# Patient Record
Sex: MALE | Race: WHITE | NOT HISPANIC OR LATINO | Employment: OTHER | URBAN - METROPOLITAN AREA
[De-identification: names, ages, dates, MRNs, and addresses within clinical notes are randomized per-mention and may not be internally consistent; named-entity substitution may affect disease eponyms.]

---

## 2019-12-02 ENCOUNTER — NURSE TRIAGE (OUTPATIENT)
Dept: ADMINISTRATIVE | Facility: CLINIC | Age: 44
End: 2019-12-02

## 2019-12-02 NOTE — TELEPHONE ENCOUNTER
"Pt calls with reports of tingling in hands and feet, vision changes, vertigo, mild confusion and "mental fogginess," and headache. Per triage protocol, pt advised to go to ED within the hour. Pt verbalized understanding.    Reason for Disposition   Headache (with neurologic deficit)    Additional Information   Negative: Difficult to awaken or acting confused (e.g., disoriented, slurred speech)   Negative: New neurologic deficit that is present NOW, sudden onset of ANY of the following: * Weakness of the face, arm, or leg on one side of the body * Numbness of the face, arm, or leg on one side of the body * Loss of speech or garbled speech   Negative: Sounds like a life-threatening emergency to the triager   Negative: Confusion, disorientation, or hallucinations is the main symptom   Negative: Dizziness is the main symptom   Negative: Followed a head injury within last 3 days    Protocols used: NEUROLOGIC DEFICIT-A-OH      "

## 2019-12-03 ENCOUNTER — LAB VISIT (OUTPATIENT)
Dept: LAB | Facility: OTHER | Age: 44
End: 2019-12-03
Attending: INTERNAL MEDICINE
Payer: COMMERCIAL

## 2019-12-03 ENCOUNTER — OFFICE VISIT (OUTPATIENT)
Dept: INTERNAL MEDICINE | Facility: CLINIC | Age: 44
End: 2019-12-03
Attending: INTERNAL MEDICINE
Payer: COMMERCIAL

## 2019-12-03 VITALS
SYSTOLIC BLOOD PRESSURE: 118 MMHG | BODY MASS INDEX: 27.36 KG/M2 | HEART RATE: 74 BPM | HEIGHT: 64 IN | DIASTOLIC BLOOD PRESSURE: 68 MMHG | WEIGHT: 160.25 LBS | OXYGEN SATURATION: 97 %

## 2019-12-03 DIAGNOSIS — R42 DIZZINESS: ICD-10-CM

## 2019-12-03 DIAGNOSIS — R41.3 MEMORY LOSS: ICD-10-CM

## 2019-12-03 DIAGNOSIS — H53.2 DIPLOPIA: ICD-10-CM

## 2019-12-03 DIAGNOSIS — Z00.00 ANNUAL PHYSICAL EXAM: Primary | ICD-10-CM

## 2019-12-03 DIAGNOSIS — G62.9 NEUROPATHY: ICD-10-CM

## 2019-12-03 DIAGNOSIS — Z00.00 ANNUAL PHYSICAL EXAM: ICD-10-CM

## 2019-12-03 DIAGNOSIS — R45.89 ANXIETY ABOUT HEALTH: ICD-10-CM

## 2019-12-03 LAB
ALBUMIN SERPL BCP-MCNC: 4.4 G/DL (ref 3.5–5.2)
ALP SERPL-CCNC: 67 U/L (ref 55–135)
ALT SERPL W/O P-5'-P-CCNC: 56 U/L (ref 10–44)
ANION GAP SERPL CALC-SCNC: 12 MMOL/L (ref 8–16)
AST SERPL-CCNC: 29 U/L (ref 10–40)
BASOPHILS # BLD AUTO: 0.04 K/UL (ref 0–0.2)
BASOPHILS NFR BLD: 0.6 % (ref 0–1.9)
BILIRUB SERPL-MCNC: 0.7 MG/DL (ref 0.1–1)
BUN SERPL-MCNC: 12 MG/DL (ref 6–20)
CALCIUM SERPL-MCNC: 9.6 MG/DL (ref 8.7–10.5)
CHLORIDE SERPL-SCNC: 105 MMOL/L (ref 95–110)
CHOLEST SERPL-MCNC: 194 MG/DL (ref 120–199)
CHOLEST/HDLC SERPL: 5.1 {RATIO} (ref 2–5)
CO2 SERPL-SCNC: 25 MMOL/L (ref 23–29)
CREAT SERPL-MCNC: 1 MG/DL (ref 0.5–1.4)
DIFFERENTIAL METHOD: NORMAL
EOSINOPHIL # BLD AUTO: 0.1 K/UL (ref 0–0.5)
EOSINOPHIL NFR BLD: 1.4 % (ref 0–8)
ERYTHROCYTE [DISTWIDTH] IN BLOOD BY AUTOMATED COUNT: 12.6 % (ref 11.5–14.5)
EST. GFR  (AFRICAN AMERICAN): >60 ML/MIN/1.73 M^2
EST. GFR  (NON AFRICAN AMERICAN): >60 ML/MIN/1.73 M^2
ESTIMATED AVG GLUCOSE: 114 MG/DL (ref 68–131)
GLUCOSE SERPL-MCNC: 83 MG/DL (ref 70–110)
HBA1C MFR BLD HPLC: 5.6 % (ref 4–5.6)
HCT VFR BLD AUTO: 47.6 % (ref 40–54)
HDLC SERPL-MCNC: 38 MG/DL (ref 40–75)
HDLC SERPL: 19.6 % (ref 20–50)
HGB BLD-MCNC: 15.5 G/DL (ref 14–18)
IMM GRANULOCYTES # BLD AUTO: 0.01 K/UL (ref 0–0.04)
IMM GRANULOCYTES NFR BLD AUTO: 0.2 % (ref 0–0.5)
LDLC SERPL CALC-MCNC: 122.6 MG/DL (ref 63–159)
LYMPHOCYTES # BLD AUTO: 1.5 K/UL (ref 1–4.8)
LYMPHOCYTES NFR BLD: 23 % (ref 18–48)
MCH RBC QN AUTO: 28.4 PG (ref 27–31)
MCHC RBC AUTO-ENTMCNC: 32.6 G/DL (ref 32–36)
MCV RBC AUTO: 87 FL (ref 82–98)
MONOCYTES # BLD AUTO: 0.7 K/UL (ref 0.3–1)
MONOCYTES NFR BLD: 10.5 % (ref 4–15)
NEUTROPHILS # BLD AUTO: 4.1 K/UL (ref 1.8–7.7)
NEUTROPHILS NFR BLD: 64.3 % (ref 38–73)
NONHDLC SERPL-MCNC: 156 MG/DL
NRBC BLD-RTO: 0 /100 WBC
PLATELET # BLD AUTO: 259 K/UL (ref 150–350)
PMV BLD AUTO: 9.8 FL (ref 9.2–12.9)
POTASSIUM SERPL-SCNC: 4 MMOL/L (ref 3.5–5.1)
PROT SERPL-MCNC: 8 G/DL (ref 6–8.4)
RBC # BLD AUTO: 5.46 M/UL (ref 4.6–6.2)
SODIUM SERPL-SCNC: 142 MMOL/L (ref 136–145)
TRIGL SERPL-MCNC: 167 MG/DL (ref 30–150)
TSH SERPL DL<=0.005 MIU/L-ACNC: 0.42 UIU/ML (ref 0.4–4)
VIT B12 SERPL-MCNC: 308 PG/ML (ref 210–950)
WBC # BLD AUTO: 6.3 K/UL (ref 3.9–12.7)

## 2019-12-03 PROCEDURE — 80061 LIPID PANEL: CPT

## 2019-12-03 PROCEDURE — 84443 ASSAY THYROID STIM HORMONE: CPT

## 2019-12-03 PROCEDURE — 36415 COLL VENOUS BLD VENIPUNCTURE: CPT

## 2019-12-03 PROCEDURE — 86592 SYPHILIS TEST NON-TREP QUAL: CPT

## 2019-12-03 PROCEDURE — 99999 PR PBB SHADOW E&M-EST. PATIENT-LVL IV: ICD-10-PCS | Mod: PBBFAC,,, | Performed by: INTERNAL MEDICINE

## 2019-12-03 PROCEDURE — 84425 ASSAY OF VITAMIN B-1: CPT

## 2019-12-03 PROCEDURE — 85025 COMPLETE CBC W/AUTO DIFF WBC: CPT

## 2019-12-03 PROCEDURE — 82607 VITAMIN B-12: CPT

## 2019-12-03 PROCEDURE — 84207 ASSAY OF VITAMIN B-6: CPT

## 2019-12-03 PROCEDURE — 99386 PREV VISIT NEW AGE 40-64: CPT | Mod: S$GLB,,, | Performed by: INTERNAL MEDICINE

## 2019-12-03 PROCEDURE — 80053 COMPREHEN METABOLIC PANEL: CPT

## 2019-12-03 PROCEDURE — 99386 PR PREVENTIVE VISIT,NEW,40-64: ICD-10-PCS | Mod: S$GLB,,, | Performed by: INTERNAL MEDICINE

## 2019-12-03 PROCEDURE — 99999 PR PBB SHADOW E&M-EST. PATIENT-LVL IV: CPT | Mod: PBBFAC,,, | Performed by: INTERNAL MEDICINE

## 2019-12-03 PROCEDURE — 83036 HEMOGLOBIN GLYCOSYLATED A1C: CPT

## 2019-12-03 NOTE — PROGRESS NOTES
"Subjective:       Patient ID: Rhett Quarles is a 44 y.o. male.    Chief Complaint: Establish Care; Numbness; and Blurred Vision    Here for annual exam    Since oct 30th he developed, "trouble with my brain." Trouble seeing.  A lot of dizziness and vertigo. Serious brain fog. 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 Stopped driving 2 months prior due to dizziness sensation. Bilateral hand numbness that often occurs at night and numbness of feet that occurs while seated. Dizziness triggered by change in vision. 5-6 times in past 10 years. One eye closed helps symptoms, frequent headaches, bright flashes in far periphery of symptoms. He notes his peripheral vision is very sensitive and he has been hyper-aware of items in his periphery. HA are frontal, associated photophobia where he has trouble keeping eyes open.    Cough for the past 2 weeks.     Allergic rhinitis        Review of Systems   Constitutional: Positive for fatigue. Negative for appetite change, chills, fever and unexpected weight change.   HENT: Negative for hearing loss, sore throat and trouble swallowing.    Eyes: Positive for photophobia and visual disturbance.   Respiratory: Negative for cough, chest tightness and shortness of breath.    Cardiovascular: Negative for chest pain and leg swelling.   Gastrointestinal: Negative for abdominal pain, blood in stool, constipation, diarrhea, nausea and vomiting.   Endocrine: Negative for polydipsia and polyuria.   Genitourinary: Negative for decreased urine volume, difficulty urinating, dysuria, frequency and urgency.   Musculoskeletal: Positive for gait problem and neck stiffness. Negative for arthralgias, back pain, myalgias and neck pain.   Skin: Negative for rash.   Neurological: Positive for dizziness, light-headedness, numbness and headaches.   Psychiatric/Behavioral: The patient is nervous/anxious.        History reviewed. No pertinent past medical history.  History reviewed. No " "pertinent surgical history.  History reviewed. No pertinent family history.  Social History     Socioeconomic History    Marital status:      Spouse name: Not on file    Number of children: Not on file    Years of education: Not on file    Highest education level: Not on file   Occupational History    Not on file   Social Needs    Financial resource strain: Not on file    Food insecurity:     Worry: Not on file     Inability: Not on file    Transportation needs:     Medical: Not on file     Non-medical: Not on file   Tobacco Use    Smoking status: Never Smoker    Smokeless tobacco: Never Used   Substance and Sexual Activity    Alcohol use: Yes    Drug use: Never    Sexual activity: Yes     Partners: Male   Lifestyle    Physical activity:     Days per week: Not on file     Minutes per session: Not on file    Stress: Not on file   Relationships    Social connections:     Talks on phone: Not on file     Gets together: Not on file     Attends Mor[employer] service: Not on file     Active member of club or organization: Not on file     Attends meetings of clubs or organizations: Not on file     Relationship status: Not on file   Other Topics Concern    Not on file   Social History Narrative    Not on file         Objective:      Vitals:    12/03/19 1550   BP: 118/68   Pulse: 74   SpO2: 97%   Weight: 72.7 kg (160 lb 4.4 oz)   Height: 5' 4" (1.626 m)      Physical Exam   Constitutional: He is oriented to person, place, and time. He appears well-developed and well-nourished. No distress.   HENT:   Head: Normocephalic and atraumatic.   Mouth/Throat: Oropharynx is clear and moist. No oropharyngeal exudate.   Eyes: Pupils are equal, round, and reactive to light. Conjunctivae and EOM are normal. No scleral icterus.   Neck: No thyromegaly present.   Cardiovascular: Normal rate, regular rhythm and normal heart sounds.   No murmur heard.  Pulmonary/Chest: Effort normal and breath sounds normal. He has no " wheezes. He has no rales.   Abdominal: Soft. He exhibits no distension. There is no tenderness.   Musculoskeletal: He exhibits no edema or tenderness.   Lymphadenopathy:     He has no cervical adenopathy.   Neurological: He is alert and oriented to person, place, and time. He has normal strength. He displays no tremor. No cranial nerve deficit or sensory deficit. He exhibits normal muscle tone. He displays a negative Romberg sign. He displays no seizure activity. Coordination normal.   Reflex Scores:       Brachioradialis reflexes are 2+ on the right side and 2+ on the left side.       Patellar reflexes are 2+ on the right side and 2+ on the left side.  No pronator drift; FTN intact   Skin: Skin is warm and dry.   Psychiatric: He has a normal mood and affect. His behavior is normal.       Assessment:       1. Annual physical exam    2. Diplopia    3. Memory loss    4. Neuropathy    5. Anxiety about health    6. Dizziness        Plan:       Rhett was seen today for establish care, numbness and blurred vision.    Diagnoses and all orders for this visit:    Annual physical exam  -     Comprehensive metabolic panel; Future  -     Lipid panel; Future  -     TSH; Future  -     CBC auto differential; Future  -     Hemoglobin A1c; Future    Diplopia   Need to r/o intrinsic eye pathology. CT to r/o mass affect. Will defer to neuro the extent of imaging moving forward for all neuro symptoms (MRI brain vs MS protocol?)  -     CT Head Without Contrast; Future  -     Ambulatory Referral to Neurology  -     Ambulatory Referral to Optometry    Memory loss  -     Vitamin B12; Future  -     Vitamin B1; Future    Neuropathy  -     TSH; Future  -     Hemoglobin A1c; Future  -     Vitamin B12; Future  -     Vitamin B1; Future  -     VITAMIN B6; Future  -     RPR; Future    Anxiety about health    Dizziness       RTC in 6 months or sooner prn       Side effects of medication(s) were discussed in detail and patient voiced understanding.   Patient will call back for any issues or complications.

## 2019-12-04 ENCOUNTER — PATIENT MESSAGE (OUTPATIENT)
Dept: INTERNAL MEDICINE | Facility: CLINIC | Age: 44
End: 2019-12-04

## 2019-12-04 ENCOUNTER — HOSPITAL ENCOUNTER (OUTPATIENT)
Dept: RADIOLOGY | Facility: OTHER | Age: 44
Discharge: HOME | End: 2019-12-04
Attending: INTERNAL MEDICINE
Payer: COMMERCIAL

## 2019-12-04 DIAGNOSIS — H53.2 DIPLOPIA: ICD-10-CM

## 2019-12-04 PROCEDURE — 70450 CT HEAD/BRAIN W/O DYE: CPT | Mod: 26,,, | Performed by: RADIOLOGY

## 2019-12-04 PROCEDURE — 70450 CT HEAD WITHOUT CONTRAST: ICD-10-PCS | Mod: 26,,, | Performed by: RADIOLOGY

## 2019-12-04 PROCEDURE — 70450 CT HEAD/BRAIN W/O DYE: CPT | Mod: TC

## 2019-12-05 LAB — RPR SER QL: NORMAL

## 2019-12-09 LAB
PYRIDOXAL SERPL-MCNC: 10 UG/L (ref 5–50)
VIT B1 BLD-MCNC: 62 UG/L (ref 38–122)

## 2019-12-11 ENCOUNTER — OFFICE VISIT (OUTPATIENT)
Dept: OPTOMETRY | Facility: CLINIC | Age: 44
End: 2019-12-11
Payer: COMMERCIAL

## 2019-12-11 DIAGNOSIS — H53.2 DIPLOPIA: Primary | ICD-10-CM

## 2019-12-11 DIAGNOSIS — H53.50 COLOR DEFICIENCY: ICD-10-CM

## 2019-12-11 PROCEDURE — 92004 PR EYE EXAM, NEW PATIENT,COMPREHESV: ICD-10-PCS | Mod: S$GLB,,, | Performed by: OPTOMETRIST

## 2019-12-11 PROCEDURE — 99999 PR PBB SHADOW E&M-EST. PATIENT-LVL II: ICD-10-PCS | Mod: PBBFAC,,, | Performed by: OPTOMETRIST

## 2019-12-11 PROCEDURE — 99999 PR PBB SHADOW E&M-EST. PATIENT-LVL II: CPT | Mod: PBBFAC,,, | Performed by: OPTOMETRIST

## 2019-12-11 PROCEDURE — 92004 COMPRE OPH EXAM NEW PT 1/>: CPT | Mod: S$GLB,,, | Performed by: OPTOMETRIST

## 2019-12-11 NOTE — LETTER
December 12, 2019      Bryce Robert MD  [address]  [address]  [facility] [address]           [facility]  [address]  [facility] [address]  Phone: [phone]          Patient: Rhett Quarles   MR Number: [id number]   YOB: 1975   Date of Visit: 12/11/2019       Dear Dr. Bryce Robert:    Thank you for referring Rhett Quarles to me for evaluation. Attached you will find relevant portions of my assessment and plan of care.    If you have questions, please do not hesitate to call me. I look forward to following Rhett Quarles along with you.    Sincerely,    Mabel Sharma, OD    Enclosure  CC:  No Recipients    If you would like to receive this communication electronically, please contact [email or web address] or [phone] to request more information on [employer] Link access.    For providers and/or their staff who would like to refer a patient to [facility], please contact us through our one-stop-shop provider referral line, [facility], at [phone].    If you feel you have received this communication in error or would no longer like to receive these types of communications, please e-mail [email or web address]

## 2019-12-12 ENCOUNTER — CLINICAL SUPPORT (OUTPATIENT)
Dept: OPHTHALMOLOGY | Facility: CLINIC | Age: 44
End: 2019-12-12
Attending: OPTOMETRIST
Payer: COMMERCIAL

## 2019-12-12 DIAGNOSIS — H53.2 DIPLOPIA: ICD-10-CM

## 2019-12-12 PROCEDURE — 92133 CPTRZD OPH DX IMG PST SGM ON: CPT | Mod: S$GLB,,, | Performed by: OPTOMETRIST

## 2019-12-12 PROCEDURE — 92133 OCT, OPTIC NERVE - OU - BOTH EYES: ICD-10-PCS | Mod: S$GLB,,, | Performed by: OPTOMETRIST

## 2019-12-12 NOTE — PROGRESS NOTES
"HPI     Ocular health exam per PCP   KEN - over 15 yrs ago     Having dizziness and vertigo consistent x1 mo feels like eyes are shifting   around and not working together. "Eyes feel like they wont stop moving"    had to stop driving while trying to figure out what's going on.  Hx of glasses at near. Wearing w/ prism, 16 yrs old wears for reading   vision only.   Sharp Stabbing as well as pressure pain in OS started around the same time   as the vertigo x1 mo and is very random a few times a day.   Has double va when looking at near only while relaxed but can self correct   it (side to side). Occurring for many years. Hurts to look at near when   not wearing glasses.   Has neuro appt next week.    Double va has been since last 16 year old exam and occasional vertigo   since but nothing consistent since 1 mo ago.   (+)Flashes OS in am x 1 mo (+)Floaters  (-)Itch, (-)tear, (-)burn, (-)Dryness.  (-) OTC Drops-  (+)Photophobia no more than normal   (-)Glare      No eye disease and no famohx     No past eye sx     -DM -HTN     No medication have been started in regards to vertigo. CT WNL. Seeing   neuro next week.   Sensitive to things in peripheral vision.   Feels like things are constantly moving in vision.     Last edited by Mabel Sharma, OD on 12/12/2019  4:09 PM. (History)            Assessment /Plan     For exam results, see Encounter Report.    Diplopia  -     OCT, Optic Nerve - OU - Both Eyes; Future    Color deficiency      H/o of diplopia at near for ~15 years. Uses +1.00 reading glasses with BI prism, which eliminates diplopia at near. Near diplopia has not worsened since vertigo episode. (-)diplopia in the distance.   Recent onset of vertigo and dizziness with the sensation of "swimming". Very sensitive to objects or movement in peripheral vision. No h/o concussion.   (-)pain on eye movements. (-)new diplopia. (-)optic nerve edema or pallor. 20/20 OD, OS, OU. J1 at near with and without SRx. RNFL OCT " performed, WNL OU.    H/o color deficiency since childhood. Pt reports he has done poorly on the color vision test in the past. OD = OS.       Continue care/management/treatment with neuro in regards to new onset vertigo. No ocular pathology to explain symptoms. Diplopia at near longstanding and corrected with prim due to binocular misalignment.     RTC for glasses check if so desired or yearly for DFE or sooner if needed.

## 2019-12-18 ENCOUNTER — LAB VISIT (OUTPATIENT)
Dept: LAB | Facility: HOSPITAL | Age: 44
End: 2019-12-18
Attending: NEUROMUSCULOSKELETAL MEDICINE & OMM
Payer: COMMERCIAL

## 2019-12-18 ENCOUNTER — OFFICE VISIT (OUTPATIENT)
Dept: NEUROLOGY | Facility: CLINIC | Age: 44
End: 2019-12-18
Payer: COMMERCIAL

## 2019-12-18 VITALS
BODY MASS INDEX: 27.21 KG/M2 | WEIGHT: 159.38 LBS | SYSTOLIC BLOOD PRESSURE: 136 MMHG | HEART RATE: 60 BPM | HEIGHT: 64 IN | DIASTOLIC BLOOD PRESSURE: 86 MMHG

## 2019-12-18 DIAGNOSIS — R53.83 OTHER FATIGUE: ICD-10-CM

## 2019-12-18 DIAGNOSIS — H53.2 DIPLOPIA: ICD-10-CM

## 2019-12-18 DIAGNOSIS — H53.2 DIPLOPIA: Primary | ICD-10-CM

## 2019-12-18 LAB
25(OH)D3+25(OH)D2 SERPL-MCNC: 28 NG/ML (ref 30–96)
ERYTHROCYTE [SEDIMENTATION RATE] IN BLOOD BY WESTERGREN METHOD: 5 MM/HR (ref 0–23)
FOLATE SERPL-MCNC: 8 NG/ML (ref 4–24)

## 2019-12-18 PROCEDURE — 83519 RIA NONANTIBODY: CPT

## 2019-12-18 PROCEDURE — 3008F BODY MASS INDEX DOCD: CPT | Mod: CPTII,S$GLB,, | Performed by: NEUROMUSCULOSKELETAL MEDICINE & OMM

## 2019-12-18 PROCEDURE — 86235 NUCLEAR ANTIGEN ANTIBODY: CPT

## 2019-12-18 PROCEDURE — 99204 PR OFFICE/OUTPT VISIT, NEW, LEVL IV, 45-59 MIN: ICD-10-PCS | Mod: S$GLB,,, | Performed by: NEUROMUSCULOSKELETAL MEDICINE & OMM

## 2019-12-18 PROCEDURE — 86038 ANTINUCLEAR ANTIBODIES: CPT

## 2019-12-18 PROCEDURE — 86147 CARDIOLIPIN ANTIBODY EA IG: CPT

## 2019-12-18 PROCEDURE — 36415 COLL VENOUS BLD VENIPUNCTURE: CPT | Mod: PO

## 2019-12-18 PROCEDURE — 85652 RBC SED RATE AUTOMATED: CPT

## 2019-12-18 PROCEDURE — 82306 VITAMIN D 25 HYDROXY: CPT

## 2019-12-18 PROCEDURE — 99204 OFFICE O/P NEW MOD 45 MIN: CPT | Mod: S$GLB,,, | Performed by: NEUROMUSCULOSKELETAL MEDICINE & OMM

## 2019-12-18 PROCEDURE — 84165 PROTEIN E-PHORESIS SERUM: CPT

## 2019-12-18 PROCEDURE — 84165 PROTEIN E-PHORESIS SERUM: CPT | Mod: 26,,, | Performed by: PATHOLOGY

## 2019-12-18 PROCEDURE — 99999 PR PBB SHADOW E&M-EST. PATIENT-LVL III: CPT | Mod: PBBFAC,,, | Performed by: NEUROMUSCULOSKELETAL MEDICINE & OMM

## 2019-12-18 PROCEDURE — 99999 PR PBB SHADOW E&M-EST. PATIENT-LVL III: ICD-10-PCS | Mod: PBBFAC,,, | Performed by: NEUROMUSCULOSKELETAL MEDICINE & OMM

## 2019-12-18 PROCEDURE — 86618 LYME DISEASE ANTIBODY: CPT

## 2019-12-18 PROCEDURE — 82746 ASSAY OF FOLIC ACID SERUM: CPT

## 2019-12-18 PROCEDURE — 84165 PATHOLOGIST INTERPRETATION SPE: ICD-10-PCS | Mod: 26,,, | Performed by: PATHOLOGY

## 2019-12-18 PROCEDURE — 3008F PR BODY MASS INDEX (BMI) DOCUMENTED: ICD-10-PCS | Mod: CPTII,S$GLB,, | Performed by: NEUROMUSCULOSKELETAL MEDICINE & OMM

## 2019-12-18 NOTE — PROGRESS NOTES
This note was dictated with Modal Fluency a word recognition program. There are occasionally word recognition errors which are missed on review.  Rhett Ng Willam  1975  Review of patient's allergies indicates:  No Known Allergies  [unfilled]    No past medical history on file.  Social History     Socioeconomic History    Marital status:      Spouse name: Not on file    Number of children: Not on file    Years of education: Not on file    Highest education level: Not on file   Occupational History    Not on file   Social Needs    Financial resource strain: Not on file    Food insecurity:     Worry: Not on file     Inability: Not on file    Transportation needs:     Medical: Not on file     Non-medical: Not on file   Tobacco Use    Smoking status: Never Smoker    Smokeless tobacco: Never Used   Substance and Sexual Activity    Alcohol use: Yes    Drug use: Never    Sexual activity: Yes     Partners: Male   Lifestyle    Physical activity:     Days per week: Not on file     Minutes per session: Not on file    Stress: Not on file   Relationships    Social connections:     Talks on phone: Not on file     Gets together: Not on file     Attends Baptism service: Not on file     Active member of club or organization: Not on file     Attends meetings of clubs or organizations: Not on file     Relationship status: Not on file   Other Topics Concern    Not on file   Social History Narrative    Not on file     No family history on file.    Review of systems:  Constitutional-negative  Eyes-negative  ENT, mouth-negative  Cardiovascular-negative  Respiratory-negative  GI-negative  - negative  Musculoskeletal-negative  Skin-negative  Neurologic-negative  Psychiatric-negative  Endocrine-negative  Hematology/lymph nodes-negative  Allergies/immunology-negative  Rhett Ng Willam  1975  Review of patient's allergies indicates:  No Known Allergies  [unfilled]    No past medical  history on file.  Social History     Socioeconomic History    Marital status:      Spouse name: Not on file    Number of children: Not on file    Years of education: Not on file    Highest education level: Not on file   Occupational History    Not on file   Social Needs    Financial resource strain: Not on file    Food insecurity:     Worry: Not on file     Inability: Not on file    Transportation needs:     Medical: Not on file     Non-medical: Not on file   Tobacco Use    Smoking status: Never Smoker    Smokeless tobacco: Never Used   Substance and Sexual Activity    Alcohol use: Yes    Drug use: Never    Sexual activity: Yes     Partners: Male   Lifestyle    Physical activity:     Days per week: Not on file     Minutes per session: Not on file    Stress: Not on file   Relationships    Social connections:     Talks on phone: Not on file     Gets together: Not on file     Attends Zoroastrianism service: Not on file     Active member of club or organization: Not on file     Attends meetings of clubs or organizations: Not on file     Relationship status: Not on file   Other Topics Concern    Not on file   Social History Narrative    Not on file     No family history on file.    Review of systems:  Constitutional-negative  Eyes-negative  ENT, mouth-negative  Cardiovascular-negative  Respiratory-negative  GI-negative  - negative  Musculoskeletal-negative  Skin-negative  Neurologic-negative  Psychiatric-negative  Endocrine-negative  Hematology/lymph nodes-negative  Allergies/immunology-negative  Gen. Appearance: Well-developed with no obvious deformities  Carotid arteries symmetrical pulses  Peripheral vascular shows symmetrical pulses with no obvious edema or tenderness  Social History :  Patient is a .  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  Present history:   This is a 44-year-old anxious white male who presents with a history of multiple neurologic symptoms including diplopia,  spinning dizziness, and numbness in the feet.  He has problems with fatigue. He has had frequent spells of the dizziness associated with double vision.  The double vision is persistent and is more present possibly on the left lateral days versus right.  He feels the spell started around October 30th.  He has not had an MRI of the brain recently although reportedly had a CT scan that was negative. He relates probably 8-9 spells in the last 10-15 years.    Neurological Exam:  Mental status-alert and oriented to person, place, and time; attention span and concentration is good. Fund of knowledge-patient is aware of current events and able to give detailed history of the current problem.recent and remote memory seems intact. Language function is normal with no evidence of aphasia  Cranial nerves:Visual acuity to hand chart -normal; visual fields to confrontation normal;pupils were equal and reactive to light ;no evidence of ptosis ;  funduscopic examination was normal with sharp disc margins. external ocular movements were full with no nystagmus. He does have left and right lateral gaze diplopia at close distance.  Facial sensation to pinprick : normal ; corneal reflexes intact; Facial muscles were symmetrical. Hearing is unimpaired symmetrical finger rub; Tongue movements - normal ; palate movements - normal ;Swallowing unimpaired. Shoulder shrug was intact with good strength Speech was normal  Motor examination: Upper : normal                                      Lower extremities - Normal;muscle tone was normal ;                  Right-handed  Sensory examination:   Upper; normal pinprick and soft touch ;   Lower extremities - normal and symmetrical.   Vibration sense: 15-20 seconds @ toes  Deep tendon reflexes: upper extremities :1-2+ symmetrical ;     lower extremities KJ- 1-2 +; AJ - 1-2+ Both plantar responses were flexor  Cerebellar examination upper: Normal finger to nose and rapid alternating movements  Gait:  Steady with no ataxia;      heel and toe walk normal  Romberg test: negative       Tandem gait: Normal    Involuntary movements: Negative  TMJ - no tenderness  Cervical examination: Full range of motion with no pain Cervical tenderness :negative  Lumbar examination: Low back tenderness-negative                  Sciatic notchtenderness-negative            Straight leg raising : negative    Impression:  Diplopia,  vertigo, fatigue    Recommendations/Plan :  Long discussion with the patient in reference to etiology of his symptoms.  He is quite concerned that he might have multiple sclerosis.  He has not had an MRI of the brain recently so we will order that with and without contrast.  I have emphasized the importance of a negative CAT scan ruling out any serious tumors or strokes.  Patient continues to be extremely anxious over his medical condition.  We will also order blood work to rule out myasthenia gravis and autoimmune disorders.

## 2019-12-18 NOTE — LETTER
December 18, 2019      Bryce Robert MD  [address]  [address]  [facility] [address]           [facility]  [address] [facility]  [facility] [place] [address]  Phone: [phone]          Patient: Rhett Quarles   MR Number: [id number]   YOB: 1975   Date of Visit: 12/18/2019       Dear Dr. Bryce Robert:    Thank you for referring Rhett Quarles to me for evaluation. Attached you will find relevant portions of my assessment and plan of care.    If you have questions, please do not hesitate to call me. I look forward to following Rhett Quarles along with you.    Sincerely,    Aakash Mora MD    Enclosure  CC:  No Recipients    If you would like to receive this communication electronically, please contact [email or web address] or [phone] to request more information on [employer] access.    For providers and/or their staff who would like to refer a patient to [facility], please contact us through our one-stop-shop provider referral line, [facility] [place], at [phone].    If you feel you have received this communication in error or would no longer like to receive these types of communications, please e-mail [email or web address]

## 2019-12-19 LAB
ALBUMIN SERPL ELPH-MCNC: 4.32 G/DL (ref 3.35–5.55)
ALPHA1 GLOB SERPL ELPH-MCNC: 0.26 G/DL (ref 0.17–0.41)
ALPHA2 GLOB SERPL ELPH-MCNC: 0.61 G/DL (ref 0.43–0.99)
ANA SER QL IF: NORMAL
B-GLOBULIN SERPL ELPH-MCNC: 0.87 G/DL (ref 0.5–1.1)
GAMMA GLOB SERPL ELPH-MCNC: 0.84 G/DL (ref 0.67–1.58)
PATHOLOGIST INTERPRETATION SPE: NORMAL
PROT SERPL-MCNC: 6.9 G/DL (ref 6–8.4)

## 2019-12-20 LAB
B BURGDOR AB SER IA-ACNC: 0.2 INDEX VALUE
CARDIOLIPIN IGG SER IA-ACNC: <9.4 GPL (ref 0–14.99)
CARDIOLIPIN IGM SER IA-ACNC: <9.4 MPL (ref 0–12.49)

## 2019-12-21 LAB
ANTI-SSA ANTIBODY: 0.08 EU (ref 0–19.99)
ANTI-SSA INTERPRETATION: NEGATIVE

## 2019-12-23 LAB — ACHR BIND AB SER-SCNC: 0 NMOL/L

## 2019-12-26 ENCOUNTER — PATIENT MESSAGE (OUTPATIENT)
Dept: NEUROLOGY | Facility: CLINIC | Age: 44
End: 2019-12-26

## 2020-01-03 ENCOUNTER — HOSPITAL ENCOUNTER (OUTPATIENT)
Dept: RADIOLOGY | Facility: OTHER | Age: 45
Discharge: HOME | End: 2020-01-03
Attending: NEUROMUSCULOSKELETAL MEDICINE & OMM
Payer: COMMERCIAL

## 2020-01-03 DIAGNOSIS — H53.2 DIPLOPIA: ICD-10-CM

## 2020-01-03 PROCEDURE — 70553 MRI PITUITARY W W/O CONTRAST: ICD-10-PCS | Mod: 26,,, | Performed by: RADIOLOGY

## 2020-01-03 PROCEDURE — 70553 MRI BRAIN STEM W/O & W/DYE: CPT | Mod: TC

## 2020-01-03 PROCEDURE — A9585 GADOBUTROL INJECTION: HCPCS | Performed by: NEUROMUSCULOSKELETAL MEDICINE & OMM

## 2020-01-03 PROCEDURE — 70553 MRI BRAIN STEM W/O & W/DYE: CPT | Mod: 26,,, | Performed by: RADIOLOGY

## 2020-01-03 PROCEDURE — 25500020 PHARM REV CODE 255: Performed by: NEUROMUSCULOSKELETAL MEDICINE & OMM

## 2020-01-03 RX ORDER — GADOBUTROL 604.72 MG/ML
7 INJECTION INTRAVENOUS
Status: COMPLETED | OUTPATIENT
Start: 2020-01-03 | End: 2020-01-03

## 2020-01-03 RX ADMIN — GADOBUTROL 7 ML: 604.72 INJECTION INTRAVENOUS at 03:01

## 2020-01-06 ENCOUNTER — PATIENT MESSAGE (OUTPATIENT)
Dept: NEUROLOGY | Facility: CLINIC | Age: 45
End: 2020-01-06

## 2020-01-06 DIAGNOSIS — E23.7 PITUITARY LESION: Primary | ICD-10-CM

## 2020-01-06 DIAGNOSIS — H53.2 DIPLOPIA: ICD-10-CM

## 2020-01-07 ENCOUNTER — OFFICE VISIT (OUTPATIENT)
Dept: NEUROLOGY | Facility: CLINIC | Age: 45
End: 2020-01-07
Payer: COMMERCIAL

## 2020-01-07 VITALS
HEIGHT: 64 IN | SYSTOLIC BLOOD PRESSURE: 130 MMHG | BODY MASS INDEX: 27.7 KG/M2 | DIASTOLIC BLOOD PRESSURE: 92 MMHG | HEART RATE: 77 BPM | WEIGHT: 162.25 LBS

## 2020-01-07 DIAGNOSIS — D35.2 PITUITARY ADENOMA: Primary | ICD-10-CM

## 2020-01-07 DIAGNOSIS — R20.0 HAND NUMBNESS: ICD-10-CM

## 2020-01-07 PROCEDURE — 3008F PR BODY MASS INDEX (BMI) DOCUMENTED: ICD-10-PCS | Mod: CPTII,S$GLB,, | Performed by: NEUROMUSCULOSKELETAL MEDICINE & OMM

## 2020-01-07 PROCEDURE — 99215 PR OFFICE/OUTPT VISIT, EST, LEVL V, 40-54 MIN: ICD-10-PCS | Mod: S$GLB,,, | Performed by: NEUROMUSCULOSKELETAL MEDICINE & OMM

## 2020-01-07 PROCEDURE — 99215 OFFICE O/P EST HI 40 MIN: CPT | Mod: S$GLB,,, | Performed by: NEUROMUSCULOSKELETAL MEDICINE & OMM

## 2020-01-07 PROCEDURE — 99999 PR PBB SHADOW E&M-EST. PATIENT-LVL III: ICD-10-PCS | Mod: PBBFAC,,, | Performed by: NEUROMUSCULOSKELETAL MEDICINE & OMM

## 2020-01-07 PROCEDURE — 3008F BODY MASS INDEX DOCD: CPT | Mod: CPTII,S$GLB,, | Performed by: NEUROMUSCULOSKELETAL MEDICINE & OMM

## 2020-01-07 PROCEDURE — 99999 PR PBB SHADOW E&M-EST. PATIENT-LVL III: CPT | Mod: PBBFAC,,, | Performed by: NEUROMUSCULOSKELETAL MEDICINE & OMM

## 2020-01-07 NOTE — PROGRESS NOTES
Patient presents for follow-up accompanied by his wife today MRI brain shows a pituitary lesion suggestive of a microadenoma.  See MRI details in epic.  All blood work has been negative for myasthenia an autoimmune diseases.  Patient continues to complain of some diplopia and blurred vision. He has numbness and tingling in the hands.  He came planes of occasional leg weakness as well.  Previous note:  12-18-19:  This is a 44-year-old anxious white male who presents with a history of multiple neurologic symptoms including diplopia, spinning dizziness, and numbness in the feet.  He has problems with fatigue. He has had frequent spells of the dizziness associated with double vision.  The double vision is persistent and is more present possibly on the left lateral days versus right.  He feels the spell started around October 30th.  He has not had an MRI of the brain recently although reportedly had a CT scan that was negative. He relates probably 8-9 spells in the last 10-15 years.     Neurological Exam:  Mental status-alert and oriented to person, place, and time; attention span and concentration is good. Fund of knowledge-patient is aware of current events and able to give detailed history of the current problem.recent and remote memory seems intact. Language function is normal with no evidence of aphasia  Cranial nerves:Visual acuity to hand chart -normal; visual fields to confrontation normal;pupils were equal and reactive to light ;no evidence of ptosis ;  funduscopic examination was normal with sharp disc margins. external ocular movements were full with no nystagmus. He does have left and right lateral gaze diplopia at close distance only which seems to have improved since last visit..  Facial sensation to pinprick : normal ; corneal reflexes intact; Facial muscles were symmetrical. Hearing is unimpaired symmetrical finger rub; Tongue movements - normal ; palate movements - normal ;Swallowing unimpaired. Shoulder  shrug was intact with good strength Speech was normal  Motor examination: Upper : normal                                      Lower extremities - Normal;muscle tone was normal ;                  Right-handed  Sensory examination:   Upper; normal pinprick and soft touch ;   Lower extremities - normal and symmetrical.   Vibration sense: 15-20 seconds @ toes  Deep tendon reflexes: upper extremities :1-2+ symmetrical ;     lower extremities KJ- 1-2 +; AJ - 1-2+ Both plantar responses were flexor  Cerebellar examination upper: Normal finger to nose and rapid alternating movements  Gait: Steady with no ataxia;      heel and toe walk normal  Romberg test: negative       Tandem gait: Normal    Involuntary movements: Negative  TMJ - no tenderness  Cervical examination: Full range of motion with no pain Cervical tenderness :negative  Lumbar examination: Low back tenderness-negative                  Sciatic notchtenderness-negative            Straight leg raising : negative     Impression:   pituitary lesion on MRI suggestive of microadenoma.  Diplopia,  vertigo, fatigue     Recommendations/Plan :  Long discussion with the patient in reference to  options including Neurosurgery and Endocrinology referral.  We will schedule nerve studies for hand numbness possibly related to carpal tunnel syndrome.  Patient is told to call if he has any further symptoms or aggravation of his double vision. He has already seen the eye doctor with a apparently normal visual fields.  Follow-up with me in 2 months if needed.

## 2020-01-08 ENCOUNTER — TELEPHONE (OUTPATIENT)
Dept: ENDOCRINOLOGY | Facility: CLINIC | Age: 45
End: 2020-01-08

## 2020-01-08 ENCOUNTER — PATIENT MESSAGE (OUTPATIENT)
Dept: NEUROLOGY | Facility: CLINIC | Age: 45
End: 2020-01-08

## 2020-01-08 DIAGNOSIS — D35.2 PITUITARY ADENOMA: Primary | ICD-10-CM

## 2020-01-08 NOTE — TELEPHONE ENCOUNTER
Reached out to Jag Timmons regarding an appointment with Dr Franz in [facility]uitary [facility]. Jag will coordinate appointments in Pituitary will Dr Franz and Dr Argueta.

## 2020-01-09 ENCOUNTER — TELEPHONE (OUTPATIENT)
Dept: ENDOCRINOLOGY | Facility: CLINIC | Age: 45
End: 2020-01-09

## 2020-01-09 NOTE — TELEPHONE ENCOUNTER
Spoke with pt. Scheduled pituitary labs and appts with Daksha Argueta and Osei. Pt in agreement with plan. Will call back with any questions or concerns prior to appts.

## 2020-01-10 ENCOUNTER — LAB VISIT (OUTPATIENT)
Dept: LAB | Facility: OTHER | Age: 45
End: 2020-01-10
Attending: INTERNAL MEDICINE
Payer: COMMERCIAL

## 2020-01-10 DIAGNOSIS — D35.2 PITUITARY ADENOMA: ICD-10-CM

## 2020-01-10 LAB
ALBUMIN SERPL BCP-MCNC: 4.1 G/DL (ref 3.5–5.2)
ALP SERPL-CCNC: 79 U/L (ref 55–135)
ALT SERPL W/O P-5'-P-CCNC: 57 U/L (ref 10–44)
ANION GAP SERPL CALC-SCNC: 11 MMOL/L (ref 8–16)
AST SERPL-CCNC: 29 U/L (ref 10–40)
BILIRUB SERPL-MCNC: 0.7 MG/DL (ref 0.1–1)
BUN SERPL-MCNC: 10 MG/DL (ref 6–20)
CALCIUM SERPL-MCNC: 9.3 MG/DL (ref 8.7–10.5)
CHLORIDE SERPL-SCNC: 104 MMOL/L (ref 95–110)
CO2 SERPL-SCNC: 27 MMOL/L (ref 23–29)
CORTIS SERPL-MCNC: 8.7 UG/DL (ref 4.3–22.4)
CREAT SERPL-MCNC: 0.9 MG/DL (ref 0.5–1.4)
EST. GFR  (AFRICAN AMERICAN): >60 ML/MIN/1.73 M^2
EST. GFR  (NON AFRICAN AMERICAN): >60 ML/MIN/1.73 M^2
FSH SERPL-ACNC: 2 MIU/ML (ref 0.95–11.95)
GLUCOSE SERPL-MCNC: 93 MG/DL (ref 70–110)
LH SERPL-ACNC: 1.2 MIU/ML (ref 0.6–12.1)
POTASSIUM SERPL-SCNC: 4.3 MMOL/L (ref 3.5–5.1)
PROLACTIN SERPL IA-MCNC: 10.8 NG/ML (ref 3.5–19.4)
PROT SERPL-MCNC: 7.5 G/DL (ref 6–8.4)
SODIUM SERPL-SCNC: 142 MMOL/L (ref 136–145)
T4 FREE SERPL-MCNC: 0.97 NG/DL (ref 0.71–1.51)
TESTOST SERPL-MCNC: 228 NG/DL (ref 304–1227)
TSH SERPL DL<=0.005 MIU/L-ACNC: 2.37 UIU/ML (ref 0.4–4)

## 2020-01-10 PROCEDURE — 82533 TOTAL CORTISOL: CPT

## 2020-01-10 PROCEDURE — 82024 ASSAY OF ACTH: CPT

## 2020-01-10 PROCEDURE — 36415 COLL VENOUS BLD VENIPUNCTURE: CPT

## 2020-01-10 PROCEDURE — 84146 ASSAY OF PROLACTIN: CPT

## 2020-01-10 PROCEDURE — 83002 ASSAY OF GONADOTROPIN (LH): CPT

## 2020-01-10 PROCEDURE — 84439 ASSAY OF FREE THYROXINE: CPT

## 2020-01-10 PROCEDURE — 84443 ASSAY THYROID STIM HORMONE: CPT

## 2020-01-10 PROCEDURE — 84305 ASSAY OF SOMATOMEDIN: CPT

## 2020-01-10 PROCEDURE — 84403 ASSAY OF TOTAL TESTOSTERONE: CPT

## 2020-01-10 PROCEDURE — 83001 ASSAY OF GONADOTROPIN (FSH): CPT

## 2020-01-10 PROCEDURE — 80053 COMPREHEN METABOLIC PANEL: CPT

## 2020-01-14 LAB — ACTH PLAS-MCNC: 40 PG/ML (ref 0–46)

## 2020-01-15 LAB
IGF-I SERPL-MCNC: 86 NG/ML (ref 44–275)
IGF-I Z-SCORE SERPL: -1.04 SD

## 2020-01-21 ENCOUNTER — OFFICE VISIT (OUTPATIENT)
Dept: ENDOCRINOLOGY | Facility: CLINIC | Age: 45
End: 2020-01-21
Payer: COMMERCIAL

## 2020-01-21 ENCOUNTER — TELEPHONE (OUTPATIENT)
Dept: NEUROSURGERY | Facility: CLINIC | Age: 45
End: 2020-01-21

## 2020-01-21 ENCOUNTER — OFFICE VISIT (OUTPATIENT)
Dept: NEUROSURGERY | Facility: CLINIC | Age: 45
End: 2020-01-21
Payer: COMMERCIAL

## 2020-01-21 VITALS
HEART RATE: 73 BPM | TEMPERATURE: 99 F | BODY MASS INDEX: 27.4 KG/M2 | SYSTOLIC BLOOD PRESSURE: 142 MMHG | HEART RATE: 73 BPM | SYSTOLIC BLOOD PRESSURE: 142 MMHG | TEMPERATURE: 99 F | WEIGHT: 159.63 LBS | DIASTOLIC BLOOD PRESSURE: 94 MMHG | WEIGHT: 159.63 LBS | DIASTOLIC BLOOD PRESSURE: 94 MMHG | BODY MASS INDEX: 27.4 KG/M2

## 2020-01-21 DIAGNOSIS — D35.2 PITUITARY ADENOMA: ICD-10-CM

## 2020-01-21 DIAGNOSIS — D35.2 PITUITARY ADENOMA: Primary | ICD-10-CM

## 2020-01-21 DIAGNOSIS — Z01.818 PREOPERATIVE TESTING: Primary | ICD-10-CM

## 2020-01-21 DIAGNOSIS — H53.2 DIPLOPIA: ICD-10-CM

## 2020-01-21 PROCEDURE — 99204 PR OFFICE/OUTPT VISIT, NEW, LEVL IV, 45-59 MIN: ICD-10-PCS | Mod: S$GLB,,, | Performed by: NEUROLOGICAL SURGERY

## 2020-01-21 PROCEDURE — 99999 PR PBB SHADOW E&M-EST. PATIENT-LVL IV: ICD-10-PCS | Mod: PBBFAC,,, | Performed by: INTERNAL MEDICINE

## 2020-01-21 PROCEDURE — 3008F PR BODY MASS INDEX (BMI) DOCUMENTED: ICD-10-PCS | Mod: CPTII,S$GLB,, | Performed by: NEUROLOGICAL SURGERY

## 2020-01-21 PROCEDURE — 99999 PR PBB SHADOW E&M-EST. PATIENT-LVL III: CPT | Mod: PBBFAC,,, | Performed by: NEUROLOGICAL SURGERY

## 2020-01-21 PROCEDURE — 3008F BODY MASS INDEX DOCD: CPT | Mod: CPTII,S$GLB,, | Performed by: NEUROLOGICAL SURGERY

## 2020-01-21 PROCEDURE — 3008F PR BODY MASS INDEX (BMI) DOCUMENTED: ICD-10-PCS | Mod: CPTII,S$GLB,, | Performed by: INTERNAL MEDICINE

## 2020-01-21 PROCEDURE — 99999 PR PBB SHADOW E&M-EST. PATIENT-LVL III: ICD-10-PCS | Mod: PBBFAC,,, | Performed by: NEUROLOGICAL SURGERY

## 2020-01-21 PROCEDURE — 99204 OFFICE O/P NEW MOD 45 MIN: CPT | Mod: S$GLB,,, | Performed by: INTERNAL MEDICINE

## 2020-01-21 PROCEDURE — 99204 OFFICE O/P NEW MOD 45 MIN: CPT | Mod: S$GLB,,, | Performed by: NEUROLOGICAL SURGERY

## 2020-01-21 PROCEDURE — 99204 PR OFFICE/OUTPT VISIT, NEW, LEVL IV, 45-59 MIN: ICD-10-PCS | Mod: S$GLB,,, | Performed by: INTERNAL MEDICINE

## 2020-01-21 PROCEDURE — 99999 PR PBB SHADOW E&M-EST. PATIENT-LVL IV: CPT | Mod: PBBFAC,,, | Performed by: INTERNAL MEDICINE

## 2020-01-21 PROCEDURE — 3008F BODY MASS INDEX DOCD: CPT | Mod: CPTII,S$GLB,, | Performed by: INTERNAL MEDICINE

## 2020-01-21 NOTE — ASSESSMENT & PLAN NOTE
Pt has a macroadenoma. Based on the clinical symptoms/ signs, and lab findings, pt doesn't have any evidence of hypersecretion. However the labs suggest hypogonadotrophic hypogonadism.     Is scheduled for transphenoidal resection in February.     -Will check AM cortisol post-op to evaluate for AI. Will need to monitor I/Os closely post-op for possible transient DI.  -F/U in pituitary clinic 4 weeks after surgery for further evaluation (hypothyroidism, and hypogonadism).

## 2020-01-21 NOTE — LETTER
January 21, 2020      Aakash Mora MD  [address] [place] [address]  [place] [address]           [facility] Endocrinology 3rd [place]  [address] [facility] [address]  Phone: [phone]  Fax: [phone]          Patient: Rhett Quarles   MR Number: [id number]   YOB: 1975   Date of Visit: 1/21/2020       Dear Dr. Aakash Mora:    Thank you for referring Rhett Quarles to me for evaluation. Attached you will find relevant portions of my assessment and plan of care.    If you have questions, please do not hesitate to call me. I look forward to following Rhett Quarles along with you.    Sincerely,    Natalia Sanders RN    Enclosure  CC:  No Recipients    If you would like to receive this communication electronically, please contact [email or web address] or [phone] to request more information on [employer] Link access.    For providers and/or their staff who would like to refer a patient to [facility], please contact us through our one-stop-shop provider referral line, [facility] , at [phone].    If you feel you have received this communication in error or would no longer like to receive these types of communications, please e-mail [email or web address]

## 2020-01-21 NOTE — PROGRESS NOTES
PITUITARY CLIN ENDOCRINOLOGY INITIAL VISIT  01/21/2020       Subjective:      Reason for referal: referred by Aakash Mora MD for evaluation and management of pituitary macroadenoma.    HPI:   Rhett Quarles is a 44 y.o. male who presents for evaluation of 2.7 cm pituitary adenoma found on MRI done for diplopia.  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      Initial symptoms include tingling, numbness in the extremities, vertigo, lightheadedness, fatigue, diplopia. The symptoms worsened since Oct, 2019.    Initial presentation:   MRI done for diplopia w/ finding of macroadenoma.    Imaging:      MRI 1/3/2020 - Pituitary is enlarged measuring 2.5 x 2.7 x 1.8 cm with thickening of the infundibulum, consistent with macroadenoma.  The pituitary gland approaches optic nerves particularly on the RIGHT yet does not grossly displace the optic nerves.  There is remodeling of the sella and extension into the sphenoid sinus    Current symptoms:  Headache:    Reports worsening headaches  Vision change:   yes  Formal Visual fields:   No    He denies symptoms of Cushing's syndrome (no unexpected weight gain, stria, easy bruising, HTN, elevated blood sugar), acromegaly (no change in shoe/ring size or spacing of teeth, excess sweating, change in facial appearance), hyperprolactinemia (no gynecomastia or nipple d/c).    Reports fatigue and weakness of the extremities. Denies proximal muscle weakness.     hypogonadotropic hypogonadism:  Low am testosterone of 228.    Reports normal libido  Denies ED.  Not on testosterone replacement  DEXA: not done    History reviewed. No pertinent past medical history.    History reviewed. No pertinent surgical history.    Review of patient's allergies indicates:  No Known Allergies    No current outpatient medications on file.    Social History     Socioeconomic History    Marital status:      Spouse name: Not on file    Number of  children: Not on file    Years of education: Not on file    Highest education level: Not on file   Occupational History    Not on file   Social Needs    Financial resource strain: Not on file    Food insecurity:     Worry: Not on file     Inability: Not on file    Transportation needs:     Medical: Not on file     Non-medical: Not on file   Tobacco Use    Smoking status: Never Smoker    Smokeless tobacco: Never Used   Substance and Sexual Activity    Alcohol use: Yes    Drug use: Never    Sexual activity: Yes     Partners: Female   Lifestyle    Physical activity:     Days per week: Not on file     Minutes per session: Not on file    Stress: Not on file   Relationships    Social connections:     Talks on phone: Not on file     Gets together: Not on file     Attends Church service: Not on file     Active member of club or organization: Not on file     Attends meetings of clubs or organizations: Not on file     Relationship status: Not on file   Other Topics Concern    Not on file   Social History Narrative    Not on file       History reviewed. No pertinent family history.    ROS:  14 point review of systems was reviewed.  Pertinent positives and negatives per HPI     Objective:   Physical Exam   BP (!) 142/94   Pulse 73   Temp 98.6 °F (37 °C) (Oral)   Wt 72.4 kg (159 lb 9.8 oz)   BMI 27.40 kg/m²   Wt Readings from Last 3 Encounters:   01/21/20 72.4 kg (159 lb 9.8 oz)   01/21/20 72.4 kg (159 lb 9.8 oz)   01/07/20 73.6 kg (162 lb 4.1 oz)       Constitutional:  Pleasant, no acute distress.   HENT:   Head:    Normocephalic and atraumatic.   Mouth/Throat:   Oropharynx is clear and moist. No oropharyngeal exudate.   Eyes:    Pupils are equal, round, and reactive to light. No scleral icterus.   Neck:    No thyromegaly or palpable thyroid nodules, no cervical LAD  Cardiovascular:  Normal rate, regular rhythm, no murmurs.  No carotid bruits.  Respiratory:   Effort normal and breath sounds normal.    Gastrointestinal: Soft, nontender  Neurological:  No tremor, normal speech  Skin:    Skin is warm, dry  Psych:  Normal mood and affect.   Extremity:  No edema or wounds    LABORATORY REVIEW:    Chemistry        Component Value Date/Time     01/10/2020 0837    K 4.3 01/10/2020 0837     01/10/2020 0837    CO2 27 01/10/2020 0837    BUN 10 01/10/2020 0837    CREATININE 0.9 01/10/2020 0837    GLU 93 01/10/2020 0837        Component Value Date/Time    CALCIUM 9.3 01/10/2020 0837    ALKPHOS 79 01/10/2020 0837    AST 29 01/10/2020 0837    ALT 57 (H) 01/10/2020 0837    BILITOT 0.7 01/10/2020 0837    ESTGFRAFRICA >60 01/10/2020 0837    EGFRNONAA >60 01/10/2020 0837          Lab Results   Component Value Date    PROLACTIN 10.8 01/10/2020    TSH 2.373 01/10/2020    TSH 0.417 12/03/2019    FREET4 0.97 01/10/2020    ACTH 40 01/10/2020    LABLH 1.2 01/10/2020    FSH 2.00 01/10/2020    SOMATMDN 86 01/10/2020     01/10/2020     12/03/2019    K 4.3 01/10/2020    K 4.0 12/03/2019    CALCIUM 9.3 01/10/2020    CALCIUM 9.6 12/03/2019    ALBUMIN 4.1 01/10/2020    ALBUMIN 4.4 12/03/2019    ESTGFRAFRICA >60 01/10/2020    ESTGFRAFRICA >60 12/03/2019    EGFRNONAA >60 01/10/2020    EGFRNONAA >60 12/03/2019     Results for DION RUBY (MRN [id number]) as of 1/21/2020 09:04   Ref. Range 12/3/2019 16:50 1/10/2020 08:37   Cortisol -8 AM Latest Ref Range: 4.30 - 22.40 ug/dL  8.70   Hemoglobin A1C External Latest Ref Range: 4.0 - 5.6 % 5.6    Estimated Avg Glucose Latest Ref Range: 68 - 131 mg/dL 114    ACTH Latest Ref Range: 0 - 46 pg/mL  40   Somatomedin (IGF-I) Latest Ref Range: 44 - 275 ng/mL  86   TSH Latest Ref Range: 0.400 - 4.000 uIU/mL 0.417 2.373   Free T4 Latest Ref Range: 0.71 - 1.51 ng/dL  0.97   FSH Latest Ref Range: 0.95 - 11.95 mIU/mL  2.00   LH Latest Ref Range: 0.6 - 12.1 mIU/mL  1.2   Prolactin Latest Ref Range: 3.5 - 19.4 ng/mL  10.8   Testosterone, Total Latest Ref Range: 304 - 1227 ng/dL  228  (L)     Imaging:      Assessment/Plan:     Problem List Items Addressed This Visit        Oncology    Pituitary adenoma     Pt has a macroadenoma. Based on the clinical symptoms/ signs, and lab findings, pt doesn't have any evidence of hypersecretion. However the labs suggest hypogonadotrophic hypogonadism.     Is scheduled for transphenoidal resection in February.     -Will check AM cortisol post-op to evaluate for AI. Will need to monitor I/Os closely post-op for possible transient DI.  -F/U in pituitary clinic 4 weeks after surgery for further evaluation (TSH, FT4, IGF-1, testosterone).         Relevant Orders    Ambulatory referral to Ophthalmology    ACTH    Cortisol, 8AM    T4, free    TSH    Insulin-like growth factor    Testosterone    CT Head Stealth W/O Contrast    MRI Brain W WO Contrast        Return to clinic 4 wks after surgery w/ 8 am labs.    Discussed with Dr. Franz

## 2020-01-21 NOTE — PRE-PROCEDURE INSTRUCTIONS
Patient stated has not had any surgery or anesthesia in the past. This will be his first surgery. Will need medical optimization by Dr. Pena as per Dr. Argueta. Will need poc appt, labs, and ua. Our  will call to set up these appts. Preop instructions given. Hold asa, asa containing products, nsaids, vitamins and supplements one week prior to surgery. Verbalizes understanding.

## 2020-01-21 NOTE — ANESTHESIA PAT ROS NOTE
01/21/2020  Rhett Quarles is a 44 y.o., male.      Pre-op Assessment         Review of Systems         Anesthesia Assessment: Preoperative EQUATION    Planned Procedure: Procedure(s) (LRB):  HYPOPHYSECTOMY, TRANSSPHENOIDAL APPROACH (N/A)  Requested Anesthesia Type:General  Surgeon: Kb Argueta MD  Service: Neurosurgery  Known or anticipated Date of Surgery:2/6/2020, date change 2/10/2020    Surgeon notes: reviewed    Electronic QUestionnaire Assessment completed via nurse interview with patient.        Triage considerations:       Previous anesthesia records:THIS IS HIS 1ST SURGERY    Last PCP note: within 3 months , within [facility]   Subspecialty notes: Endocrinology, Neurology, Neurosurgery, OPTOMETRY    Other important co-morbidities:PER Epic:  PITUITARY ADENOMA      Tests already available:  Available tests,  within 3 months , within [facility] .1/10/2020 CMP, TSH,& FT4, 1/3/2020 MRI PITUITARY W W/O CONTRAST, 12/4/2019 CT HEAD W/O CONTRAST, 12/3/2019 HGA1C, & CBC             Instructions given. (See in Nurse's note)    Optimization:  Anesthesia Preop Clinic Assessment  Indicated    Medical Opinion Indicated           Plan:    Testing:  PT/INR, PTT, T&C, T&S and UA   Pre-anesthesia  visit       Visit focus: concerns in complex and/or prolonged anesthesia     Consultation:IM Perioperative Hospitalist     Patient  has previously scheduled Medical Appointment:1/29 [facility], DR. HARRINGTON, OPHTHALMOLOGY    Navigation: Tests Scheduled. TBD             Consults scheduled.TBD             Results will be tracked by Preop Clinic.   2/4 Medical optimization by Dr. Pena on 2/4. Labs, & UA resulted and noted.   Shell Khan RN BSN

## 2020-01-21 NOTE — H&P (VIEW-ONLY)
Subjective:   I, Gilma Teixeira, attest that this documentation has been prepared under the direction and in the presence of Kb Argueta MD.     Patient ID: Rhett Quarles is a 44 y.o. male     Chief Complaint: No chief complaint on file.      HPI  Mr. Rhett Quarles is a pleasant 44 y.o. gentleman who presents today for consultation regarding newly discovered pituitary adenoma. Pt states he had an onset of diplopia, dizziness, extremity paraesthesias, fatigue, and mental fogginess several months ago. He was seen by his PCP and more recently by Neurology who ordered a MRI Brain which revealed an incidental pituitary mass. He has been worked up for MS but results are inconclusive. He denies a decrease in his libido. Pt works from home as a .    Pt was seen in our multidisciplinary pituitary clinic with Dr. Franz.      Review of Systems   Constitutional: Positive for fatigue. Negative for activity change, appetite change, fever and unexpected weight change.   HENT: Negative for facial swelling.    Eyes: Positive for visual disturbance (diplopia).   Respiratory: Negative.    Cardiovascular: Negative.    Gastrointestinal: Negative for diarrhea, nausea and vomiting.   Endocrine: Negative.    Genitourinary: Negative.    Musculoskeletal: Negative for back pain, joint swelling, myalgias and neck pain.   Neurological: Positive for dizziness. Negative for weakness, numbness and headaches.        + Extremity paraesthesias and mental fog   Psychiatric/Behavioral: Negative.       No past medical history on file.    Objective:      Vitals:    01/21/20 0925   BP: (!) 142/94   Pulse: 73   Temp: 98.6 °F (37 °C)      Physical Exam   Constitutional: He is oriented to person, place, and time. He appears well-nourished.   HENT:   Head: Normocephalic and atraumatic.   Neck: Neck supple.   Neurological: He is alert and oriented to person, place, and time. No cranial nerve deficit. He displays a negative Romberg  sign. GCS eye subscore is 4. GCS verbal subscore is 5. GCS motor subscore is 6.          IMAGING:  MRI Pituitary W W/O Contrast (01/03/2020) shows a macroadenoma causing compression  of the pituitary gland and herniation into the sphenoid sinus. There is no compression of the optic nerves or chiasm.    Labs:  Testosterone elevated at 228.  otherwise within normal limits.      I have personally reviewed the images with the pt.      I, Dr. Kb Argueta, personally performed the services described in this documentation. All medical record entries made by the scribe, Gilma Teixeira, were at my direction and in my presence.  I have reviewed the chart and agree that the record reflects my personal performance and is accurate and complete. Kb Argueta MD. 01/21/2020    Assessment:       Pituitary adenoma     Plan:   I have personally reviewed the MRI Pituitary with the pt which shows a macroadenoma causing compression of the pituitary gland and herniation into the sphenoid sinus. There is no compression of the optic nerves or chiasm. There are no T2 changes that are suspicious for MS plaques that I could see.    Given the size of the mass, I propose a transsphenoidal hypophysectomy of the pituitary mass. I have discussed the risks/benefits, indications, and alternatives for the proposed procedure in detail.  I have answered all of their questions and the pt wishes to proceed with surgery.  We will schedule the pt on Thursday, February 6, 2020.    Pt will need to refrain from blowing his nose, drinking from a straw, or traveling via airplane for at least one month after surgery.

## 2020-01-21 NOTE — PATIENT INSTRUCTIONS
I have personally reviewed the MRI Pituitary with the pt which shows a macroadenoma causing compression of the pituitary gland and herniation into the sphenoid sinus. There is no compression of the optic nerves or chiasm. There are no T2 changes that are suspicious for MS plaques that I could see.    Given the size of the mass, I propose a transsphenoidal hypophysectomy of the pituitary mass. I have discussed the risks/benefits, indications, and alternatives for the proposed procedure in detail.  I have answered all of their questions and the pt wishes to proceed with surgery.  We will schedule the pt on Thursday, February 6, 2020.    Pt will need to refrain from blowing his nose, drinking from a straw, or traveling via airplane for at least one month after surgery.

## 2020-01-21 NOTE — LETTER
January 22, 2020      Aakash Mora MD  [address]  [place] [address]           [facility] [place]  [address]  [place] [address]  Phone: [phone]          Patient: Rhett Quarles   MR Number: [id number]   YOB: 1975   Date of Visit: 1/21/2020       Dear Dr. Aakash Mora:    Thank you for referring Rhett Quarles to me for evaluation. Attached you will find relevant portions of my assessment and plan of care.    If you have questions, please do not hesitate to call me. I look forward to following Rhett Quarles along with you.    Sincerely,    Kb Argueta MD    Enclosure  CC:  No Recipients    If you would like to receive this communication electronically, please contact [email or web address] or [phone] to request more information on [employer] Link access.    For providers and/or their staff who would like to refer a patient to [facility], please contact us through our one-stop-shop provider referral line, [facility], at [phone].    If you feel you have received this communication in error or would no longer like to receive these types of communications, please e-mail [email or web address]

## 2020-01-21 NOTE — PROGRESS NOTES
Subjective:   I, Gilma Teixeira, attest that this documentation has been prepared under the direction and in the presence of Kb Argueta MD.     Patient ID: Rhett uQarles is a 44 y.o. male     Chief Complaint: No chief complaint on file.      HPI  Mr. Rhett Quarles is a pleasant 44 y.o. gentleman who presents today for consultation regarding newly discovered pituitary adenoma. Pt states he had an onset of diplopia, dizziness, extremity paraesthesias, fatigue, and mental fogginess several months ago. He was seen by his PCP and more recently by Neurology who ordered a MRI Brain which revealed an incidental pituitary mass. He has been worked up for MS but results are inconclusive. He denies a decrease in his libido. Pt works from home as a .    Pt was seen in our multidisciplinary pituitary clinic with Dr. Franz.      Review of Systems   Constitutional: Positive for fatigue. Negative for activity change, appetite change, fever and unexpected weight change.   HENT: Negative for facial swelling.    Eyes: Positive for visual disturbance (diplopia).   Respiratory: Negative.    Cardiovascular: Negative.    Gastrointestinal: Negative for diarrhea, nausea and vomiting.   Endocrine: Negative.    Genitourinary: Negative.    Musculoskeletal: Negative for back pain, joint swelling, myalgias and neck pain.   Neurological: Positive for dizziness. Negative for weakness, numbness and headaches.        + Extremity paraesthesias and mental fog   Psychiatric/Behavioral: Negative.       No past medical history on file.    Objective:      Vitals:    01/21/20 0925   BP: (!) 142/94   Pulse: 73   Temp: 98.6 °F (37 °C)      Physical Exam   Constitutional: He is oriented to person, place, and time. He appears well-nourished.   HENT:   Head: Normocephalic and atraumatic.   Neck: Neck supple.   Neurological: He is alert and oriented to person, place, and time. No cranial nerve deficit. He displays a negative Romberg  sign. GCS eye subscore is 4. GCS verbal subscore is 5. GCS motor subscore is 6.          IMAGING:  MRI Pituitary W W/O Contrast (01/03/2020) shows a macroadenoma causing compression  of the pituitary gland and herniation into the sphenoid sinus. There is no compression of the optic nerves or chiasm.    Labs:  Testosterone elevated at 228.  otherwise within normal limits.      I have personally reviewed the images with the pt.      I, Dr. Kb Argueta, personally performed the services described in this documentation. All medical record entries made by the scribe, Gilma Teixeira, were at my direction and in my presence.  I have reviewed the chart and agree that the record reflects my personal performance and is accurate and complete. Kb Argueta MD. 01/21/2020    Assessment:       Pituitary adenoma     Plan:   I have personally reviewed the MRI Pituitary with the pt which shows a macroadenoma causing compression of the pituitary gland and herniation into the sphenoid sinus. There is no compression of the optic nerves or chiasm. There are no T2 changes that are suspicious for MS plaques that I could see.    Given the size of the mass, I propose a transsphenoidal hypophysectomy of the pituitary mass. I have discussed the risks/benefits, indications, and alternatives for the proposed procedure in detail.  I have answered all of their questions and the pt wishes to proceed with surgery.  We will schedule the pt on Thursday, February 6, 2020.    Pt will need to refrain from blowing his nose, drinking from a straw, or traveling via airplane for at least one month after surgery.

## 2020-01-22 NOTE — PROGRESS NOTES
I have reviewed and concur with Dr. Meyer's history, physical, assessment, and plan.  I have personally interviewed and examined the patient.  See below addendum for my evaluation and additional findings.    I have personally reviewed the pituitary MRI from earlier this month showing a large solid pituitary adenoma that is extending downward into the sphenoid sinus with possible extension into the blx cavernous sinus (L>R).  No impingement of the optic chiasm however the adenoma may contact the right optic nerve.  Will have him see Dr. Winston for baseline HVF prior to resection next month.  Although labs are consistent w/ nonfunctional adenoma andshow hypogonadotropic hypogonadism we will hold off on replacement as he may have recovery after surgery.  Will follow up 1 mo after surgery to check pituitary labs.      Lazarus Franz MD

## 2020-01-23 ENCOUNTER — TELEPHONE (OUTPATIENT)
Dept: NEUROSURGERY | Facility: CLINIC | Age: 45
End: 2020-01-23

## 2020-01-23 ENCOUNTER — TELEPHONE (OUTPATIENT)
Dept: PREADMISSION TESTING | Facility: HOSPITAL | Age: 45
End: 2020-01-23

## 2020-01-23 NOTE — TELEPHONE ENCOUNTER
Ct scheduled per pt request tried calling pt by phone and left voicemail. Will mail appointment info as well.

## 2020-01-23 NOTE — TELEPHONE ENCOUNTER
----- Message from Shell Khan RN sent at 1/23/2020 11:19 AM CST -----  Patient had poc appt on 1/29 and, Dr. Pena, labs and ua on 2/3. You can schedue the stealth CT based on these appts.Thanks!

## 2020-01-28 ENCOUNTER — ANESTHESIA EVENT (OUTPATIENT)
Dept: SURGERY | Facility: HOSPITAL | Age: 45
DRG: 615 | End: 2020-01-28
Payer: COMMERCIAL

## 2020-01-29 ENCOUNTER — HOSPITAL ENCOUNTER (OUTPATIENT)
Dept: PREADMISSION TESTING | Facility: HOSPITAL | Age: 45
Discharge: HOME | End: 2020-01-29
Attending: ANESTHESIOLOGY
Payer: COMMERCIAL

## 2020-01-29 ENCOUNTER — OFFICE VISIT (OUTPATIENT)
Dept: OPHTHALMOLOGY | Facility: CLINIC | Age: 45
End: 2020-01-29
Payer: COMMERCIAL

## 2020-01-29 ENCOUNTER — CLINICAL SUPPORT (OUTPATIENT)
Dept: OPHTHALMOLOGY | Facility: CLINIC | Age: 45
End: 2020-01-29
Payer: COMMERCIAL

## 2020-01-29 DIAGNOSIS — D35.2 PITUITARY ADENOMA: Primary | ICD-10-CM

## 2020-01-29 DIAGNOSIS — H51.11 CONVERGENCE INSUFFICIENCY: ICD-10-CM

## 2020-01-29 PROCEDURE — 92083 EXTENDED VISUAL FIELD XM: CPT | Mod: S$GLB,,, | Performed by: OPHTHALMOLOGY

## 2020-01-29 PROCEDURE — 99999 PR PBB SHADOW E&M-EST. PATIENT-LVL I: CPT | Mod: PBBFAC,,,

## 2020-01-29 PROCEDURE — 92004 COMPRE OPH EXAM NEW PT 1/>: CPT | Mod: S$GLB,,, | Performed by: OPHTHALMOLOGY

## 2020-01-29 PROCEDURE — 92004 PR EYE EXAM, NEW PATIENT,COMPREHESV: ICD-10-PCS | Mod: S$GLB,,, | Performed by: OPHTHALMOLOGY

## 2020-01-29 PROCEDURE — 92083 HUMPHREY VISUAL FIELD - OU - BOTH EYES: ICD-10-PCS | Mod: S$GLB,,, | Performed by: OPHTHALMOLOGY

## 2020-01-29 PROCEDURE — 99999 PR PBB SHADOW E&M-EST. PATIENT-LVL III: ICD-10-PCS | Mod: PBBFAC,,, | Performed by: OPHTHALMOLOGY

## 2020-01-29 PROCEDURE — 99999 PR PBB SHADOW E&M-EST. PATIENT-LVL III: CPT | Mod: PBBFAC,,, | Performed by: OPHTHALMOLOGY

## 2020-01-29 PROCEDURE — 99999 PR PBB SHADOW E&M-EST. PATIENT-LVL I: ICD-10-PCS | Mod: PBBFAC,,,

## 2020-01-29 RX ORDER — TRIPROLIDINE/PSEUDOEPHEDRINE 2.5MG-60MG
TABLET ORAL EVERY 6 HOURS PRN
Status: ON HOLD | COMMUNITY
End: 2020-02-12 | Stop reason: HOSPADM

## 2020-01-29 NOTE — DISCHARGE INSTRUCTIONS
Pituitary Adenoma Resection - Patient Information    -Do not take any Aspirin, Aspirin-containing products, or nonsteroidal anti-inflammatory drugs (NSAID's) like Advil, Ibuprofen, Aleve, Mobic, etc for 2 weeks after surgery.   -Continue taking the Hydrocortisone (steroid) as instructed. Do not take on empty stomach.   -Continue taking the Famotidine to protect your stomach while taking the steroid.   -Please HOLD your evening dose of Hydrocortisone the day prior and the morning dose on the day of your lab appointment.  -Continue over the counter Ocean Nasal Spray, 1 spray per nostril, four times per day for 10 days after surgery.  -Continue over the counter Afrin nasal spray, 1 spray per nostril, twice daily for 7 days after surgery.   -You have been given a prescription for Desmopressin nasal spray. You do not need to get this filled at this time, but please hold on to it. Contact Dr. Argueta's office if you have to urinate more than 3 times in one hour.   -No excessive or forceful blowing of your nose  -Do not drink through straws, stick anything up your nose or sneeze with a closed mouth.   -No lifting or strenuous activity.  -Do not strain to have a bowel movement. Please take over the counter Miralax and a stool softener as needed for constipation. Following dosing instructions on the packaging.   -No driving while taking narcotic pain medications  -You may take over the counter Pseudoephedrine 30-60 mg every 4 hours as needed for nasal congestion           -If you have any issues with elevated blood pressure, stop taking immediately           -If you have any issues with urinary retention, stop taking immediately           -If you have any issues with unwanted side effects such as insomnia or feeling jittery, stop taking immediately  -Do not perform any excessive bending over or leaning forward as this is a fall hazard.  -Do not perform any heavy lifting or lifting more than 10 lbs from the ground level as this  is a fall hazard.  -Do not take any over the counter products containing acetaminophen at the same time as you take your narcotic pain medication. Medications that may contain acetaminophen include but are not limited to: Excedrin and other headache medications, arthritis medications, cold and sinus medications, etc. Please review the list of active ingredients on any OTC medication prior to taking it.  -Do not consume any alcoholic beverages until released by your Neurosurgeon  -Follow up with Dr. Argueta in 2 weeks for a wound check and pathology results    Contact the Neurosurgery Office immediately if:  -If you begin to notice any excessive thirst or urination  -If you begin to notice any clear drainage with a salty taste   -If you begin to notice any neurologic changes such as:           -Sudden onset of lethargy or sleepiness           -Sudden confusion, trouble speaking, or understanding            -Sudden trouble seeing in one or both eyes            -Sudden trouble walking, dizziness, loss of coordination            -Sudden severe headache with no known cause            -Sudden onset of numbness or weakness     Wound Care:  For your nasal incision: Continue Ocean Nasal Spray four times per day for 10 days.     For your abdominal incision: Remove bandage on the 2nd day after surgery. No bandage required, keep your incision open to air. You may shower on the 2nd day after your surgery. Keep the incision clean and dry at all times. Please cover the incision while showering and REMOVE once you have completed taking your shower. Do not allow the force of water to hit the incision. If the incision gets damp, pat it dry. Do not rub or scrub the incision. You cannot take a bath/swim/submerge the incision until 8 weeks after surgery.    Apply Bacitracin ointment (over the counter) to incision twice daily.    Call your doctor or go to the Emergency Room for any signs of infection including: increased redness, drainage,  pain or fever (temperature greater than or equal to 101.4).       Neurosurgery Office: [phone]

## 2020-01-29 NOTE — PRE-PROCEDURE INSTRUCTIONS
Medication instructions given.Preop instructions given. Hold asa, asa containing products, nsaids, vitamins and supplements one week prior to surgery.  Shower the night before surgery and the morning of surgery with an antibacterial soap( hibiclens or dial antibacterial soap).  Nothing on the skin once shower. Do not apply any deodorant, lotion, powder, perfume,or aftershave.  No jewelry going to surgery.  May have solid foods, gum, and hard candy until 8 hours before surgery/procedure time.  May have clear liquids( water, gatorade, powerade or apple juice) until 2 hours prior to surgery/procedure time.  No red drinks. If in doubt , drink water. Nothing to drink 2 hours before arrival time for surgery/procedure. If you are told to take medication in the morning of surgery, it may be taken with a sip of water. If your doctor tells you something different pertaining to when to stop eating or drinking, follow your doctor's instructions. Directions given to the surgery center. Call for changes in status or questions.Verbalizes understanding.Written med and preop instructions given.AVS given.

## 2020-01-29 NOTE — ANESTHESIA PREPROCEDURE EVALUATION
01/29/2020  Rhett Quarles is a 44 y.o., male with ho diplopia, dizziness, extremity paraesthesias, fatigue, and mental fogginess several months.  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      Anesthesia Evaluation    I have reviewed the Patient Summary Reports.    I have reviewed the Nursing Notes.   I have reviewed the Medications.     Review of Systems  Anesthesia Hx:  No previous Anesthesia  Denies Family Hx of Anesthesia complications.    Social:  Non-Smoker  Denies Tobacco Use.   Cardiovascular:   Denies Hypertension.  Denies MI.  Denies CAD.    Denies CABG/stent.  Denies Dysrhythmias.  no hyperlipidemia  Denies Coronary Artery Disease.   Denies Hypertension.    Pulmonary:   Denies COPD.  Denies Asthma.  Denies Shortness of breath.  Denies Recent URI.  Denies Asthma.  Denies Chronic Obstructive Pulmonary Disease (COPD).    Renal/:   Denies Chronic Renal Disease.   Denies Kidney Function/Disease    Hepatic/GI:   Denies GERD. Denies Liver Disease.  Denies Esophageal / Stomach Disorders  Denies Liver Disease    Neurological:   Denies TIA. Denies CVA. Denies Seizures.  Denies Seizure Disorder  Brain Tumor (Pituitary Adenoma) Denies CVA - Cerebrovasular Accident  Denies TIA - Transient Ischemic Attack    Endocrine:   Denies Diabetes. Denies Hypothyroidism.  Denies Diabetes  Denies Thyroid Disease  Denies Metabolic Disorders      Physical Exam  General:  Well nourished    Airway/Jaw/Neck:  Airway Findings: Mouth Opening: Normal Tongue: Normal  General Airway Assessment: Adult  Mallampati: III  TM Distance: 4 - 6 cm  Jaw/Neck Findings:  Neck ROM: Normal ROM       Dental:  Dental Findings: In tact   Chest/Lungs:  Chest/Lungs Findings: Clear to auscultation, Normal Respiratory Rate     Heart/Vascular:  Heart Findings: Rate: Normal  Rhythm: Regular Rhythm  Sounds: Normal  Heart murmur: negative       Mental Status:  Mental Status Findings:  Cooperative, Alert and Oriented         Anesthesia Plan  Type of Anesthesia, risks & benefits discussed:  Anesthesia Type:  general  Patient's Preference:   Intra-op Monitoring Plan: standard ASA monitors and arterial line  Intra-op Monitoring Plan Comments:   Post Op Pain Control Plan: per primary service following discharge from PACU, IV/PO Opioids PRN and multimodal analgesia  Post Op Pain Control Plan Comments:   Induction:   IV  Beta Blocker:  Patient is not currently on a Beta-Blocker (No further documentation required).       Informed Consent: Patient understands risks and agrees with Anesthesia plan.  Questions answered. Anesthesia consent signed with patient.  ASA Score: 2     Day of Surgery Review of History & Physical:    H&P update referred to the surgeon.         Ready For Surgery From Anesthesia Perspective.

## 2020-01-29 NOTE — LETTER
[facility]  [address]  [facility] [address]  Phone: [phone]  Fax: [phone]   January 29, 2020    Kb Argueta MD  [address]  [facility] [address]    Patient: Rhett Quarles   MR Number: [id number]   YOB: 1975   Date of Visit: 1/29/2020       Dear Dr. Argueta:    Thank you for referring Rhett Quarles to me for evaluation. Here is my assessment and plan of care:    Assessment:  /Plan     For exam results, see Encounter Report.    Pituitary adenoma  -     Mejia Visual Field - OU - Extended - Both Eyes    Convergence insufficiency      I found no evidence of visual pathway effects from the pituitary adenoma. 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 Repeat exam and visual field testing in two months.          Plan:  For exam results, see Encounter Report.    Pituitary adenoma  -     Mejia Visual Field - OU - Extended - Both Eyes    Convergence insufficiency      I found no evidence of visual pathway effects from the pituitary adenoma. 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 Repeat exam and visual field testing in two months.            Below you will find my full exam findings. If you have questions, please do not hesitate to call me. I look forward to following Mr. Rhett Quarles along with you.    Sincerely,        Bradley Winston MD       CC  No Recipients             Base Eye Exam     Visual Acuity (Snellen - Linear)       Right Left    Dist sc 20/25 -3 20/25 -1          Tonometry (Applanation, 10:41 AM)       Right Left    Pressure 16 15          Pupils       Dark Light Shape React APD    Right 5 3 Round Brisk None    Left 5 3 Round Brisk None          Visual Fields    See HVF report           Neuro/Psych     Oriented x3:  Yes    Mood/Affect:  Normal          Dilation     Both eyes:  1%  Mydriacyl, 2.5% Phenylephrine @ 10:42 AM            Strabismus Exam       - - - - - -   - - - - - -                      ET 2 - -  - -  ET 2 - -  - -  ET 2                     - - - - - -   - - - - - -                Decreased convergence to approaching target     Slit Lamp and Fundus Exam     Slit Lamp Exam       Right Left    Lids/Lashes Normal Normal    Conjunctiva/Sclera White and quiet White and quiet    Cornea Clear Clear    Anterior Chamber Deep and quiet Deep and quiet    Iris Round and reactive Round and reactive    Lens Clear Clear    Vitreous Normal Normal          Fundus Exam       Right Left    Disc Normal Normal    C/D Ratio 0.3 0.3    Macula Normal Normal    Vessels Normal Normal    Periphery Normal Normal

## 2020-01-29 NOTE — PROGRESS NOTES
HPI     Concerns About Ocular Health      Additional comments: Pituitary tumor              Comments     Referred by Dr.Marcus SHAHZAD Argueta  Recently dx w/pituitary tumor.  Patient here for baseline eye exam.  Pt states experiencing double vision(side by side) mainly at near, but did   have eyeglasses w/prism.  Notice flashes in peripheral vision.  Dizzy,Nausa and seeing patterns.  Scheduled for surgery 2/06/2020  Review HVF    I have personally interviewed the patient, reviewed the history and   examined the patient and agree with the technician's exam.          Last edited by rBadley Winston MD on 1/29/2020 10:23 AM. (History)            Assessment /Plan     For exam results, see Encounter Report.    Pituitary adenoma  -     Mejia Visual Field - OU - Extended - Both Eyes    Convergence insufficiency      I found no evidence of visual pathway effects from the pituitary adenoma. 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 Repeat exam and visual field testing in two months.

## 2020-01-30 ENCOUNTER — TELEPHONE (OUTPATIENT)
Dept: OPHTHALMOLOGY | Facility: CLINIC | Age: 45
End: 2020-01-30

## 2020-02-03 ENCOUNTER — INITIAL CONSULT (OUTPATIENT)
Dept: INTERNAL MEDICINE | Facility: CLINIC | Age: 45
End: 2020-02-03
Payer: COMMERCIAL

## 2020-02-03 ENCOUNTER — HOSPITAL ENCOUNTER (OUTPATIENT)
Dept: RADIOLOGY | Facility: HOSPITAL | Age: 45
Discharge: HOME | End: 2020-02-03
Attending: INTERNAL MEDICINE
Payer: COMMERCIAL

## 2020-02-03 VITALS
BODY MASS INDEX: 27.55 KG/M2 | DIASTOLIC BLOOD PRESSURE: 70 MMHG | OXYGEN SATURATION: 99 % | SYSTOLIC BLOOD PRESSURE: 100 MMHG | WEIGHT: 161.38 LBS | HEIGHT: 64 IN | HEART RATE: 74 BPM

## 2020-02-03 DIAGNOSIS — D35.2 PITUITARY ADENOMA: ICD-10-CM

## 2020-02-03 DIAGNOSIS — R79.89 ABNORMAL LFTS: ICD-10-CM

## 2020-02-03 DIAGNOSIS — R20.0 HAND NUMBNESS: ICD-10-CM

## 2020-02-03 DIAGNOSIS — R06.83 SNORING: ICD-10-CM

## 2020-02-03 DIAGNOSIS — Z01.818 PREOP EXAMINATION: Primary | ICD-10-CM

## 2020-02-03 PROCEDURE — 70450 CT HEAD/BRAIN W/O DYE: CPT | Mod: TC

## 2020-02-03 PROCEDURE — 99244 OFF/OP CNSLTJ NEW/EST MOD 40: CPT | Mod: S$GLB,,, | Performed by: HOSPITALIST

## 2020-02-03 PROCEDURE — 70450 CT HEAD/BRAIN W/O DYE: CPT | Mod: 26,,, | Performed by: RADIOLOGY

## 2020-02-03 PROCEDURE — 70450 CT HEAD STEALTH W/O CONTRAST: ICD-10-PCS | Mod: 26,,, | Performed by: RADIOLOGY

## 2020-02-03 PROCEDURE — 99244 PR OFFICE CONSULTATION,LEVEL IV: ICD-10-PCS | Mod: S$GLB,,, | Performed by: HOSPITALIST

## 2020-02-03 PROCEDURE — 99999 PR PBB SHADOW E&M-EST. PATIENT-LVL III: CPT | Mod: PBBFAC,,, | Performed by: HOSPITALIST

## 2020-02-03 PROCEDURE — 99999 PR PBB SHADOW E&M-EST. PATIENT-LVL III: ICD-10-PCS | Mod: PBBFAC,,, | Performed by: HOSPITALIST

## 2020-02-03 RX ORDER — IBUPROFEN 200 MG
200 TABLET ORAL
Status: ON HOLD | COMMUNITY
End: 2020-02-12 | Stop reason: HOSPADM

## 2020-02-03 NOTE — PROGRESS NOTES
[facility] - Pre Op Consult  Progress Note    Patient Name: Rhett Quarles  MRN: [id number]  Date of Evaluation- 02/03/2020  PCP- Bryce Robert MD    Future cases for Rhett Quarles [[id number]]     Case ID Status Date Time Scooter Procedure Provider Location    [id number] [facility] 2/6/2020 10:30  HYPOPHYSECTOMY, TRANSSPHENOIDAL APPROACH Kb Argueta MD [[id number]] [facility] OR 2ND FLR          HPI:  History of present illness- I had the pleasure of meeting this pleasant 44 y.o. gentleman in the pre op clinic prior to his elective Neuro  surgery. The patient is new to me . Rhett was accompanied by wife Elis .    I have obtained the history by speaking to the patient and by reviewing the electronic health records.    Events leading up to surgery / History of presenting illness -    Pituitary adenoma    Started with double vision - long time - Tried prisms   Oct 31st 2019- double vision increased, vertigo ,Fatigued   No problem with lateral vision  Occasional headaches that were helped by Advil   Tingling , numbness - arms, legs- got better   No nipple discharge  No BP changes  No excessive urination      Relevant health conditions of significance for the perioperative period/ History of presenting illness -    Subjectively describes health as good      Lives with wife   - works from home    Not known to have heart disease , Diabetes Mellitus, Lung disease, HTN         Subjective/ Objective:       Chief complaint-Preoperative evaluation, Perioperative Medical management, complication reduction plan     Active cardiac conditions- none   Occasional extra beat-suggested caffeine beverage reduction     Revised cardiac risk index predictors- none    Functional capacity -Examples of physical activity,   house work, can take 1 flight of stairs and cutting the grass with a mower, avid walker ( 2 miles a day ) ----- He can undertake all the above activities without  chest pain,chest tightness, Shortness of  "breath making his exercise tolerance more than 4 MET's    Review of Systems   Constitutional: Negative for chills.        No unusual weight changes     HENT:        STOPBANG score 4 / 8    Loud Snoring   Witnessed stopping of breathing   Neck size over 40 CM  Male gender   Eyes:        As noted   Respiratory:        No cough , phlegm    No Hemoptysis   Cardiovascular:        As noted   Gastrointestinal:        Bowels- Regular   No overt GI/ blood losses   Endocrine:        Prednisone use > 20 mg daily for 3 weeks- None    Genitourinary: Negative for dysuria.        No urinary hesitancy    Musculoskeletal:          Occasional Left hand pain- attributes to Sympler playing    Skin: Negative for rash.   Neurological: Negative for syncope.        No unilateral weakness   Hematological:        Current use of Anticoagulants  None    Psychiatric/Behavioral:        Supportive family  No SI/HI     No vascular stenting     No past medical history pertinent negatives.  No family history on file.  No past surgical history on file.    No  previous surgeries/procedures.  Medications and Allergies reviewed in epic.   FH- No anesthesia,bleeding / venous thrombosis , early onset heart disease in family     Physical Exam  Blood pressure 100/70, pulse 74, height 5' 4" (1.626 m), weight 73.2 kg (161 lb 6.4 oz), SpO2 99 %.      Physical Exam  Constitutional- Vitals - Body mass index is 27.7 kg/m².,   Vitals:    02/03/20 1500   BP: 100/70   Pulse: 74     General appearance-Conscious,Coherent  Eyes- No conjunctival icterus,pupils  round  and reactive to light   ENT-Oral cavity- moist  , Hearing grossly normal   Neck- No thyromegaly ,Trachea -central, No jugular venous distension,   No Carotid Bruit   Cardiovascular -Heart Sounds- Normal  and  no murmur   , No gallop rhythm   Respiratory - Normal Respiratory Effort, Normal breath sounds,  no wheeze  and  no forced expiratory wheeze    Peripheral pitting pedal edema-- none , no calf pain "   Gastrointestinal -Soft abdomen, No palpable masses, Non Tender,Liver,Spleen not palpable. No-- free fluid and shifting dullness  Musculoskeletal- No finger Clubbing. Strength grossly normal   Lymphatic-No Palpable cervical, axillary,Inguinal lymphadenopathy   Psychiatric - normal effect,Orientation  Rt Dorsalis pedis pulses-palpable    Lt Dorsalis pedis pulses- palpable   Rt Posterior tibial pulses -palpable   Left posterior tibial pulses -palpable   Miscellaneous -  no asterixis,  no dupuytren's contracture and  no renal bruit  Investigations  [facility]    Review of Medicine tests      Review of clinical lab tests:  Lab Results   Component Value Date    CREATININE 0.9 01/10/2020    HGB 15.5 12/03/2019     12/03/2019             Review of old records- Was done and information gathered regards to events leading to surgery and health conditions of significance in the perioperative period.        Preoperative cardiac risk assessment-  The patient does not have any active cardiac conditions . Revised cardiac risk index predictors- 0---.Functional capacity is more than 4 Mets. He will be undergoing a Neuro  procedure that carries a intermediate risk   Risk of a major Cardiac event ( Defined as death, myocardial infarction, or cardiac arrest at 30 days after noncardiac surgery), based on RCRI score     -3.9%       No further cardiac work up is indicated prior to proceeding with the surgery          American Society of Anesthesiologists Physical status classification ( ASA ) class: 3     Postoperative pulmonary complication risk assessment:      ARISCAT ( Canet) risk index- risk class -  Low     Jaspalzull Respiratory failure index- percentage risk of respiratory failure: 1.8 %     Assessment/Plan:     Pituitary adenoma  As per history   Labs consistent with  labs are consistent w/ nonfunctional adenoma   For surgery    Hand numbness  Hand care suggested     Snoring    Possible  sleep apnea- I suggest a sleep study and suggest caution with usage of medication that can cause respiratory suppression in the perioperative period  potential ramifications of untreated sleep apnea, which could include daytime sleepiness, hypertension, heart disease and stroke were discussed    Avoidance of  supine sleep, weight gain , alcoholic beverages , care with , sedative , CNS depressant use indicated  since all of these can worsen BON     Care with sedating Medication use discussed jessi op         Abnormal LFTs  2 drinks a night   Not known to have fatty liver  Suggested Alcohol reduction   Offered US abdomen - he deferred US at this time   No suggestion of  hepatic decompensation     Suggest care with usage of Medication that are hepatotoxic or  Metabolized in the liver           Preventive perioperative care    Thromboembolic prophylaxis:  His risk factors for thrombosis include surgical procedure and age.I suggest  thromboembolic prophylaxis ( mechanical/pharmacological, weighing the risk benefits of pharmacological agent use considering jessi procedural bleeding )  during the perioperative period.I suggested being active in the post operative period.      Postoperative pulmonary complication prophylaxis-Risk factors for post operative pulmonary complications include sleep apnea  ASA class >2- I suggest incentive spirometry use, early ambulation and end tidal carbon dioxide monitoring  , oral care , head end of bed elevation      Renal complication prophylaxis- I suggest keeping him well hydrated  in the perioperative period     Surgical site Infection Prophylaxis-I  suggest appropriate antibiotic for Prophylaxis against Surgical site infections     This visit was focused on Preoperative evaluation, Perioperative Medical management, complication reduction plans. I suggest that the patient follows up with primary care or relevant sub specialists for ongoing health care.    I appreciate the opportunity to  be involved in this patients care. Please feel free to contact me if there were any questions about this consultation.    Patient is optimized       Patient was instructed to call and update me about any changes to health,  medication, office visits ,testing out side of the jessi operative care center , hospitalizations between now and surgery     Hannah Pena MD  Perioperative Medicine  [facility]   Pager [phone]    Skin checks suggested   No spider angioma, gynecomastia  --  2/5- 10 46   Called to follow up , spoke to him to address any concerns with the up coming surgery or any questions on Medication instructions -  Doing well ,No changes to Medication, Health -

## 2020-02-03 NOTE — ASSESSMENT & PLAN NOTE
2 drinks a night   Not known to have fatty liver  Suggested Alcohol reduction   Offered US abdomen - he deferred US at this time   No suggestion of  hepatic decompensation     Suggest care with usage of Medication that are hepatotoxic or  Metabolized in the liver

## 2020-02-03 NOTE — ASSESSMENT & PLAN NOTE
Possible sleep apnea- I suggest a sleep study and suggest caution with usage of medication that can cause respiratory suppression in the perioperative period  potential ramifications of untreated sleep apnea, which could include daytime sleepiness, hypertension, heart disease and stroke were discussed    Avoidance of  supine sleep, weight gain , alcoholic beverages , care with , sedative , CNS depressant use indicated  since all of these can worsen BON     Care with sedating Medication use discussed jessi op

## 2020-02-03 NOTE — HPI
History of present illness- I had the pleasure of meeting this pleasant 44 y.o. gentleman in the pre op clinic prior to his elective Neuro  surgery. The patient is new to me . Rhett was accompanied by wife Elis .    I have obtained the history by speaking to the patient and by reviewing the electronic health records.    Events leading up to surgery / History of presenting illness -    Pituitary adenoma    Started with double vision - long time - Tried prisms   Oct 31st 2019- double vision increased, vertigo ,Fatigued   No problem with lateral vision  Occasional headaches that were helped by Advil   Tingling , numbness - arms, legs- got better   No nipple discharge  No BP changes  No excessive urination      Relevant health conditions of significance for the perioperative period/ History of presenting illness -    Subjectively describes health as good      Lives with wife   - works from home    Not known to have heart disease , Diabetes Mellitus, Lung disease, HTN

## 2020-02-03 NOTE — OUTPATIENT SUBJECTIVE & OBJECTIVE
"Outpatient Subjective & Objective     Chief complaint-Preoperative evaluation, Perioperative Medical management, complication reduction plan     Active cardiac conditions- none   Occasional extra beat-suggested caffeine beverage reduction     Revised cardiac risk index predictors- none    Functional capacity -Examples of physical activity,   house work, can take 1 flight of stairs and cutting the grass with a mower, avid walker ( 2 miles a day ) ----- He can undertake all the above activities without  chest pain,chest tightness, Shortness of breath making his exercise tolerance more than 4 MET's    Review of Systems   Constitutional: Negative for chills.        No unusual weight changes     HENT:        STOPBANG score 4 / 8    Loud Snoring   Witnessed stopping of breathing   Neck size over 40 CM  Male gender   Eyes:        As noted   Respiratory:        No cough , phlegm    No Hemoptysis   Cardiovascular:        As noted   Gastrointestinal:        Bowels- Regular   No overt GI/ blood losses   Endocrine:        Prednisone use > 20 mg daily for 3 weeks- None    Genitourinary: Negative for dysuria.        No urinary hesitancy    Musculoskeletal:          Occasional Left hand pain- attributes to [employer] playing    Skin: Negative for rash.   Neurological: Negative for syncope.        No unilateral weakness   Hematological:        Current use of Anticoagulants  None    Psychiatric/Behavioral:        Supportive family  No SI/HI     No vascular stenting     No past medical history pertinent negatives.  No family history on file.  No past surgical history on file.    No  previous surgeries/procedures.  Medications and Allergies reviewed in epic.   FH- No anesthesia,bleeding / venous thrombosis , early onset heart disease in family     Physical Exam  Blood pressure 100/70, pulse 74, height 5' 4" (1.626 m), weight 73.2 kg (161 lb 6.4 oz), SpO2 99 %.      Physical Exam  Constitutional- Vitals - Body mass index is 27.7 kg/m²., "   Vitals:    02/03/20 1500   BP: 100/70   Pulse: 74     General appearance-Conscious,Coherent  Eyes- No conjunctival icterus,pupils  round  and reactive to light   ENT-Oral cavity- moist  , Hearing grossly normal   Neck- No thyromegaly ,Trachea -central, No jugular venous distension,   No Carotid Bruit   Cardiovascular -Heart Sounds- Normal  and  no murmur   , No gallop rhythm   Respiratory - Normal Respiratory Effort, Normal breath sounds,  no wheeze  and  no forced expiratory wheeze    Peripheral pitting pedal edema-- none , no calf pain   Gastrointestinal -Soft abdomen, No palpable masses, Non Tender,Liver,Spleen not palpable. No-- free fluid and shifting dullness  Musculoskeletal- No finger Clubbing. Strength grossly normal   Lymphatic-No Palpable cervical, axillary,Inguinal lymphadenopathy   Psychiatric - normal effect,Orientation  Rt Dorsalis pedis pulses-palpable    Lt Dorsalis pedis pulses- palpable   Rt Posterior tibial pulses -palpable   Left posterior tibial pulses -palpable   Miscellaneous -  no asterixis,  no dupuytren's contracture and  no renal bruit  Investigations  Lab and Imaging have been reviewed in epic.    Review of Medicine tests      Review of clinical lab tests:  Lab Results   Component Value Date    CREATININE 0.9 01/10/2020    HGB 15.5 12/03/2019     12/03/2019             Review of old records- Was done and information gathered regards to events leading to surgery and health conditions of significance in the perioperative period.    Outpatient Subjective & Objective

## 2020-02-03 NOTE — LETTER
February 3, 2020      Kb Argueta MD  [address]  [place] [address]           [facility]  [address] [facility] [address]  Phone: [phone]          Patient: Rhett Quarles   MR Number: [id number]   YOB: 1975   Date of Visit: 2/3/2020       Dear Dr. Kb Argueta:    Thank you for referring Rhett Quarles to me for evaluation. Attached you will find relevant portions of my assessment and plan of care.    If you have questions, please do not hesitate to call me. I look forward to following Rhett Quarles along with you.    Sincerely,    Hannah Pena MD    Enclosure  CC:  No Recipients    If you would like to receive this communication electronically, please contact [email or web address] or [phone] to request more information on [email or web address] Link access.    For providers and/or their staff who would like to refer a patient to [facility], please contact us through our one-stop-shop provider referral line, [facility], at [phone].    If you feel you have received this communication in error or would no longer like to receive these types of communications, please e-mail [email or web address]

## 2020-02-05 ENCOUNTER — TELEPHONE (OUTPATIENT)
Dept: NEUROSURGERY | Facility: CLINIC | Age: 45
End: 2020-02-05

## 2020-02-07 ENCOUNTER — TELEPHONE (OUTPATIENT)
Dept: NEUROSURGERY | Facility: CLINIC | Age: 45
End: 2020-02-07

## 2020-02-07 NOTE — TELEPHONE ENCOUNTER
Patient contacted. Advised to arrive for surgery at 0500, DOSC, NPO after MN the night before, shower x 2 with Hibiclens or Dial antibacterial soap. Understanding verbalized by patient.

## 2020-02-10 ENCOUNTER — ANESTHESIA (OUTPATIENT)
Dept: SURGERY | Facility: HOSPITAL | Age: 45
DRG: 615 | End: 2020-02-10
Payer: COMMERCIAL

## 2020-02-10 ENCOUNTER — HOSPITAL ENCOUNTER (INPATIENT)
Facility: HOSPITAL | Age: 45
LOS: 2 days | Discharge: HOME | DRG: 615 | End: 2020-02-12
Attending: NEUROLOGICAL SURGERY | Admitting: NEUROLOGICAL SURGERY
Payer: COMMERCIAL

## 2020-02-10 DIAGNOSIS — Z01.818 PREOPERATIVE TESTING: ICD-10-CM

## 2020-02-10 DIAGNOSIS — D49.6 BRAIN TUMOR: ICD-10-CM

## 2020-02-10 DIAGNOSIS — D35.2 PITUITARY ADENOMA: Primary | ICD-10-CM

## 2020-02-10 LAB
BILIRUB UR QL STRIP: NEGATIVE
CLARITY UR REFRACT.AUTO: CLEAR
COLOR UR AUTO: COLORLESS
GLUCOSE UR QL STRIP: NEGATIVE
HGB UR QL STRIP: NEGATIVE
KETONES UR QL STRIP: NEGATIVE
LEUKOCYTE ESTERASE UR QL STRIP: NEGATIVE
NITRITE UR QL STRIP: NEGATIVE
OSMOLALITY SERPL: 303 MOSM/KG (ref 280–300)
OSMOLALITY UR: 133 MOSM/KG (ref 50–1200)
PH UR STRIP: 6 [PH] (ref 5–8)
POCT GLUCOSE: 174 MG/DL (ref 70–110)
PROT UR QL STRIP: NEGATIVE
SODIUM SERPL-SCNC: 139 MMOL/L (ref 136–145)
SODIUM SERPL-SCNC: 142 MMOL/L (ref 136–145)
SP GR UR STRIP: 1 (ref 1–1.03)
URN SPEC COLLECT METH UR: ABNORMAL

## 2020-02-10 PROCEDURE — 84295 ASSAY OF SERUM SODIUM: CPT | Mod: 91

## 2020-02-10 PROCEDURE — 63600175 PHARM REV CODE 636 W HCPCS: Performed by: NEUROLOGICAL SURGERY

## 2020-02-10 PROCEDURE — D9220A PRA ANESTHESIA: Mod: CRNA,,, | Performed by: NURSE ANESTHETIST, CERTIFIED REGISTERED

## 2020-02-10 PROCEDURE — 63600175 PHARM REV CODE 636 W HCPCS: Performed by: STUDENT IN AN ORGANIZED HEALTH CARE EDUCATION/TRAINING PROGRAM

## 2020-02-10 PROCEDURE — 88307 TISSUE EXAM BY PATHOLOGIST: CPT | Performed by: PATHOLOGY

## 2020-02-10 PROCEDURE — 25000003 PHARM REV CODE 250: Performed by: STUDENT IN AN ORGANIZED HEALTH CARE EDUCATION/TRAINING PROGRAM

## 2020-02-10 PROCEDURE — D9220A PRA ANESTHESIA: ICD-10-PCS | Mod: ANES,,, | Performed by: ANESTHESIOLOGY

## 2020-02-10 PROCEDURE — 61781 PR STEREOTACTIC COMP ASSIST PROC,CRANIAL,INTRADURAL: ICD-10-PCS | Mod: ,,, | Performed by: NEUROLOGICAL SURGERY

## 2020-02-10 PROCEDURE — 36000713 HC OR TIME LEV V EA ADD 15 MIN: Performed by: NEUROLOGICAL SURGERY

## 2020-02-10 PROCEDURE — 25000003 PHARM REV CODE 250: Performed by: NEUROLOGICAL SURGERY

## 2020-02-10 PROCEDURE — 25000003 PHARM REV CODE 250: Performed by: NURSE ANESTHETIST, CERTIFIED REGISTERED

## 2020-02-10 PROCEDURE — 99223 PR INITIAL HOSPITAL CARE,LEVL III: ICD-10-PCS | Mod: ,,, | Performed by: NURSE PRACTITIONER

## 2020-02-10 PROCEDURE — 20000000 HC ICU ROOM

## 2020-02-10 PROCEDURE — 27201423 OPTIME MED/SURG SUP & DEVICES STERILE SUPPLY: Performed by: NEUROLOGICAL SURGERY

## 2020-02-10 PROCEDURE — 99223 1ST HOSP IP/OBS HIGH 75: CPT | Mod: ,,, | Performed by: NURSE PRACTITIONER

## 2020-02-10 PROCEDURE — 37000008 HC ANESTHESIA 1ST 15 MINUTES: Performed by: NEUROLOGICAL SURGERY

## 2020-02-10 PROCEDURE — C9399 UNCLASSIFIED DRUGS OR BIOLOG: HCPCS | Performed by: NEUROLOGICAL SURGERY

## 2020-02-10 PROCEDURE — 86920 COMPATIBILITY TEST SPIN: CPT

## 2020-02-10 PROCEDURE — 88342 IMHCHEM/IMCYTCHM 1ST ANTB: CPT | Performed by: PATHOLOGY

## 2020-02-10 PROCEDURE — 37000009 HC ANESTHESIA EA ADD 15 MINS: Performed by: NEUROLOGICAL SURGERY

## 2020-02-10 PROCEDURE — 15769 GRFG AUTOL SOFT TISS DIR EXC: CPT | Mod: 51,,, | Performed by: NEUROLOGICAL SURGERY

## 2020-02-10 PROCEDURE — 88341 IMHCHEM/IMCYTCHM EA ADD ANTB: CPT | Performed by: PATHOLOGY

## 2020-02-10 PROCEDURE — 36000712 HC OR TIME LEV V 1ST 15 MIN: Performed by: NEUROLOGICAL SURGERY

## 2020-02-10 PROCEDURE — 63600175 PHARM REV CODE 636 W HCPCS: Performed by: NURSE PRACTITIONER

## 2020-02-10 PROCEDURE — 81003 URINALYSIS AUTO W/O SCOPE: CPT

## 2020-02-10 PROCEDURE — 88307 TISSUE EXAM BY PATHOLOGIST: CPT | Mod: 26,,, | Performed by: PATHOLOGY

## 2020-02-10 PROCEDURE — 61548 REMOVAL OF PITUITARY GLAND: CPT | Mod: ,,, | Performed by: NEUROLOGICAL SURGERY

## 2020-02-10 PROCEDURE — D9220A PRA ANESTHESIA: ICD-10-PCS | Mod: CRNA,,, | Performed by: NURSE ANESTHETIST, CERTIFIED REGISTERED

## 2020-02-10 PROCEDURE — 63600175 PHARM REV CODE 636 W HCPCS: Performed by: NURSE ANESTHETIST, CERTIFIED REGISTERED

## 2020-02-10 PROCEDURE — C1713 ANCHOR/SCREW BN/BN,TIS/BN: HCPCS | Performed by: NEUROLOGICAL SURGERY

## 2020-02-10 PROCEDURE — 61781 SCAN PROC CRANIAL INTRA: CPT | Mod: ,,, | Performed by: NEUROLOGICAL SURGERY

## 2020-02-10 PROCEDURE — D9220A PRA ANESTHESIA: Mod: ANES,,, | Performed by: ANESTHESIOLOGY

## 2020-02-10 PROCEDURE — 83935 ASSAY OF URINE OSMOLALITY: CPT | Mod: 91

## 2020-02-10 PROCEDURE — 88307 PR  SURG PATH,LEVEL V: ICD-10-PCS | Mod: 26,,, | Performed by: PATHOLOGY

## 2020-02-10 PROCEDURE — 15769 PR GRAFTING, SOFT TISSUE, AUTO, DIRECT EXCISION: ICD-10-PCS | Mod: 51,,, | Performed by: NEUROLOGICAL SURGERY

## 2020-02-10 PROCEDURE — 83930 ASSAY OF BLOOD OSMOLALITY: CPT

## 2020-02-10 PROCEDURE — 63600175 PHARM REV CODE 636 W HCPCS: Performed by: ANESTHESIOLOGY

## 2020-02-10 PROCEDURE — 61548 PR EXCIS PITUITARY,TRANSNASAL/SEPTAL: ICD-10-PCS | Mod: ,,, | Performed by: NEUROLOGICAL SURGERY

## 2020-02-10 PROCEDURE — 25000003 PHARM REV CODE 250: Performed by: NURSE PRACTITIONER

## 2020-02-10 DEVICE — IMPLANTABLE DEVICE: Type: IMPLANTABLE DEVICE | Site: CRANIAL | Status: FUNCTIONAL

## 2020-02-10 RX ORDER — MIDAZOLAM HYDROCHLORIDE 1 MG/ML
INJECTION, SOLUTION INTRAMUSCULAR; INTRAVENOUS
Status: DISCONTINUED | OUTPATIENT
Start: 2020-02-10 | End: 2020-02-10

## 2020-02-10 RX ORDER — SODIUM CHLORIDE 0.9 % (FLUSH) 0.9 %
2 SYRINGE (ML) INJECTION
Status: DISCONTINUED | OUTPATIENT
Start: 2020-02-10 | End: 2020-02-12 | Stop reason: HOSPADM

## 2020-02-10 RX ORDER — HEPARIN SODIUM 5000 [USP'U]/ML
5000 INJECTION, SOLUTION INTRAVENOUS; SUBCUTANEOUS EVERY 8 HOURS
Status: DISCONTINUED | OUTPATIENT
Start: 2020-02-11 | End: 2020-02-12 | Stop reason: HOSPADM

## 2020-02-10 RX ORDER — NEOSTIGMINE METHYLSULFATE 0.5 MG/ML
INJECTION, SOLUTION INTRAVENOUS
Status: DISCONTINUED | OUTPATIENT
Start: 2020-02-10 | End: 2020-02-10

## 2020-02-10 RX ORDER — ONDANSETRON 2 MG/ML
INJECTION INTRAMUSCULAR; INTRAVENOUS
Status: DISCONTINUED | OUTPATIENT
Start: 2020-02-10 | End: 2020-02-10

## 2020-02-10 RX ORDER — ACETAMINOPHEN 10 MG/ML
INJECTION, SOLUTION INTRAVENOUS
Status: DISCONTINUED | OUTPATIENT
Start: 2020-02-10 | End: 2020-02-10

## 2020-02-10 RX ORDER — OXYCODONE AND ACETAMINOPHEN 5; 325 MG/1; MG/1
1 TABLET ORAL EVERY 4 HOURS PRN
Status: DISCONTINUED | OUTPATIENT
Start: 2020-02-10 | End: 2020-02-12 | Stop reason: HOSPADM

## 2020-02-10 RX ORDER — LIDOCAINE HYDROCHLORIDE AND EPINEPHRINE 10; 10 MG/ML; UG/ML
INJECTION, SOLUTION INFILTRATION; PERINEURAL
Status: DISCONTINUED | OUTPATIENT
Start: 2020-02-10 | End: 2020-02-10 | Stop reason: HOSPADM

## 2020-02-10 RX ORDER — MORPHINE SULFATE 2 MG/ML
1 INJECTION, SOLUTION INTRAMUSCULAR; INTRAVENOUS
Status: DISCONTINUED | OUTPATIENT
Start: 2020-02-10 | End: 2020-02-12 | Stop reason: HOSPADM

## 2020-02-10 RX ORDER — DEXMEDETOMIDINE HYDROCHLORIDE 100 UG/ML
INJECTION, SOLUTION INTRAVENOUS
Status: DISCONTINUED | OUTPATIENT
Start: 2020-02-10 | End: 2020-02-10

## 2020-02-10 RX ORDER — LIDOCAINE HYDROCHLORIDE 20 MG/ML
INJECTION INTRAVENOUS
Status: DISCONTINUED | OUTPATIENT
Start: 2020-02-10 | End: 2020-02-10

## 2020-02-10 RX ORDER — ROCURONIUM BROMIDE 10 MG/ML
INJECTION, SOLUTION INTRAVENOUS
Status: DISCONTINUED | OUTPATIENT
Start: 2020-02-10 | End: 2020-02-10

## 2020-02-10 RX ORDER — OXYMETAZOLINE HCL 0.05 %
2 SPRAY, NON-AEROSOL (ML) NASAL 2 TIMES DAILY
Status: DISCONTINUED | OUTPATIENT
Start: 2020-02-10 | End: 2020-02-12 | Stop reason: HOSPADM

## 2020-02-10 RX ORDER — BACITRACIN 50000 [IU]/1
INJECTION, POWDER, FOR SOLUTION INTRAMUSCULAR
Status: DISCONTINUED | OUTPATIENT
Start: 2020-02-10 | End: 2020-02-10 | Stop reason: HOSPADM

## 2020-02-10 RX ORDER — FENTANYL CITRATE 50 UG/ML
25 INJECTION, SOLUTION INTRAMUSCULAR; INTRAVENOUS EVERY 4 HOURS PRN
Status: DISCONTINUED | OUTPATIENT
Start: 2020-02-10 | End: 2020-02-12 | Stop reason: HOSPADM

## 2020-02-10 RX ORDER — FENTANYL CITRATE 50 UG/ML
25 INJECTION, SOLUTION INTRAMUSCULAR; INTRAVENOUS EVERY 5 MIN PRN
Status: DISCONTINUED | OUTPATIENT
Start: 2020-02-10 | End: 2020-02-10

## 2020-02-10 RX ORDER — ONDANSETRON 2 MG/ML
4 INJECTION INTRAMUSCULAR; INTRAVENOUS ONCE AS NEEDED
Status: COMPLETED | OUTPATIENT
Start: 2020-02-10 | End: 2020-02-10

## 2020-02-10 RX ORDER — CEFAZOLIN SODIUM 1 G/3ML
1 INJECTION, POWDER, FOR SOLUTION INTRAMUSCULAR; INTRAVENOUS
Status: DISCONTINUED | OUTPATIENT
Start: 2020-02-10 | End: 2020-02-12 | Stop reason: HOSPADM

## 2020-02-10 RX ORDER — FENTANYL CITRATE 50 UG/ML
INJECTION, SOLUTION INTRAMUSCULAR; INTRAVENOUS
Status: DISCONTINUED | OUTPATIENT
Start: 2020-02-10 | End: 2020-02-10

## 2020-02-10 RX ORDER — DEXAMETHASONE SODIUM PHOSPHATE 4 MG/ML
INJECTION, SOLUTION INTRA-ARTICULAR; INTRALESIONAL; INTRAMUSCULAR; INTRAVENOUS; SOFT TISSUE
Status: DISCONTINUED | OUTPATIENT
Start: 2020-02-10 | End: 2020-02-10

## 2020-02-10 RX ORDER — GLYCOPYRROLATE 0.2 MG/ML
INJECTION INTRAMUSCULAR; INTRAVENOUS
Status: DISCONTINUED | OUTPATIENT
Start: 2020-02-10 | End: 2020-02-10

## 2020-02-10 RX ORDER — BACITRACIN ZINC 500 UNIT/G
OINTMENT (GRAM) TOPICAL
Status: DISCONTINUED | OUTPATIENT
Start: 2020-02-10 | End: 2020-02-10 | Stop reason: HOSPADM

## 2020-02-10 RX ORDER — BUPIVACAINE HYDROCHLORIDE AND EPINEPHRINE 2.5; 5 MG/ML; UG/ML
INJECTION, SOLUTION EPIDURAL; INFILTRATION; INTRACAUDAL; PERINEURAL
Status: DISCONTINUED | OUTPATIENT
Start: 2020-02-10 | End: 2020-02-10 | Stop reason: HOSPADM

## 2020-02-10 RX ORDER — FAMOTIDINE 40 MG/5ML
20 POWDER, FOR SUSPENSION ORAL 2 TIMES DAILY
Status: DISCONTINUED | OUTPATIENT
Start: 2020-02-10 | End: 2020-02-12

## 2020-02-10 RX ORDER — SODIUM CHLORIDE 9 MG/ML
INJECTION, SOLUTION INTRAVENOUS CONTINUOUS
Status: DISCONTINUED | OUTPATIENT
Start: 2020-02-10 | End: 2020-02-12

## 2020-02-10 RX ORDER — PSEUDOEPHEDRINE HCL 30 MG
60 TABLET ORAL EVERY 4 HOURS PRN
Status: DISCONTINUED | OUTPATIENT
Start: 2020-02-10 | End: 2020-02-10

## 2020-02-10 RX ORDER — HYDRALAZINE HYDROCHLORIDE 20 MG/ML
20 INJECTION INTRAMUSCULAR; INTRAVENOUS EVERY 6 HOURS PRN
Status: DISCONTINUED | OUTPATIENT
Start: 2020-02-10 | End: 2020-02-12 | Stop reason: HOSPADM

## 2020-02-10 RX ORDER — SODIUM CHLORIDE 9 MG/ML
INJECTION, SOLUTION INTRAVENOUS CONTINUOUS PRN
Status: DISCONTINUED | OUTPATIENT
Start: 2020-02-10 | End: 2020-02-10

## 2020-02-10 RX ORDER — LABETALOL HCL 20 MG/4 ML
10 SYRINGE (ML) INTRAVENOUS EVERY 10 MIN PRN
Status: DISCONTINUED | OUTPATIENT
Start: 2020-02-10 | End: 2020-02-12 | Stop reason: HOSPADM

## 2020-02-10 RX ORDER — PROPOFOL 10 MG/ML
VIAL (ML) INTRAVENOUS
Status: DISCONTINUED | OUTPATIENT
Start: 2020-02-10 | End: 2020-02-10

## 2020-02-10 RX ORDER — OXYMETAZOLINE HCL 0.05 %
SPRAY, NON-AEROSOL (ML) NASAL
Status: DISCONTINUED | OUTPATIENT
Start: 2020-02-10 | End: 2020-02-10 | Stop reason: HOSPADM

## 2020-02-10 RX ORDER — KETAMINE HCL IN 0.9 % NACL 50 MG/5 ML
SYRINGE (ML) INTRAVENOUS
Status: DISCONTINUED | OUTPATIENT
Start: 2020-02-10 | End: 2020-02-10

## 2020-02-10 RX ORDER — PSEUDOEPHEDRINE HCL 30 MG
30 TABLET ORAL EVERY 4 HOURS PRN
Status: DISCONTINUED | OUTPATIENT
Start: 2020-02-10 | End: 2020-02-12 | Stop reason: HOSPADM

## 2020-02-10 RX ADMIN — FENTANYL CITRATE 50 MCG: 50 INJECTION, SOLUTION INTRAMUSCULAR; INTRAVENOUS at 07:02

## 2020-02-10 RX ADMIN — DEXAMETHASONE SODIUM PHOSPHATE 4 MG: 4 INJECTION, SOLUTION INTRAMUSCULAR; INTRAVENOUS at 07:02

## 2020-02-10 RX ADMIN — DEXMEDETOMIDINE HYDROCHLORIDE 12 MCG: 100 INJECTION, SOLUTION INTRAVENOUS at 08:02

## 2020-02-10 RX ADMIN — OXYCODONE HYDROCHLORIDE AND ACETAMINOPHEN 1 TABLET: 5; 325 TABLET ORAL at 02:02

## 2020-02-10 RX ADMIN — PROPOFOL 100 MG: 10 INJECTION, EMULSION INTRAVENOUS at 07:02

## 2020-02-10 RX ADMIN — SODIUM CHLORIDE, SODIUM GLUCONATE, SODIUM ACETATE, POTASSIUM CHLORIDE, MAGNESIUM CHLORIDE, SODIUM PHOSPHATE, DIBASIC, AND POTASSIUM PHOSPHATE: .53; .5; .37; .037; .03; .012; .00082 INJECTION, SOLUTION INTRAVENOUS at 08:02

## 2020-02-10 RX ADMIN — CEFAZOLIN 1 G: 1 INJECTION, POWDER, FOR SOLUTION INTRAMUSCULAR; INTRAVENOUS at 05:02

## 2020-02-10 RX ADMIN — GLYCOPYRROLATE 0.4 MG: 0.2 INJECTION, SOLUTION INTRAMUSCULAR; INTRAVENOUS at 08:02

## 2020-02-10 RX ADMIN — ONDANSETRON 4 MG: 2 INJECTION INTRAMUSCULAR; INTRAVENOUS at 09:02

## 2020-02-10 RX ADMIN — MORPHINE SULFATE 1 MG: 2 INJECTION, SOLUTION INTRAMUSCULAR; INTRAVENOUS at 11:02

## 2020-02-10 RX ADMIN — OXYCODONE HYDROCHLORIDE AND ACETAMINOPHEN 1 TABLET: 5; 325 TABLET ORAL at 10:02

## 2020-02-10 RX ADMIN — SODIUM CHLORIDE: 0.9 INJECTION, SOLUTION INTRAVENOUS at 06:02

## 2020-02-10 RX ADMIN — SODIUM CHLORIDE 100 ML: 9 INJECTION, SOLUTION INTRAVENOUS at 07:02

## 2020-02-10 RX ADMIN — ROCURONIUM BROMIDE 50 MG: 10 INJECTION, SOLUTION INTRAVENOUS at 07:02

## 2020-02-10 RX ADMIN — FENTANYL CITRATE 25 MCG: 50 INJECTION INTRAMUSCULAR; INTRAVENOUS at 09:02

## 2020-02-10 RX ADMIN — ONDANSETRON 4 MG: 2 INJECTION INTRAMUSCULAR; INTRAVENOUS at 07:02

## 2020-02-10 RX ADMIN — OXYCODONE HYDROCHLORIDE AND ACETAMINOPHEN 1 TABLET: 5; 325 TABLET ORAL at 06:02

## 2020-02-10 RX ADMIN — NEOSTIGMINE METHYLSULFATE 5 MG: 0.5 INJECTION INTRAVENOUS at 08:02

## 2020-02-10 RX ADMIN — FENTANYL CITRATE 25 MCG: 50 INJECTION INTRAMUSCULAR; INTRAVENOUS at 07:02

## 2020-02-10 RX ADMIN — SALINE NASAL SPRAY 1 SPRAY: 1.5 SOLUTION NASAL at 08:02

## 2020-02-10 RX ADMIN — HYDROCORTISONE SODIUM SUCCINATE 50 MG: 100 INJECTION, POWDER, FOR SOLUTION INTRAMUSCULAR; INTRAVENOUS at 02:02

## 2020-02-10 RX ADMIN — SALINE NASAL SPRAY 1 SPRAY: 1.5 SOLUTION NASAL at 05:02

## 2020-02-10 RX ADMIN — CEFAZOLIN 1 G: 1 INJECTION, POWDER, FOR SOLUTION INTRAMUSCULAR; INTRAVENOUS at 09:02

## 2020-02-10 RX ADMIN — SALINE NASAL SPRAY 1 SPRAY: 1.5 SOLUTION NASAL at 12:02

## 2020-02-10 RX ADMIN — GLYCOPYRROLATE 0.2 MG: 0.2 INJECTION, SOLUTION INTRAMUSCULAR; INTRAVENOUS at 06:02

## 2020-02-10 RX ADMIN — OXYCODONE HYDROCHLORIDE AND ACETAMINOPHEN 1 TABLET: 5; 325 TABLET ORAL at 09:02

## 2020-02-10 RX ADMIN — Medication 30 MG: at 07:02

## 2020-02-10 RX ADMIN — FENTANYL CITRATE 100 MCG: 50 INJECTION, SOLUTION INTRAMUSCULAR; INTRAVENOUS at 07:02

## 2020-02-10 RX ADMIN — PROPOFOL 20 MG: 10 INJECTION, EMULSION INTRAVENOUS at 07:02

## 2020-02-10 RX ADMIN — MIDAZOLAM HYDROCHLORIDE 2 MG: 1 INJECTION, SOLUTION INTRAMUSCULAR; INTRAVENOUS at 06:02

## 2020-02-10 RX ADMIN — ACETAMINOPHEN 1000 MG: 10 INJECTION, SOLUTION INTRAVENOUS at 07:02

## 2020-02-10 RX ADMIN — PROPOFOL 140 MG: 10 INJECTION, EMULSION INTRAVENOUS at 07:02

## 2020-02-10 RX ADMIN — FAMOTIDINE 20 MG: 40 POWDER, FOR SUSPENSION ORAL at 08:02

## 2020-02-10 RX ADMIN — CEFTRIAXONE SODIUM 2 G: 2 INJECTION, POWDER, FOR SOLUTION INTRAMUSCULAR; INTRAVENOUS at 07:02

## 2020-02-10 RX ADMIN — Medication 2 SPRAY: at 06:02

## 2020-02-10 RX ADMIN — HYDROCORTISONE SODIUM SUCCINATE 50 MG: 100 INJECTION, POWDER, FOR SOLUTION INTRAMUSCULAR; INTRAVENOUS at 10:02

## 2020-02-10 RX ADMIN — LIDOCAINE HYDROCHLORIDE 70 MG: 20 INJECTION, SOLUTION INTRAVENOUS at 07:02

## 2020-02-10 NOTE — CARE UPDATE
NSGY notified of -500cc/hr xpast 2 hours. No UOP documented since leaving OR at 0845. q6Na and spec grav ordered, results pending.    Plan:   STRICT I and O, hourly urine output  q6 Na  q6 spec grav    Wilda Elizabeth MD  [facility]

## 2020-02-10 NOTE — ASSESSMENT & PLAN NOTE
Admit to [facility] s/p transphenoidal hypophysectomy with fat graft POD#0  NSGY following  2/10CT Head  Showed mixed densities, blood fluid and fat packing within sphenoid sinus, no complications noted  VS/Neuro checks q1h  SBP goal 100-160  Sinus precautions  IVF /h  Strict I/O  Monitor for DI symptoms; Q6h Na and U/A for sp grav  Ancef 1g q8 post-op  Hydrocortisone 50mg q8h x24h then 25mg q6h x24h  Oxymetazoline nasal spray 2 sprays each nare bid  Pseudoephedrine 30mg q4 prn congestion.  Saline nasal spray 1 spray QID  DDAVP at bedside if needed  Fentanyl. Morphine prn severe pain  Percocet 5/325 q4 prn moderate pain  PT/OT/SLP

## 2020-02-10 NOTE — OP NOTE
DATE OF PROCEDURE:  2/10/2020     SURGEON:  Kb Argueta M.D., Ph.D.     ASSISTANT: MARGOT Abbott M.D.  (RES) (the assistant is a [facility] resident).     PREOPERATIVE DIAGNOSIS:  Pituitary adenoma.    POSTOPERATIVE DIAGNOSIS:  Pituitary adenoma.     PROCEDURES PERFORMED:  1.  Endonasal transsphenoidal resection of pituitary tumor.  2.  Stealth navigation.  3.  Fat graft.    SPECIMEN: Brain tumor.     INDICATIONS IN DETAIL:    Mr. Rhett Quarles is a pleasant 44 y.o. gentleman who presents today for consultation regarding newly discovered pituitary adenoma. Pt states he had an onset of diplopia, dizziness, extremity paraesthesias, fatigue, and mental fogginess several months ago. He was seen by his PCP and more recently by Neurology who ordered a MRI Brain which revealed an incidental pituitary mass. He has been worked up for MS but results are inconclusive. He denies a decrease in his libido.   MRI Pituitary W W/O Contrast (01/03/2020) shows a macroadenoma causing compression  of the pituitary gland and herniation into the sphenoid sinus. There is no compression of the optic nerves or chiasm.  Given the size of the mass, I propose a transsphenoidal hypophysectomy of the pituitary mass. I have discussed the risks/benefits, indications, and alternatives for the proposed procedure in detail.  I have answered all of their questions and the pt wishes to proceed with surgery.    PROCEDURE IN DETAIL:  The patient was brought to the Operating Room and a general anesthetic was administered.  All proper lines were placed.  The patient was placed in the semi-sitting position and his head was turned to the right in slight extension.  The patient's head was placed in 3-point fixation using a   Garza headholder.  The Stealth navigation system was brought to the field and an accurate registration was achieved using the tracer function.  The patient had a CT prior to surgery and this was used for  the navigation purposes.  The patient's nasal cavity, face and abdomen were cleaned, prepped and draped in the usual fashion.  We initially turned our attention to obtaining the fat graft.  A 3 cm line was drawn on the patient's abdomen and a lidocaine-bupivacaine mix was infiltrated under the skin.  Incision was made along that line and carried down to the fat level using Bovie cautery. Retraction was held using a Weitlaner retractor.  A 3 x 3 cm piece of fat was removed.  The wound was irrigated copiously.  All bleeding points were coagulated and then the wound was closed in layers with staples in the skin.  A clean dressing was placed.  At this point, we turned our attention to tumor approach and removal.  The patient had Afrin-soaked pledgets placed in his nasal cavities bilaterally. These were removed.  We then entered the right nasal cavity using the handheld nasal speculum and followed the septum back towards the pituitary fossa.  We used Stealth navigation to verify that we were indeed on an accurate trajectory.  We followed the nasal septum back to the bony rostrum of the sphenoid sinus. We then fractured the nasal septum medially and dissected the mucosa from both sides of the bony rostrum.  An endonasal retractor was then placed.  We again verified our trajectory.  Then, using a pituitary rongeur as well as Kerrison rongeurs, we made an opening into the sphenoid sinus which would allow us access to the tumor.  Upon entering the sphenoid sinus, we irrigated copiously.  There was extensive tumor in the sphenoid sinus.  We began by removing this tumor with ring curettes.  We were then able to access the edge of the mucosa.  We then removed the mucosa from the sphenoid sinus which allowed near complete removal of the tumor.  Once we had resected all tumor from this region we turned our attention to the pituitary fossa.  Tumor was resected all the way up to the edge of the pituitary fossa.  Upon exploration we  found a hole in the dura.  We used this as a natural opening and extended it so that we could explore the pituitary fossa.    Upon exploration, we initially found a portion of the gland that was next to the diaphragma sella.  There was a small leak of CSF from the diaphragma sella which we cauterized to close.  We then dissected below this and removed more bone to access the floor of the pituitary fossa.  We dissected in this region and found an opening that contained residual tumor in the pituitary fossa.  This was removed until we could see clean dura and gland.  We then coagulated the layer of the gland and took a small layer of normal gland.  Once this was removed, we could see clean pituitary gland in this area.   At this point, we turned our attention to the closure.  The wound was irrigated copiously.  All bleeding points were again coagulated and we gained excellent control of bleeding at this point.  The sella was then packed with fat after all the tumor had been removed and we were certain of an excellent decompression.  A resorbable plate was then placed in as a buttress to hold the fat in place.  Evicel was then placed over this area. There was no evidence of any CSF leak during this entire surgery.  The wound was irrigated copiously.  All bleeding points were again coagulated.  The self-retaining endonasal retractor was removed.  We re-entered the left nasal cavity.  The septum was medialized.  The middle turbinate was also medialized and Afrin-soaked pledgets were placed behind it.  We re-entered the right nasal cavity using a handheld nasal speculum and performed the same maneuver.  This left the septum in the midline.  At this point, the patient was taken out of 3-point fixation.  His face was cleaned and the patient was awakened by the Anesthesia staff.  At the point the patient was awake and following commands, he was extubated.     The Afrin-soaked pledgets were removed just prior to extubation.   The patient was then transferred to the ICU in excellent condition.  EBL was approximately 25 mL.      Specimen included brain tumor for permanent evaluation.      There were no intraprocedural complications.     All counts were correct at the end of surgery.     Dr. Kb Argueta was present during the entire procedure.

## 2020-02-10 NOTE — ANESTHESIA POSTPROCEDURE EVALUATION
Anesthesia Post Evaluation    Patient: Rhett Quarles    Procedure(s) Performed: Procedure(s) (LRB):  HYPOPHYSECTOMY, TRANSSPHENOIDAL APPROACH with FAT GRAFT (N/A)    Final Anesthesia Type: general    Patient location during evaluation: ICU  Patient participation: No - Unable to Participate, Sedation  Level of consciousness: sedated  Post-procedure vital signs: reviewed and stable  Pain management: adequate  Airway patency: patent    PONV status at discharge: No PONV  Anesthetic complications: no      Cardiovascular status: blood pressure returned to baseline and hemodynamically stable  Respiratory status: unassisted, spontaneous ventilation, face mask and oral airway  Hydration status: euvolemic  Follow-up not needed.          Vitals Value Taken Time   /91 2/10/2020  5:37 AM   Temp 36.7 °C (98.1 °F) 2/10/2020  5:37 AM   Pulse 60 2/10/2020  8:57 AM   Resp 18 2/10/2020  8:57 AM   SpO2 99 % 2/10/2020  8:57 AM   Vitals shown include unvalidated device data.      No case tracking events are documented in the log.      Pain/Abel Score: No data recorded

## 2020-02-10 NOTE — INTERVAL H&P NOTE
The patient has been examined and the H&P has been reviewed:    I concur with the findings and no changes have occurred since H&P was written.    -- Patient evaluated prior to surgery  -- All diagnostics and imaging reviewed  -- Patient NPO since MN  -- No anti-coag/plt medication  -- Further orders to follow s/p surgery            Active Hospital Problems    Diagnosis  POA    Brain tumor [D49.6]  Yes      Resolved Hospital Problems   No resolved problems to display.

## 2020-02-10 NOTE — HPI
Mr. Rhett Quarles is a pleasant 44 y.o. gentleman seen by Dr Argueta for pituitary adenoma. Pt states he had an onset of diplopia, dizziness, extremity paraesthesias, fatigue, and mental fogginess several months ago. He was seen by his PCP, ophthalmology and  recently by Neurology who ordered a MRI Brain which revealed an incidental pituitary mass. He has been worked up for MS but results are inconclusive.    Pt presents to [facility] s/p transphenoidal hypophysectomy with fat graft for close neurological monitoring and DI monitoring.

## 2020-02-10 NOTE — PLAN OF CARE
02/10/20 1520   Post-Acute Status   Post-Acute Authorization Other;Placement   Post-Acute Placement Status Awaiting Internal Medical Clearance   Discharge Delays None known at this time   Discharge Plan   Discharge Plan A Home   Discharge Plan B [facility]       Shawna Leija RN, CCRN-K, [facility]  Neuro-Critical Care   X [id number]

## 2020-02-10 NOTE — ANESTHESIA PROCEDURE NOTES
Intubation  Performed by: Rodolfo Aquino Jr., CRNA  Authorized by: Fredy Alarcon MD     Intubation:     Induction:  Intramuscular    Intubated:  Postinduction    Mask Ventilation:  Easy mask    Attempted By:  Student    Method of Intubation:  Direct    Blade:  Cesar 2    Laryngeal View Grade: Grade I - full view of chords      Difficult Airway Encountered?: No      Complications:  None    Airway Device:  Oral endotracheal tube    Airway Device Size:  7.5    Style/Cuff Inflation:  Cuffed    Inflation Amount (mL):  6    Tube secured:  23    Secured at:  The lips    Placement Verified By:  Capnometry    Complicating Factors:  None    Findings Post-Intubation:  BS equal bilateral and atraumatic/condition of teeth unchanged

## 2020-02-10 NOTE — CARE UPDATE
NSGY Post Op Check Note    Patient seen and examined in [facility]. Complaining of facial pain. Following commands x4. PERRL, EOMI, FS, TM. Visual fields grossly intact to counting. Denies drainage from nose. Pending CTH.     -- SBP <160  -- Nasal precautions  -- CTH today  -- Steroid taper  -- Pain control

## 2020-02-10 NOTE — H&P
[facility]  Neurocritical Care  Progress Note    Admit Date: 2/10/2020  Service Date: 02/10/2020  Length of Stay: 0    Subjective:     Chief Complaint: Brain tumor    History of Present Illness: Mr. Rhett Quarles is a pleasant 44 y.o. gentleman  seen by Dr Argueta for pituitary adenoma. Pt states he had an onset of diplopia, dizziness, extremity paraesthesias, fatigue, and mental fogginess several months ago. He was seen by his PCP, ophthalmology and  recently by Neurology who ordered a MRI Brain which revealed an incidental pituitary mass. He has been worked up for MS but results are inconclusive.    Pt presents to [facility] s/p transphenoidal hypophysectomy with fat graft for close neurological monitoring and DI monitoring.     Hospital Course: 2/10 2020 admit to [facility] s/p transphenoidal hypophysectomy with fat graft    History reviewed. No pertinent past medical history.  History reviewed. No pertinent surgical history.    No current facility-administered medications on file prior to encounter.      Current Outpatient Medications on File Prior to Encounter   Medication Sig Dispense Refill    ibuprofen (ADVIL,MOTRIN) 100 mg/5 mL suspension Take by mouth every 6 (six) hours as needed for Temperature greater than.       Allergies: Patient has no known allergies.    History reviewed. No pertinent family history.    Social History     Tobacco Use    Smoking status: Never Smoker    Smokeless tobacco: Never Used   Substance Use Topics    Alcohol use: Yes     Comment: 2 rums in the evening    Drug use: Not Currently     Types: Marijuana      Review of Systems:  Constitutional: + fatigue Denies fevers, weight loss, chills, or weakness.  Eyes:+ visual disturbance, diplopia Denies changes in vision.  ENT: Denies dysphagia, nasal discharge, ear pain or discharge.  Cardiovascular: Denies chest pain, palpitations, orthopnea, or claudication.  Respiratory: Denies shortness of breath, cough, hemoptysis, or  wheezing.  GI: Denies nausea/vomitting, hematochezia, melena, abd pain, or changes in appetite.  : Denies dysuria, incontinence, or hematuria.  Musculoskeletal: Denies joint pain or myalgias.  Skin/breast: Denies rashes, lumps, lesions, or discharge.  Neurologic:+ dizziness  + extremity parathesias and mental fog Denies headache, dizziness, vertigo, or paresthesias.  Psychiatric: Denies changes in mood or hallucinations.  Endocrine: Denies polyuria, polydipsia, heat/cold intolerance.  Hematologic/Lymph: Denies lymphadenopathy, easy bruising or easy bleeding.  Allergic/Immunologic: Denies rash, rhinitis.         Vitals:   Temp: 98 °F (36.7 °C)  Pulse: 98  Rhythm: normal sinus rhythm  BP: (!) 148/86  MAP (mmHg): 111  Resp: 16  SpO2: 95 %  O2 Device (Oxygen Therapy): room air    Temp  Min: 97.5 °F (36.4 °C)  Max: 98.1 °F (36.7 °C)  Pulse  Min: 62  Max: 98  BP  Min: 123/81  Max: 168/82  MAP (mmHg)  Min: 98  Max: 119  Resp  Min: 12  Max: 20  SpO2  Min: 95 %  Max: 100 %    No intake/output data recorded.   Unmeasured Output  Stool Occurrence: 0     Examination:   Constitutional: Well-nourished and -developed. No apparent distress.   Eyes: Conjunctiva clear, anicteric. Lids no lesions.  Head/Ears/Nose/Mouth/Throat/Neck: Moist mucous membranes. External ears, nose atraumatic.   Cardiovascular: Regular rhythm. No murmurs. No leg edema.  Respiratory: Comfortable respirations. Clear to auscultation.  Gastrointestinal: No hernia. Soft, nondistended, nontender. + bowel sounds.    Neurologic:   -[id number]  -Alert. Oriented to person, place, and time. Speech fluent. Follows commands. Recent and remote memory adequate. Judgment good. Insight appropriate.  -Cranial nerves II-XII grosslyintact,PERRL 3+ EOMI.  -Strength 5/5 and symmetric in arms, legs. Tone normal.   -Sensation bilat intact to touch in arms, legs.        Today I independently reviewed pertinent medications, lines/drains/airways, imaging, laboratory results,  microbiology results, notably:   Assessment/Plan:     Oncology  Pituitary adenoma  Admit to [facility] s/p transphenoidal hypophysectomy with fat graft POD#0  NSGY following  2/10CT Head  Showed mixed densities, blood fluid and fat packing within sphenoid sinus, no complications noted  VS/Neuro checks q1h  SBP goal 100-160  Sinus precautions  IVF /h  Strict I/O  Monitor for DI symptoms; Q6h Na and U/A for sp grav  Ancef 1g q8 post-op  Hydrocortisone 50mg q8h x24h then 25mg q6h x24h  Oxymetazoline nasal spray 2 sprays each nare bid  Pseudoephedrine 30mg q4 prn congestion.  Saline nasal spray 1 spray QID  DDAVP at bedside if needed  Fentanyl. Morphine prn severe pain  Percocet 5/325 q4 prn moderate pain  PT/OT/SLP                  The patient is being Prophylaxed for:  Venous Thromboembolism with: Mechanical or Chemical  Stress Ulcer with: H2B  Ventilator Pneumonia with: not applicable    Activity Orders          Diet Adult Regular (IDDSI Level 7): Regular starting at 02/10 0839        No Order    I have spent 35 min with this patient, with over 50% of this time spent coordinating care and speaking with the family      Cary Monroe NP  Neurocritical Care  [facility]

## 2020-02-10 NOTE — PLAN OF CARE
CM met with patient and wife in room for [place] Planning Assessment.  Patient is able to answer questions.  Per wife, the patient lives with her in a single story house with 6 step(s) to enter.   Per wife, patient was independent with ADLS and used no for ambulation.  Patient will have assistance from wife upon discharge.   Discharge Planning Booklet given to patient/family and discussed.  All questions addressed.  CM will follow for needs.       02/10/20 7991   Discharge Assessment   Assessment Type Discharge Planning Assessment   Confirmed/corrected address and phone number on facesheet? Yes   Assessment information obtained from? Patient;Caregiver  (Wife, Ashley)   Expected Length of Stay (days) 3   Communicated expected length of stay with patient/caregiver yes   Prior to hospitilization cognitive status: Alert/Oriented   Prior to hospitalization functional status: Independent   Current cognitive status: Alert/Oriented   Current Functional Status: Needs Assistance  (S/p transphenoidal Pituitary Resection)   Lives With spouse   Able to Return to Prior Arrangements yes   Is patient able to care for self after discharge? Yes   Who are your caregiver(s) and their phone number(s)? Elis Quarles (wife) [phone]   Patient's perception of discharge disposition home or selfcare   Readmission Within the Last 30 Days no previous admission in last 30 days   Patient currently being followed by outpatient case management? No   Patient currently receives any other outside agency services? No   Equipment Currently Used at Home none   Do you have any problems affording any of your prescribed medications? No   Is the patient taking medications as prescribed? yes   Does the patient have transportation home? Yes   Transportation Anticipated family or friend will provide   Does the patient receive services at the [facility]mad[facility] Clinic? No   Discharge Plan A Home   Discharge Plan B Home Health   DME Needed Upon Discharge   none   Patient/Family in Agreement with Plan yes                PCP:  Bryce Robert MD        Pharmacy:    [id number] - [place], [place] - [address]  [address]  [place] [address]  Phone: [phone] Fax: [phone]        Emergency Contacts:  Extended Emergency Contact Information  Primary Emergency Contact: Elis Quarles  Mobile Phone: [phone]  Relation: Spouse  Preferred language: English   needed? No      Insurance:    Payor: BLUE CROSS BLUE [employer] / Plan: BLUE CONNECT / Product Type: HMO /       Shawna Leija RN, CCRN-K, [facility]  Neuro-Critical Care   X [id number]    02/10/2020  3:19 PM

## 2020-02-10 NOTE — TRANSFER OF CARE
"Anesthesia Transfer of Care Note    Patient: Rhett Quarles    Procedure(s) Performed: Procedure(s) (LRB):  HYPOPHYSECTOMY, TRANSSPHENOIDAL APPROACH with FAT GRAFT (N/A)    Patient location: ICU    Anesthesia Type: general    Transport from OR: Transported from OR on 6-10 L/min O2 by face mask with adequate spontaneous ventilation    Post pain: adequate analgesia    Post assessment: no apparent anesthetic complications and tolerated procedure well    Post vital signs: stable    Level of consciousness: sedated    Nausea/Vomiting: no nausea/vomiting    Complications: none    Transfer of care protocol was followed      Last vitals:   Visit Vitals  BP (!) 159/91 (BP Location: Left arm, Patient Position: Lying)   Pulse 76   Temp 36.7 °C (98.1 °F) (Oral)   Resp 20   Ht 5' 4" (1.626 m)   Wt 72.6 kg (160 lb)   SpO2 100%   BMI 27.46 kg/m²     "

## 2020-02-10 NOTE — BRIEF OP NOTE
[facility]  Brief Operative Note    SUMMARY     Surgery Date: 2/10/2020     Surgeon(s) and Role:     * Kb Argueta MD - Primary    Assisting Surgeon: MARGOT Abbott MD - Resident Assisting    Pre-op Diagnosis:  Pituitary adenoma [D35.2]    Post-op Diagnosis:  Post-Op Diagnosis Codes:     * Pituitary adenoma [D35.2]    Procedure(s) (LRB):  HYPOPHYSECTOMY, TRANSSPHENOIDAL APPROACH with FAT GRAFT (N/A)    Anesthesia: General     Description of Procedure: microscopic transnasal transphenoidal resection of pituitary mass with use of neuronavigation and microsurgical technique    Description of the findings of the procedure: pituitary tumor    Estimated Blood Loss: * No values recorded between 2/10/2020  7:33 AM and 2/10/2020  8:41 AM *         Specimens:   Specimen (12h ago, onward)    None

## 2020-02-11 LAB
ALBUMIN SERPL BCP-MCNC: 4 G/DL (ref 3.5–5.2)
ALP SERPL-CCNC: 60 U/L (ref 55–135)
ALT SERPL W/O P-5'-P-CCNC: 32 U/L (ref 10–44)
ANION GAP SERPL CALC-SCNC: 10 MMOL/L (ref 8–16)
ANION GAP SERPL CALC-SCNC: 9 MMOL/L (ref 8–16)
AST SERPL-CCNC: 19 U/L (ref 10–40)
BACTERIA #/AREA URNS AUTO: NORMAL /HPF
BASOPHILS # BLD AUTO: 0.01 K/UL (ref 0–0.2)
BASOPHILS NFR BLD: 0.1 % (ref 0–1.9)
BILIRUB SERPL-MCNC: 0.3 MG/DL (ref 0.1–1)
BILIRUB UR QL STRIP: NEGATIVE
BUN SERPL-MCNC: 5 MG/DL (ref 6–20)
BUN SERPL-MCNC: 5 MG/DL (ref 6–20)
CALCIUM SERPL-MCNC: 8.7 MG/DL (ref 8.7–10.5)
CALCIUM SERPL-MCNC: 9.4 MG/DL (ref 8.7–10.5)
CHLORIDE SERPL-SCNC: 108 MMOL/L (ref 95–110)
CHLORIDE SERPL-SCNC: 112 MMOL/L (ref 95–110)
CLARITY UR REFRACT.AUTO: CLEAR
CO2 SERPL-SCNC: 24 MMOL/L (ref 23–29)
CO2 SERPL-SCNC: 24 MMOL/L (ref 23–29)
COLOR UR AUTO: COLORLESS
COLOR UR AUTO: YELLOW
CREAT SERPL-MCNC: 0.9 MG/DL (ref 0.5–1.4)
CREAT SERPL-MCNC: 0.9 MG/DL (ref 0.5–1.4)
DIFFERENTIAL METHOD: ABNORMAL
EOSINOPHIL # BLD AUTO: 0 K/UL (ref 0–0.5)
EOSINOPHIL NFR BLD: 0 % (ref 0–8)
ERYTHROCYTE [DISTWIDTH] IN BLOOD BY AUTOMATED COUNT: 12.6 % (ref 11.5–14.5)
EST. GFR  (AFRICAN AMERICAN): >60 ML/MIN/1.73 M^2
EST. GFR  (AFRICAN AMERICAN): >60 ML/MIN/1.73 M^2
EST. GFR  (NON AFRICAN AMERICAN): >60 ML/MIN/1.73 M^2
EST. GFR  (NON AFRICAN AMERICAN): >60 ML/MIN/1.73 M^2
GLUCOSE SERPL-MCNC: 141 MG/DL (ref 70–110)
GLUCOSE SERPL-MCNC: 146 MG/DL (ref 70–110)
GLUCOSE UR QL STRIP: ABNORMAL
HCT VFR BLD AUTO: 49.6 % (ref 40–54)
HGB BLD-MCNC: 16 G/DL (ref 14–18)
HGB UR QL STRIP: NEGATIVE
IMM GRANULOCYTES # BLD AUTO: 0.11 K/UL (ref 0–0.04)
IMM GRANULOCYTES NFR BLD AUTO: 0.7 % (ref 0–0.5)
KETONES UR QL STRIP: NEGATIVE
LEUKOCYTE ESTERASE UR QL STRIP: NEGATIVE
LYMPHOCYTES # BLD AUTO: 0.8 K/UL (ref 1–4.8)
LYMPHOCYTES NFR BLD: 5.1 % (ref 18–48)
MAGNESIUM SERPL-MCNC: 2.2 MG/DL (ref 1.6–2.6)
MCH RBC QN AUTO: 29.2 PG (ref 27–31)
MCHC RBC AUTO-ENTMCNC: 32.3 G/DL (ref 32–36)
MCV RBC AUTO: 91 FL (ref 82–98)
MICROSCOPIC COMMENT: NORMAL
MONOCYTES # BLD AUTO: 0.6 K/UL (ref 0.3–1)
MONOCYTES NFR BLD: 4.2 % (ref 4–15)
NEUTROPHILS # BLD AUTO: 13.5 K/UL (ref 1.8–7.7)
NEUTROPHILS NFR BLD: 89.9 % (ref 38–73)
NITRITE UR QL STRIP: NEGATIVE
NRBC BLD-RTO: 0 /100 WBC
OSMOLALITY SERPL: 298 MOSM/KG (ref 280–300)
OSMOLALITY SERPL: 310 MOSM/KG (ref 280–300)
OSMOLALITY SERPL: 314 MOSM/KG (ref 280–300)
OSMOLALITY SERPL: NORMAL MOSM/KG (ref 280–300)
OSMOLALITY SERPL: NORMAL MOSM/KG (ref 280–300)
OSMOLALITY UR: 406 MOSM/KG (ref 50–1200)
OSMOLALITY UR: 481 MOSM/KG (ref 50–1200)
OSMOLALITY UR: 73 MOSM/KG (ref 50–1200)
OSMOLALITY UR: 773 MOSM/KG (ref 50–1200)
OSMOLALITY UR: 90 MOSM/KG (ref 50–1200)
PH UR STRIP: 5 [PH] (ref 5–8)
PH UR STRIP: 5 [PH] (ref 5–8)
PH UR STRIP: 6 [PH] (ref 5–8)
PH UR STRIP: 6 [PH] (ref 5–8)
PHOSPHATE SERPL-MCNC: 1.2 MG/DL (ref 2.7–4.5)
PLATELET # BLD AUTO: 302 K/UL (ref 150–350)
PMV BLD AUTO: 10.1 FL (ref 9.2–12.9)
POCT GLUCOSE: 175 MG/DL (ref 70–110)
POTASSIUM SERPL-SCNC: 3.6 MMOL/L (ref 3.5–5.1)
POTASSIUM SERPL-SCNC: 4.3 MMOL/L (ref 3.5–5.1)
PROT SERPL-MCNC: 7.5 G/DL (ref 6–8.4)
PROT UR QL STRIP: NEGATIVE
RBC # BLD AUTO: 5.48 M/UL (ref 4.6–6.2)
RBC #/AREA URNS AUTO: 1 /HPF (ref 0–4)
SODIUM SERPL-SCNC: 141 MMOL/L (ref 136–145)
SODIUM SERPL-SCNC: 141 MMOL/L (ref 136–145)
SODIUM SERPL-SCNC: 146 MMOL/L (ref 136–145)
SODIUM SERPL-SCNC: 146 MMOL/L (ref 136–145)
SODIUM UR-SCNC: <20 MMOL/L (ref 20–250)
SP GR UR STRIP: 1 (ref 1–1.03)
SP GR UR STRIP: 1.01 (ref 1–1.03)
SP GR UR STRIP: 1.02 (ref 1–1.03)
SP GR UR STRIP: 1.02 (ref 1–1.03)
URN SPEC COLLECT METH UR: ABNORMAL
WBC # BLD AUTO: 15.01 K/UL (ref 3.9–12.7)
WBC #/AREA URNS AUTO: 2 /HPF (ref 0–5)

## 2020-02-11 PROCEDURE — 84100 ASSAY OF PHOSPHORUS: CPT

## 2020-02-11 PROCEDURE — 94761 N-INVAS EAR/PLS OXIMETRY MLT: CPT

## 2020-02-11 PROCEDURE — 20000000 HC ICU ROOM

## 2020-02-11 PROCEDURE — 81003 URINALYSIS AUTO W/O SCOPE: CPT | Mod: 91

## 2020-02-11 PROCEDURE — 25000003 PHARM REV CODE 250: Performed by: STUDENT IN AN ORGANIZED HEALTH CARE EDUCATION/TRAINING PROGRAM

## 2020-02-11 PROCEDURE — 83735 ASSAY OF MAGNESIUM: CPT

## 2020-02-11 PROCEDURE — 97530 THERAPEUTIC ACTIVITIES: CPT

## 2020-02-11 PROCEDURE — 84295 ASSAY OF SERUM SODIUM: CPT

## 2020-02-11 PROCEDURE — 25000003 PHARM REV CODE 250: Performed by: NURSE PRACTITIONER

## 2020-02-11 PROCEDURE — 81001 URINALYSIS AUTO W/SCOPE: CPT

## 2020-02-11 PROCEDURE — 85025 COMPLETE CBC W/AUTO DIFF WBC: CPT

## 2020-02-11 PROCEDURE — 83935 ASSAY OF URINE OSMOLALITY: CPT

## 2020-02-11 PROCEDURE — 97165 OT EVAL LOW COMPLEX 30 MIN: CPT

## 2020-02-11 PROCEDURE — 99232 PR SUBSEQUENT HOSPITAL CARE,LEVL II: ICD-10-PCS | Mod: ,,, | Performed by: PSYCHIATRY & NEUROLOGY

## 2020-02-11 PROCEDURE — 99232 SBSQ HOSP IP/OBS MODERATE 35: CPT | Mod: ,,, | Performed by: PSYCHIATRY & NEUROLOGY

## 2020-02-11 PROCEDURE — 63600175 PHARM REV CODE 636 W HCPCS: Performed by: NURSE PRACTITIONER

## 2020-02-11 PROCEDURE — 83930 ASSAY OF BLOOD OSMOLALITY: CPT | Mod: 91

## 2020-02-11 PROCEDURE — 80053 COMPREHEN METABOLIC PANEL: CPT

## 2020-02-11 PROCEDURE — 83930 ASSAY OF BLOOD OSMOLALITY: CPT

## 2020-02-11 PROCEDURE — 80048 BASIC METABOLIC PNL TOTAL CA: CPT

## 2020-02-11 PROCEDURE — 97161 PT EVAL LOW COMPLEX 20 MIN: CPT

## 2020-02-11 PROCEDURE — 63600175 PHARM REV CODE 636 W HCPCS: Performed by: STUDENT IN AN ORGANIZED HEALTH CARE EDUCATION/TRAINING PROGRAM

## 2020-02-11 PROCEDURE — 84300 ASSAY OF URINE SODIUM: CPT

## 2020-02-11 RX ORDER — DESMOPRESSIN ACETATE 0.1 MG/ML
10 SOLUTION NASAL 2 TIMES DAILY PRN
Status: DISCONTINUED | OUTPATIENT
Start: 2020-02-11 | End: 2020-02-12 | Stop reason: HOSPADM

## 2020-02-11 RX ORDER — FAMOTIDINE 20 MG/1
20 TABLET, FILM COATED ORAL 2 TIMES DAILY
Status: CANCELLED | OUTPATIENT
Start: 2020-02-11

## 2020-02-11 RX ADMIN — Medication 2 SPRAY: at 09:02

## 2020-02-11 RX ADMIN — DESMOPRESSIN ACETATE 10 MCG: 0.1 SOLUTION NASAL at 07:02

## 2020-02-11 RX ADMIN — OXYCODONE HYDROCHLORIDE AND ACETAMINOPHEN 1 TABLET: 5; 325 TABLET ORAL at 10:02

## 2020-02-11 RX ADMIN — FAMOTIDINE 20 MG: 40 POWDER, FOR SUSPENSION ORAL at 09:02

## 2020-02-11 RX ADMIN — HEPARIN SODIUM 5000 UNITS: 5000 INJECTION, SOLUTION INTRAVENOUS; SUBCUTANEOUS at 10:02

## 2020-02-11 RX ADMIN — SALINE NASAL SPRAY 1 SPRAY: 1.5 SOLUTION NASAL at 09:02

## 2020-02-11 RX ADMIN — OXYCODONE HYDROCHLORIDE AND ACETAMINOPHEN 1 TABLET: 5; 325 TABLET ORAL at 02:02

## 2020-02-11 RX ADMIN — DESMOPRESSIN ACETATE 10 MCG: 0.1 SOLUTION NASAL at 09:02

## 2020-02-11 RX ADMIN — DESMOPRESSIN ACETATE 10 MCG: 0.1 SOLUTION NASAL at 05:02

## 2020-02-11 RX ADMIN — OXYCODONE HYDROCHLORIDE AND ACETAMINOPHEN 1 TABLET: 5; 325 TABLET ORAL at 06:02

## 2020-02-11 RX ADMIN — HYDROCORTISONE SODIUM SUCCINATE 25 MG: 100 INJECTION, POWDER, FOR SOLUTION INTRAMUSCULAR; INTRAVENOUS at 09:02

## 2020-02-11 RX ADMIN — CEFAZOLIN 1 G: 1 INJECTION, POWDER, FOR SOLUTION INTRAMUSCULAR; INTRAVENOUS at 02:02

## 2020-02-11 RX ADMIN — SALINE NASAL SPRAY 1 SPRAY: 1.5 SOLUTION NASAL at 08:02

## 2020-02-11 RX ADMIN — OXYCODONE HYDROCHLORIDE AND ACETAMINOPHEN 1 TABLET: 5; 325 TABLET ORAL at 07:02

## 2020-02-11 RX ADMIN — HYDROCORTISONE SODIUM SUCCINATE 50 MG: 100 INJECTION, POWDER, FOR SOLUTION INTRAMUSCULAR; INTRAVENOUS at 05:02

## 2020-02-11 RX ADMIN — CEFAZOLIN 1 G: 1 INJECTION, POWDER, FOR SOLUTION INTRAMUSCULAR; INTRAVENOUS at 09:02

## 2020-02-11 RX ADMIN — CEFAZOLIN 1 G: 1 INJECTION, POWDER, FOR SOLUTION INTRAMUSCULAR; INTRAVENOUS at 05:02

## 2020-02-11 RX ADMIN — FAMOTIDINE 20 MG: 40 POWDER, FOR SUSPENSION ORAL at 08:02

## 2020-02-11 RX ADMIN — HYDROCORTISONE SODIUM SUCCINATE 25 MG: 100 INJECTION, POWDER, FOR SOLUTION INTRAMUSCULAR; INTRAVENOUS at 02:02

## 2020-02-11 RX ADMIN — SALINE NASAL SPRAY 1 SPRAY: 1.5 SOLUTION NASAL at 05:02

## 2020-02-11 RX ADMIN — SALINE NASAL SPRAY 1 SPRAY: 1.5 SOLUTION NASAL at 01:02

## 2020-02-11 RX ADMIN — Medication 2 SPRAY: at 08:02

## 2020-02-11 RX ADMIN — HEPARIN SODIUM 5000 UNITS: 5000 INJECTION, SOLUTION INTRAVENOUS; SUBCUTANEOUS at 08:02

## 2020-02-11 RX ADMIN — FENTANYL CITRATE 25 MCG: 50 INJECTION INTRAMUSCULAR; INTRAVENOUS at 07:02

## 2020-02-11 RX ADMIN — HEPARIN SODIUM 5000 UNITS: 5000 INJECTION, SOLUTION INTRAVENOUS; SUBCUTANEOUS at 02:02

## 2020-02-11 NOTE — PT/OT/SLP EVAL
Physical Therapy Evaluation    Patient Name:  Rhett Quarles   MRN:  [id number]  Admit Date: 2/10/2020  Admitting Diagnosis:  Pituitary adenoma  Length of Stay: 1 days  Recent Surgery: Procedure(s) (LRB):  HYPOPHYSECTOMY, TRANSSPHENOIDAL APPROACH with FAT GRAFT (N/A) 1 Day Post-Op    Recommendations:     Discharge Recommendations:  outpatient PT   Discharge Equipment Recommendations: none   Barriers to discharge: Decreased caregiver support    Assessment:     Rhett Quarles is a 45 y.o. male admitted with a medical diagnosis of Pituitary adenoma.  He presents with the following impairments/functional limitations: decreased functional mobility. Pt with good strength and balance, limited by pain and bedrest. Rhett Quarles's deficits effect their ability to safely and independently participate in self-care tasks and functional mobility which increases their caregiver burden. Due to his physical therapy diagnosis of debility and deconditioning, they continue to benefit from acute PT services to address the following limitations: high fall risk, weakness, instability, and the need for supervised instruction of exercise. These deficits effect their roles and responsibilities in which they were able to complete prior to admit. Pt would benefit from an intensive and collaborative PT/OT/SLP therapy program to improve quality of life and focus on recovery of impairments.     Problem List: impaired self care skills, impaired endurance, impaired functional mobilty, impaired balance, visual deficits  Rehab Prognosis: Good; patient would benefit from acute skilled PT services to address these deficits and reach maximum level of function.      Plan:     During this hospitalization, patient to be seen 3 x/week to address the identified rehab impairments via gait training, therapeutic activities, therapeutic exercises, neuromuscular re-education and progress towards the established goals.    · Plan of Care  "Expires:  02/28/20    Subjective   Communicated with RN prior to session.  Patient found supine upon PT entry to room, agreeable to evaluation. Rhett Quarles's family present during session.    Chief Complaint: No chief complaint on file.    Patient/Family Comments/goals: "It feels good to get up, I just have a headache."  Pain/Comfort:  · Pain Rating 1: 7/10  · Location 1: (headache)  · Pain Rating Post-Intervention 1: 7/10    Living Environment:  Patient lives with wife in a single family home with 5 JANET.   Prior Level of Function:   Patient reports being independent with mobility & with ADLs. Hand Dominance: right. Patient uses DME as follows: none. DME owned (not currently used): none.  Roles/Repsonsibilities:   Work: Self-Employed. Drive: no. Managing Medicines/Managing Home: yes. Hobbies: playing music.    Patient reports they will have assistance from family upon discharge.    Objective:   Patient found with: blood pressure cuff, pulse ox (continuous), telemetry, peripheral IV, SCD     General Precautions: Standard, Cardiac aspiration, fall(nasal (no straws, no nasal cannula, no sneezing/cough))   Orthopedic Precautions:N/A   Braces: N/A   Oxygen Device: Room Air  Vitals: /79   Pulse 74   Temp 98.8 °F (37.1 °C) (Oral)   Resp 15   Ht 5' 4" (1.626 m)   Wt 71.5 kg (157 lb 10.1 oz)   SpO2 97%   BMI 27.06 kg/m²     Exams:  · Cognition:   · Alert and Cooperative  · AxOx4  · Command following: Follows multistep  commands  · Fluency: clear/fluent  · Hearing: Intact  · Vision:  wears prism glasses for reading     Left UE Left LE Right UE Right LE   Edema absent absent absent absent   ROM AROM WFL AROM WFL AROM WFL AROM WFL   Modified Yessenia Scale 0: No increase in muscle tone 0: No increase in muscle tone 0: No increase in muscle tone 0: No increase in muscle tone   Strength 5/5 5/5 5/5 5/5   Sensation intact to light touch intact to light touch intact to light touch intact to light touch "   Coordination normal normal normal normal     Outcome Measures:  AM-PAC 6 CLICK MOBILITY  Turning over in bed (including adjusting bedclothes, sheets and blankets)?: 4  Sitting down on and standing up from a chair with arms (e.g., wheelchair, bedside commode, etc.): 4  Moving from lying on back to sitting on the side of the bed?: 4  Moving to and from a bed to a chair (including a wheelchair)?: 4  Need to walk in hospital room?: 4  Climbing 3-5 steps with a railing?: 4  Basic Mobility Total Score: 24     Functional Mobility:  Additional staff present: N/A  Bed Mobility:  · Supine to Sit: stand by assistance; HOB elevated and from right side of bed    Transfers:   · Sit <> Stand Transfer: stand by assistance with no assistive device   · Stand <> Sit Transfer: stand by assistance with no assistive device   · [id number] from EOB  · Bed <> Chair Transfer: Step Transfer technique with stand by assistance with no assistive device  · Chair on patient's right    Gait:   · Patient ambulated: 5 steps at bedside chair (limited by lines)   · Patient required: standy by assistance  · Patient used: no assistive device  · Gait Pattern observed: reciprocal gait  · Gait Deviation(s): decreased blank  · Impairments due to: pain  · Comments: pt without nosebleed during mobility.    Therapeutic Activities & Exercises:     Education:  Patient provided with daily orientation and goals of this PT session. Patient and family agreed to participate in session. They were educated to transfer to bedside chair/bedside commode/bathroom with RN/PCT present. Patient and Family member educated on Fall risk, home safety and Home exercise program by explanation.  Patient was receptive to education and verbalizes understanding. Encouraged patient to perform daily exercises & mobility to increase endurance and decrease effects of bedrest. Time provided for therapeutic counseling and discussion of current health disposition. All questions answered to  patient's and family's satisfaction, within scope of PT practice; voiced no other concerns. White board updated in patient's room, RN notified of session.    Patient left up in chair, with head in midline, neutral pelvis & heels floated for skin protection with all lines intact, call button in reach and RN notified.    GOALS:   Multidisciplinary Problems     Physical Therapy Goals        Problem: Physical Therapy Goal    Goal Priority Disciplines Outcome Goal Variances Interventions   Physical Therapy Goal     PT, PT/OT Ongoing, Progressing     Description:  Goals to be met by: 2020    Patient will increase functional independence with mobility by performin. Supine <> sit with Modified [place].  2. Sit <> stand transfer with Modified [place] using No Assistive Device.  3. Bed <> chair transfer via Stand Pivot with Modified [place] using No Assistive Device.  4. Gait  x 200 feet with Modified [place] using No Assistive Device to prepare for community ambulation and endurance activities.  5. Ascend/descend 5 stairs with right Handrails Stand-by Assistance using No Assistive Device.                        History:     History reviewed. No pertinent past medical history.    History reviewed. No pertinent surgical history.    Time Tracking:     PT Received On: 20  PT Start Time: 1417     PT Stop Time: 1440  PT Total Time (min): 23 min     Billable Minutes: Evaluation 10 and Therapeutic Activity 10    Violeta Lee PT, DPT  2020  Pager: [phone]

## 2020-02-11 NOTE — PROGRESS NOTES
[facility]  Neurosurgery  Progress Note    Subjective:     History of Present Illness: No notes on file    Post-Op Info:  Procedure(s) (LRB):  HYPOPHYSECTOMY, TRANSSPHENOIDAL APPROACH with FAT GRAFT (N/A)   1 Day Post-Op     Interval History: Increased UOP overnight with increased Na and spec grav 1.0, received 1x dose intranasal DDAVP at 0500. Plan for TTF today, at neurologic baseline. Some bloody discharge from nare, no evidence of CSF leak.     Medications:  Continuous Infusions:   sodium chloride 0.9% 100 mL/hr at 02/11/20 0605     Scheduled Meds:   ceFAZolin (ANCEF) IVPB  1 g Intravenous Q8H    famotidine  20 mg Oral BID    heparin (porcine)  5,000 Units Subcutaneous Q8H    hydrocortisone sodium succinate  25 mg Intravenous Q8H    oxymetazoline  2 spray Each Nostril BID    sodium chloride  1 spray Each Nostril QID     PRN Meds:desmopressin, fentaNYL, hydrALAZINE, labetalol, morphine, oxyCODONE-acetaminophen, promethazine (PHENERGAN) IVPB, pseudoephedrine, sodium chloride 0.9%, sodium phosphate IVPB, sodium phosphate IVPB, sodium phosphate IVPB     Review of Systems  Objective:     Weight: 71.5 kg (157 lb 10.1 oz)  Body mass index is 27.06 kg/m².  Vital Signs (Most Recent):  Temp: 99 °F (37.2 °C) (02/11/20 0305)  Pulse: 93 (02/11/20 0605)  Resp: (!) 25 (02/11/20 0605)  BP: (!) 146/82 (02/11/20 0605)  SpO2: (!) 93 % (02/11/20 0605) Vital Signs (24h Range):  Temp:  [97.5 °F (36.4 °C)-99 °F (37.2 °C)] 99 °F (37.2 °C)  Pulse:  [] 93  Resp:  [12-30] 25  SpO2:  [93 %-100 %] 93 %  BP: (123-177)/() 146/82                          Neurosurgery Physical Exam   Awake, alert, NAD  PERRL  EOMI  FS, TM  VF grossly intact to finger counting  FCx4, AG in all extremities, full strength  No leakage from nostrils    Significant Labs:  Recent Labs   Lab 02/10/20  1410 02/10/20  6792 02/11/20  0334   GLU  --   --  146*    142 146*  146*   K  --   --  4.3   CL  --   --  112*   CO2  --    --  24   BUN  --   --  5*   CREATININE  --   --  0.9   CALCIUM  --   --  9.4   MG  --   --  2.2     Recent Labs   Lab 02/11/20  0334   WBC 15.01*   HGB 16.0   HCT 49.6        Significant Diagnostics:  .Ct Head Without Contrast    Result Date: 2/10/2020  Postsurgical change of recent transsphenoidal resection of a sellar mass protruding into the sphenoid sinus as above.  No apparent intracranial complication. Electronically signed by: Bradley Layne MD Date:    02/10/2020 Time:    15:44      Assessment/Plan:     Pituitary adenoma  45M with pituitary adenoma s/p TSR, POD#1    TTF with NSGY  Neurochecks per protocol  STRICT NASAL PRECAUTIONS - no straws, no nasal cannula, minimize cough/sneeze  Ok for diet  Q6Na, q6 UA for spec grav  Strict I/O, monitor UOP  S/p intranasal DDAVPx1 on 2/11  Continue ocean nasal spray and afrin nasal spray  Continue to monitor for CSF leak  Please page NSGY w/exam change    Discussed w/Dr. Ellie Beltre MD  Neurosurgery  [facility]

## 2020-02-11 NOTE — HOSPITAL COURSE
2/10/2020: s/p transphenoidal hypophysectomy of pituitary macroadenoma with fat graft. Admitted to [facility] for q1 neuro checks and DI monitoring  2/11/2020: NAEON, received 1 dose of ddAVP at 5am. Trending Na and serum osm q6. Monitor I&O. To transfer to NSGY floor today

## 2020-02-11 NOTE — NURSING
RN has made mult. Calls to lab to check the status of (NA, urine osmolarity, serum osmolarity). Urine osmolarity still pending.

## 2020-02-11 NOTE — ASSESSMENT & PLAN NOTE
45M with pituitary adenoma s/p TSR, POD#1    TTF with NSGY  Neurochecks per protocol  STRICT NASAL PRECAUTIONS - no straws, no nasal cannula, minimize cough/sneeze  Continue Hydrocortisone taper  Ok for diet  Q6Na, q6 UA for spec grav  Strict I/O, monitor UOP  S/p intranasal DDAVPx1 on 2/11  Continue ocean nasal spray and afrin nasal spray  Continue to monitor for CSF leak  Please page NSGY w/exam change    Discussed w/Dr. Argueta

## 2020-02-11 NOTE — PLAN OF CARE
Plan of Care:  Discharge Recommendation: OPPT.  Please continue Progressive Mobility Protocol as appropriate.  Appropriate transfer level with nursing staff: Bed <> Chair:  Stand Pivot with stand by assistance with No Assistive Device.    Violeta Lee PT, DPT  2020  Pager: [phone]    Physical Therapy Treatment Goals:  Multidisciplinary Problems       Physical Therapy Goals          Problem: Physical Therapy Goal    Goal Priority Disciplines Outcome Goal Variances Interventions   Physical Therapy Goal     PT, PT/OT Ongoing, Progressing     Description:  Goals to be met by: 2020    Patient will increase functional independence with mobility by performin. Supine <> sit with Modified [place].  2. Sit <> stand transfer with Modified [place] using No Assistive Device.  3. Bed <> chair transfer via Stand Pivot with Modified [place] using No Assistive Device.  4. Gait  x 200 feet with Modified [place] using No Assistive Device to prepare for community ambulation and endurance activities.  5. Ascend/descend 5 stairs with right Handrails Stand-by Assistance using No Assistive Device.

## 2020-02-11 NOTE — SUBJECTIVE & OBJECTIVE
Interval History: Increased UOP overnight with increased Na and spec grav 1.0, received 1x dose intranasal DDAVP at 0500. Plan for TTF today, at neurologic baseline. Some bloody discharge from nare, no evidence of CSF leak.     Medications:  Continuous Infusions:   sodium chloride 0.9% 100 mL/hr at 02/11/20 0605     Scheduled Meds:   ceFAZolin (ANCEF) IVPB  1 g Intravenous Q8H    famotidine  20 mg Oral BID    heparin (porcine)  5,000 Units Subcutaneous Q8H    hydrocortisone sodium succinate  25 mg Intravenous Q8H    oxymetazoline  2 spray Each Nostril BID    sodium chloride  1 spray Each Nostril QID     PRN Meds:desmopressin, fentaNYL, hydrALAZINE, labetalol, morphine, oxyCODONE-acetaminophen, promethazine (PHENERGAN) IVPB, pseudoephedrine, sodium chloride 0.9%, sodium phosphate IVPB, sodium phosphate IVPB, sodium phosphate IVPB     Review of Systems  Objective:     Weight: 71.5 kg (157 lb 10.1 oz)  Body mass index is 27.06 kg/m².  Vital Signs (Most Recent):  Temp: 99 °F (37.2 °C) (02/11/20 0305)  Pulse: 93 (02/11/20 0605)  Resp: (!) 25 (02/11/20 0605)  BP: (!) 146/82 (02/11/20 0605)  SpO2: (!) 93 % (02/11/20 0605) Vital Signs (24h Range):  Temp:  [97.5 °F (36.4 °C)-99 °F (37.2 °C)] 99 °F (37.2 °C)  Pulse:  [] 93  Resp:  [12-30] 25  SpO2:  [93 %-100 %] 93 %  BP: (123-177)/() 146/82                          Neurosurgery Physical Exam   Awake, alert, NAD  PERRL  EOMI  FS, TM  VF grossly intact to finger counting  FCx4, AG in all extremities, full strength  No leakage from nostrils    Significant Labs:  Recent Labs   Lab 02/10/20  1410 02/10/20  2232 02/11/20  0334   GLU  --   --  146*    142 146*  146*   K  --   --  4.3   CL  --   --  112*   CO2  --   --  24   BUN  --   --  5*   CREATININE  --   --  0.9   CALCIUM  --   --  9.4   MG  --   --  2.2     Recent Labs   Lab 02/11/20  0334   WBC 15.01*   HGB 16.0   HCT 49.6        Significant Diagnostics:  .Ct Head Without Contrast    Result  Date: 2/10/2020  Postsurgical change of recent transsphenoidal resection of a sellar mass protruding into the sphenoid sinus as above.  No apparent intracranial complication. Electronically signed by: Bradley Layne MD Date:    02/10/2020 Time:    15:44

## 2020-02-11 NOTE — PROGRESS NOTES
[facility]  Neurocritical Care  Progress Note    Admit Date: 2/10/2020  Service Date: 02/11/2020  Length of Stay: 1    Subjective:     Chief Complaint: Pituitary adenoma    History of Present Illness: Mr. Rhett Quarles is a pleasant 44 y.o. gentleman  seen by Dr Argueta for pituitary adenoma. Pt states he had an onset of diplopia, dizziness, extremity paraesthesias, fatigue, and mental fogginess several months ago. He was seen by his PCP, ophthalmology and  recently by Neurology who ordered a MRI Brain which revealed an incidental pituitary mass. He has been worked up for MS but results are inconclusive.    Pt presents to [facility] s/p transphenoidal hypophysectomy with fat graft for close neurological monitoring and DI monitoring.     Hospital Course: 2/10/2020: s/p transphenoidal hypophysectomy of pituitary macroadenoma with fat graft. Admitted to [facility] for q1 neuro checks and DI monitoring  2/11/2020: NAEON, received 1 dose of ddAVP at 5am. Trending Na and serum osm q6. Monitor I&O. To transfer to NSGY floor today    Interval History:  PARISH, received 1 dose of ddAVP at 5am.   Transfer to NSGY floor.  Review of Systems   Constitutional: Negative for chills and fever.   HENT: Positive for nosebleeds. Negative for trouble swallowing.    Respiratory: Negative for cough and shortness of breath.    Cardiovascular: Negative for chest pain.   Gastrointestinal: Negative for abdominal pain.   Skin: Negative for rash.   Neurological: Negative for dizziness, weakness and headaches.       Objective:     Vitals:  Temp: 98.8 °F (37.1 °C)  Pulse: 80  Rhythm: normal sinus rhythm  BP: (!) 140/81  MAP (mmHg): 105  Resp: 18  SpO2: 96 %  O2 Device (Oxygen Therapy): room air    Temp  Min: 98 °F (36.7 °C)  Max: 99 °F (37.2 °C)  Pulse  Min: 72  Max: 108  BP  Min: 130/76  Max: 177/106  MAP (mmHg)  Min: 97  Max: 134  Resp  Min: 13  Max: 30  SpO2  Min: 93 %  Max: 100 %    02/10 0701 - 02/11 0700  In: 9325 [P.O.:6750;  I.V.:2575]  Out: 8100 [Urine:8100]   Unmeasured Output  Stool Occurrence: 0       Physical Exam  Gen: well developed, well nourished. No distress  HEENT: NCAT  Skin: No rashes    Neuro exam:  CN: PERRL, EOMI, symmetric facies, no changes in sensation, uvula midline  Motor: Moves all extremities spontaneously, no focal weakness  Sensory: intact to light touch    Medications:  Continuous  sodium chloride 0.9% Last Rate: 100 mL/hr at 02/11/20 1005   Scheduled  ceFAZolin (ANCEF) IVPB 1 g Q8H   famotidine 20 mg BID   heparin (porcine) 5,000 Units Q8H   hydrocortisone sodium succinate 25 mg Q8H   oxymetazoline 2 spray BID   sodium chloride 1 spray QID   PRN  desmopressin 10 mcg BID PRN   fentaNYL 25 mcg Q4H PRN   hydrALAZINE 20 mg Q6H PRN   labetalol 10 mg Q10 Min PRN   morphine 1 mg Q2H PRN   oxyCODONE-acetaminophen 1 tablet Q4H PRN   promethazine (PHENERGAN) IVPB 6.25 mg Q10 Min PRN   pseudoephedrine 30 mg Q4H PRN   sodium chloride 0.9% 2 mL PRN   sodium phosphate IVPB 15 mmol PRN   sodium phosphate IVPB 20.01 mmol PRN   sodium phosphate IVPB 30 mmol PRN     Today I personally reviewed pertinent medications, lines/drains/airways, imaging, cardiology results, laboratory results, microbiology results, notably:    Diet  Diet Adult Regular (IDDSI Level 7)  Diet Adult Regular (IDDSI Level 7)        Assessment/Plan:     Oncology  * Pituitary adenoma  - s/p transphenoidal hypophysectomy with fat graft POD#0. NSGY following  - repeat head CT 2/10 post-op w mixed densities, blood fluid and fat packing within sphenoid sinus, no complications noted  - continue Ancef 1g q8 post-op  - SBP goal < 160  - Check Na and urine osm q6. ddAVP as needed (received 5am 2/11)  - IVF NS @100cc/hr  - Strict I&O  - Sinus precautions: Oxymetazoline nasal spray 2 sprays each nare bid, Pseudoephedrine 30mg q4 prn congestion, Saline nasal spray 1 spray QID. Saline nasal spray 1 spray QID  - For pain: Fentany & morphine prn severe pain, Percocet 5/325  q4 prn moderate pain  - PT/OT/SLP  - Plan to transfer to NSGY floor today 2/11              The patient is being Prophylaxed for:  Venous Thromboembolism with: Mechanical  Stress Ulcer with: Not Applicable   Ventilator Pneumonia with: not applicable    Activity Orders          Diet Adult Regular (IDDSI Level 7): Regular starting at 02/10 0839        No Order    Tiffany Del Real MD  Neurocritical Care  [facility]

## 2020-02-11 NOTE — ASSESSMENT & PLAN NOTE
- s/p transphenoidal hypophysectomy with fat graft POD#0. NSGY following  - repeat head CT 2/10 post-op w mixed densities, blood fluid and fat packing within sphenoid sinus, no complications noted  - continue Ancef 1g q8 post-op  - SBP goal < 160  - Check Na and urine osm q6. ddAVP as needed (received 5am 2/11)  - IVF NS @100cc/hr  - Strict I&O  - Sinus precautions: Oxymetazoline nasal spray 2 sprays each nare bid, Pseudoephedrine 30mg q4 prn congestion, Saline nasal spray 1 spray QID. Saline nasal spray 1 spray QID  - For pain: Fentany & morphine prn severe pain, Percocet 5/325 q4 prn moderate pain  - PT/OT/SLP  - Plan to transfer to NSGY floor today 2/11

## 2020-02-11 NOTE — PLAN OF CARE
POC reviewed with pt at 0500. Pt verbalized understanding. Pt.'s urine output increased throughout the night. Serial sodium, serum osmolarity, urine osmolarity, and U/A drawn Q6.1 dose of DDVAP given. Questions and concerns addressed. No acute events overnight. Pt progressing toward goals. Will continue to monitor. See flowsheets for full assessment and VS info

## 2020-02-11 NOTE — PLAN OF CARE
OT evaluation completed.  HEIDI Maciel  2/11/2020    Problem: Occupational Therapy Goal  Goal: Occupational Therapy Goal  Description  Goals set 2/11 to be addressed for 14 days with expiration date, 2/25:  Patient will increase functional independence with ADLs by performing:    Patient will demonstrate rolling to the right with modified independence.  Not met   Patient will demonstrate rolling to the left with modified independence.   Not met  Patient will demonstrate supine -sit with modified independence.   Not met  Patient will demonstrate stand pivot transfers with modified independence.   Not met  Patient will demonstrate grooming while standing with modified independence.   Not met  Patient will demonstrate upper body dressing with modified independence while seated EOB.   Not met  Patient will demonstrate lower body dressing with modified independence while seated EOB.   Not met  Patient will demonstrate toileting with modified independence.   Not met  Patient will demonstrate bathing while seated EOB with modified independence.   Not met  Patient's family / caregiver will demonstrate independence and safety with assisting patient with self-care skills and functional mobility.     Not met  Patient and/or patient's family will verbalize understanding of stroke prevention guidelines, personal risk factors and stroke warning signs via teachback method.  Not met            Outcome: Ongoing, Progressing

## 2020-02-11 NOTE — NURSING
Critical value received about serum osmolality being 337. MD Lety with NCC and neurosurgery notified. Sodium redraw and urine UA ordered. Will continue top monitor labs.

## 2020-02-11 NOTE — NURSING
Neuro Critical care notified pt urine specific gravity (1.000), Na (146), Urine output >200 ml/hr. MD ordered RN to given x1 dose of demopressin through nasal that was not accessed for surgery.

## 2020-02-11 NOTE — SUBJECTIVE & OBJECTIVE
Interval History:  PARISH, received 1 dose of ddAVP at 5am.   Transfer to NSGY floor.  Review of Systems   Constitutional: Negative for chills and fever.   HENT: Positive for nosebleeds. Negative for trouble swallowing.    Respiratory: Negative for cough and shortness of breath.    Cardiovascular: Negative for chest pain.   Gastrointestinal: Negative for abdominal pain.   Skin: Negative for rash.   Neurological: Negative for dizziness, weakness and headaches.       Objective:     Vitals:  Temp: 98.8 °F (37.1 °C)  Pulse: 80  Rhythm: normal sinus rhythm  BP: (!) 140/81  MAP (mmHg): 105  Resp: 18  SpO2: 96 %  O2 Device (Oxygen Therapy): room air    Temp  Min: 98 °F (36.7 °C)  Max: 99 °F (37.2 °C)  Pulse  Min: 72  Max: 108  BP  Min: 130/76  Max: 177/106  MAP (mmHg)  Min: 97  Max: 134  Resp  Min: 13  Max: 30  SpO2  Min: 93 %  Max: 100 %    02/10 0701 - 02/11 0700  In: 9325 [P.O.:6750; I.V.:2575]  Out: 8100 [Urine:8100]   Unmeasured Output  Stool Occurrence: 0       Physical Exam  Gen: well developed, well nourished. No distress  HEENT: NCAT  Skin: No rashes    Neuro exam:  CN: PERRL, EOMI, symmetric facies, no changes in sensation, uvula midline  Motor: Moves all extremities spontaneously, no focal weakness  Sensory: intact to light touch    Medications:  Continuous  sodium chloride 0.9% Last Rate: 100 mL/hr at 02/11/20 1005   Scheduled  ceFAZolin (ANCEF) IVPB 1 g Q8H   famotidine 20 mg BID   heparin (porcine) 5,000 Units Q8H   hydrocortisone sodium succinate 25 mg Q8H   oxymetazoline 2 spray BID   sodium chloride 1 spray QID   PRN  desmopressin 10 mcg BID PRN   fentaNYL 25 mcg Q4H PRN   hydrALAZINE 20 mg Q6H PRN   labetalol 10 mg Q10 Min PRN   morphine 1 mg Q2H PRN   oxyCODONE-acetaminophen 1 tablet Q4H PRN   promethazine (PHENERGAN) IVPB 6.25 mg Q10 Min PRN   pseudoephedrine 30 mg Q4H PRN   sodium chloride 0.9% 2 mL PRN   sodium phosphate IVPB 15 mmol PRN   sodium phosphate IVPB 20.01 mmol PRN   sodium phosphate IVPB 30  mmol PRN     Today I personally reviewed pertinent medications, lines/drains/airways, imaging, cardiology results, laboratory results, microbiology results, notably:    Diet  Diet Adult Regular (IDDSI Level 7)  Diet Adult Regular (IDDSI Level 7)

## 2020-02-11 NOTE — PT/OT/SLP EVAL
"Occupational Therapy   Evaluation    Name: Rhett Quarles  MRN: [id number]  Admitting Diagnosis:  Brain tumor 1 Day Post-Op    Recommendations:     Discharge Recommendations:  outpt  Discharge Equipment Recommendations:  none  Barriers to discharge:  None    Assessment:     Rhett Quarles is a 45 y.o. male with a medical diagnosis of Brain tumor.  He presents with performance deficits affecting function: impaired self care skills, weakness, impaired endurance, impaired functional mobilty, gait instability, visual deficits, decreased coordination, impaired balance.      Rehab Prognosis: Good; patient would benefit from acute skilled OT services to address these deficits and reach maximum level of function.       Plan:     Patient to be seen 3 x/week to address the above listed problems via self-care/home management, neuromuscular re-education, cognitive retraining, therapeutic activities, therapeutic exercises, sensory integration  · Plan of Care Expires: 03/10/20  · Plan of Care Reviewed with: patient, spouse    Subjective     Patient: "It started Oct. 30th; I was getting dizzy and vertigo and seeing double and tingling in my arms and legs and a lot of fatigue.  It got so bad I started a journal.  Eight times in the past 20 years, I have had these symptoms of dizziness.  I have bad close up vision.  15 years ago I was prescribed prism glasses.  I see double close up.  I would wear them 1-2x month.  In January, I needed to wear them everyday."  "The last few times, I have gotten up, I immediately get a nose bleed."    Occupational Profile:  Patient resides in [place] with his wife in a one story home with 6 steps to enter, bilateral rail.  PTA patient independent with ADLs.  Patient has not been driving x 2 months.  Currently owns no DME.  Patient is ambidexterous.  Works from home as a .  Hobbies: making pizza, playing music (Guitar).    Pain/Comfort:  · Pain Rating 1: 0/10  · Pain " Rating Post-Intervention 1: 0/10    Patients cultural, spiritual, Scientologist conflicts given the current situation: no    Objective:     Communicated with: Nurse prior to session.  Patient found supine with bed alarm, blood pressure cuff, telemetry, peripheral IV, SCD upon OT entry to room. Wife present.  General Precautions: Standard, aspiration, fall   Orthopedic Precautions:N/A   Braces: N/A     Occupational Performance:    Bed Mobility:    · Patient completed Rolling/Turning to Left with  supervision  · Patient completed Rolling/Turning to Right with supervision  · Patient completed Supine to Sit with supervision  · Patient completed Sit to Supine with supervision    Functional Mobility/Transfers:  · Patient completed Sit <> Stand Transfer with supervision  with  no assistive device   · Patient completed Bed <> Chair Transfer using Stand Pivot technique with CGA with no assistive device    Activities of Daily Living:  · Grooming: contact guard assistance while standing  · Upper Body Dressing: contact guard assistance    · Lower Body Dressing: contact guard assistance      Cognitive/Visual Perceptual:  Cognitive/Psychosocial Skills:     -       Oriented to: Person, Place, Time and Situation   -       Follows Commands/attention:Follows one-step commands  -       Communication: clear/fluent  -       Safety awareness/insight to disability: intact   -       Mood/Affect/Coping skills/emotional control: Appropriate to situation and Cooperative  Visual/Perceptual:      -wears prism glasses    Physical Exam:  Postural examination/scapula alignment:    -       Rounded shoulders  Skin integrity: Visible skin intact  Edema:  None noted  Sensation:    -       Intact  Upper Extremity Range of Motion:     -       Right Upper Extremity: WNL  -       Left Upper Extremity: WNL  Upper Extremity Strength:    -       Right Upper Extremity: WNL  -       Left Upper Extremity: WNL    AMPAC 6 Click ADL:  AMPAC Total Score:  18    Treatment & Education:  Patient education provided on role of OT and need for outpt OT upon discharge.  Patient alert and oriented x 3; able to follow 4/4 one step commands.  Patient attentive and interactive throughout the session.  Patient able to identify 5/5 body parts.  Able to name 5/5 objects.  Able to sequence 7/7 days of the week and 12/12 months of the year.  White board updated in patient's room.  OT asked if there were any other questions; patient/ family had no further questions.     Education:  Patient left supine with all lines intact, call button in reach and bed alarm on    GOALS:   Multidisciplinary Problems     Occupational Therapy Goals        Problem: Occupational Therapy Goal    Goal Priority Disciplines Outcome Interventions   Occupational Therapy Goal     OT, PT/OT Ongoing, Progressing    Description:  Goals set 2/11 to be addressed for 14 days with expiration date, 2/25:  Patient will increase functional independence with ADLs by performing:    Patient will demonstrate rolling to the right with modified independence.  Not met   Patient will demonstrate rolling to the left with modified independence.   Not met  Patient will demonstrate supine -sit with modified independence.   Not met  Patient will demonstrate stand pivot transfers with modified independence.   Not met  Patient will demonstrate grooming while standing with modified independence.   Not met  Patient will demonstrate upper body dressing with modified independence while seated EOB.   Not met  Patient will demonstrate lower body dressing with modified independence while seated EOB.   Not met  Patient will demonstrate toileting with modified independence.   Not met  Patient will demonstrate bathing while seated EOB with modified independence.   Not met  Patient's family / caregiver will demonstrate independence and safety with assisting patient with self-care skills and functional mobility.     Not met                              History:     History reviewed. No pertinent past medical history.    History reviewed. No pertinent surgical history.    Time Tracking:     OT Date of Treatment: 02/11/20  OT Start Time: 0630  OT Stop Time: 0648  OT Total Time (min): 18 min    Billable Minutes:Evaluation 10  Therapeutic Activity 8    HEIDI Maciel  2/11/2020

## 2020-02-11 NOTE — PLAN OF CARE
02/11/20 0946   Post-Acute Status   Post-Acute Authorization Placement;Other   Post-Acute Placement Status Awaiting Internal Medical Clearance   Other Status See Comments  (OP therapy needed)     Rachana Craft LCSW  Neurocritical Care   [facility]  [id number]

## 2020-02-12 VITALS
HEIGHT: 64 IN | TEMPERATURE: 99 F | WEIGHT: 157.63 LBS | OXYGEN SATURATION: 93 % | SYSTOLIC BLOOD PRESSURE: 137 MMHG | DIASTOLIC BLOOD PRESSURE: 81 MMHG | HEART RATE: 65 BPM | BODY MASS INDEX: 26.91 KG/M2 | RESPIRATION RATE: 14 BRPM

## 2020-02-12 DIAGNOSIS — D35.2 PITUITARY ADENOMA: Primary | ICD-10-CM

## 2020-02-12 LAB
ALBUMIN SERPL BCP-MCNC: 3.3 G/DL (ref 3.5–5.2)
ALP SERPL-CCNC: 71 U/L (ref 55–135)
ALT SERPL W/O P-5'-P-CCNC: 25 U/L (ref 10–44)
ANION GAP SERPL CALC-SCNC: 10 MMOL/L (ref 8–16)
AST SERPL-CCNC: 20 U/L (ref 10–40)
BASOPHILS # BLD AUTO: 0.03 K/UL (ref 0–0.2)
BASOPHILS NFR BLD: 0.2 % (ref 0–1.9)
BILIRUB SERPL-MCNC: 0.4 MG/DL (ref 0.1–1)
BILIRUB UR QL STRIP: NEGATIVE
BILIRUB UR QL STRIP: NEGATIVE
BLD PROD TYP BPU: NORMAL
BLOOD UNIT EXPIRATION DATE: NORMAL
BLOOD UNIT TYPE CODE: 7300
BLOOD UNIT TYPE: NORMAL
BUN SERPL-MCNC: 5 MG/DL (ref 6–20)
CALCIUM SERPL-MCNC: 8.4 MG/DL (ref 8.7–10.5)
CHLORIDE SERPL-SCNC: 101 MMOL/L (ref 95–110)
CLARITY UR REFRACT.AUTO: CLEAR
CLARITY UR REFRACT.AUTO: CLEAR
CO2 SERPL-SCNC: 24 MMOL/L (ref 23–29)
CODING SYSTEM: NORMAL
COLOR UR AUTO: ABNORMAL
COLOR UR AUTO: YELLOW
CREAT SERPL-MCNC: 0.7 MG/DL (ref 0.5–1.4)
DIFFERENTIAL METHOD: ABNORMAL
DISPENSE STATUS: NORMAL
EOSINOPHIL # BLD AUTO: 0 K/UL (ref 0–0.5)
EOSINOPHIL NFR BLD: 0.1 % (ref 0–8)
ERYTHROCYTE [DISTWIDTH] IN BLOOD BY AUTOMATED COUNT: 12.6 % (ref 11.5–14.5)
EST. GFR  (AFRICAN AMERICAN): >60 ML/MIN/1.73 M^2
EST. GFR  (NON AFRICAN AMERICAN): >60 ML/MIN/1.73 M^2
GLUCOSE SERPL-MCNC: 122 MG/DL (ref 70–110)
GLUCOSE UR QL STRIP: ABNORMAL
GLUCOSE UR QL STRIP: ABNORMAL
HCT VFR BLD AUTO: 40.5 % (ref 40–54)
HGB BLD-MCNC: 13.1 G/DL (ref 14–18)
HGB UR QL STRIP: NEGATIVE
HGB UR QL STRIP: NEGATIVE
IMM GRANULOCYTES # BLD AUTO: 0.07 K/UL (ref 0–0.04)
IMM GRANULOCYTES NFR BLD AUTO: 0.5 % (ref 0–0.5)
KETONES UR QL STRIP: NEGATIVE
KETONES UR QL STRIP: NEGATIVE
LEUKOCYTE ESTERASE UR QL STRIP: NEGATIVE
LEUKOCYTE ESTERASE UR QL STRIP: NEGATIVE
LYMPHOCYTES # BLD AUTO: 1.9 K/UL (ref 1–4.8)
LYMPHOCYTES NFR BLD: 14.1 % (ref 18–48)
MCH RBC QN AUTO: 29.2 PG (ref 27–31)
MCHC RBC AUTO-ENTMCNC: 32.3 G/DL (ref 32–36)
MCV RBC AUTO: 90 FL (ref 82–98)
MONOCYTES # BLD AUTO: 0.8 K/UL (ref 0.3–1)
MONOCYTES NFR BLD: 6.1 % (ref 4–15)
NEUTROPHILS # BLD AUTO: 10.8 K/UL (ref 1.8–7.7)
NEUTROPHILS NFR BLD: 79 % (ref 38–73)
NITRITE UR QL STRIP: NEGATIVE
NITRITE UR QL STRIP: NEGATIVE
NRBC BLD-RTO: 0 /100 WBC
OSMOLALITY UR: 518 MOSM/KG (ref 50–1200)
OSMOLALITY UR: 767 MOSM/KG (ref 50–1200)
PH UR STRIP: 6 [PH] (ref 5–8)
PH UR STRIP: 7 [PH] (ref 5–8)
PLATELET # BLD AUTO: 255 K/UL (ref 150–350)
PMV BLD AUTO: 10.5 FL (ref 9.2–12.9)
POTASSIUM SERPL-SCNC: 3.2 MMOL/L (ref 3.5–5.1)
PROT SERPL-MCNC: 6 G/DL (ref 6–8.4)
PROT UR QL STRIP: NEGATIVE
PROT UR QL STRIP: NEGATIVE
RBC # BLD AUTO: 4.49 M/UL (ref 4.6–6.2)
SODIUM SERPL-SCNC: 134 MMOL/L (ref 136–145)
SODIUM SERPL-SCNC: 135 MMOL/L (ref 136–145)
SODIUM SERPL-SCNC: 135 MMOL/L (ref 136–145)
SP GR UR STRIP: 1.01 (ref 1–1.03)
SP GR UR STRIP: 1.02 (ref 1–1.03)
TRANS ERYTHROCYTES VOL PATIENT: NORMAL ML
URN SPEC COLLECT METH UR: ABNORMAL
URN SPEC COLLECT METH UR: ABNORMAL
WBC # BLD AUTO: 13.72 K/UL (ref 3.9–12.7)

## 2020-02-12 PROCEDURE — 85025 COMPLETE CBC W/AUTO DIFF WBC: CPT

## 2020-02-12 PROCEDURE — 63600175 PHARM REV CODE 636 W HCPCS: Performed by: STUDENT IN AN ORGANIZED HEALTH CARE EDUCATION/TRAINING PROGRAM

## 2020-02-12 PROCEDURE — 81001 URINALYSIS AUTO W/SCOPE: CPT

## 2020-02-12 PROCEDURE — 84295 ASSAY OF SERUM SODIUM: CPT

## 2020-02-12 PROCEDURE — 81003 URINALYSIS AUTO W/O SCOPE: CPT | Mod: 91

## 2020-02-12 PROCEDURE — 99024 PR POST-OP FOLLOW-UP VISIT: ICD-10-PCS | Mod: ,,, | Performed by: PHYSICIAN ASSISTANT

## 2020-02-12 PROCEDURE — 36415 COLL VENOUS BLD VENIPUNCTURE: CPT

## 2020-02-12 PROCEDURE — 25000003 PHARM REV CODE 250: Performed by: PHYSICIAN ASSISTANT

## 2020-02-12 PROCEDURE — 99024 POSTOP FOLLOW-UP VISIT: CPT | Mod: ,,, | Performed by: PHYSICIAN ASSISTANT

## 2020-02-12 PROCEDURE — 97535 SELF CARE MNGMENT TRAINING: CPT

## 2020-02-12 PROCEDURE — 80053 COMPREHEN METABOLIC PANEL: CPT

## 2020-02-12 PROCEDURE — 63600175 PHARM REV CODE 636 W HCPCS: Performed by: NURSE PRACTITIONER

## 2020-02-12 PROCEDURE — 25000003 PHARM REV CODE 250: Performed by: NURSE PRACTITIONER

## 2020-02-12 PROCEDURE — 83935 ASSAY OF URINE OSMOLALITY: CPT

## 2020-02-12 PROCEDURE — 25000003 PHARM REV CODE 250: Performed by: STUDENT IN AN ORGANIZED HEALTH CARE EDUCATION/TRAINING PROGRAM

## 2020-02-12 PROCEDURE — 83930 ASSAY OF BLOOD OSMOLALITY: CPT | Mod: 91

## 2020-02-12 RX ORDER — DESMOPRESSIN ACETATE 0.1 MG/ML
SOLUTION NASAL
Qty: 5 ML | Refills: 1 | Status: SHIPPED | OUTPATIENT
Start: 2020-02-12 | End: 2020-08-25

## 2020-02-12 RX ORDER — PANTOPRAZOLE SODIUM 40 MG/1
40 TABLET, DELAYED RELEASE ORAL DAILY
Status: DISCONTINUED | OUTPATIENT
Start: 2020-02-12 | End: 2020-02-12 | Stop reason: HOSPADM

## 2020-02-12 RX ORDER — HYDROCORTISONE 10 MG/1
10 TABLET ORAL NIGHTLY
Status: DISCONTINUED | OUTPATIENT
Start: 2020-02-12 | End: 2020-02-12 | Stop reason: HOSPADM

## 2020-02-12 RX ORDER — DESMOPRESSIN ACETATE 0.1 MG/ML
SOLUTION NASAL
Qty: 5 ML | Refills: 1 | Status: SHIPPED | OUTPATIENT
Start: 2020-02-12 | End: 2020-02-12

## 2020-02-12 RX ORDER — PANTOPRAZOLE SODIUM 40 MG/1
40 TABLET, DELAYED RELEASE ORAL DAILY
Qty: 30 TABLET | Refills: 1 | Status: SHIPPED | OUTPATIENT
Start: 2020-02-12 | End: 2020-02-12

## 2020-02-12 RX ORDER — HYDROCORTISONE 20 MG/1
20 TABLET ORAL EVERY MORNING
Status: DISCONTINUED | OUTPATIENT
Start: 2020-02-12 | End: 2020-02-12 | Stop reason: HOSPADM

## 2020-02-12 RX ORDER — OXYMETAZOLINE HCL 0.05 %
2 SPRAY, NON-AEROSOL (ML) NASAL 2 TIMES DAILY
Refills: 0 | COMMUNITY
Start: 2020-02-12 | End: 2020-02-12

## 2020-02-12 RX ORDER — HYDROCORTISONE 10 MG/1
TABLET ORAL
Qty: 60 TABLET | Refills: 1 | Status: SHIPPED | OUTPATIENT
Start: 2020-02-12 | End: 2020-02-12

## 2020-02-12 RX ORDER — PANTOPRAZOLE SODIUM 40 MG/1
40 TABLET, DELAYED RELEASE ORAL DAILY
Qty: 30 TABLET | Refills: 1 | Status: SHIPPED | OUTPATIENT
Start: 2020-02-12 | End: 2020-03-10

## 2020-02-12 RX ORDER — HYDROCORTISONE 10 MG/1
TABLET ORAL
Qty: 60 TABLET | Refills: 1 | Status: SHIPPED | OUTPATIENT
Start: 2020-02-12 | End: 2020-03-10

## 2020-02-12 RX ORDER — OXYCODONE AND ACETAMINOPHEN 5; 325 MG/1; MG/1
1 TABLET ORAL EVERY 6 HOURS PRN
Qty: 45 TABLET | Refills: 0 | Status: SHIPPED | OUTPATIENT
Start: 2020-02-12 | End: 2020-05-30

## 2020-02-12 RX ORDER — OXYMETAZOLINE HCL 0.05 %
2 SPRAY, NON-AEROSOL (ML) NASAL 2 TIMES DAILY
Refills: 0 | COMMUNITY
Start: 2020-02-12 | End: 2020-02-17

## 2020-02-12 RX ADMIN — Medication 2 SPRAY: at 08:02

## 2020-02-12 RX ADMIN — CEFAZOLIN 1 G: 1 INJECTION, POWDER, FOR SOLUTION INTRAMUSCULAR; INTRAVENOUS at 02:02

## 2020-02-12 RX ADMIN — HYDROCORTISONE 20 MG: 20 TABLET ORAL at 10:02

## 2020-02-12 RX ADMIN — SALINE NASAL SPRAY 1 SPRAY: 1.5 SOLUTION NASAL at 08:02

## 2020-02-12 RX ADMIN — CEFAZOLIN 1 G: 1 INJECTION, POWDER, FOR SOLUTION INTRAMUSCULAR; INTRAVENOUS at 10:02

## 2020-02-12 RX ADMIN — SODIUM CHLORIDE: 0.9 INJECTION, SOLUTION INTRAVENOUS at 08:02

## 2020-02-12 RX ADMIN — FAMOTIDINE 20 MG: 40 POWDER, FOR SUSPENSION ORAL at 08:02

## 2020-02-12 RX ADMIN — OXYCODONE HYDROCHLORIDE AND ACETAMINOPHEN 1 TABLET: 5; 325 TABLET ORAL at 06:02

## 2020-02-12 RX ADMIN — PANTOPRAZOLE SODIUM 40 MG: 40 TABLET, DELAYED RELEASE ORAL at 10:02

## 2020-02-12 RX ADMIN — HYDROCORTISONE SODIUM SUCCINATE 25 MG: 100 INJECTION, POWDER, FOR SOLUTION INTRAMUSCULAR; INTRAVENOUS at 06:02

## 2020-02-12 RX ADMIN — HEPARIN SODIUM 5000 UNITS: 5000 INJECTION, SOLUTION INTRAVENOUS; SUBCUTANEOUS at 06:02

## 2020-02-12 NOTE — PROGRESS NOTES
AVS and discharge instructions printed and reviewed with pt and pt's wife, opportunity for questions provided, all questions answered, tele monitor and PIVs removed; pt VSS, incision site CDI, no acute changes noted; transport requested.    1511 Pt transported to hospital exit via wheelchair, pt's wife alongside with belongings.

## 2020-02-12 NOTE — DISCHARGE SUMMARY
[facility]  Neurosurgery  Discharge Summary      Patient Name: Rhett Quarles  MRN: [id number]  Admission Date: 2/10/2020  Hospital Length of Stay: 2 days  Discharge Date and Time:  02/12/2020   Attending Physician: Kb Argueta MD   Discharging Provider: TAMI Castillo  Primary Care Provider: Bryce Robert MD    HPI:   Mr. Rhett Quarles is a pleasant 44 y.o. gentleman who presents today for consultation regarding newly discovered pituitary adenoma. Pt states he had an onset of diplopia, dizziness, extremity paraesthesias, fatigue, and mental fogginess several months ago. He was seen by his PCP and more recently by Neurology who ordered a MRI Brain which revealed an incidental pituitary mass. He has been worked up for MS but results are inconclusive. He denies a decrease in his libido.   MRI Pituitary W W/O Contrast (01/03/2020) shows a macroadenoma causing compression  of the pituitary gland and herniation into the sphenoid sinus. He presents today for elective resection.       DATE OF PROCEDURE:  2/10/2020     SURGEON:  Kb Argueta M.D., Ph.D.     ASSISTANT: MARGOT Abbott M.D.  (RES) (the assistant is a [facility] resident).     PREOPERATIVE DIAGNOSIS:  Pituitary adenoma.     POSTOPERATIVE DIAGNOSIS:  Pituitary adenoma.     PROCEDURES PERFORMED:  1.  Endonasal transsphenoidal resection of pituitary tumor.  2.  Stealth navigation.  3.  Fat graft.     SPECIMEN: Brain tumor.      Hospital Course:  2/10: OR today for elective resection of pituitary macroadenoma. No intra op complications. Patient tolerated procedure well. Recovered in the neuro ICU.   2/11: Increased UOP overnight with increased Na and spec grav 1.0, received 1x dose intranasal DDAVP at 0500. Trending Na and serum osm q6. Monitor I&O. Some bloody discharge from nare, no evidence of CSF leak.  Transfer to NSGY floor today  2/12: NAEON. Patient denies excessive thirst or urination.  Spec grav, serum Na, and urine osmol are all WNL. Denies positional headaches, salty drainage, or clear rhinorrhea. Pain well controlled. DC home with family support. Follow up on 2/17 for pituitary labs. Follow up with Dr. Argueta in 2 weeks for a wound check and path results. Discharge instructions given verbally to patient and his wife. All of their questions were answered. They were encouraged to call the clinic with any questions or concerns prior to follow up appt.       Consults: Neuro critical care, PT, OT    Significant Diagnostic Studies: Labs:   BMP:   Recent Labs   Lab 02/11/20  0334 02/11/20  1348 02/11/20  1855   * 141*  --    *  146* 141 141   K 4.3 3.6  --    * 108  --    CO2 24 24  --    BUN 5* 5*  --    CREATININE 0.9 0.9  --    CALCIUM 9.4 8.7  --    MG 2.2  --   --     and CBC   Recent Labs   Lab 02/11/20  0334   WBC 15.01*   HGB 16.0   HCT 49.6        Radiology: CT scan: head    Pending Diagnostic Studies:     Procedure Component Value Units Date/Time    CBC auto differential [[id number]]     Order Status:  Sent Lab Status:  No result     Specimen:  Blood     Comprehensive metabolic panel [[id number]]     Order Status:  Sent Lab Status:  No result     Specimen:  Blood     Osmolality, Serum [[id number]]     Order Status:  Sent Lab Status:  No result     Specimen:  Blood     Osmolality, Serum [[id number]]     Order Status:  Sent Lab Status:  No result     Specimen:  Blood     Osmolality, Serum [[id number]]     Order Status:  Sent Lab Status:  No result     Specimen:  Blood     Osmolality, Serum [[id number]]     Order Status:  Sent Lab Status:  No result     Specimen:  Blood     Osmolality, Serum [[id number]]     Order Status:  Sent Lab Status:  No result     Specimen:  Blood     Osmolality, Serum [[id number]] Collected:  02/10/20 2250    Order Status:  Sent Lab Status:  In process Updated:  02/10/20 2250    Specimen:  Blood     Osmolality, urine [[id number]] Collected:  02/12/20  1015    Order Status:  Sent Lab Status:  In process Updated:  02/12/20 1017    Specimen:  Urine, Clean Catch     Sodium [[id number]]     Order Status:  Sent Lab Status:  No result     Specimen:  Blood     Sodium [[id number]]     Order Status:  Sent Lab Status:  No result     Specimen:  Blood     Sodium [[id number]]     Order Status:  Sent Lab Status:  No result     Specimen:  Blood     Sodium [[id number]]     Order Status:  Sent Lab Status:  No result     Specimen:  Blood     Sodium [[id number]] Collected:  02/10/20 2250    Order Status:  Sent Lab Status:  In process Updated:  02/10/20 2250    Specimen:  Blood     Specimen to Pathology, Surgery Neurosurgery [[id number]] Collected:  02/10/20 0815    Order Status:  Sent Lab Status:  In process Updated:  02/10/20 1145    Urinalysis [[id number]] Collected:  02/12/20 1015    Order Status:  Sent Lab Status:  In process Updated:  02/12/20 1021    Specimen:  Urine     Urinalysis [[id number]] Collected:  02/12/20 1015    Order Status:  Sent Lab Status:  In process Updated:  02/12/20 1016    Specimen:  Urine         Final Active Diagnoses:    Diagnosis Date Noted POA    PRINCIPAL PROBLEM:  Pituitary adenoma [D35.2] 01/07/2020 Yes    Brain tumor [D49.6] 02/10/2020 Yes      Problems Resolved During this Admission:      Discharged Condition: good    Disposition: Home or Self Care    Follow Up:  -Follow up on 2/17 for pituitary labs.  -Follow up with Dr. Argueta in 2 weeks for a wound check and path results.     Patient Instructions:   See the patient instruction tab for detailed discharge instructions and follow up information.        Notify your health care provider if you experience any of the following:  temperature >100.4     Notify your health care provider if you experience any of the following:  persistent nausea and vomiting or diarrhea     Notify your health care provider if you experience any of the following:  severe uncontrolled pain     Notify your health care provider if  you experience any of the following:  redness, tenderness, or signs of infection (pain, swelling, redness, odor or green/yellow discharge around incision site)     Notify your health care provider if you experience any of the following:  difficulty breathing or increased cough     Notify your health care provider if you experience any of the following:  severe persistent headache     Notify your health care provider if you experience any of the following:  worsening rash     Notify your health care provider if you experience any of the following:  persistent dizziness, light-headedness, or visual disturbances     Notify your health care provider if you experience any of the following:  increased confusion or weakness     No dressing needed     Activity as tolerated     Medications:  Reconciled Home Medications:      Medication List      START taking these medications    desmopressin 10 mcg/spray (0.1 mL) Sprp  Commonly known as:  DDAVP NASAL  Call Dr. Argueta if urinating more than 3 times per night. If instructed, administer 1 spray in the left nostril.     hydrocortisone 10 MG Tab  Commonly known as:  CORTEF  Take 20mg (2 tabs) by mouth every AM and 10mg (1 tabs) every evening PM. Hold the evening dose of Cortef prior to your lab appt.     oxyCODONE-acetaminophen 5-325 mg per tablet  Commonly known as:  PERCOCET  Take 1 tablet by mouth every 6 (six) hours as needed for Pain.     oxymetazoline 0.05 % nasal spray  Commonly known as:  AFRIN  2 sprays by Nasal route 2 (two) times daily. for 5 days     pantoprazole 40 MG tablet  Commonly known as:  PROTONIX  Take 1 tablet (40 mg total) by mouth once daily.     sodium chloride 0.65 % nasal spray  Commonly known as:  OCEAN  1 spray by Nasal route 4 (four) times daily. for 7 days        CONTINUE taking these medications    multivitamin capsule  Take 1 capsule by mouth once daily.     SAW PALMETTO ORAL  Take by mouth once daily.     VITAMIN C ORAL  Take by mouth once daily.         STOP taking these medications    ibuprofen 100 mg/5 mL suspension  Commonly known as:  ADVIL,MOTRIN     ibuprofen 200 MG tablet  Commonly known as:  ADVIL,MOTRIN            TAMI Castillo  Neurosurgery  [facility]

## 2020-02-12 NOTE — PLAN OF CARE
Goals remain appropriate.  HEIDI Maciel  2/12/2020    Problem: Occupational Therapy Goal  Goal: Occupational Therapy Goal  Description  Goals set 2/11 to be addressed for 14 days with expiration date, 2/25:  Patient will increase functional independence with ADLs by performing:    Patient will demonstrate rolling to the right with modified independence.  Not met   Patient will demonstrate rolling to the left with modified independence.   Not met  Patient will demonstrate supine -sit with modified independence.   Not met  Patient will demonstrate stand pivot transfers with modified independence.   Not met  Patient will demonstrate grooming while standing with modified independence.   Not met  Patient will demonstrate upper body dressing with modified independence while seated EOB.   Not met  Patient will demonstrate lower body dressing with modified independence while seated EOB.   Not met  Patient will demonstrate toileting with modified independence.   Not met  Patient will demonstrate bathing while seated EOB with modified independence.   Not met  Patient's family / caregiver will demonstrate independence and safety with assisting patient with self-care skills and functional mobility.     Not met            Outcome: Ongoing, Progressing

## 2020-02-12 NOTE — PROGRESS NOTES
[facility]  Neurosurgery  Progress Note    Subjective:     History of Present Illness: Mr. Rhett Quarles is a pleasant 44 y.o. gentleman who presents today for consultation regarding newly discovered pituitary adenoma. Pt states he had an onset of diplopia, dizziness, extremity paraesthesias, fatigue, and mental fogginess several months ago. He was seen by his PCP and more recently by Neurology who ordered a MRI Brain which revealed an incidental pituitary mass. He has been worked up for MS but results are inconclusive. He denies a decrease in his libido.   MRI Pituitary W W/O Contrast (01/03/2020) shows a macroadenoma causing compression  of the pituitary gland and herniation into the sphenoid sinus. He presents today for elective resection.     Post-Op Info:  Procedure(s) (LRB):  HYPOPHYSECTOMY, TRANSSPHENOIDAL APPROACH with FAT GRAFT (N/A)   2 Days Post-Op     Interval History:   NAEON. Patient resting comfortably in bed. Wife at bedside. He reports a mild headache and sinus pressure that is controlled with current medications. Denies positional headaches, salty taste, clear rhinorrhea, excessive thirst, or increased urination. Tolerating diet. Voiding appropriately.     Medications:  Continuous Infusions:  Scheduled Meds:   ceFAZolin (ANCEF) IVPB  1 g Intravenous Q8H    desmopressin  10 mcg Nasal Once    heparin (porcine)  5,000 Units Subcutaneous Q8H    hydrocortisone  10 mg Oral QHS    hydrocortisone  20 mg Oral QAM    oxymetazoline  2 spray Each Nostril BID    pantoprazole  40 mg Oral Daily    sodium chloride  1 spray Each Nostril QID     PRN Meds:desmopressin, fentaNYL, hydrALAZINE, labetalol, morphine, oxyCODONE-acetaminophen, promethazine (PHENERGAN) IVPB, pseudoephedrine, sodium chloride 0.9%     Review of Systems  Objective:     Weight: 71.5 kg (157 lb 10.1 oz)  Body mass index is 27.06 kg/m².  Vital Signs (Most Recent):  Temp: 98.5 °F (36.9 °C) (02/12/20  1122)  Pulse: 66 (02/12/20 1122)  Resp: 14 (02/12/20 0819)  BP: 137/83 (02/12/20 1122)  SpO2: (!) 94 % (02/12/20 1122) Vital Signs (24h Range):  Temp:  [97 °F (36.1 °C)-98.9 °F (37.2 °C)] 98.5 °F (36.9 °C)  Pulse:  [57-99] 66  Resp:  [11-38] 14  SpO2:  [92 %-97 %] 94 %  BP: (126-164)/(73-90) 137/83     Date 02/12/20 0700 - 02/13/20 0659   Shift 7016-0481 2788-0602 0365-7034 24 Hour Total   INTAKE   P.O. 240   240   I.V.(mL/kg) 413.3(5.8)   413.3(5.8)   Shift Total(mL/kg) 653.3(9.1)   653.3(9.1)   OUTPUT   Urine(mL/kg/hr) 300   300   Shift Total(mL/kg) 300(4.2)   300(4.2)   Weight (kg) 71.5 71.5 71.5 71.5     Neurosurgery Physical Exam   General: well developed, well nourished, no distress.   Head: normocephalic. Some dried blood inside of right nare. No active drainage or bleeding noted.   Neurologic: Alert and oriented. Thought content appropriate.  GCS: Motor: 6/Verbal: 5/Eyes: 4 GCS Total: 15  Mental Status: Awake, Alert, Oriented x 4  Language: No aphasia  Speech: No dysarthria  Cranial nerves: face symmetric, tongue midline, CN II-XII grossly intact.   Eyes: pupils equal, round, reactive to light with accomodation, EOMI.   Pulmonary: normal respirations, no signs of respiratory distress  Abdomen: soft, non-distended, not tender to palpation  Skin: Skin is warm, dry and intact.  Sensory: intact to light touch throughout  Motor Strength:Moves all extremities spontaneously with good tone.  Full strength upper and lower extremities. No abnormal movements seen.   Babinski's: Negative.  Clonus: Negative.  Finger-to-nose: intact bilaterally   Pronator drift: absent bilaterally         Incision:  Abdominal: Clean, dry, staples intact. Skin edges well approximated. No surrounding erythema or edema. No drainage or TTP.       Significant Labs:  Recent Labs   Lab 02/11/20  0334 02/11/20  1348 02/11/20  1855   * 141*  --    *  146* 141 141   K 4.3 3.6  --    * 108  --    CO2 24 24  --    BUN 5* 5*  --     CREATININE 0.9 0.9  --    CALCIUM 9.4 8.7  --    MG 2.2  --   --      Recent Labs   Lab 02/11/20  0334   WBC 15.01*   HGB 16.0   HCT 49.6            Assessment/Plan:     * Pituitary adenoma  45M with pituitary adenoma s/p TSR on 2/10.    -Patient neurologically stable on exam  -No signs or symptoms of CSF leak.   -Continue ocean nasal spray and Afrin  -Continue nasal precautions. No nose blowing or using straws. NO use of high flow oxygen, CPAP, or BiPap. Sneeze with mouth open. Do not stick anything up the nose. OK to use mustache dressing.  -No signs or symptoms of DI per patient. UOP not recorded overnight. Spec grav, serum Na, and urine osmol are all WNL. Will recheck labs on 2/17. Rx for DDAVP given to patient. Patient instructed to call the clinic if he urinates more than 3 times in one hour.   -Continue Cortef taper. Decreased to 20 mg qAM and 10 mg qPM today. PPI while on steroids.   -DC home with family support  -Follow up with Dr. Argueta in 2 weeks for a wound check and path results.   -Discharge instructions given verbally to patient and his wife. All of their questions were answered. They were encouraged to call the clinic with any questions or concerns prior to follow up appt.       Discussed with Dr. Argutea  Please call with any questions      Malou Chavez PA-C   Neurosurgery   Pager: [phone]

## 2020-02-12 NOTE — NURSING TRANSFER
Nursing Transfer Note      2/12/2020     Transfer To: 940    Transfer via bed    Transfer with cardiac monitoring    Transported by RN x1    Medicines sent: Yes, given to receiving RN    Chart send with patient: Yes    Notified: spouse, at bedside    Patient reassessed at: 2/12/2020  0520    Upon arrival to floor: cardiac monitor applied, patient oriented to room, call bell in reach and bed in lowest position

## 2020-02-12 NOTE — PLAN OF CARE
POC reviewed with pt at 0500. Pt verbalized understanding. Questions and concerns addressed. No acute events overnight.     No neuro exam changes overnight. Pt had only one complaint of pain, relieved with one dose of PRN Oxy and one dose of PRN Fentanyl. Pt HR sustained in 50's range while sleeping, asymptomatic and afebrile. VSS while on RA. NS gtt remains at 100cc/hr. Urinal at bedside, no issue using. Transferred to 940 this AM.       Pt progressing toward goals. Will continue to monitor. See flowsheets for full assessment and VS info

## 2020-02-12 NOTE — HOSPITAL COURSE
2/10: OR today for elective resection of pituitary macroadenoma. No intra op complications. Patient tolerated procedure well. Recovered in the neuro ICU.   2/11: Increased UOP overnight with increased Na and spec grav 1.0, received 1x dose intranasal DDAVP at 0500. Trending Na and serum osm q6. Monitor I&O. Some bloody discharge from nare, no evidence of CSF leak. Transfer to NSGY floor today  2/12: NAEON. Patient denies excessive thirst or urination. Spec grav, serum Na, and urine osmol are all WNL. Denies positional headaches, salty drainage, or clear rhinorrhea. Pain well controlled. DC home with family support. Follow up on 2/17 for pituitary labs. Follow up with Dr. Argueta in 2 weeks for a wound check and path results. Discharge instructions given verbally to patient and his wife. All of their questions were answered. They were encouraged to call the clinic with any questions or concerns prior to follow up appt.

## 2020-02-12 NOTE — PT/OT/SLP PROGRESS
"Occupational Therapy   Treatment    Name: Rhett Quarles  MRN: [id number]  Admitting Diagnosis:  Pituitary adenoma  2 Days Post-Op    Recommendations:     Discharge Recommendations: outpatient OT  Discharge Equipment Recommendations:  none  Barriers to discharge:  None    Assessment:     Rhett Quarles is a 45 y.o. male with a medical diagnosis of Pituitary adenoma.  He presents with performance deficits affecting function are impaired self care skills, impaired functional mobilty, impaired balance, visual deficits.     Rehab Prognosis:  Good; patient would benefit from acute skilled OT services to address these deficits and reach maximum level of function.       Plan:     Patient to be seen 3 x/week to address the above listed problems via self-care/home management, neuromuscular re-education, cognitive retraining, therapeutic activities, therapeutic exercises, sensory integration  · Plan of Care Expires: 03/10/20  · Plan of Care Reviewed with: patient, spouse    Subjective   Patient:  "My night was bad.  They had trouble getting an IV."  Pain/Comfort:  · Pain Rating 1: 4/10  · Location 1: (headache)  · Pain Addressed 1: Reposition  · Pain Rating Post-Intervention 1: 4/10    Objective:     Communicated with: Nurse prior to session.  Patient found supine with peripheral IV, SCD upon OT entry to room.  Wife present.    General Precautions: Standard, aspiration, fall   Orthopedic Precautions:N/A   Braces: N/A     Occupational Performance:     Bed Mobility:    · Patient completed Rolling/Turning to Left with  modified independence  · Patient completed Rolling/Turning to Right with modified independence  · Patient completed Scooting/Bridging with modified independence  · Patient completed Supine to Sit with modified independence  · Patient completed Sit to Supine with modified independence     Functional Mobility/Transfers:  · Patient completed Sit <> Stand Transfer with supervision  with  no assistive " device   · Patient completed Bed <> Chair Transfer using Stand Pivot technique with stand by assistance with no assistive device    Activities of Daily Living:  · Grooming: stand by assistance while standing  · Upper Body Dressing: stand by assistance    · Lower Body Dressing: stand by assistance      [facility] 6 Click ADL: 19    Patient left supine with all lines intact, call button in reach and bed alarm onEducation:      GOALS:   Multidisciplinary Problems     Occupational Therapy Goals        Problem: Occupational Therapy Goal    Goal Priority Disciplines Outcome Interventions   Occupational Therapy Goal     OT, PT/OT Ongoing, Progressing    Description:  Goals set 2/11 to be addressed for 14 days with expiration date, 2/25:  Patient will increase functional independence with ADLs by performing:    Patient will demonstrate rolling to the right with modified independence.  Not met   Patient will demonstrate rolling to the left with modified independence.   Not met  Patient will demonstrate supine -sit with modified independence.   Not met  Patient will demonstrate stand pivot transfers with modified independence.   Not met  Patient will demonstrate grooming while standing with modified independence.   Not met  Patient will demonstrate upper body dressing with modified independence while seated EOB.   Not met  Patient will demonstrate lower body dressing with modified independence while seated EOB.   Not met  Patient will demonstrate toileting with modified independence.   Not met  Patient will demonstrate bathing while seated EOB with modified independence.   Not met  Patient's family / caregiver will demonstrate independence and safety with assisting patient with self-care skills and functional mobility.     Not met                             Time Tracking:     OT Date of Treatment: 02/12/20  OT Start Time: 0639  OT Stop Time: 0702  OT Total Time (min): 23 min    Billable Minutes:Self Care/Home Management  23    Veronica Lowry, LOTR  2/12/2020

## 2020-02-12 NOTE — PLAN OF CARE
Patient to be discharged home.  The patient does not have any home needs.  Family to provide transportation home.  Neurosurgery clinic to schedule follow up appointment.    Future Appointments   Date Time Provider Department Center   2/17/2020  8:10 AM LAB, APPOINTMENT [facility] IM [facility]   2/17/2020  8:20 AM LAB, APPOINTMENT [facility]   2/18/2020 10:45 AM Wei Leija, ASMITA [facility] OPTOMTY [facility]   3/3/2020  8:20 AM LAB, APPOINTMENT [facility] IM [facility]   3/3/2020  9:00 AM [facility]   3/10/2020 10:30 AM Lazarus Franz MD [facility] ENDO PI [facility]   3/17/2020 11:00 AM Aakash Mora MD [facility] NEURO [place]   3/30/2020  8:15 AM PERIMETRY, HUMPH [facility] OPHTHAL [facility]   3/30/2020  9:00 AM Bradley Winston MD [facility] OPHTHAL [facility]        02/12/20 1436   Final Note   Assessment Type Final Discharge Note   Anticipated Discharge Disposition Home   Hospital Follow Up  Appt(s) scheduled?   (Neurosurgery clinic to schedule follow up appointment.)   Discharge plans and expectations educations in teach back method with documentation complete? Yes

## 2020-02-12 NOTE — ASSESSMENT & PLAN NOTE
45M with pituitary adenoma s/p TSR on 2/10.    -Patient neurologically stable on exam  -No signs or symptoms of CSF leak.   -Continue ocean nasal spray and Afrin  -Continue nasal precautions. No nose blowing or using straws. NO use of high flow oxygen, CPAP, or BiPap. Sneeze with mouth open. Do not stick anything up the nose. OK to use mustache dressing.  -No signs or symptoms of DI per patient. UOP not recorded overnight. Spec grav, serum Na, and urine osmol are all WNL. Will recheck labs on 2/17. Rx for DDAVP given to patient. Patient instructed to call the clinic if he urinates more than 3 times in one hour.   -Continue Cortef taper. Decreased to 20 mg qAM and 10 mg qPM today. PPI while on steroids.   -DC home with family support  -Follow up with Dr. Argueta in 2 weeks for a wound check and path results.   -Discharge instructions given verbally to patient and his wife. All of their questions were answered. They were encouraged to call the clinic with any questions or concerns prior to follow up appt.

## 2020-02-12 NOTE — PLAN OF CARE
02/12/20 1217   Post-Acute Status   Post-Acute Authorization Other   Other Status No Post-Acute Service Needs       No SW needs at time of DC. SW in contact with CM and Medical staff. Will continue to follow and offer support as needed.     Abril Zepeda LMSW  [facility]   Ext. [phone]

## 2020-02-12 NOTE — HPI
Mr. Rhett Quarles is a pleasant 44 y.o. gentleman who presents today for consultation regarding newly discovered pituitary adenoma. Pt states he had an onset of diplopia, dizziness, extremity paraesthesias, fatigue, and mental fogginess several months ago. He was seen by his PCP and more recently by Neurology who ordered a MRI Brain which revealed an incidental pituitary mass. He has been worked up for MS but results are inconclusive. He denies a decrease in his libido.   MRI Pituitary W W/O Contrast (01/03/2020) shows a macroadenoma causing compression  of the pituitary gland and herniation into the sphenoid sinus. He presents today for elective resection.

## 2020-02-12 NOTE — NURSING
Pt stepped down from NCC via hospital bed. POC reviewed with pt and spouse, verbalized understanding. Patient aaox4, afebrile and VSS. C/o constant headache 4/10; medicated with prn percocet. Abdominal graft site drsg with moderate blood stain. Reinforced nasal precautions, pt instructed to notify nurse of any nosebleeds. Cardiac monitoring in place; fall precautions implemented. Bed locked and in the lowest position; call light and personal belongings within reach. All questions answered and concerns addressed; pt progressing towards goals. Spouse remains at bedside; WCTM.

## 2020-02-12 NOTE — SUBJECTIVE & OBJECTIVE
Interval History:   NAEON. Patient resting comfortably in bed. Wife at bedside. He reports a mild headache and sinus pressure that is controlled with current medications. Denies positional headaches, salty taste, clear rhinorrhea, excessive thirst, or increased urination. Tolerating diet. Voiding appropriately.     Medications:  Continuous Infusions:  Scheduled Meds:   ceFAZolin (ANCEF) IVPB  1 g Intravenous Q8H    desmopressin  10 mcg Nasal Once    heparin (porcine)  5,000 Units Subcutaneous Q8H    hydrocortisone  10 mg Oral QHS    hydrocortisone  20 mg Oral QAM    oxymetazoline  2 spray Each Nostril BID    pantoprazole  40 mg Oral Daily    sodium chloride  1 spray Each Nostril QID     PRN Meds:desmopressin, fentaNYL, hydrALAZINE, labetalol, morphine, oxyCODONE-acetaminophen, promethazine (PHENERGAN) IVPB, pseudoephedrine, sodium chloride 0.9%     Review of Systems  Objective:     Weight: 71.5 kg (157 lb 10.1 oz)  Body mass index is 27.06 kg/m².  Vital Signs (Most Recent):  Temp: 98.5 °F (36.9 °C) (02/12/20 1122)  Pulse: 66 (02/12/20 1122)  Resp: 14 (02/12/20 0819)  BP: 137/83 (02/12/20 1122)  SpO2: (!) 94 % (02/12/20 1122) Vital Signs (24h Range):  Temp:  [97 °F (36.1 °C)-98.9 °F (37.2 °C)] 98.5 °F (36.9 °C)  Pulse:  [57-99] 66  Resp:  [11-38] 14  SpO2:  [92 %-97 %] 94 %  BP: (126-164)/(73-90) 137/83     Date 02/12/20 0700 - 02/13/20 0659   Shift 6638-1045 3303-8744 8659-7295 24 Hour Total   INTAKE   P.O. 240   240   I.V.(mL/kg) 413.3(5.8)   413.3(5.8)   Shift Total(mL/kg) 653.3(9.1)   653.3(9.1)   OUTPUT   Urine(mL/kg/hr) 300   300   Shift Total(mL/kg) 300(4.2)   300(4.2)   Weight (kg) 71.5 71.5 71.5 71.5     Neurosurgery Physical Exam   General: well developed, well nourished, no distress.   Head: normocephalic. Some dried blood inside of right nare. No active drainage or bleeding noted.   Neurologic: Alert and oriented. Thought content appropriate.  GCS: Motor: 6/Verbal: 5/Eyes: 4 GCS Total: 15  Mental  Status: Awake, Alert, Oriented x 4  Language: No aphasia  Speech: No dysarthria  Cranial nerves: face symmetric, tongue midline, CN II-XII grossly intact.   Eyes: pupils equal, round, reactive to light with accomodation, EOMI.   Pulmonary: normal respirations, no signs of respiratory distress  Abdomen: soft, non-distended, not tender to palpation  Skin: Skin is warm, dry and intact.  Sensory: intact to light touch throughout  Motor Strength:Moves all extremities spontaneously with good tone.  Full strength upper and lower extremities. No abnormal movements seen.   Babinski's: Negative.  Clonus: Negative.  Finger-to-nose: intact bilaterally   Pronator drift: absent bilaterally         Incision:  Abdominal: Clean, dry, staples intact. Skin edges well approximated. No surrounding erythema or edema. No drainage or TTP.       Significant Labs:  Recent Labs   Lab 02/11/20  0334 02/11/20  1348 02/11/20  1855   * 141*  --    *  146* 141 141   K 4.3 3.6  --    * 108  --    CO2 24 24  --    BUN 5* 5*  --    CREATININE 0.9 0.9  --    CALCIUM 9.4 8.7  --    MG 2.2  --   --      Recent Labs   Lab 02/11/20  0334   WBC 15.01*   HGB 16.0   HCT 49.6

## 2020-02-13 ENCOUNTER — TELEPHONE (OUTPATIENT)
Dept: NEUROSURGERY | Facility: CLINIC | Age: 45
End: 2020-02-13

## 2020-02-13 LAB
OSMOLALITY SERPL: 281 MOSM/KG (ref 280–300)
OSMOLALITY SERPL: 281 MOSM/KG (ref 280–300)

## 2020-02-13 NOTE — TELEPHONE ENCOUNTER
Wife called. Pt doing well. They were told to call if he had to urinate more than 3 times overnight. Last night he peed about every hour.    Asked about pt's thirst. She said he is not thirsty.    Told her that if this continues for another 24 hrs, to call me back tomorrow and I would order a sodium & u/a.    She v/u.

## 2020-02-14 ENCOUNTER — PATIENT OUTREACH (OUTPATIENT)
Dept: ADMINISTRATIVE | Facility: CLINIC | Age: 45
End: 2020-02-14

## 2020-02-14 NOTE — PATIENT INSTRUCTIONS
Sepsis     To treat sepsis, antibiotics and fluids may by given through an intravenous (IV) line.     Sepsis happens when your body responds with widespread inflammation to a bad infection or bacteremia--the presence of bacteria in your bloodstream. Sepsis can be deadly. Blood pressure may drop and the lungs and kidneys may start to fail. Emergency care for sepsis is crucial.  Risk factors  Those most at risk for sepsis are:  · Infants or older adults  · People who have an illness that weakens their immune system, such as cancer, AIDS, or diabetes  · People being treated with chemotherapy medicines or radiation, which weakens the immune system  · People who have had a transplant  · People with a very severe infection such as pneumonia, meningitis, or a urinary tract infection  When to go to the emergency department (ED)  Sepsis is an emergency. Go to the nearest ED if you have a fever with any of these symptoms:  · Chills and shaking  · Rapid heartbeat and breathing  · Trouble breathing  · Severe nausea or uncontrolled vomiting  · Confusion, disorientation, drowsiness, or dizziness  · Decreased urination  · Severe pain, including in the back or joints   What to expect in the ED  · Blood and urine tests are done to look for bacteria. They also check for organ failure.  · Blood, urine, or sputum cultures may be taken. The samples are sent to a lab. They are placed in a special container. Any bacteria should grow in 24 hours.  · X-rays or other imaging tests may be done.  A person with sepsis will be admitted to the hospital and treated with antibiotics. Treatment may also include oxygen and intravenous fluids.  Date Last Reviewed: 10/1/2016  © 3245-2033 [employer]. [address] [place] [address], [place] [address]. All rights reserved. This information is not intended as a substitute for professional medical care. Always follow your healthcare professional's instructions.

## 2020-02-17 ENCOUNTER — PATIENT OUTREACH (OUTPATIENT)
Dept: ADMINISTRATIVE | Facility: OTHER | Age: 45
End: 2020-02-17

## 2020-02-17 ENCOUNTER — LAB VISIT (OUTPATIENT)
Dept: LAB | Facility: HOSPITAL | Age: 45
End: 2020-02-17
Attending: PHYSICIAN ASSISTANT
Payer: COMMERCIAL

## 2020-02-17 DIAGNOSIS — D35.2 PITUITARY ADENOMA: ICD-10-CM

## 2020-02-17 LAB
AMORPH CRY UR QL COMP ASSIST: NORMAL
BACTERIA #/AREA URNS AUTO: NORMAL /HPF
BILIRUB UR QL STRIP: NEGATIVE
CLARITY UR REFRACT.AUTO: ABNORMAL
COLOR UR AUTO: YELLOW
CORTIS SERPL-MCNC: 13.3 UG/DL (ref 4.3–22.4)
GLUCOSE UR QL STRIP: NEGATIVE
HGB UR QL STRIP: NEGATIVE
KETONES UR QL STRIP: NEGATIVE
LEUKOCYTE ESTERASE UR QL STRIP: NEGATIVE
MICROSCOPIC COMMENT: NORMAL
NITRITE UR QL STRIP: NEGATIVE
PH UR STRIP: 8 [PH] (ref 5–8)
PROLACTIN SERPL IA-MCNC: 9.7 NG/ML (ref 3.5–19.4)
PROT UR QL STRIP: NEGATIVE
SODIUM SERPL-SCNC: 132 MMOL/L (ref 136–145)
SP GR UR STRIP: 1.01 (ref 1–1.03)
T3 SERPL-MCNC: 104 NG/DL (ref 60–180)
T4 FREE SERPL-MCNC: 1.33 NG/DL (ref 0.71–1.51)
TESTOST SERPL-MCNC: 94 NG/DL (ref 304–1227)
TSH SERPL DL<=0.005 MIU/L-ACNC: 2.02 UIU/ML (ref 0.4–4)
URN SPEC COLLECT METH UR: ABNORMAL

## 2020-02-17 PROCEDURE — 82533 TOTAL CORTISOL: CPT

## 2020-02-17 PROCEDURE — 84443 ASSAY THYROID STIM HORMONE: CPT

## 2020-02-17 PROCEDURE — 82024 ASSAY OF ACTH: CPT

## 2020-02-17 PROCEDURE — 36415 COLL VENOUS BLD VENIPUNCTURE: CPT

## 2020-02-17 PROCEDURE — 84403 ASSAY OF TOTAL TESTOSTERONE: CPT

## 2020-02-17 PROCEDURE — 84295 ASSAY OF SERUM SODIUM: CPT

## 2020-02-17 PROCEDURE — 84305 ASSAY OF SOMATOMEDIN: CPT

## 2020-02-17 PROCEDURE — 84480 ASSAY TRIIODOTHYRONINE (T3): CPT

## 2020-02-17 PROCEDURE — 84439 ASSAY OF FREE THYROXINE: CPT

## 2020-02-17 PROCEDURE — 84146 ASSAY OF PROLACTIN: CPT

## 2020-02-17 PROCEDURE — 83003 ASSAY GROWTH HORMONE (HGH): CPT

## 2020-02-18 ENCOUNTER — OFFICE VISIT (OUTPATIENT)
Dept: OPTOMETRY | Facility: CLINIC | Age: 45
End: 2020-02-18
Payer: COMMERCIAL

## 2020-02-18 DIAGNOSIS — H52.4 PRESBYOPIA OF BOTH EYES: ICD-10-CM

## 2020-02-18 DIAGNOSIS — H53.2 DIPLOPIA: Primary | ICD-10-CM

## 2020-02-18 DIAGNOSIS — H51.11 CONVERGENCE INSUFFICIENCY: ICD-10-CM

## 2020-02-18 DIAGNOSIS — H53.50 COLOR DEFICIENCY: ICD-10-CM

## 2020-02-18 LAB
ACTH PLAS-MCNC: 146 PG/ML (ref 0–46)
GH SERPL-MCNC: <0.1 NG/ML (ref 0–3)

## 2020-02-18 PROCEDURE — 99999 PR PBB SHADOW E&M-EST. PATIENT-LVL III: CPT | Mod: PBBFAC,,, | Performed by: OPTOMETRIST

## 2020-02-18 PROCEDURE — 99999 PR PBB SHADOW E&M-EST. PATIENT-LVL III: ICD-10-PCS | Mod: PBBFAC,,, | Performed by: OPTOMETRIST

## 2020-02-18 PROCEDURE — 92015 DETERMINE REFRACTIVE STATE: CPT | Mod: S$GLB,,, | Performed by: OPTOMETRIST

## 2020-02-18 PROCEDURE — 92012 INTRM OPH EXAM EST PATIENT: CPT | Mod: S$GLB,,, | Performed by: OPTOMETRIST

## 2020-02-18 PROCEDURE — 92012 PR EYE EXAM, EST PATIENT,INTERMED: ICD-10-PCS | Mod: S$GLB,,, | Performed by: OPTOMETRIST

## 2020-02-18 PROCEDURE — 92015 PR REFRACTION: ICD-10-PCS | Mod: S$GLB,,, | Performed by: OPTOMETRIST

## 2020-02-18 NOTE — PATIENT INSTRUCTIONS
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  Recent diagnosis of pituitary adenoma.  S/p surgery.  No evidence of visual field constriction in either eye on confrontation visual field test done today.    Ocular health appears good otherwise.    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    Presbyopia, consistent with age.  Note need for increased "plus" for near viewing.  Discussed the potential impact of increased plus prescription on his convergence problems, and advised that base in correction would likely need to be increased as a result.     Cover test confirms diagnosis of convergence insufficiency.    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    New spectacle lens Rx issued for near/reading lenses (only) with increase from 10 PD base in to total of 12 PD base in prism to help aid fusion at near.    Recheck if any problems with new lenses.    Otherwise, recheck in one year.       "

## 2020-02-18 NOTE — LETTER
February 21, 2020      Mabel Sharma, OD  [address]  [facility] [address]           [facility]  [address]  [facility] [address]  Phone: [phone]  Fax: [phone]          Patient: Rhett Quarles   MR Number: [id number]   YOB: 1975   Date of Visit: 2/18/2020       Dear Dr. Mabel Sharma:    Thank you for referring Rhett Quarles to me for evaluation. Attached you will find relevant portions of my assessment and plan of care.    If you have questions, please do not hesitate to call me. I look forward to following Rhett Quarles along with you.    Sincerely,    Wei Leija, OD    Enclosure  CC:  No Recipients    If you would like to receive this communication electronically, please contact [email or web address] or [phone] to request more information on [employer] Link access.    For providers and/or their staff who would like to refer a patient to [facility], please contact us through our one-stop-shop provider referral line, [facility], at [phone].    If you feel you have received this communication in error or would no longer like to receive these types of communications, please e-mail [email or web address]

## 2020-02-18 NOTE — PROGRESS NOTES
HPI     Diplopia      Additional comments: History of pituitary adenoma.  Recent Sx.   Recent visit with Dr. Winston.  Has had problems with horizontal diplopia for many years (at near).  Prism   glasses prescribed with good relief of diplopia.  Have stopped working as   well.                 Comments     Patient's age: 45 y.o.  Occupation:   Approximate date of last eye examination:     Name of last eye doctor seen: 01/29/2020  City/State: [facility]  Wears glasses? Yes, with prism      If yes, wears  Full-time or part-time?  Only for near vision  Present glasses are: Bifocal, SV Distance, SV Reading?  SV  Approximate age of present glasses:  16 yrs old    Got new glasses following last exam, or subsequently?:  Yes    Any problem with VA with glasses?  Pt needs updated glasses with prism   for near vision  Wears CLs?:  No   Headaches?  Yes   Eye pain/discomfort?  No                                                                                      Flashes?  Yes   Floaters?  Yes   Diplopia/Double vision?  Yes   Patient's Ocular History:         Any eye surgeries? No          Any eye injury?  No          Any treatment for eye disease? Pt states he is Color Deficient.   Maternal grandfather color deficient.   Family history of eye disease?  No   Significant patient medical history:         1. Diabetes?  No        If yes, IDDM or NIDDM?   n/a   2. HBP?  No               3. Other (describe):  Pituitary adenoma   ! OTC eyedrops currently using:  No    ! Prescription eye meds currently using:  No    ! Any history of allergy/adverse reaction to any eye meds used   previously?  No     ! Any history of allergy/adverse reaction to eyedrops used during prior   eye exam(s)? No     ! Any history of allergy/adverse reaction to Novacaine or similar meds?   No    ! Any history of allergy/adverse reaction to Epinephrine or similar meds?   No     ! Patient okay with use of anesthetic eyedrops to check eye pressure?   "  Yes         ! Patient okay with use of eyedrops to dilate pupils today?  Yes    !  Allergies/Medications/Medical History/Family History reviewed today?    Yes       PD =   65/61  Desired reading distance =  22''                                                                     Last edited by Wei Leija, OD on 2/18/2020 11:59 AM. (History)            Assessment /Plan     For exam results, see Encounter Report.    1. Diplopia     2. Color deficiency     3. Convergence insufficiency     4. 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  Recent diagnosis of pituitary adenoma.  S/p surgery.  No evidence of visual field constriction in either eye on confrontation visual field test done today.    Ocular health appears good otherwise.    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    Presbyopia, consistent with age.  Note need for increased "plus" for near viewing.  Discussed the potential impact of increased plus prescription on his convergence problems, and advised that base in correction would likely need to be increased as a result.     Cover test confirms diagnosis of convergence insufficiency.    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    New spectacle lens Rx issued for near/reading lenses (only) with increase from 10 PD base in to total of 12 PD base in prism to help aid fusion at near.    Recheck if any problems with new lenses.    Otherwise, recheck in one year.           "

## 2020-02-19 LAB
IGF-I SERPL-MCNC: 151 NG/ML (ref 44–275)
IGF-I Z-SCORE SERPL: 0.39 SD

## 2020-03-02 ENCOUNTER — TELEPHONE (OUTPATIENT)
Dept: NEUROSURGERY | Facility: CLINIC | Age: 45
End: 2020-03-02

## 2020-03-03 ENCOUNTER — LAB VISIT (OUTPATIENT)
Dept: LAB | Facility: HOSPITAL | Age: 45
End: 2020-03-03
Attending: INTERNAL MEDICINE
Payer: COMMERCIAL

## 2020-03-03 ENCOUNTER — TELEPHONE (OUTPATIENT)
Dept: ENDOCRINOLOGY | Facility: CLINIC | Age: 45
End: 2020-03-03

## 2020-03-03 ENCOUNTER — OFFICE VISIT (OUTPATIENT)
Dept: NEUROSURGERY | Facility: CLINIC | Age: 45
End: 2020-03-03
Payer: COMMERCIAL

## 2020-03-03 VITALS
DIASTOLIC BLOOD PRESSURE: 89 MMHG | WEIGHT: 161.38 LBS | TEMPERATURE: 98 F | HEART RATE: 70 BPM | SYSTOLIC BLOOD PRESSURE: 131 MMHG | BODY MASS INDEX: 27.7 KG/M2

## 2020-03-03 DIAGNOSIS — D35.2 PITUITARY ADENOMA: Primary | ICD-10-CM

## 2020-03-03 DIAGNOSIS — D35.2: ICD-10-CM

## 2020-03-03 DIAGNOSIS — D35.2 PITUITARY ADENOMA: ICD-10-CM

## 2020-03-03 LAB
CORTIS SERPL-MCNC: 7.8 UG/DL (ref 4.3–22.4)
T4 FREE SERPL-MCNC: 1.06 NG/DL (ref 0.71–1.51)
TESTOST SERPL-MCNC: 224 NG/DL (ref 304–1227)
TSH SERPL DL<=0.005 MIU/L-ACNC: 1.92 UIU/ML (ref 0.4–4)

## 2020-03-03 PROCEDURE — 84305 ASSAY OF SOMATOMEDIN: CPT

## 2020-03-03 PROCEDURE — 99999 PR PBB SHADOW E&M-EST. PATIENT-LVL III: ICD-10-PCS | Mod: PBBFAC,,, | Performed by: NEUROLOGICAL SURGERY

## 2020-03-03 PROCEDURE — 99024 POSTOP FOLLOW-UP VISIT: CPT | Mod: S$GLB,,, | Performed by: NEUROLOGICAL SURGERY

## 2020-03-03 PROCEDURE — 99999 PR PBB SHADOW E&M-EST. PATIENT-LVL III: CPT | Mod: PBBFAC,,, | Performed by: NEUROLOGICAL SURGERY

## 2020-03-03 PROCEDURE — 82533 TOTAL CORTISOL: CPT

## 2020-03-03 PROCEDURE — 84443 ASSAY THYROID STIM HORMONE: CPT

## 2020-03-03 PROCEDURE — 82024 ASSAY OF ACTH: CPT

## 2020-03-03 PROCEDURE — 84439 ASSAY OF FREE THYROXINE: CPT

## 2020-03-03 PROCEDURE — 84403 ASSAY OF TOTAL TESTOSTERONE: CPT

## 2020-03-03 PROCEDURE — 36415 COLL VENOUS BLD VENIPUNCTURE: CPT

## 2020-03-03 PROCEDURE — 99024 PR POST-OP FOLLOW-UP VISIT: ICD-10-PCS | Mod: S$GLB,,, | Performed by: NEUROLOGICAL SURGERY

## 2020-03-03 NOTE — PATIENT INSTRUCTIONS
I have personally reviewed the CTH with the pt which shows postoperative changes from recent transsphenoidal resection of a sellar mass.    Pathology report positive for Corticotroph adenoma, Though pt is not symptomatic, will continue to follow.     Will refer pt to Dr. Franz.    I will schedule the patient for 3 month follow up with MRI Brain.     In three weeks, pt may resume more strenuous activity as needed. He is cleared to travel.

## 2020-03-03 NOTE — PROGRESS NOTES
Subjective:   I, Gilma Teixeira, attest that this documentation has been prepared under the direction and in the presence of Kb Argueta MD.     Patient ID: Rhett Quarles is a 45 y.o. male     Chief Complaint: No chief complaint on file.      HPI  Mr. Rhett Quarles is a pleasant 45 y.o. gentleman who is s/p Endonasal transsphenoidal resection of pituitary tumor on 02/10/2020 and presents today for his 2 week postoperative follow up. This is a pt who had an onset of diplopia, dizziness, extremity paraesthesias, fatigue, and mental fogginess several months ago. He was seen by his PCP, and more recently by Neurology, who ordered a MRI Brain which revealed an incidental pituitary mass. He alsol had an MS workup that was inconclusive.  He was seen in my clinic on 01/21/2020 and denied changes in his libido. 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 There were no T2 changes suspicious for MS plaques that I could see. After great discussion, pt was offered resection for tumor and wished to proceed.    Pt states he has been doing well since surgery. He states he has been relatively inactive and able to sleep through the night. Over the last couple of days, he has been experiencing headaches while laying down and has been taking Tylenol as needed. He is accompanied by his spouse who reports a few sneezing bouts in the past week secondary to allergies. Pt has not taken pain medications in approximately a week.        Review of Systems   Constitutional: Negative for activity change, appetite change, fatigue, fever and unexpected weight change.   HENT: Negative for facial swelling.    Eyes: Negative.    Respiratory: Negative.    Cardiovascular: Negative.    Gastrointestinal: Negative for diarrhea, nausea and vomiting.   Endocrine: Negative.    Genitourinary: Negative.     Musculoskeletal: Negative for back pain, joint swelling, myalgias and neck pain.   Neurological: Positive for headaches. Negative for dizziness, weakness and numbness.   Psychiatric/Behavioral: Negative.       History reviewed. No pertinent past medical history.    Objective:      Vitals:    03/03/20 0903   BP: 131/89   Pulse: 70   Temp: 97.6 °F (36.4 °C)      Physical Exam   Constitutional: He is oriented to person, place, and time. He appears well-nourished.   HENT:   Head: Normocephalic and atraumatic.   Neck: Neck supple.   Neurological: He is alert and oriented to person, place, and time. No cranial nerve deficit. He displays a negative Romberg sign. GCS eye subscore is 4. GCS verbal subscore is 5. GCS motor subscore is 6.   Skin:   Abdominal incision site is dry, clean, and intact.          IMAGING:  CT Head Without Contrast (02/10/2020) shows postoperative changes from recent transsphenoidal resection of a sellar mass.    Pathology: Corticotroph adenoma    I have personally reviewed the images with the pt.      I, Dr. Kb Argueta, personally performed the services described in this documentation. All medical record entries made by the scribe, Gilma Teixeira, were at my direction and in my presence.  I have reviewed the chart and agree that the record reflects my personal performance and is accurate and complete. Kb Argueta MD. 03/03/2020    Assessment:       Corticotroph adenoma s/p tumor resection    Plan:   I have personally reviewed the CTH with the pt which shows postoperative changes from recent transsphenoidal resection of a sellar mass.    Pathology report positive for Corticotroph adenoma, Though pt is not symptomatic, will continue to follow.     Will refer pt to Dr. Franz.    I will schedule the patient for 3 month follow up with MRI Brain.     In three weeks, pt may resume more strenuous activity as needed. He is cleared to travel.  Abdominal staples removed by nurse today

## 2020-03-06 ENCOUNTER — PATIENT OUTREACH (OUTPATIENT)
Dept: ADMINISTRATIVE | Facility: OTHER | Age: 45
End: 2020-03-06

## 2020-03-06 LAB
ACTH PLAS-MCNC: 46 PG/ML (ref 0–46)
FINAL PATHOLOGIC DIAGNOSIS: NORMAL
GROSS: NORMAL
IGF-I SERPL-MCNC: 117 NG/ML (ref 44–275)
IGF-I Z-SCORE SERPL: -0.25 SD

## 2020-03-06 NOTE — PROGRESS NOTES
Chart reviewed.   Immunizations: updated in LINKS  Orders placed: n/a  Upcoming appts to satisfy GEOFFREY topics: n/a

## 2020-03-09 ENCOUNTER — PATIENT MESSAGE (OUTPATIENT)
Dept: ENDOCRINOLOGY | Facility: CLINIC | Age: 45
End: 2020-03-09

## 2020-03-09 ENCOUNTER — TELEPHONE (OUTPATIENT)
Dept: ENDOCRINOLOGY | Facility: CLINIC | Age: 45
End: 2020-03-09

## 2020-03-09 DIAGNOSIS — D35.2: Primary | ICD-10-CM

## 2020-03-09 NOTE — TELEPHONE ENCOUNTER
----- Message from Lavinia Broderick sent at 3/9/2020 11:45 AM CDT -----  Contact: Pt  Reason: Pt calling to see if labs are needed for appt on 3/10 he stated he normally do lab before seeing Dr. Franz    Communication: [phone]

## 2020-03-10 ENCOUNTER — LAB VISIT (OUTPATIENT)
Dept: LAB | Facility: HOSPITAL | Age: 45
End: 2020-03-10
Payer: COMMERCIAL

## 2020-03-10 ENCOUNTER — OFFICE VISIT (OUTPATIENT)
Dept: ENDOCRINOLOGY | Facility: CLINIC | Age: 45
End: 2020-03-10
Payer: COMMERCIAL

## 2020-03-10 ENCOUNTER — TELEPHONE (OUTPATIENT)
Dept: ENDOCRINOLOGY | Facility: CLINIC | Age: 45
End: 2020-03-10

## 2020-03-10 VITALS
TEMPERATURE: 98 F | DIASTOLIC BLOOD PRESSURE: 85 MMHG | WEIGHT: 159.38 LBS | BODY MASS INDEX: 27.36 KG/M2 | SYSTOLIC BLOOD PRESSURE: 116 MMHG | HEART RATE: 70 BPM

## 2020-03-10 DIAGNOSIS — E29.1 HYPOGONADISM IN MALE: Primary | ICD-10-CM

## 2020-03-10 DIAGNOSIS — D35.2 PITUITARY ADENOMA: ICD-10-CM

## 2020-03-10 DIAGNOSIS — D35.2 PITUITARY ADENOMA: Primary | ICD-10-CM

## 2020-03-10 LAB — CORTIS SERPL-MCNC: 7.7 UG/DL (ref 4.3–22.4)

## 2020-03-10 PROCEDURE — 99213 OFFICE O/P EST LOW 20 MIN: CPT | Mod: S$GLB,,, | Performed by: INTERNAL MEDICINE

## 2020-03-10 PROCEDURE — 3008F PR BODY MASS INDEX (BMI) DOCUMENTED: ICD-10-PCS | Mod: CPTII,S$GLB,, | Performed by: INTERNAL MEDICINE

## 2020-03-10 PROCEDURE — 99999 PR PBB SHADOW E&M-EST. PATIENT-LVL III: CPT | Mod: PBBFAC,,, | Performed by: INTERNAL MEDICINE

## 2020-03-10 PROCEDURE — 36415 COLL VENOUS BLD VENIPUNCTURE: CPT

## 2020-03-10 PROCEDURE — 99213 PR OFFICE/OUTPT VISIT, EST, LEVL III, 20-29 MIN: ICD-10-PCS | Mod: S$GLB,,, | Performed by: INTERNAL MEDICINE

## 2020-03-10 PROCEDURE — 82533 TOTAL CORTISOL: CPT

## 2020-03-10 PROCEDURE — 99999 PR PBB SHADOW E&M-EST. PATIENT-LVL III: ICD-10-PCS | Mod: PBBFAC,,, | Performed by: INTERNAL MEDICINE

## 2020-03-10 PROCEDURE — 3008F BODY MASS INDEX DOCD: CPT | Mod: CPTII,S$GLB,, | Performed by: INTERNAL MEDICINE

## 2020-03-10 RX ORDER — DEXAMETHASONE 1 MG/1
TABLET ORAL
Qty: 1 TABLET | Refills: 0 | Status: SHIPPED | OUTPATIENT
Start: 2020-03-10 | End: 2020-05-30

## 2020-03-10 NOTE — PROGRESS NOTES
"[facility] POSTOP FOLLOW-UP  03/10/2020     The patient's last visit with me was on 1/21/2020.   Subjective:      cc:  Postop follow-up for transsphenoidal resection, macroadenoma    HPI:   Rhett Quarles is a 45 y.o. male who underwent transsphenoidal resection for 2.7 cm pituitary adenoma found on MRI done for diplopia.  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     Underwent TSR 02/10/2020  Pathology - [facility]: CORTICOTROPH ADENOMA, IMMUNOREACTIVE FOR ACTH AND T-Pit    Currently off Hydrocortisone 20/10 mg since 3/4/2020.  Denies fatigue, N/V, weight loss. Reports normal appetite.   Reports occasional brain fog.    Reports having post-op DI. Used intranasal DDAVP once while in the hospital.   Denies polyuria, polydipsia.   Wakes up about 2-3 times at night to urinate.    Initial presentation:   MRI done for diplopia w/ finding of macroadenoma.    Imaging:      MRI 1/3/2020 - Pituitary is enlarged measuring 2.5 x 2.7 x 1.8 cm with thickening of the infundibulum, consistent with macroadenoma.  The pituitary gland approaches optic nerves particularly on the RIGHT yet does not grossly displace the optic nerves.  There is remodeling of the sella and extension into the sphenoid sinus    Current symptoms:  Headache:    Reports headaches-- states improving after surgery  Vision change:   Reports improvement    Formal VF:  Dr. Winston 1/29/2020 (pre-op)  "no evidence of visual pathway effects from the pituitary adenoma"  due for repeat in 2 mo    hypogonadotropic hypogonadism:  Low am testosterone of 228.  (preop had level of 94)  Reports normal libido  Denies ED.  Not on testosterone replacement  DEXA: not done  Lab Results   Component Value Date    TOTALTESTOST 224 (L) 03/03/2020    TOTALTESTOST 94 (L) 02/17/2020    TOTALTESTOST 228 (L) 01/10/2020        Past Medical History:   Diagnosis Date    Pituitary macroadenoma        Past Surgical History: "   Procedure Laterality Date    EXCISION, NEOPLASM, PITUITARY, TRANSSPHENOIDAL APPROACH, USING COMPUTER-ASSISTED NAVIGATION         Review of patient's allergies indicates:  No Known Allergies      Current Outpatient Medications:     ascorbic acid (VITAMIN C ORAL), Take by mouth once daily., Disp: , Rfl:     desmopressin (DDAVP NASAL) 10 mcg/spray (0.1 mL) SprP, Call Dr. Argueta if urinating more than 3 times per night. If instructed, administer 1 spray in the left nostril., Disp: 5 mL, Rfl: 1    multivitamin capsule, Take 1 capsule by mouth once daily., Disp: , Rfl:     dexAMETHasone (DECADRON) 1 MG Tab, Take 1 tablet (1 mg) by mouth at 11pm. Get fasting Cortisol drawn at 8am the following morning., Disp: 1 tablet, Rfl: 0    oxyCODONE-acetaminophen (PERCOCET) 5-325 mg per tablet, Take 1 tablet by mouth every 6 (six) hours as needed for Pain. (Patient not taking: Reported on 3/10/2020), Disp: 45 tablet, Rfl: 0    Social History     Socioeconomic History    Marital status:      Spouse name: Not on file    Number of children: Not on file    Years of education: Not on file    Highest education level: Not on file   Occupational History    Not on file   Social Needs    Financial resource strain: Not on file    Food insecurity:     Worry: Not on file     Inability: Not on file    Transportation needs:     Medical: Not on file     Non-medical: Not on file   Tobacco Use    Smoking status: Never Smoker    Smokeless tobacco: Never Used   Substance and Sexual Activity    Alcohol use: Yes     Comment: 2 rums in the evening    Drug use: Not Currently     Types: Marijuana    Sexual activity: Yes     Partners: Female   Lifestyle    Physical activity:     Days per week: Not on file     Minutes per session: Not on file    Stress: Not on file   Relationships    Social connections:     Talks on phone: Not on file     Gets together: Not on file     Attends Holiness service: Not on file     Active member of  club or organization: Not on file     Attends meetings of clubs or organizations: Not on file     Relationship status: Not on file   Other Topics Concern    Not on file   Social History Narrative    Not on file       History reviewed. No pertinent family history.    ROS:  14 point review of systems was reviewed.  Pertinent positives and negatives per HPI     Objective:   Physical Exam   /85   Pulse 70   Temp 97.9 °F (36.6 °C) (Oral)   Wt 72.3 kg (159 lb 6.3 oz)   BMI 27.36 kg/m²   Wt Readings from Last 3 Encounters:   03/10/20 72.3 kg (159 lb 6.3 oz)   03/03/20 73.2 kg (161 lb 6 oz)   02/10/20 71.5 kg (157 lb 10.1 oz)       Constitutional:  Pleasant, no acute distress.   HENT:   Head:    Normocephalic and atraumatic.   Mouth/Throat:   Oropharynx is clear and moist. No oropharyngeal exudate.   Eyes:    Pupils are equal, round, and reactive to light. No scleral icterus.   Neck:    No thyromegaly or palpable thyroid nodules, no cervical LAD  Cardiovascular:  Normal rate, regular rhythm, no murmurs.  No carotid bruits.  Respiratory:   Effort normal and breath sounds normal.   Gastrointestinal: Soft, nontender  Neurological:  No tremor, normal speech  Skin:    Skin is warm, dry  Psych:  Normal mood and affect.   Extremity:  No edema or wounds    LABORATORY REVIEW:    Chemistry        Component Value Date/Time     (L) 02/17/2020 0830    K 3.2 (L) 02/12/2020 0300     02/12/2020 0300    CO2 24 02/12/2020 0300    BUN 5 (L) 02/12/2020 0300    CREATININE 0.7 02/12/2020 0300     (H) 02/12/2020 0300        Component Value Date/Time    CALCIUM 8.4 (L) 02/12/2020 0300    ALKPHOS 71 02/12/2020 0300    AST 20 02/12/2020 0300    ALT 25 02/12/2020 0300    BILITOT 0.4 02/12/2020 0300    ESTGFRAFRICA >60.0 02/12/2020 0300    EGFRNONAA >60.0 02/12/2020 0300          Lab Results   Component Value Date    PROLACTIN 9.7 02/17/2020    PROLACTIN 10.8 01/10/2020    TSH 1.918 03/03/2020    TSH 2.022 02/17/2020     TSH 2.373 01/10/2020    TSH 0.417 12/03/2019    FREET4 1.06 03/03/2020    FREET4 1.33 02/17/2020    FREET4 0.97 01/10/2020    ACTH 46 03/03/2020    ACTH 146 (H) 02/17/2020    ACTH 40 01/10/2020    LABLH 1.2 01/10/2020    FSH 2.00 01/10/2020    SOMATMDN 117 03/03/2020    SOMATMDN 151 02/17/2020    SOMATMDN 86 01/10/2020     (L) 02/17/2020     (L) 02/12/2020     (L) 02/12/2020     (L) 02/12/2020    K 3.2 (L) 02/12/2020    K 3.6 02/11/2020    K 4.3 02/11/2020    K 4.3 01/10/2020    CALCIUM 8.4 (L) 02/12/2020    CALCIUM 8.7 02/11/2020    CALCIUM 9.4 02/11/2020    CALCIUM 9.3 01/10/2020    ALBUMIN 3.3 (L) 02/12/2020    ALBUMIN 4.0 02/11/2020    ALBUMIN 4.1 01/10/2020    ALBUMIN 4.4 12/03/2019    ESTGFRAFRICA >60.0 02/12/2020    ESTGFRAFRICA >60.0 02/11/2020    ESTGFRAFRICA >60.0 02/11/2020    ESTGFRAFRICA >60 01/10/2020    EGFRNONAA >60.0 02/12/2020    EGFRNONAA >60.0 02/11/2020    EGFRNONAA >60.0 02/11/2020    EGFRNONAA >60 01/10/2020     Results for DION RUBY (MRN [id number]) as of 1/21/2020 09:04   Ref. Range 12/3/2019 16:50 1/10/2020 08:37   Cortisol -8 AM Latest Ref Range: 4.30 - 22.40 ug/dL  8.70   Hemoglobin A1C External Latest Ref Range: 4.0 - 5.6 % 5.6    Estimated Avg Glucose Latest Ref Range: 68 - 131 mg/dL 114    ACTH Latest Ref Range: 0 - 46 pg/mL  40   Somatomedin (IGF-I) Latest Ref Range: 44 - 275 ng/mL  86   TSH Latest Ref Range: 0.400 - 4.000 uIU/mL 0.417 2.373   Free T4 Latest Ref Range: 0.71 - 1.51 ng/dL  0.97   FSH Latest Ref Range: 0.95 - 11.95 mIU/mL  2.00   LH Latest Ref Range: 0.6 - 12.1 mIU/mL  1.2   Prolactin Latest Ref Range: 3.5 - 19.4 ng/mL  10.8   Testosterone, Total Latest Ref Range: 304 - 1227 ng/dL  228 (L)     Imaging:      Assessment/Plan:     Problem List Items Addressed This Visit        Oncology    Pituitary adenoma     Pituitary adenoma  -S/p Endonasal transsphenoidal resection of pituitary tumor on 2/10/2020.    -Given path report showed tumor  cells stained positive for ACTH, will r/o cortisol excess. Low suspicion as pt is asymptomatic.   -Total testosterone level is slowly improving post-op.  -Thyroid function and IGF-1 levels are normal.   -No evidence of DI at this time.    -Check 24 urine cortisol, salivary cortisol (x2), and DST.  -Repeat serum testosterone level in 3 months. If testosterone level remains low, consider replacement therapy.  -F/U in pituitary clinic in 6 months           Relevant Orders    Ambulatory referral to Ophthalmology    ACTH    Cortisol, 8AM    T4, free    TSH    Insulin-like growth factor    Testosterone    CT Head Stealth W/O Contrast    MRI Brain W WO Contrast          Discussed with Dr. Franz

## 2020-03-10 NOTE — ASSESSMENT & PLAN NOTE
-S/p Endonasal transsphenoidal resection of pituitary tumor on 2/10/2020.    -Given path report showed tumor cells stained positive for ACTH, will r/o cortisol excess. Low suspicion as pt is asymptomatic.   -Total testosterone level is slowly improving post-op.  -Thyroid function and IGF-1 levels are normal.   -No evidence of DI at this time.    -Check 24 urine cortisol, salivary cortisol (x2), and DST.  -Repeat serum testosterone level in 3 months. If testosterone level remains low, consider replacement therapy.  -F/U in pituitary clinic in 6 months

## 2020-03-11 NOTE — PROGRESS NOTES
I have reviewed and concur with Dr. Meyer's history, physical, assessment, and plan.  I have personally interviewed and examined the patient.  See below addendum for my evaluation and additional findings.    Doing well following transsphenoidal resection for pituitary macroadenoma.  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  I have low suspicion that this was functional tumor however will check 24 hr urine free cortisol, salivary cortisol x2, and 1 mg dexamethasone suppression test to confirm that he does not have Cushing's disease.  Will have him back in 6 months with MRI as well as pituitary labs    Lazarus Franz MD

## 2020-03-26 ENCOUNTER — PATIENT MESSAGE (OUTPATIENT)
Dept: NEUROSURGERY | Facility: CLINIC | Age: 45
End: 2020-03-26

## 2020-03-26 ENCOUNTER — PATIENT MESSAGE (OUTPATIENT)
Dept: ENDOCRINOLOGY | Facility: CLINIC | Age: 45
End: 2020-03-26

## 2020-03-27 ENCOUNTER — NURSE TRIAGE (OUTPATIENT)
Dept: ADMINISTRATIVE | Facility: CLINIC | Age: 45
End: 2020-03-27

## 2020-03-27 NOTE — TELEPHONE ENCOUNTER
Pt is a 46 y/o male Self quarantine for ~13 days - no outside contact    Last night fever ~100.6F  Today: 99.8F  Yes Body Aches  No HA  No cough  No SOB    OTC: Nyquil to sleep; Tylenol     Surgery 6 weeks ago: What kind?   Pituitary Adenoma removal  Corticosteroids - finished ~2.5 weeks ago    Every 4 hours oral temperature  Electrolytes and fluids  Rest  Ibuprofen now - Hold Tylenol for now to unmask possible fever    Counseled to call back if symptoms worsen or further desire to seek testing.   Counseled about COVID symptoms, isolation, precautions and transmission.   Counseled about COVID testing guidelines.    Counseled about washing hands, covering cough and cleaning surfaces.   [email or web address] for virtual MD visit to seek Rx & advice.    Reason for Disposition   Fever > 100.5 F (38.1 C) and surgery in the past month    Additional Information   Negative: Severe difficulty breathing (e.g., struggling for each breath, speak in single words, bluish lips)   Negative: Sounds like a life-threatening emergency to the triager   Negative: [1] Difficulty breathing (shortness of breath) occurs AND [2] onset > 14 days after COVID-19 EXPOSURE (Close Contact)   Negative: [1] Dry cough occurs AND [2] onset > 14 days after COVID-19 EXPOSURE   Negative: [1] Wet cough (i.e., white-yellow, yellow, green, or christina colored sputum) AND [2] onset > 14 days after COVID-19 EXPOSURE   Negative: [1] Common cold symptoms AND [2] onset > 14 days after COVID-19 EXPOSURE   Negative: [1] Difficulty breathing occurs AND [2] within 14 days of COVID-19 EXPOSURE (Close Contact)   Negative: Patient sounds very sick or weak to the triager   Negative: [1] Fever or feeling feverish AND [2] within 14 Days of COVID-19 EXPOSURE (Close Contact)   Negative: [1] Cough occurs AND [2] within 14 days of COVID-19 EXPOSURE   Negative: [1] Fever (or feeling feverish) OR cough occurs AND [2] travel from or living  in high risk area (identified by CDC) AND [3] within last 14 days   Negative: [1] COVID-19 EXPOSURE within last 14 days AND [2] mild body aches, chills, diarrhea, headache, runny nose, or sore throat occur   Negative: [1] COVID-19 EXPOSURE within last 14 days AND [2] NO cough, fever, or breathing difficulty AND [3] exposed person is a healthcare worker who was NOT using all recommended personal protective equipment (i.e., a respirator-N95 mask, eye protection, gloves, and gown)    Protocols used: FEVER-A-OH, CORONAVIRUS (COVID-19) EXPOSURE-A-AH

## 2020-03-28 ENCOUNTER — CLINICAL SUPPORT (OUTPATIENT)
Dept: URGENT CARE | Facility: CLINIC | Age: 45
End: 2020-03-28
Payer: COMMERCIAL

## 2020-03-28 DIAGNOSIS — R50.9 FEVER, UNSPECIFIED FEVER CAUSE: Primary | ICD-10-CM

## 2020-03-28 PROCEDURE — U0002 COVID-19 LAB TEST NON-CDC: HCPCS

## 2020-03-28 NOTE — PROGRESS NOTES
PT IS HERE FOR COVID SWAB. SENT BY SIDNEY SMYTH, SHE STATED THAT HE IS ON IMMUNOCOMPROMISING MEDICATION

## 2020-03-30 ENCOUNTER — TELEPHONE (OUTPATIENT)
Dept: URGENT CARE | Facility: CLINIC | Age: 45
End: 2020-03-30

## 2020-03-30 LAB — SARS-COV-2 RNA RESP QL NAA+PROBE: DETECTED

## 2020-03-30 RX ORDER — ALBUTEROL SULFATE 90 UG/1
2 AEROSOL, METERED RESPIRATORY (INHALATION) EVERY 4 HOURS PRN
Qty: 18 G | Refills: 0 | Status: SHIPPED | OUTPATIENT
Start: 2020-03-30 | End: 2020-05-30

## 2020-03-30 NOTE — TELEPHONE ENCOUNTER
INFORMED OF POSITIVE COVID 19 RESULTS  DOING WELL  WILL ENROLL IN HOME MONITORING PROGRAM AND SENT OVER ALBUTEROL INHALER TO HIS PHARMACY OF CHOICE

## 2020-04-21 ENCOUNTER — TELEPHONE (OUTPATIENT)
Dept: ENDOCRINOLOGY | Facility: CLINIC | Age: 45
End: 2020-04-21

## 2020-04-21 NOTE — TELEPHONE ENCOUNTER
Peer to peer   Received: Today   Message Contents   Karlee Franz MD; ARUN Qiu Staff             Good morning,      test is pending a peer to peer for code [id number] with [employer] please call [phone]     Order Request Summary   Request Status:   In Progress   Health Plan:   [employer] [place]     Case Due to Close On/Before:   04/23/2020   Scheduled Date of Service:   04/21/2020     Thank you   Karlee PATEL   [id number]

## 2020-04-28 ENCOUNTER — TELEPHONE (OUTPATIENT)
Dept: ENDOCRINOLOGY | Facility: CLINIC | Age: 45
End: 2020-04-28

## 2020-05-19 ENCOUNTER — OFFICE VISIT (OUTPATIENT)
Dept: ENDOCRINOLOGY | Facility: CLINIC | Age: 45
End: 2020-05-19
Payer: COMMERCIAL

## 2020-05-19 DIAGNOSIS — D35.2 PITUITARY ADENOMA: Primary | ICD-10-CM

## 2020-05-19 DIAGNOSIS — R53.83 FATIGUE, UNSPECIFIED TYPE: ICD-10-CM

## 2020-05-19 DIAGNOSIS — R53.1 WEAKNESS: ICD-10-CM

## 2020-05-19 DIAGNOSIS — U07.1 COVID-19 VIRUS DETECTED: ICD-10-CM

## 2020-05-19 PROCEDURE — 99213 PR OFFICE/OUTPT VISIT, EST, LEVL III, 20-29 MIN: ICD-10-PCS | Mod: 95,,, | Performed by: INTERNAL MEDICINE

## 2020-05-19 PROCEDURE — 99213 OFFICE O/P EST LOW 20 MIN: CPT | Mod: 95,,, | Performed by: INTERNAL MEDICINE

## 2020-05-19 NOTE — ASSESSMENT & PLAN NOTE
- recent COVID Positive test, improving  - self quarantine   - could be causing some of his symptoms of weakness, fatigue

## 2020-05-19 NOTE — ASSESSMENT & PLAN NOTE
S/p Endonasal transsphenoidal resection of pituitary tumor on 2/10/2020.  Given path report showed tumor cells stained positive for ACTH, will r/o cortisol excess.   Low suspicion as pt is asymptomatic  Total testosterone level is slowly improving post-op.  Thyroid function and IGF-1 levels are normal.   No evidence of DI at this time.    Plan  - Checking 8 AM Cortisol  - Follow up MRI Brain in 6/2020  - [facility], check testosterone  - If testosterone level remains low, consider replacement therapy.

## 2020-05-19 NOTE — PROGRESS NOTES
"[facility] ENDOCR[facility]OLOGY POSTOP FOLLOW-UP      Patient ID: Rhett Quarles is a 45 y.o. male.    Chief Complaint:  Postop follow-up for transsphenoidal resection, macroadenoma     HPI:   Rhett Quarles is a 45 y.o. male who underwent transsphenoidal resection for 2.7 cm pituitary adenoma found on MRI done for diplopia.  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      Underwent TSR 02/10/2020  Pathology - [facility]: CORTICOTROPH ADENOMA, IMMUNOREACTIVE FOR ACTH AND T-Pit  Initial presentation: MRI done for diplopia w/ finding of macroadenoma.     Imaging:                                   MRI 1/3/2020 - Pituitary is enlarged measuring 2.5 x 2.7 x 1.8 cm with thickening of the infundibulum, consistent with macroadenoma.  The pituitary gland approaches optic nerves particularly on the RIGHT yet does not grossly displace the optic nerves.  There is remodeling of the sella and extension into the sphenoid sinus      Interval Hx: Had COVID 4 weeks ago>> did not required hospital stay, spent at home quarantine, mild version   Difficult time in regulating temp, heat and cold  Report weakness of hands and feet>> before his covid infection   Hair is failling out  Reporting hair falling out    Been off Hydrocortisone 20/10 mg since 3/4/2020.  Not on thyroid medication  Not DDAVP at night, No polyuria, polydipsia  Used intranasal DDAVP once while in the hospital.     No headache no blurry vision  No chest pain, palpitation, no coughing  No abd pain, constipation, diarrhea  Low libdo, but able to have erection    Denies fatigue, N/V, weight loss. Reports normal appetite.   Reports occasional brain fog.        Current symptoms:  Headache: reports headaches-- states improving after surgery  Vision change:  Reports improvement     Formal VF:  Dr. Winston 1/29/2020 (pre-op)  "no evidence of visual pathway effects from the pituitary adenoma"  due for repeat in 2 " mo     hypogonadotropic hypogonadism:  Low am testosterone of 228.  (preop had level of 94)  Reports normal libido  Denies ED.  Not on testosterone replacement  DEXA: not done          Lab Results   Component Value Date     TOTALTESTOST 224 (L) 03/03/2020     TOTALTESTOST 94 (L) 02/17/2020     TOTALTESTOST 228 (L) 01/10/2020      Reviewed past medical, family, social history and updated as appropriate.    Review of Systems   Constitutional: Positive for fatigue. Negative for activity change and unexpected weight change.   HENT: Negative for sore throat and voice change.    Eyes: Negative for visual disturbance.   Respiratory: Negative for shortness of breath.    Cardiovascular: Negative for chest pain.   Gastrointestinal: Negative for abdominal pain, constipation, diarrhea, nausea and vomiting.   Genitourinary: Negative for urgency.   Musculoskeletal: Positive for myalgias. Negative for arthralgias.   Skin: Negative for wound.   Neurological: Positive for weakness and numbness. Negative for headaches.   Psychiatric/Behavioral: Negative for confusion and sleep disturbance.       Objective:   Physical Exam   Constitutional:   Physical exam and Vital signs were not done due to virtual visit given current COVID pandemic       There were no vitals taken for this visit.    There is no height or weight on file to calculate BMI.    Lab Review:   Lab Results   Component Value Date    HGBA1C 5.6 12/03/2019     Lab Results   Component Value Date    CHOL 194 12/03/2019    HDL 38 (L) 12/03/2019    LDLCALC 122.6 12/03/2019    TRIG 167 (H) 12/03/2019    CHOLHDL 19.6 (L) 12/03/2019     Lab Results   Component Value Date     (L) 02/17/2020    K 3.2 (L) 02/12/2020     02/12/2020    CO2 24 02/12/2020     (H) 02/12/2020    BUN 5 (L) 02/12/2020    CREATININE 0.7 02/12/2020    CALCIUM 8.4 (L) 02/12/2020    PROT 6.0 02/12/2020    ALBUMIN 3.3 (L) 02/12/2020    BILITOT 0.4 02/12/2020    ALKPHOS 71 02/12/2020    AST 20  02/12/2020    ALT 25 02/12/2020    ANIONGAP 10 02/12/2020    ESTGFRAFRICA >60.0 02/12/2020    EGFRNONAA >60.0 02/12/2020    TSH 1.918 03/03/2020     Vit D, 25-Hydroxy   Date Value Ref Range Status   12/18/2019 28 (L) 30 - 96 ng/mL Final     Comment:     Vitamin D deficiency.........<10 ng/mL                              Vitamin D insufficiency......10-29 ng/mL       Vitamin D sufficiency........> or equal to 30 ng/mL  Vitamin D toxicity............>100 ng/mL         Assessment and Plan     Pituitary adenoma  S/p Endonasal transsphenoidal resection of pituitary tumor on 2/10/2020.  Given path report showed tumor cells stained positive for ACTH, will r/o cortisol excess.   Low suspicion as pt is asymptomatic  Total testosterone level is slowly improving post-op.  Thyroid function and IGF-1 levels are normal.   No evidence of DI at this time.    Plan  - Unclear if COVID vs hormonal def as cause of his symptoms  - Checking 8 AM Cortisol  - Follow up MRI Brain in 6/2020  - BMP, check testosterone  - If testosterone level remains low, consider replacement therapy.      COVID-19 virus detected  - recent COVID Positive test, improving  - self quarantine   - could be causing some of his symptoms of weakness, fatigue      Fatigue  - Weakness  - could be due to covid  - checking hormonal lab work   - TSH, Testosterone, CMP      RTC in 3-6 mo or sooner if needed  Follow up on lab work    Jenaro Javed MD  Endocrinology Fellow  5/19/2020 4:36 PM    The patient location is: Home located in [place]   The chief complaint leading to consultation is: Postop follow-up for transsphenoidal resection, macroadenoma  Visit type: Virtual visit with synchronous audio and video  Total time spent with patient: 30 mins  Each patient to whom he or she provides medical services by telemedicine is:  (1) informed of the relationship between the physician and patient and the respective role of any other health care provider with respect to  management of the patient; and (2) notified that he or she may decline to receive medical services by telemedicine and may withdraw from such care at any time.

## 2020-05-19 NOTE — PATIENT INSTRUCTIONS
Thank you for completing a virtual visit with me    Per our conversation, we want to do lab work at 8 AM to check your hormones level. Please keep your MRI brain as schedule. We will contact you with the result of your lab work    We will plan a telemedicine or an in-clinic visit in 3-4  Months.    My staff will contact you to schedule the above.     Please let me know if you have any other questions.      Thank you,  Jenaro Javed MD  Endocrinology Fellow  [facility], 6th Floor  [address], [place], [address]    Office: [phone]  Fax: [phone]

## 2020-05-20 ENCOUNTER — LAB VISIT (OUTPATIENT)
Dept: LAB | Facility: HOSPITAL | Age: 45
End: 2020-05-20
Payer: COMMERCIAL

## 2020-05-20 DIAGNOSIS — U07.1 COVID-19 VIRUS DETECTED: ICD-10-CM

## 2020-05-20 DIAGNOSIS — D35.2 PITUITARY ADENOMA: ICD-10-CM

## 2020-05-20 DIAGNOSIS — R53.1 WEAKNESS: ICD-10-CM

## 2020-05-20 DIAGNOSIS — R53.83 FATIGUE, UNSPECIFIED TYPE: ICD-10-CM

## 2020-05-20 LAB
ALBUMIN SERPL BCP-MCNC: 4.2 G/DL (ref 3.5–5.2)
ALP SERPL-CCNC: 72 U/L (ref 55–135)
ALT SERPL W/O P-5'-P-CCNC: 21 U/L (ref 10–44)
ANION GAP SERPL CALC-SCNC: 9 MMOL/L (ref 8–16)
AST SERPL-CCNC: 17 U/L (ref 10–40)
BILIRUB SERPL-MCNC: 0.8 MG/DL (ref 0.1–1)
BUN SERPL-MCNC: 9 MG/DL (ref 6–20)
CALCIUM SERPL-MCNC: 8.9 MG/DL (ref 8.7–10.5)
CHLORIDE SERPL-SCNC: 103 MMOL/L (ref 95–110)
CO2 SERPL-SCNC: 28 MMOL/L (ref 23–29)
CORTIS SERPL-MCNC: 9.3 UG/DL (ref 4.3–22.4)
CREAT SERPL-MCNC: 1 MG/DL (ref 0.5–1.4)
EST. GFR  (AFRICAN AMERICAN): >60 ML/MIN/1.73 M^2
EST. GFR  (NON AFRICAN AMERICAN): >60 ML/MIN/1.73 M^2
GLUCOSE SERPL-MCNC: 171 MG/DL (ref 70–110)
POTASSIUM SERPL-SCNC: 4.2 MMOL/L (ref 3.5–5.1)
PROT SERPL-MCNC: 7.1 G/DL (ref 6–8.4)
SODIUM SERPL-SCNC: 140 MMOL/L (ref 136–145)
T4 FREE SERPL-MCNC: 1.05 NG/DL (ref 0.71–1.51)
TESTOST SERPL-MCNC: 200 NG/DL (ref 304–1227)
TSH SERPL DL<=0.005 MIU/L-ACNC: 2.09 UIU/ML (ref 0.4–4)

## 2020-05-20 PROCEDURE — 84403 ASSAY OF TOTAL TESTOSTERONE: CPT

## 2020-05-20 PROCEDURE — 82533 TOTAL CORTISOL: CPT

## 2020-05-20 PROCEDURE — 36415 COLL VENOUS BLD VENIPUNCTURE: CPT

## 2020-05-20 PROCEDURE — 84439 ASSAY OF FREE THYROXINE: CPT

## 2020-05-20 PROCEDURE — 84443 ASSAY THYROID STIM HORMONE: CPT

## 2020-05-20 PROCEDURE — 80053 COMPREHEN METABOLIC PANEL: CPT

## 2020-05-20 NOTE — PROGRESS NOTES
Testosterone has improved from 3 months ago but is still low.  It may continue to improve so will plan to wait until August 2020 to see if he continues to have low testosterone.  If that is the case we will plan to give him replacement but hold off for now to see if he has recovery of his gonadotropins.

## 2020-05-22 ENCOUNTER — TELEPHONE (OUTPATIENT)
Dept: ENDOCRINOLOGY | Facility: CLINIC | Age: 45
End: 2020-05-22

## 2020-05-22 DIAGNOSIS — R53.83 FATIGUE, UNSPECIFIED TYPE: ICD-10-CM

## 2020-05-22 DIAGNOSIS — D35.2 PITUITARY ADENOMA: Primary | ICD-10-CM

## 2020-05-22 DIAGNOSIS — E29.1 HYPOGONADISM IN MALE: ICD-10-CM

## 2020-05-30 ENCOUNTER — OFFICE VISIT (OUTPATIENT)
Dept: URGENT CARE | Facility: CLINIC | Age: 45
End: 2020-05-30
Payer: COMMERCIAL

## 2020-05-30 VITALS
OXYGEN SATURATION: 98 % | WEIGHT: 159.38 LBS | BODY MASS INDEX: 27.21 KG/M2 | RESPIRATION RATE: 20 BRPM | HEART RATE: 85 BPM | HEIGHT: 64 IN | TEMPERATURE: 98 F

## 2020-05-30 DIAGNOSIS — M94.0 COSTOCHONDRITIS: ICD-10-CM

## 2020-05-30 DIAGNOSIS — R05.9 COUGH: Primary | ICD-10-CM

## 2020-05-30 PROCEDURE — U0003 INFECTIOUS AGENT DETECTION BY NUCLEIC ACID (DNA OR RNA); SEVERE ACUTE RESPIRATORY SYNDROME CORONAVIRUS 2 (SARS-COV-2) (CORONAVIRUS DISEASE [COVID-19]), AMPLIFIED PROBE TECHNIQUE, MAKING USE OF HIGH THROUGHPUT TECHNOLOGIES AS DESCRIBED BY CMS-2020-01-R: HCPCS

## 2020-05-30 PROCEDURE — 99214 PR OFFICE/OUTPT VISIT, EST, LEVL IV, 30-39 MIN: ICD-10-PCS | Mod: S$GLB,,, | Performed by: FAMILY MEDICINE

## 2020-05-30 PROCEDURE — 99214 OFFICE O/P EST MOD 30 MIN: CPT | Mod: S$GLB,,, | Performed by: FAMILY MEDICINE

## 2020-05-30 PROCEDURE — 71046 X-RAY EXAM CHEST 2 VIEWS: CPT | Mod: S$GLB,,, | Performed by: RADIOLOGY

## 2020-05-30 PROCEDURE — 71046 XR CHEST PA AND LATERAL: ICD-10-PCS | Mod: S$GLB,,, | Performed by: RADIOLOGY

## 2020-05-30 RX ORDER — BENZONATATE 100 MG/1
CAPSULE ORAL
Qty: 30 CAPSULE | Refills: 1 | Status: SHIPPED | OUTPATIENT
Start: 2020-05-30 | End: 2021-04-28

## 2020-05-30 RX ORDER — ALBUTEROL SULFATE 90 UG/1
2 AEROSOL, METERED RESPIRATORY (INHALATION) EVERY 4 HOURS PRN
Qty: 18 G | Refills: 0 | Status: SHIPPED | OUTPATIENT
Start: 2020-05-30 | End: 2021-04-28

## 2020-05-30 NOTE — PROGRESS NOTES
"Subjective:       Patient ID: Rhett Quarles is a 45 y.o. male.    Vitals:  height is 5' 4" (1.626 m) and weight is 72.3 kg (159 lb 6.3 oz). His temperature is 97.9 °F (36.6 °C). His pulse is 85. His respiration is 20 and oxygen saturation is 98%.     Chief Complaint: Cough    Cough x's 3 days. He was positive for COVID in March.    Cough   This is a new problem. The current episode started yesterday. The problem has been unchanged. The cough is non-productive. Associated symptoms include shortness of breath. Pertinent negatives include no chest pain, chills, ear congestion, ear pain, eye redness, fever, headaches, heartburn, hemoptysis, myalgias, nasal congestion, postnasal drip, rash, rhinorrhea, sore throat, sweats, weight loss or wheezing. Nothing aggravates the symptoms. He has tried OTC cough suppressant for the symptoms. The treatment provided no relief. There is no history of asthma, bronchiectasis, bronchitis, COPD, emphysema, environmental allergies or pneumonia.       Constitution: Negative for chills, sweating, fatigue and fever.   HENT: Negative for ear pain, congestion, postnasal drip, sinus pain, sinus pressure, sore throat and voice change.    Neck: Negative for painful lymph nodes.   Cardiovascular: Negative for chest pain.   Eyes: Negative for eye redness.   Respiratory: Positive for cough and shortness of breath. Negative for chest tightness, sputum production, bloody sputum, COPD, stridor, wheezing and asthma.    Gastrointestinal: Negative for nausea, vomiting and heartburn.   Musculoskeletal: Negative for muscle ache.   Skin: Negative for rash.   Allergic/Immunologic: Negative for environmental allergies, seasonal allergies and asthma.   Neurological: Negative for headaches.   Hematologic/Lymphatic: Negative for swollen lymph nodes.       Objective:      Physical Exam   Constitutional: He is oriented to person, place, and time. He appears well-developed and well-nourished. He is " cooperative.  Non-toxic appearance. He does not have a sickly appearance. He does not appear ill. No distress.   HENT:   Head: Normocephalic and atraumatic.   Right Ear: Hearing, tympanic membrane, external ear and ear canal normal.   Left Ear: Hearing, tympanic membrane, external ear and ear canal normal.   Nose: Nose normal. No mucosal edema, rhinorrhea or nasal deformity. No epistaxis. Right sinus exhibits no maxillary sinus tenderness and no frontal sinus tenderness. Left sinus exhibits no maxillary sinus tenderness and no frontal sinus tenderness.   Mouth/Throat: Uvula is midline, oropharynx is clear and moist and mucous membranes are normal. No trismus in the jaw. Normal dentition. No uvula swelling. No oropharyngeal exudate, posterior oropharyngeal edema or posterior oropharyngeal erythema.   Eyes: Conjunctivae and lids are normal. No scleral icterus.   Neck: Trachea normal, full passive range of motion without pain and phonation normal. Neck supple. No neck rigidity. No edema and no erythema present.   Cardiovascular: Normal rate, regular rhythm, normal heart sounds, intact distal pulses and normal pulses.   Pulmonary/Chest: Effort normal and breath sounds normal. No respiratory distress. He has no decreased breath sounds. He has no rhonchi. He exhibits tenderness (tenderness of left costochondral joints ).   Abdominal: Normal appearance.   Musculoskeletal: Normal range of motion. He exhibits no edema or deformity.   Neurological: He is alert and oriented to person, place, and time. He exhibits normal muscle tone. Coordination normal.   Skin: Skin is warm, dry, intact, not diaphoretic and not pale.   Psychiatric: He has a normal mood and affect. His speech is normal and behavior is normal. Judgment and thought content normal. Cognition and memory are normal.   Nursing note and vitals reviewed.        Assessment:       1. Cough    2. Costochondritis        Plan:         Cough  -     COVID-19 Routine  Screening  -     Airborne and Contact and Droplet Isolation Status; Standing  -     X-Ray Chest PA And Lateral; Future; Expected date: 05/30/2020 -xray was normal  -     benzonatate (TESSALON PERLES) 100 MG capsule; 1 or 2 every 8 hours prn cough  Dispense: 30 capsule; Refill: 1  -     albuterol (PROVENTIL/VENTOLIN HFA) 90 mcg/actuation inhaler; Inhale 2 puffs into the lungs every 4 (four) hours as needed for Wheezing or Shortness of Breath. Rescue  Dispense: 18 g; Refill: 0    Costochondritis      Patient Instructions     Chest Wall Pain: Costochondritis    The chest pain that you have had today is caused by costochondritis. This condition is caused by an inflammation of the cartilage joining your ribs to your breastbone. It is not caused by heart or lung problems. Your healthcare team has made sure that the chest pain you feel is not from a life threatening cause of chest pain such as heart attack, collapsed lung, blood clot in the lung, tear in the aorta, or esophageal rupture. The inflammation may have been brought on by a blow to the chest, lifting heavy objects, intense exercise, or an illness that made you cough and sneeze a lot. It often occurs during times of emotional stress. It can be painful, but it is not dangerous. It usually goes away in 1 to 2 weeks. But it may happen again. Rarely, a more serious condition may cause symptoms similar to costochondritis. Thats why its important to watch for the warning signs listed below.  Home care  Follow these guidelines when caring for yourself at home:  · If you feel that emotional stress is a cause of your condition, try to figure out the sources of that stress. It may not be obvious. Learn ways to deal with the stress in your life. This can include regular exercise, muscle relaxation, meditation, or simply taking time out for yourself.  · You may use acetaminophen, ibuprofen, or naproxen to control pain, unless another pain medicine was prescribed. If you  have liver or kidney disease or ever had a stomach ulcer, talk with your healthcare provider before using these medicines.  · You can also help ease pain by using a hot, wet compress or heating pad. Use this with or without a medicated skin cream that helps relieves pain.  · Do stretching exercise as advised by your provider.  · Take any prescribed medicines as directed.  Follow-up care  Follow up with your healthcare provider, or as advised, if you do not start to get better in the next 2 days.  When to seek medical advice  Call your healthcare provider right away if any of these occur:  · A change in the type of pain. Call if it feels different, becomes more serious, lasts longer, or spreads into your shoulder, arm, neck, jaw, or back.  · Shortness of breath or pain gets worse when you breathe  · Weakness, dizziness, or fainting  · Cough with dark-colored sputum (phlegm) or blood  · Abdominal pain  · Dark red or black stools  · Fever of 100.4ºF (38ºC) or higher, or as directed by your healthcare provider  Date Last Reviewed: 12/1/2016  © 0969-7100 [employer]. [address] [place] [id number]. All rights reserved. This information is not intended as a substitute for professional medical care. Always follow your healthcare professional's instructions.

## 2020-05-30 NOTE — PATIENT INSTRUCTIONS

## 2020-05-31 LAB — SARS-COV-2 RNA RESP QL NAA+PROBE: NOT DETECTED

## 2020-06-04 ENCOUNTER — OFFICE VISIT (OUTPATIENT)
Dept: INTERNAL MEDICINE | Facility: CLINIC | Age: 45
End: 2020-06-04
Attending: INTERNAL MEDICINE
Payer: COMMERCIAL

## 2020-06-04 DIAGNOSIS — R29.898 BILATERAL ARM WEAKNESS: ICD-10-CM

## 2020-06-04 DIAGNOSIS — R05.9 COUGH: ICD-10-CM

## 2020-06-04 DIAGNOSIS — R07.1 PAIN OF ANTERIOR CHEST WALL WITH RESPIRATION: ICD-10-CM

## 2020-06-04 DIAGNOSIS — R20.0 HAND NUMBNESS: ICD-10-CM

## 2020-06-04 DIAGNOSIS — Z86.16 HISTORY OF 2019 NOVEL CORONAVIRUS DISEASE (COVID-19): ICD-10-CM

## 2020-06-04 DIAGNOSIS — D35.2 PITUITARY ADENOMA: Primary | ICD-10-CM

## 2020-06-04 PROCEDURE — 99214 OFFICE O/P EST MOD 30 MIN: CPT | Mod: 95,,, | Performed by: INTERNAL MEDICINE

## 2020-06-04 PROCEDURE — 99214 PR OFFICE/OUTPT VISIT, EST, LEVL IV, 30-39 MIN: ICD-10-PCS | Mod: 95,,, | Performed by: INTERNAL MEDICINE

## 2020-06-04 NOTE — PROGRESS NOTES
Subjective:       Patient ID: Rhett Quarles is a 45 y.o. male.    Chief Complaint: No chief complaint on file.    The patient location is: Home [place]   The chief complaint leading to consultation is: f/u brain mass and recent COViID 19  Visit type: audiovisual  Total time spent with patient:   Minutes  visit performed on virtual visit due to current risk associated with COVID 19 pandemic  Each patient to whom he or she provides medical services by telemedicine is:  (1) informed of the relationship between the physician and patient and the respective role of any other health care provider with respect to management of the patient; and (2) notified that he or she may decline to receive medical services by telemedicine and may withdraw from such care at any time.      Tested positive for COVID 19. 6 weeks later felt better followed by numbness in limbs. Numbness actually preceded COVID. He was seen by neurology would performed MRI brain converted to pituitary due to mass. Had subsequent resection of pituitary adenoma (path consistent with corticoptropin adenoma). 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 Also notices intermittent clumsiness of left hand while playing. Concerned for MS. Neuro workup halted by pituitary mass and COVID 19. EMG and imaging are next step for possible cervical pathology. No issues with worbreathing or worsening cough related to recent COVID 19 infxn.     Cough   This is a recurrent problem. The current episode started more than 1 month ago. The problem has been waxing and waning. The problem occurs hourly. The cough is non-productive. Associated symptoms include chest pain. He has tried OTC cough suppressant and a beta-agonist inhaler for the symptoms. The treatment provided mild relief. There is no history of asthma, bronchiectasis, bronchitis, COPD, emphysema, environmental allergies or pneumonia.        Review of Systems   Respiratory: Positive for cough.    Cardiovascular: Positive for chest pain.   Allergic/Immunologic: Negative for environmental allergies.   Neurological: Positive for weakness and numbness. Negative for tremors, seizures, syncope, facial asymmetry and speech difficulty.       Objective:      There were no vitals filed for this visit.   Physical Exam   Constitutional: He is oriented to person, place, and time. He appears well-developed and well-nourished. He does not have a sickly appearance. No distress.   HENT:   Head: Atraumatic.   Eyes: Conjunctivae and EOM are normal. Right eye exhibits no discharge. Left eye exhibits no discharge. No scleral icterus.   Neck: No thyromegaly present.   Pulmonary/Chest: Effort normal. No respiratory distress. He has no wheezes.   Abdominal: Normal appearance.   Neurological: He is alert and oriented to person, place, and time.   Skin: He is not diaphoretic. No pallor.   Psychiatric: He has a normal mood and affect. His speech is normal.       Assessment:       1. Pituitary adenoma    2. Hand numbness    3. History of 2019 novel coronavirus disease (COVID-19)    4. Cough    5. Pain of anterior chest wall with respiration    6. Bilateral arm weakness        Plan:       Diagnoses and all orders for this visit:    Pituitary adenoma  Comments:  2.5 x 2.7 x 1.8 cm pituitary macroadenoma s/p resection 01/2020 Path Corticotropin adenoma. Hormonally + for hypogonadism    Hand numbness   F/u neuro and EMG and imaging. No mylopathy s/s. Exam limited today but symptoms overall unchanged.  No s/s of increased intracranial pressure per presentation. . Office and Emergency Department prompts discussed.    History of 2019 novel coronavirus disease (COVID-19) -   -Resolved.  Cough   oost viral. No s/s to suggest secondary PNA.    Pain of anterior chest wall with respiration   MSK. No pleuritic component.  Office and Emergency Department prompts discussed.    Bilateral  arm weakness           Bryce Merida MD  Internal Medicine-[facility]        Side effects of medication(s) were discussed in detail and patient voiced understanding.  Patient will call back for any issues or complications.

## 2020-06-11 ENCOUNTER — TELEPHONE (OUTPATIENT)
Dept: NEUROSURGERY | Facility: CLINIC | Age: 45
End: 2020-06-11

## 2020-06-13 ENCOUNTER — TELEPHONE (OUTPATIENT)
Dept: OPTOMETRY | Facility: CLINIC | Age: 45
End: 2020-06-13

## 2020-06-13 ENCOUNTER — OFFICE VISIT (OUTPATIENT)
Dept: OPTOMETRY | Facility: CLINIC | Age: 45
End: 2020-06-13
Payer: COMMERCIAL

## 2020-06-13 DIAGNOSIS — H52.4 PRESBYOPIA OF BOTH EYES: ICD-10-CM

## 2020-06-13 DIAGNOSIS — H51.11 CONVERGENCE INSUFFICIENCY: Primary | ICD-10-CM

## 2020-06-13 PROCEDURE — 99499 UNLISTED E&M SERVICE: CPT | Mod: S$GLB,,, | Performed by: OPTOMETRIST

## 2020-06-13 PROCEDURE — 99999 PR PBB SHADOW E&M-EST. PATIENT-LVL II: ICD-10-PCS | Mod: PBBFAC,,, | Performed by: OPTOMETRIST

## 2020-06-13 PROCEDURE — 99999 PR PBB SHADOW E&M-EST. PATIENT-LVL II: CPT | Mod: PBBFAC,,, | Performed by: OPTOMETRIST

## 2020-06-13 PROCEDURE — 99499 NO LOS: ICD-10-PCS | Mod: S$GLB,,, | Performed by: OPTOMETRIST

## 2020-06-13 NOTE — PATIENT INSTRUCTIONS
Mr. Quarles returns with report of problem reading with the last spectacle lenses prescribed.  He notes that lenses are focused too near, and he feels that the horiztonal prism is too strong.   He states he feels more comfortable with his old reading glasses (+0.75 DS OU with total base in prism of 8.75 - 9.0 prism diopters (as measured on lensometer).  Rechecked refraction.  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  Early presbyopia consistent with age.  New spectacle lens Rx issued for reading lenses, and referred to the optical shop for lens remake of both lenses.  He was advised to return if problems with the new lenses persist. Otherwise, return for recheck in January, 2021.

## 2020-06-13 NOTE — PROGRESS NOTES
Assessment /Plan     For exam results, see Encounter Report.    1. Convergence insufficiency     2. Presbyopia of both eyes                    Mr. Quarles returns with report of problem reading with the last spectacle lenses prescribed.  He notes that lenses are focused too near, and he feels that the horiztonal prism is too strong.   He states he feels more comfortable with his old reading glasses (+0.75 DS OU with total base in prism of 8.75 - 9.0 prism diopters (as measured on lensometer).  Rechecked refraction.  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  Early presbyopia consistent with age.  New spectacle lens Rx issued for reading lenses, and referred to the optical shop for lens remake of both lenses.  He was advised to return if problems with the new lenses persist. Otherwise, return for recheck in January, 2021.

## 2020-06-15 ENCOUNTER — HOSPITAL ENCOUNTER (OUTPATIENT)
Dept: RADIOLOGY | Facility: HOSPITAL | Age: 45
Discharge: HOME | End: 2020-06-15
Attending: INTERNAL MEDICINE
Payer: COMMERCIAL

## 2020-06-15 DIAGNOSIS — D35.2 PITUITARY ADENOMA: ICD-10-CM

## 2020-06-15 PROCEDURE — 70553 MRI BRAIN W WO CONTRAST: ICD-10-PCS | Mod: 26,,, | Performed by: RADIOLOGY

## 2020-06-15 PROCEDURE — A9585 GADOBUTROL INJECTION: HCPCS | Performed by: INTERNAL MEDICINE

## 2020-06-15 PROCEDURE — 70553 MRI BRAIN STEM W/O & W/DYE: CPT | Mod: 26,,, | Performed by: RADIOLOGY

## 2020-06-15 PROCEDURE — 25500020 PHARM REV CODE 255: Performed by: INTERNAL MEDICINE

## 2020-06-15 PROCEDURE — 70553 MRI BRAIN STEM W/O & W/DYE: CPT | Mod: TC

## 2020-06-15 RX ORDER — GADOBUTROL 604.72 MG/ML
4 INJECTION INTRAVENOUS
Status: COMPLETED | OUTPATIENT
Start: 2020-06-15 | End: 2020-06-15

## 2020-06-15 RX ADMIN — GADOBUTROL 4 ML: 604.72 INJECTION INTRAVENOUS at 05:06

## 2020-06-16 ENCOUNTER — OFFICE VISIT (OUTPATIENT)
Dept: NEUROSURGERY | Facility: CLINIC | Age: 45
End: 2020-06-16
Payer: COMMERCIAL

## 2020-06-16 ENCOUNTER — PROCEDURE VISIT (OUTPATIENT)
Dept: NEUROLOGY | Facility: CLINIC | Age: 45
End: 2020-06-16
Payer: COMMERCIAL

## 2020-06-16 DIAGNOSIS — R20.0 HAND NUMBNESS: ICD-10-CM

## 2020-06-16 DIAGNOSIS — D35.2 PITUITARY ADENOMA: Primary | ICD-10-CM

## 2020-06-16 DIAGNOSIS — R25.3 MUSCLE TWITCHING: ICD-10-CM

## 2020-06-16 PROCEDURE — 99214 OFFICE O/P EST MOD 30 MIN: CPT | Mod: 95,,, | Performed by: NEUROLOGICAL SURGERY

## 2020-06-16 PROCEDURE — 95910 PR NERVE CONDUCTION STUDY; 7-8 STUDIES: ICD-10-PCS | Mod: S$GLB,,, | Performed by: NEUROMUSCULOSKELETAL MEDICINE & OMM

## 2020-06-16 PROCEDURE — 95886 PR EMG COMPLETE, W/ NERVE CONDUCTION STUDIES, 5+ MUSCLES: ICD-10-PCS | Mod: S$GLB,,, | Performed by: NEUROMUSCULOSKELETAL MEDICINE & OMM

## 2020-06-16 PROCEDURE — 99214 PR OFFICE/OUTPT VISIT, EST, LEVL IV, 30-39 MIN: ICD-10-PCS | Mod: 95,,, | Performed by: NEUROLOGICAL SURGERY

## 2020-06-16 PROCEDURE — 95910 NRV CNDJ TEST 7-8 STUDIES: CPT | Mod: S$GLB,,, | Performed by: NEUROMUSCULOSKELETAL MEDICINE & OMM

## 2020-06-16 PROCEDURE — 95886 MUSC TEST DONE W/N TEST COMP: CPT | Mod: S$GLB,,, | Performed by: NEUROMUSCULOSKELETAL MEDICINE & OMM

## 2020-06-16 NOTE — PROGRESS NOTES
Subjective:   I, Gilma Teixeira, attest that this documentation has been prepared under the direction and in the presence of Kb Argueta MD.     Patient ID: Rhett Quarles is a 45 y.o. male     Chief Complaint: No chief complaint on file.    The patient location is: home  The chief complaint leading to consultation is: Corticotroph adenoma  Visit type: Virtual visit with synchronous audio and video.  Face to Face time with patient: 15 minutes of total time spent on the encounter, which includes face to face time and non-face to face time preparing to see the patient (eg, review of tests), Obtaining and/or reviewing separately obtained history, Documenting clinical information in the electronic or other health record, Independently interpreting results (not separately reported) and communicating results to the patient/family/caregiver, or Care coordination (not separately reported).         Each patient to whom he or she provides medical services by telemedicine is:  (1) informed of the relationship between the physician and patient and the respective role of any other health care provider with respect to management of the patient; and (2) notified that he or she may decline to receive medical services by telemedicine and may withdraw from such care at any time.      HPI  Mr. Rhett Quarles is a pleasant 45 y.o. gentleman who is s/p Endonasal transsphenoidal resection of pituitary tumor (Corticotroph adenoma) on 02/10/2020  and presents today for his 3 month follow up with MRI Brain. pt was last seen in clinic on 03/03/2020, at which time he complained of headaches while laying down for which he was taking Tylenol.     Pt states he unfortunately tested positive for COVID-19 approximately 2 months ago from which he was symptomatic. He states the paraesthesias in his BUE and BLE, which he has preoperatively, has worsened. He endorses associated weakness and trouble being able to play instruments. He  also endorses recent onset of muscle spasms. He is scheduled for an EMG/NCS today and Neurology f/u in July.      Review of Systems   Constitutional: Negative for activity change, appetite change, fatigue, fever and unexpected weight change.   HENT: Negative for facial swelling.    Eyes: Negative.    Respiratory: Negative.    Cardiovascular: Negative.    Gastrointestinal: Negative for diarrhea, nausea and vomiting.   Endocrine: Negative.    Genitourinary: Negative.    Musculoskeletal: Negative for back pain, joint swelling, myalgias and neck pain.        + Muscle twitches   Neurological: Negative for dizziness, seizures, weakness, numbness and headaches.        + weakness and paraesthesias in the BUE and BLE   Psychiatric/Behavioral: Negative.       Past Medical History:   Diagnosis Date    Pituitary macroadenoma        Objective:    There were no vitals filed for this visit.   Physical Exam  Constitutional:       General: He is not in acute distress.  HENT:      Head: Normocephalic and atraumatic.   Neurological:      Mental Status: He is alert and oriented to person, place, and time.            IMAGING:  MRI Brain (06/15/2020) shows expected postoperative changes from prior transsphenoidal hypophysectomy of tumor.    I have personally reviewed the images with the pt.      I, Dr. Kb Argueta, personally performed the services described in this documentation. All medical record entries made by the scribe, Gilma Teixeira, were at my direction and in my presence.  I have reviewed the chart and agree that the record reflects my personal performance and is accurate and complete. Kb Argueta MD. 06/16/2020    Assessment:       Corticotroph adenoma.     Plan:   I have personally reviewed the MRI brain with the pt which shows expected postoperative changes from prior transsphenoidal hypophysectomy of tumor.    Will review EMG/NCS results once they become available and contact Neurology for a sooner workup to rule out  aseptic meningitis.     I will schedule the patient for 1 year f/u with MRI brain.

## 2020-06-16 NOTE — PATIENT INSTRUCTIONS
I have personally reviewed the MRI brain with the pt which shows expected postoperative changes from prior transsphenoidal hypophysectomy of tumor.    Will review EMG/NCS results once they become available and contact Neurology for a sooner workup to rule out aseptic meningitis.     I will schedule the patient for 1 year f/u with MRI brain.

## 2020-06-16 NOTE — PROCEDURES
EMG Needle exam performed by me.  Nerve conduction studies were also performed by me without the assistance of the technician    Nerve conduction study    Right median motor-normal  Left median motor-normal  Right ulnar motor-normal  Right median sensory-normal  Left median sensory-normal  Right ulnar sensory-normal  Right radial sensory-normal    EMG needle exam  Needle exam including right deltoid, biceps, EDC, FCR, triceps, 1st DI, abductor pollicis showed all motor units normal with normal recruitment    Impression :  Normal study as above

## 2020-06-23 ENCOUNTER — TELEPHONE (OUTPATIENT)
Dept: NEUROLOGY | Facility: CLINIC | Age: 45
End: 2020-06-23

## 2020-06-23 NOTE — TELEPHONE ENCOUNTER
----- Message from Janeth Ruelas sent at 6/23/2020  3:31 PM CDT -----  Regarding: pt  Pt is calling to speak with the nurse to get his EMG test results that were done on Tuesday June 16,2020 can you please call pt at [phone].    JT

## 2020-06-23 NOTE — TELEPHONE ENCOUNTER
Spoke with patient and informed him that I forwarded message to Abby and will contact him once Abby reviews results and sends a results note.

## 2020-07-02 NOTE — TELEPHONE ENCOUNTER
----- Message from Dionicio Harvey sent at 7/2/2020 10:50 AM CDT -----  Contact: pt  Please call pt at [phone]    Patient is returning staff call regarding his EMG results    Thank you

## 2020-07-02 NOTE — TELEPHONE ENCOUNTER
Spoke with patient and informed him that EMG was normal per Abby.  Advised patient that  can access his EMG results.

## 2020-07-20 ENCOUNTER — OFFICE VISIT (OUTPATIENT)
Dept: NEUROLOGY | Facility: CLINIC | Age: 45
End: 2020-07-20
Payer: COMMERCIAL

## 2020-07-20 ENCOUNTER — LAB VISIT (OUTPATIENT)
Dept: LAB | Facility: OTHER | Age: 45
End: 2020-07-20
Attending: PSYCHIATRY & NEUROLOGY
Payer: COMMERCIAL

## 2020-07-20 VITALS
WEIGHT: 152.31 LBS | DIASTOLIC BLOOD PRESSURE: 92 MMHG | BODY MASS INDEX: 26 KG/M2 | SYSTOLIC BLOOD PRESSURE: 137 MMHG | HEART RATE: 80 BPM | HEIGHT: 64 IN

## 2020-07-20 DIAGNOSIS — M48.02 SPINAL STENOSIS, CERVICAL REGION: ICD-10-CM

## 2020-07-20 DIAGNOSIS — R53.1 WEAKNESS: ICD-10-CM

## 2020-07-20 DIAGNOSIS — G60.3 IDIOPATHIC PROGRESSIVE POLYNEUROPATHY: ICD-10-CM

## 2020-07-20 DIAGNOSIS — M48.04 SPINAL STENOSIS, THORACIC REGION: ICD-10-CM

## 2020-07-20 DIAGNOSIS — R53.1 WEAKNESS: Primary | ICD-10-CM

## 2020-07-20 LAB
ALBUMIN SERPL BCP-MCNC: 4.7 G/DL (ref 3.5–5.2)
ALP SERPL-CCNC: 76 U/L (ref 55–135)
ALT SERPL W/O P-5'-P-CCNC: 36 U/L (ref 10–44)
ANION GAP SERPL CALC-SCNC: 13 MMOL/L (ref 8–16)
AST SERPL-CCNC: 27 U/L (ref 10–40)
BASOPHILS # BLD AUTO: 0.04 K/UL (ref 0–0.2)
BASOPHILS NFR BLD: 0.6 % (ref 0–1.9)
BILIRUB SERPL-MCNC: 0.7 MG/DL (ref 0.1–1)
BUN SERPL-MCNC: 10 MG/DL (ref 6–20)
CALCIUM SERPL-MCNC: 9.6 MG/DL (ref 8.7–10.5)
CHLORIDE SERPL-SCNC: 103 MMOL/L (ref 95–110)
CK SERPL-CCNC: 136 U/L (ref 20–200)
CO2 SERPL-SCNC: 27 MMOL/L (ref 23–29)
CREAT SERPL-MCNC: 1 MG/DL (ref 0.5–1.4)
CRP SERPL-MCNC: 4.1 MG/L (ref 0–8.2)
DIFFERENTIAL METHOD: ABNORMAL
EOSINOPHIL # BLD AUTO: 0.1 K/UL (ref 0–0.5)
EOSINOPHIL NFR BLD: 1.9 % (ref 0–8)
ERYTHROCYTE [DISTWIDTH] IN BLOOD BY AUTOMATED COUNT: 13.1 % (ref 11.5–14.5)
ERYTHROCYTE [SEDIMENTATION RATE] IN BLOOD: 1 MM/HR (ref 0–10)
EST. GFR  (AFRICAN AMERICAN): >60 ML/MIN/1.73 M^2
EST. GFR  (NON AFRICAN AMERICAN): >60 ML/MIN/1.73 M^2
GLUCOSE SERPL-MCNC: 90 MG/DL (ref 70–110)
HCT VFR BLD AUTO: 56.1 % (ref 40–54)
HGB BLD-MCNC: 17.9 G/DL (ref 14–18)
IMM GRANULOCYTES # BLD AUTO: 0.02 K/UL (ref 0–0.04)
IMM GRANULOCYTES NFR BLD AUTO: 0.3 % (ref 0–0.5)
LYMPHOCYTES # BLD AUTO: 2.3 K/UL (ref 1–4.8)
LYMPHOCYTES NFR BLD: 33.6 % (ref 18–48)
MAGNESIUM SERPL-MCNC: 2.2 MG/DL (ref 1.6–2.6)
MCH RBC QN AUTO: 28.3 PG (ref 27–31)
MCHC RBC AUTO-ENTMCNC: 31.9 G/DL (ref 32–36)
MCV RBC AUTO: 89 FL (ref 82–98)
MONOCYTES # BLD AUTO: 0.5 K/UL (ref 0.3–1)
MONOCYTES NFR BLD: 6.7 % (ref 4–15)
NEUTROPHILS # BLD AUTO: 3.9 K/UL (ref 1.8–7.7)
NEUTROPHILS NFR BLD: 56.9 % (ref 38–73)
NRBC BLD-RTO: 0 /100 WBC
PLATELET # BLD AUTO: 294 K/UL (ref 150–350)
PMV BLD AUTO: 9.1 FL (ref 9.2–12.9)
POTASSIUM SERPL-SCNC: 4.2 MMOL/L (ref 3.5–5.1)
PROT SERPL-MCNC: 8.3 G/DL (ref 6–8.4)
RBC # BLD AUTO: 6.33 M/UL (ref 4.6–6.2)
SODIUM SERPL-SCNC: 143 MMOL/L (ref 136–145)
WBC # BLD AUTO: 6.87 K/UL (ref 3.9–12.7)

## 2020-07-20 PROCEDURE — 83735 ASSAY OF MAGNESIUM: CPT

## 2020-07-20 PROCEDURE — 82525 ASSAY OF COPPER: CPT

## 2020-07-20 PROCEDURE — 80053 COMPREHEN METABOLIC PANEL: CPT

## 2020-07-20 PROCEDURE — 85025 COMPLETE CBC W/AUTO DIFF WBC: CPT

## 2020-07-20 PROCEDURE — 86140 C-REACTIVE PROTEIN: CPT

## 2020-07-20 PROCEDURE — 3008F BODY MASS INDEX DOCD: CPT | Mod: CPTII,S$GLB,, | Performed by: PSYCHIATRY & NEUROLOGY

## 2020-07-20 PROCEDURE — 84630 ASSAY OF ZINC: CPT

## 2020-07-20 PROCEDURE — 80074 ACUTE HEPATITIS PANEL: CPT

## 2020-07-20 PROCEDURE — 86334 PATHOLOGIST INTERPRETATION IFE: ICD-10-PCS | Mod: 26,,, | Performed by: PATHOLOGY

## 2020-07-20 PROCEDURE — 3008F PR BODY MASS INDEX (BMI) DOCUMENTED: ICD-10-PCS | Mod: CPTII,S$GLB,, | Performed by: PSYCHIATRY & NEUROLOGY

## 2020-07-20 PROCEDURE — 86334 IMMUNOFIX E-PHORESIS SERUM: CPT | Mod: 26,,, | Performed by: PATHOLOGY

## 2020-07-20 PROCEDURE — 85651 RBC SED RATE NONAUTOMATED: CPT

## 2020-07-20 PROCEDURE — 99215 PR OFFICE/OUTPT VISIT, EST, LEVL V, 40-54 MIN: ICD-10-PCS | Mod: S$GLB,,, | Performed by: PSYCHIATRY & NEUROLOGY

## 2020-07-20 PROCEDURE — 99999 PR PBB SHADOW E&M-EST. PATIENT-LVL V: ICD-10-PCS | Mod: PBBFAC,,, | Performed by: PSYCHIATRY & NEUROLOGY

## 2020-07-20 PROCEDURE — 86592 SYPHILIS TEST NON-TREP QUAL: CPT

## 2020-07-20 PROCEDURE — 36415 COLL VENOUS BLD VENIPUNCTURE: CPT

## 2020-07-20 PROCEDURE — 84252 ASSAY OF VITAMIN B-2: CPT

## 2020-07-20 PROCEDURE — 84165 PROTEIN E-PHORESIS SERUM: CPT | Mod: 26,,, | Performed by: PATHOLOGY

## 2020-07-20 PROCEDURE — 82550 ASSAY OF CK (CPK): CPT

## 2020-07-20 PROCEDURE — 84165 PROTEIN E-PHORESIS SERUM: CPT

## 2020-07-20 PROCEDURE — 84165 PATHOLOGIST INTERPRETATION SPE: ICD-10-PCS | Mod: 26,,, | Performed by: PATHOLOGY

## 2020-07-20 PROCEDURE — 99215 OFFICE O/P EST HI 40 MIN: CPT | Mod: S$GLB,,, | Performed by: PSYCHIATRY & NEUROLOGY

## 2020-07-20 PROCEDURE — 99999 PR PBB SHADOW E&M-EST. PATIENT-LVL V: CPT | Mod: PBBFAC,,, | Performed by: PSYCHIATRY & NEUROLOGY

## 2020-07-20 PROCEDURE — 82164 ANGIOTENSIN I ENZYME TEST: CPT

## 2020-07-20 PROCEDURE — 84446 ASSAY OF VITAMIN E: CPT

## 2020-07-20 PROCEDURE — 86334 IMMUNOFIX E-PHORESIS SERUM: CPT

## 2020-07-20 NOTE — PATIENT INSTRUCTIONS
Thank you for coming.    I recommend the following:  -Magensium oxide 400mg at night and if no side effects increase to 800mg at night  -Obtain MRI of your cervical and thoracic spine- [facility]  - Blood work today  - PT referral is in  - Check with your PCP about the Vitamin D- dose and frequency  -Vitamin B12 1000 micrograms daily   -Follow up in 3 months

## 2020-07-20 NOTE — PROGRESS NOTES
"  NEUROLOGY  Outpatient Initial Clinic visit note    [address] [address]  [phone] (office) / [phone] ( (fax)    Patient Name:  Rhett Quarles  :  1975  MR #:  [id number]  Acct #:  [id number]    Date of Neurology Visit: 2020  Name of Neurologist: Sangeetha Boyer MD    Other Physicians:  Bryce Robert MD (Primary Care Physician); Self, Aaareferral (Referring)      Chief Complaint: Neurologic Problem      History of Present Illness (HPI):  Rhett Quarles is a 45 y.o. male presents for evaluation of paresthesias and twitching involving different regions of his body.    The patient states that he had abrupt onset of numbness/tingling involving various areas on 10/30/2019.  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 He underwent adenoma resection.  He states he had headaches for a very long time and the headaches were attributed to the pituitary macroadenoma.     30 days later he was diagnosed with COVID-19, he was symptomatic at that time.  Since then, he has noticed that everything feels difficult to do including fine motor skills like playing his guitar. It is getting tougher and he has muscle twitches all over his body - including his tongue. He also noted tongue numbness. He has intermittent tingling involving various parts of his body.  He has noted heaviness in his muscles and difficulty with walking periodically.    The twitching came on after COVID-19 and the tongue heaviness came on after COVID-19 as well.Since the infection he has had a " brain fog" it takes longer for him to say a word he wants to say and he has to concentrate to remember things.    Some days are good and somes days are not so good. He is keeping a journal and cannot find a pattern to his symptoms.  He has joined a COVID-19 patient group and states that several patients on the group have had " symptoms that he is currently having.    Breathing: okay  Speech: is okay but tongue feels heavier  Swallowing: no episodes of choking  Weight loss: 14 pound weight loss which is non-intenional  Drinks alcohol several days a week: 1-2 drinks/ night  Family history: none     He is a .    He has headaches, these are chronic and do not bother him as much. He has anxiety because of the possiblity of a terrible diagnosis.       Duration:  Original symptoms since 8-9 months, additional symptoms since after COVID diagnosis 4 months ago  Location:  Different areas of his body- arm/leg/back/tongue/face  Intensity:  Moderate to severe  Aggravating factors:  None  Relieving factors:  Alcohol help somewhat  Frequency:  Several times a day  Timing:  During the day  Associated symptoms:  Heaviness of muscles        Treatment to date:    None for paresthesias    Review of Systems:    General: Weight gain: No, Weight Loss: Yes, Fatigue: Yes,   Fever: No, Chills: No, Night Sweats: No, Insomnia: No, Excessive sleeping: No   Respiratory:  Cough: No, Shortness of Breath: No,   Wheezing: No, Excessive Snoring: Yes, Coughing up blood: No  Endocrine: Heat Intolerance: No, Cold Intolerance: No,   Excessive Thirst: No, Excessive Hunger: No,   Eyes:  Blurred Vision: No, Double Vision: Yes,   Light Sensitivity: No, Eye pain: No  Musculoskeletal: Muscle Aches/Pain: Yes, Joint Pain/Swelling: Yes, Muscle Cramps: No, Muscle Weakness: Yes, Neck Pain: Yes, Back Pain: Yes   Neurological: Difficulty Walking/Falls: Yes, Headache Migraine: Yes, Dizziness/Vertigo: Yes, Fainting: No, Difficulty with Speech: Yes, Weakness: Yes, Tingling/Numbness: Yes, Tremors: Yes, Memory Problems: Yes, Seizures: No, Difficulty Swallowing: No, Altered Taste: No.  Cardiovascular: Chest Pain: No, Shortness of Breath: No,   Palpitations: No,  Gastrointestinal: Nausea/Vomiting: No, Constipation: No, Diarrhea: No, Bloody Stools: No   Psych/Cog:  Depression: No,  "Anxiety: Yes, Hallucinations: No, Problems Concentrating: No  : Frequent Urination: No, Incontinence: No, Blood of Urine: No, Urinary Infections: No,   ENT:Hearing Loss: No, Earache: No, Ringing in Ears: No,   Facial Pain: No, Chronic Congestion: No   Immune: Seasonal Allergies: Yes, Hives and/or Rashes: No  The remainder of the review of twelve body systems was reviewed and normal.    Past Medical, Surgical, Family & Social History:   Past Medical History:   Diagnosis Date    Pituitary macroadenoma        Home Medications:     Current Outpatient Medications:     albuterol (PROVENTIL/VENTOLIN HFA) 90 mcg/actuation inhaler, Inhale 2 puffs into the lungs every 4 (four) hours as needed for Wheezing or Shortness of Breath. Rescue, Disp: 18 g, Rfl: 0    ascorbic acid (VITAMIN C ORAL), Take by mouth once daily., Disp: , Rfl:     benzonatate (TESSALON PERLES) 100 MG capsule, 1 or 2 every 8 hours prn cough, Disp: 30 capsule, Rfl: 1    desmopressin (DDAVP NASAL) 10 mcg/spray (0.1 mL) SprP, Call Dr. Argueta if urinating more than 3 times per night. If instructed, administer 1 spray in the left nostril., Disp: 5 mL, Rfl: 1    multivitamin capsule, Take 1 capsule by mouth once daily., Disp: , Rfl:     Physical Examination:  BP (!) 137/92   Pulse 80   Ht 5' 4" (1.626 m)   Wt 69.1 kg (152 lb 5.4 oz)   BMI 26.15 kg/m²     GENERAL:  General appearance: Well, non-toxic appearing.  No apparent distress.  Fundi exam: normal.  Neck: supple.  Carotid auscultation: normal.  Heart auscultation: normal.  Peripheral pulses: normal.  Extremities: normal.    MENTAL STATUS:  Alertness, attention span & concentration: normal.  Language: normal.  Orientation to self, place & time:  normal.  Memory, recent & remote: normal.  Fund of knowledge: normal.  MMSE:    SPEECH:  Clear and fluent.  Follows complex commands.    CRANIAL NERVES:  Cranial Nerves II-XII were examined.  II - Visual fields: normal.  III, IV, VI: PERRL, EOMI, No ptosis, " No nystagmus.  V - Facial sensation: normal.  VII - Face symmetry & mobility: normal.  VIII - Hearing: normal.  IX, X - Palate: mobile & midline.  XI - Shoulder shrug: normal.  XII - Tongue protrusion: normal.    GROSS MOTOR:  Gait & station: normal.  Tone: normal.  Abnormal movements: none.  Finger-nose & Heel-knee-shin: normal.  Rapid alternating movements & drift: normal.  Romberg: absent    MUSCLE STRENGTH:     Fascics Atrophy RIGHT    LEFT Atrophy Fascics     5 Neck Ext. 5       5 Neck Flex 5       5 Deltoids 5       5 Sh.Ext.Rot. 5       5 Sh.Int.Rot. 5       5 Biceps 5       5 Triceps 5       5 Forearm.Pr. 5       5 Wrist Ext. 5       5 Wrist Flex 5       5 Finger Ext. 5       5 Finger Flex 5       5 FPL 5       5 Inteross. 5                         5 Iliopsoas 5       5 Hip Abduct 5       5 Hip Adduct 5       5 Quads 5       5 Hams 5       5 Dorsiflex 5       5 Plantar Flex 5       5 Ankle Mane 5       5 Ankle Invert 5       5 Toe Ext. 5       5 Toe Flex 5                         REFLEXES:    RIGHT Reflex   LEFT   2+ Biceps 2+   3+ Brachiorad. 3+   2+ Triceps 2+        3+ Patellar 3+   2 beat clonus Ankle 2 beat clonus        Down PLANTAR [place]      SENSORY:  Light touch: Normal throughout.  Sharp touch: Normal throughout.  Vibration: 8 sceonds at great toes bilaterally  Temperature: Normal throughout.  Joint Position: Normal throughout.    Diagnostic Data Reviewed:     Blood work  12/03/2019:  TSH -0.417  Hemoglobin A1c -5.6  Vitamin   Vitamin B1-62  Vitamin B6-10  RPR-negative  Serum protein immunofixation:  Negative  Acetylcholine receptor binding antibody:  Negative  ANAND screen:  Negative  Sjogren syndrome:  Negative  Lyme  disease antibodies:  Negative  ESR-5  Vitamin D-28  Folate-8  Anti cardiolipin antibody-negative  MRI brain with and without contrast on 06/15/2020:    Impression:     Postoperative changes consistent with transsphenoidal surgery with resection of the vast majority of the  pituitary macroadenoma.  Fat packing material within the anterior aspect of the sella and sphenoid sinus.  Thin rim of residual enhancing tissue along the sella floor, nonspecific in the postoperative setting.  No evidence of compression upon the optic chiasm or extension into the cavernous sinus.     Exam can serve as baseline for future comparison studies.     Brain parenchyma is unremarkable.     Small volume right mastoid air cell fluid.    CT head without contrast on 02/10/2020:    Impression:     Postsurgical change of recent transsphenoidal resection of a sellar mass protruding into the sphenoid sinus as above.  No apparent intracranial complication.      EMG/NCS of bilateral  upper extremities:  Normal study per Dr. Mora's note, unable to review data      Assessment and Plan:  45-year-old previously healthy male with recently diagnosed pituitary macroadenoma status post resection presents to the Neurology Clinic for a follow-up visit.    The patient has been previously seen by Dr. Mora.    The patient states that he had abrupt onset of numbness/tingling involving various areas on 10/30/2019.  He was evaluated by Dr. 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 He underwent adenoma resection.  He states he had headaches for a very long time and the headaches were attributed to the pituitary macroadenoma.  However the etiology of his paresthesias was unclear.  He was then diagnosed with COVID-19 in March 2020 after which he developed fasciculations involving different muscles including his tongue.  He noted muscle fatigue and difficulty with using his muscles it is which weight reason intensity.     His neurological examination is within normal limits except for hyper reflexia as noted above.  MRI brain reviewed with the patient and did not reveal evidence of demyelinating lesions.  I would like to evaluate with MRI of spine and blood work.  The patient's fasciculations  came on after his COVID-19 infection as well as the muscle heaviness came on after the infection.  A possibility would be postviral syndrome causing his symptoms.  The paresthesias predated the COVID-19 and I would like to work him up for an underlying neuropathy.        Assessment:    1.  Intermittent paresthesias  2.  Diffuse fasciculations  3.  Pituitary macroadenoma status post resection  4.  Recent COVID-19 infection  5.  Vitamin-D deficiency  6.  Low vitamin B12 level  7.  Pre diabetes    Recommendations:  1.  Obtain neuropathy blood work -other than what was previously checked as mentioned above  2.  EMG/NCS of bilateral lower extremities  3.  I reassured the patient that fasciculations by themselves do not carry a grave prognosis as he has no evidence of weakness/atrophy on examination.  4.  I recommended vitamin D supplementation  5.  His vitamin B12 level is less than 400 and I recommended that he start 1000 micro g daily  6.  Obtain MRI C and T-spine with and without contrast demyelinating protocol  7.  Magnesium oxide 400 mg at night and to increase to 800 mg at night to help with fasciculations      Future considerations:  Check PETH level         The patient will return to clinic in 3 months.    Important to note, also  has a past medical history of Pituitary macroadenoma.         Sangeetha Boyer MD  Medicine-Neurology, Clinical Neurophysiology    Time Spent: 60 minutes spent face-to-face, >50% spent advising about: counseling and/or coordination of care    This note was created with voice recognition software.  Grammatical, syntax and spelling errors may be inevitable.

## 2020-07-21 LAB
ALBUMIN SERPL ELPH-MCNC: 5.19 G/DL (ref 3.35–5.55)
ALPHA1 GLOB SERPL ELPH-MCNC: 0.31 G/DL (ref 0.17–0.41)
ALPHA2 GLOB SERPL ELPH-MCNC: 0.7 G/DL (ref 0.43–0.99)
B-GLOBULIN SERPL ELPH-MCNC: 1.04 G/DL (ref 0.5–1.1)
GAMMA GLOB SERPL ELPH-MCNC: 1.07 G/DL (ref 0.67–1.58)
HAV IGM SERPL QL IA: NEGATIVE
HBV CORE IGM SERPL QL IA: NEGATIVE
HBV SURFACE AG SERPL QL IA: NEGATIVE
HCV AB SERPL QL IA: NEGATIVE
INTERPRETATION SERPL IFE-IMP: NORMAL
PROT SERPL-MCNC: 8.3 G/DL (ref 6–8.4)
RPR SER QL: NORMAL

## 2020-07-22 LAB
ACE SERPL-CCNC: 22 U/L (ref 16–85)
PATHOLOGIST INTERPRETATION IFE: NORMAL
PATHOLOGIST INTERPRETATION SPE: NORMAL

## 2020-07-23 ENCOUNTER — CLINICAL SUPPORT (OUTPATIENT)
Dept: REHABILITATION | Facility: HOSPITAL | Age: 45
End: 2020-07-23
Attending: PSYCHIATRY & NEUROLOGY
Payer: COMMERCIAL

## 2020-07-23 DIAGNOSIS — G60.3 IDIOPATHIC PROGRESSIVE POLYNEUROPATHY: ICD-10-CM

## 2020-07-23 DIAGNOSIS — Z74.09 IMPAIRED FUNCTIONAL MOBILITY, BALANCE, GAIT, AND ENDURANCE: ICD-10-CM

## 2020-07-23 DIAGNOSIS — R29.898 LEG WEAKNESS, BILATERAL: ICD-10-CM

## 2020-07-23 LAB
COPPER SERPL-MCNC: 1336 UG/L (ref 665–1480)
ZINC SERPL-MCNC: 76 UG/DL (ref 60–130)

## 2020-07-23 PROCEDURE — 97162 PT EVAL MOD COMPLEX 30 MIN: CPT | Mod: PO

## 2020-07-23 NOTE — PLAN OF CARE
[facility]  Physical Therapy Neurological Rehabilitation Initial Evaluation    [id number]    Therapy Diagnosis:   Encounter Diagnoses   Name Primary?    Idiopathic progressive polyneuropathy     Impaired functional mobility, balance, gait, and endurance     Leg weakness, bilateral      Physician: Sangeetha Boyer MD    Physician Orders: PT Eval and Treat   Medical Diagnosis from Referral: Idiopathic progressive polyneuropathy  Evaluation Date: 7/23/2020  Authorization Period Expiration: 07/20/20 to 12/31/20  Plan of Care Expiration: 09/17/20  Visit # / Visits authorized: 01/ 20    Time In: 14:45  Time Out: 15:30  Total Billable Time: 45 minutes    Precautions: Standard, h/o COVID-19 infection, s/p macroadenoma resection    Subjective   Date of onset: s/p resection of macroadenoma on Feb 10, 2020; COVID-19 infection ~4 months ago    History of current condition - Rhett reports: he was having significant headaches which was discovered to be a pituitary adenoma. He underwent resection for tumor earlier this year. Then in March, patient contracted COVID-19. After getting COVID-19 infection, he noticed generalized weakness throughout BUE and BLE. EMG performed and came back normal. He is currently undergoing testing to determine if current symptoms are related to post-COVID-19 infection or more progressive neurological disease such as MS or GBS.  Prior to medical issues, this past year patient was completely independent with all functional mobility, playing guitar, and living active lifestyle. Currently, he has episodes where he has significant muscle twitches, decreased endurance, numbness throughout various regions, and weakness in BLE and BUE. No specific pattern to symptom presentation. He has participated in 3 sessions with therapist to cope with current situation.      Medical History:   Past Medical History:   Diagnosis Date    Pituitary  "macroadenoma        Surgical History:   Rhett Quarles  has a past surgical history that includes Excision, neoplasm, pituitary, transsphenoidal approach, using computer-assisted navigation.    Medications:   Rhett has a current medication list which includes the following prescription(s): albuterol, ascorbic acid, benzonatate, desmopressin, and multivitamin.    Allergies:   Review of patient's allergies indicates:  No Known Allergies     Imaging:  MRI of cervical and thoracic spine ordered but not yet completed.   MRI of Brain ordered but not yet completed.   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  No apparent intracranial complication.    Prior Therapy: none prior  Social History: lives with his wife  Falls: 0   DME: TENS unit- not used  Home Environment: [facility], [address], [facility]  Exercise Routine / History: yoga with wife throughout the week  Family Present at time of Eval: wife drove patient to therapy   Occupation: documentary ,   Prior Level of Function: (I) functional mobility/ADL, driving, cooking/cleaning, working full time, no AD  Current Level of Function: (I) functional mobility/ADL, currently not driving, cooking/cleaning, still working full time; needs more rest breaks throughout the day    Pain:  Current 2/10, worst 5/10, best 2/10   Location: left 4th toe  Description: Aching  Aggravating Factors: Walking  Easing Factors: rest    Patient's goals: "To improve overall mobility and to help with weakness in muscles."    Objective     Mental status: alert, oriented to person, place, and time, normal mood, behavior, speech, dress, motor activity, and thought processes  Appearance: Casually dressed  Behavior:  calm and cooperative  Attention Span and Concentration:  Normal    Dominant hand:  ambidextrous     Posture Alignment in sitting and standing:   Head: forward head   Scapulae: slightly " rounded shoulders   Trunk: slightly slouched posture   Pelvis: neutral   Legs: neutral     Sensation: Light Touch: patient endorses tingling throughout arms, legs, face, and tongue without any specific pattern or triggers         Tone: gross BUE and BLE tone WFL    Visual/Auditory: denies changes with vision or hearing  Tracking: NT  Saccades: NT  Acuity:wears glasses to see up close  R/L discrimination: Intact  Visual field: Intact    Coordination:   - fine motor: able to complete but patient endorses effort to perform  - UE coordination: NT  - LE coordination:  Impaired with alternating toe tapping    ROM:   UPPER EXTREMITY--AROM/PROM  (R) UE: WFLs  (L) UE: WFLs           RANGE OF MOTION--LOWER EXTREMITIES  (R) LE Hip: WFL   Knee: WFL   Ankle: WFL    (L) LE: Hip: WFL   Knee: WFL   Ankle: WFL    Strength: manual muscle test grades below   Upper Extremity Strength- performed with patient in sitting  (R) UE  (L) UE    Shoulder Flexion: 3+/5 Shoulder Flexion: 4-/5   Shoulder Abduction: 3+/5 Shoulder Abduction: 4-/5   Shoulder Extension: 3+/5 Shoulder Extension:   4-/5   Elbow Flexion: 4-/5 Elbow Flexion: 4/5   Elbow Extension: 4/5 Elbow Extension: 4/5   Wrist Flexion: 4+/5 Wrist Flexion: 4+/5   Wrist Extension: 4+/5 Wrist Extension: 4+/5   : WFL : WFL     Lower Extremity Strength- performed with patient in sitting  Right LE  Left LE    Hip Flexion: 3/5 Hip Flexion: 3/5   Hip Extension:  3/5 Hip Extension: 3/5   Hip Abduction: 4/5 Hip Abduction: 4/5   Hip Adduction: 4-/5 Hip Adduction 4-/5   Knee Extension: 4-/5 Knee Extension: 4/5   Knee Flexion: 4/5 Knee Flexion: 4-/5   Ankle Dorsiflexion: 4+/5 Ankle Dorsiflexion: 4+/5   Ankle Plantarflexion: 4+/5 Ankle Plantarflexion: 4+/5     Abdominal Strength: at least 3/5    Flexibility: slight tightness noted in bilateral calf regions     Evaluation   Single Limb Stance R LE 4 sec  (<10 sec = HIGH FALL RISK)   Single Limb Stance L LE 3 sec  (<10 sec = HIGH FALL RISK)       Evaluation   5 times sit-stand  (adults 18-63 y/o) 25 seconds, no UE support- fatigue noted as activity progressed  >12 sec= fall risk for general elderly  >16 sec= fall risk for Parkinson's disease  >10 sec= balance/vestibular dysfunction (<59 y/o)  >14.2 sec= balance/vestibular dysfunction (>59 y/o)  >12 sec= fall risk for CVA       Gait Assessment:   - AD used: none  - Assistance: independent  - Distance: community distances    GAIT DEVIATIONS:  Rhett displays the following deviations with ambulation: occasional lateral sway when patient appears fatigued    Impairments contributing to deviations: decreased muscular endurance, decreased muscular strength    Endurance Deficit: significant- LE fatigue noted quickly with sit <> stand activity      Evaluation   Timed Up and Go NT   Self Selected Walking Speed 1.0 m/sec (6m/6s)   Fast Walking Speed 1.2 m/sec (6m/5s)       Functional Gait Assessment:   1. Gait on level surface =  3  2. Change in Gait Speed = 2  3. Gait with horizontal head turns  = 2  4. Gait with vertical head turns = 3  5. Gait with pivot turns = 1  6. Step over obstacle = 2  7. Gait with Narrow MINAL = 3  8. Gait with eyes closed = 2  9. Ambulating Backwards = 2  10. Steps = 2     Score 22/30   Score:   <22/30 fall risk   <20/30 fall risk in older adults   <18/30 fall risk in Parkinsons     CMS Impairment/Limitation/Restriction for FOTO  Survey- patient did not perform this date         TREATMENT     No treatment provided this date. Entire time spent on evaluation.     Home Exercises and Patient Education Provided    Education provided:   - POC, scheduling  -purpose of therapy  - Patient expressed frustration about current medical problems. PT spent time discussing neurological recovery and patient's frustrations.     Written Home Exercises Provided: to be provided at first follow up session.     Assessment   Rhett is a 45 y.o. male referred to outpatient Physical Therapy with a medical diagnosis  of Idiopathic progressive polyneuropathy. Patient presents with chief complaints of progressive fatigue and weakness, muscle twitching, and numbness/tingling throughout BUE and BLE s/p macroadenoma resection in Feb 2020 and then COVID-19 infection in Mar 2020. Deficits noted during both BUE and BLE MMT with proximal strength deficits more apparent with both BUE and BLE compared to distal muscle groups. Patient able to complete fine motor coordination task, but effortful to complete. Additionally, decreased coordination noted with alternating tapping of feet. Patient demonstrates quick fatigue with chair rise test with current score placing patient in elevated fall risk category. Patient demonstrates fair baseline performance on FGA with current score placing him in elevated fall risk category. SLS scores fairly symmetrical with both scores also placing patient in elevated fall risk category. Patient able to demonstrate safe change in speed from SSWS test to FSWS with current SSWS score placing patient in community ambulator with increased time needs category. Patient to benefit from continued PT intervention to address mobility deficits listed above, to reduce risk of fall, and to help patient regain active PLOF. Recommend OT evaluation to address BUE and fine motor deficits.     Patient prognosis is Good.   Patient will benefit from skilled outpatient Physical Therapy to address the deficits stated above and in the chart below, provide patient/family education, and to maximize patient's level of independence.     Plan of care discussed with patient: Yes  Patient's spiritual, cultural and educational needs considered and patient is agreeable to the plan of care and goals as stated below:     Anticipated Barriers for therapy: co-morbidities, emotional deficits    Medical Necessity is demonstrated by the following  History  Co-morbidities and personal factors that may impact the plan of care Co-morbidities:   Muscle  twitching, diplopia, convergence insufficiency, pituitary adenoma c/ macroadenoma resection, fatigue, COVID-19 virus, snoring    Personal Factors:   coping style     high   Examination  Body Structures and Functions, activity limitations and participation restrictions that may impact the plan of care Body Regions:   lower extremities  trunk    Body Systems:    gross symmetry  ROM  strength  gross coordinated movement  balance  gait  transfers  transitions  motor control  motor learning    Participation Restrictions:   None noted    Activity limitations:   Learning and applying knowledge  no deficits    General Tasks and Commands  no deficits    Communication  no deficits    Mobility  lifting and carrying objects  fine hand use (grasping/picking up)  walking  driving (bike, car, motorcycle)    Self care  no deficits    Domestic Life  no deficits    Interactions/Relationships  no deficits    Life Areas  employment    Community and Social Life  community life  recreation and leisure         moderate   Clinical Presentation evolving clinical presentation with changing clinical characteristics moderate   Decision Making/ Complexity Score: moderate     Goals:   Short Term Goals: 4 weeks   1. Patient to be (I) with established HEP.   2. Patient to complete OT evaluation for further assessment of UE and fine motor deficits.   3. Patient to improve bilateral hip flexion and hip extension strength scores to at least 4-/5 for improved stability with community ambulation.   4. Patient to improve bilateral knee flexion and extension strength scores to at least 4/5 for improved stability with community ambulation.   5. Patient to improve FGA score to at least 25/30 for improved balance and decreased fall risk in community settings.   6. Patient to improve BLE SLS scores to at least 8 sec for improved balance with standing ADL.   7. PT to formally assess 6 minute walk test and establish appropriate goals for formal assessment of  endurance deficits.     Long Term Goals: 8 weeks   1. Patient to be (I) with advanced HEP.   2. Patient to improve bilateral gross hip strength scores to at least 4/5 for improved stability with community ambulation.   3. Patient to improve bilateral knee flexion and extension strength scores to at least 4+/5 for improved stability with community ambulation.   4. Patient to improve FGA score to at least 28/30 for improved balance and decreased fall risk in community settings.   5. Patient to improve BLE SLS scores to at least 12 sec for improved balance with standing ADL.     Plan   Plan of care Certification: 7/23/2020 to 09/17/20.    Outpatient Physical Therapy 2 times weekly for 8 weeks to include the following interventions: Gait Training, Manual Therapy, Moist Heat/ Ice, Neuromuscular Re-ed, Orthotic Management and Training, Patient Education, Self Care, Therapeutic Activites and Therapeutic Exercise.     Wilda Turcios, PT

## 2020-07-24 ENCOUNTER — HOSPITAL ENCOUNTER (OUTPATIENT)
Dept: RADIOLOGY | Facility: HOSPITAL | Age: 45
Discharge: HOME | End: 2020-07-24
Attending: PSYCHIATRY & NEUROLOGY
Payer: COMMERCIAL

## 2020-07-24 DIAGNOSIS — M48.02 SPINAL STENOSIS, CERVICAL REGION: ICD-10-CM

## 2020-07-24 DIAGNOSIS — M48.04 SPINAL STENOSIS, THORACIC REGION: ICD-10-CM

## 2020-07-24 DIAGNOSIS — R53.1 WEAKNESS: Primary | ICD-10-CM

## 2020-07-24 LAB — VIT B2 SERPL-MCNC: 6 MCG/L (ref 1–19)

## 2020-07-24 PROCEDURE — 25500020 PHARM REV CODE 255: Performed by: PSYCHIATRY & NEUROLOGY

## 2020-07-24 PROCEDURE — 72157 MRI CHEST SPINE W/O & W/DYE: CPT | Mod: 26,,, | Performed by: RADIOLOGY

## 2020-07-24 PROCEDURE — 72156 MRI NECK SPINE W/O & W/DYE: CPT | Mod: 26,,, | Performed by: RADIOLOGY

## 2020-07-24 PROCEDURE — 72156 MRI CERVICAL SPINE DEMYELINATING W W/O CONTRAST: ICD-10-PCS | Mod: 26,,, | Performed by: RADIOLOGY

## 2020-07-24 PROCEDURE — 72157 MRI CHEST SPINE W/O & W/DYE: CPT | Mod: TC

## 2020-07-24 PROCEDURE — 72157 MRI THORACIC SPINE DEMYELINATING W W/O CONTRAST: ICD-10-PCS | Mod: 26,,, | Performed by: RADIOLOGY

## 2020-07-24 PROCEDURE — A9585 GADOBUTROL INJECTION: HCPCS | Performed by: PSYCHIATRY & NEUROLOGY

## 2020-07-24 PROCEDURE — 72156 MRI NECK SPINE W/O & W/DYE: CPT | Mod: TC

## 2020-07-24 RX ORDER — GADOBUTROL 604.72 MG/ML
7 INJECTION INTRAVENOUS
Status: COMPLETED | OUTPATIENT
Start: 2020-07-24 | End: 2020-07-24

## 2020-07-24 RX ADMIN — GADOBUTROL 7 ML: 604.72 INJECTION INTRAVENOUS at 08:07

## 2020-07-27 LAB — A-TOCOPHEROL VIT E SERPL-MCNC: 1835 UG/DL (ref 500–1800)

## 2020-07-30 ENCOUNTER — CLINICAL SUPPORT (OUTPATIENT)
Dept: REHABILITATION | Facility: HOSPITAL | Age: 45
End: 2020-07-30
Attending: PSYCHIATRY & NEUROLOGY
Payer: COMMERCIAL

## 2020-07-30 DIAGNOSIS — Z74.09 IMPAIRED MOBILITY AND ADLS: ICD-10-CM

## 2020-07-30 DIAGNOSIS — R53.1 WEAKNESS: ICD-10-CM

## 2020-07-30 DIAGNOSIS — R29.898 FINE MOTOR IMPAIRMENT: ICD-10-CM

## 2020-07-30 DIAGNOSIS — R29.818 FINE MOTOR IMPAIRMENT: ICD-10-CM

## 2020-07-30 DIAGNOSIS — R29.898 UPPER EXTREMITY WEAKNESS: ICD-10-CM

## 2020-07-30 DIAGNOSIS — R29.898 DECREASED GRIP STRENGTH: ICD-10-CM

## 2020-07-30 DIAGNOSIS — Z78.9 IMPAIRED MOBILITY AND ADLS: ICD-10-CM

## 2020-07-30 PROBLEM — R52 PAIN: Status: ACTIVE | Noted: 2020-07-30

## 2020-07-30 PROBLEM — R52 PAIN: Status: RESOLVED | Noted: 2020-07-30 | Resolved: 2020-07-30

## 2020-07-30 PROCEDURE — 97162 PT EVAL MOD COMPLEX 30 MIN: CPT | Mod: PO

## 2020-07-30 NOTE — PLAN OF CARE
"[facility] Occupational Therapy  Initial Neurological Evaluation     Date: 7/30/2020  Patient: Rhett Quarles  Chart Number: [id number]    Therapy Diagnosis: No diagnosis found.  Physician: Sangeetha Boyer MD    Visit Diagnoses:   Weakness R53.1  Impaired Mobility and ADLs Z74.09  Upper extremity weakness R29.898  Decreased  strength R29.989  Fine motor impairment R29.818    Physician Orders: Eval and Treat (Comment - Finger dexterity. Weakness all over)  Medical Diagnosis: R53.1 (ICD-10-CM) - Weakness  Evaluation Date: 7/30/2020  Plan of Care Expiration Period: 8/28/2020  Insurance Authorization period Expiration: 7/24/2021  Visit # / Visits Authorized: 1 / 1  FOTO: Not administered this date secondary to time constraints; will administer at first follow up session    Time In: 1455  Time Out: 1545  Total Billable (one on one) Time: 50 minutes    Precautions: Standard    Subjective     History of Current Condition: Rhett Quarles is a 45 year old  male who presents to [facility] for evaluation and treatment secondary to reports of muscle weakness in upper and lower extremities and decreased ROM. Pt was diagnosed with having a pituitary adenoma earlier this year, of which has been removed. Soon after the tumor was removed, he was diagnosed with COVID-19. Pt was not hospitalized and was able to be treated at home, but has had a very difficulty recovery. Pt reports he is having work ups by neurology, with no definitive diagnosis to date. He also reports that he experiences severe muscle fatigue, explaining that his muscles feel "heavy" and he experiences "twitching", making lifting objects such as his phone to his hear, a spoon to his mouth, and his guitar pic very challenging. He also reports that he has been noticing poor  strength and after he lifts something he experiences increased pain. Pt also explained that he has had a very " difficulty year and because he is experiencing anxiety/stress/depression he has been seeing a therapist to help cope.     Involved Side: bilateral (L worse than R)   Dominant Side: Ambidextrous  Date of Onset: Diagnosed with COVID-19 March 30; received negative test ~2 months ago   Surgical Procedure: s/p resection of macroadenoma on February 10, 2020  Previous Therapy: none, has been evaled for PT    Patient's Goals for Therapy: to increase strength in his hands/upper extremities and fine motor skills to be able to play guitar and participate in leisure activities; improve activity tolerance    Pain:  Pain Related Behaviors Observed: yes 2/10 on current  Functional Pain Scale Rating 0-10:   3/10 on average  2/10 at best  3/10 at worst  Location: hips, arms  Description: Aching  Aggravating Factors: none noted  Easing Factors: advil, rest    Occupation:    Working presently: employed  Duties: computer management     Functional Limitations/Social History:    Prior Level of Function: independent   Current Level of Function: modified independent     Home/Living environment: lives with their spouse  Home Access: one story home, ~5 JANET with rails on both sides   DME: none   - Pt reports he was wearing wrist guards at night because he was noticing that his wrists were flexed upon waking up; is not currently wearing them      Leisure: Guitar, making music/video content, building things, gardening    Driving: not driving currently (has not driven since tumor resection) but would like to return    Past Medical History/Physical Systems Review:     Past Medical History:  Rhett Quarles  has a past medical history of Pituitary macroadenoma.    Past Surgical History:  Rhett Quarles  has a past surgical history that includes Excision, neoplasm, pituitary, transsphenoidal approach, using computer-assisted navigation.    Current Medications:  Rhett has a current medication list which includes the  following prescription(s): albuterol, ascorbic acid, benzonatate, desmopressin, and multivitamin.    Allergies:  Review of patient's allergies indicates:  No Known Allergies     Objective     Cognitive Exam:   Oriented: Person, Place, Time and Situation  Behaviors: normal, cooperative  Follows Commands/attention: Follows multistep  commands  Communication: clear/fluent  Memory: No Deficits noted as determined by 3 word recall after 1 minute and 3 minutes (words: blue, sock, bed)   Safety awareness/insight to disability: aware of diagnosis, treatment, and prognosis  Coping skills/emotional control: Appropriate to situation    Visual/Perceptual:  Tracking: intact     Horizontal: intact   Vertical: intact    Diagonal: NT   Saccades: impaired (vertical)     Horizontal: intact     Vertical: slow speed noted and pt report increased difficulty    Diagonal: intact   Acuity: uses readers  Convergence: impaired (~18.5cm)     Physical Exam:  Postural examination/scapula alignment: slightly  Rounded shoulder  Joint integrity: Firm end feeling    Joint Evaluation  AROM  7/30/2020 PROM   7/30/2020 AROM  7/30/2020 PROM   7/30/2020    Left Left Right Right   Shoulder flex 0-180 135 142 135 144   Shoulder Abd 0-180 123 136 133 146   Shoulder ER 0-90 63 NT (2* time constraints & pain) 65 NT (2* time constraints & pain)   Shoulder IR 0-90 51 NT (2* time constraints & pain) 52 NT (2* time constraints & pain)    Shoulder Extension 0-80 70 WNL 68 WNL     Elbow flex/ext, wrist flex/ext, sup/pro, and UD/RD - WFL    Fist: normal    Strength 7/30/2020 7/30/2020   **within available ROM** Left Right   Shoulder flex 3+/5 3+/5   Shoulder abd 3+/5 4/5   Shoulder ER 3+/5 3+/5   Shoulder IR 3+/5 4/5   Shoulder Extension 3+/5 4/5   Elbow flex 3+/5 3+/5   Elbow ext 3+/5 4+/5   Wrist flex 3+/5 4+/5   Wrist ext 3+/5 5/5      Strength: (JAYCEE Dynamometer in lbs.) One maximum trial; Position II:     7/30/2020 7/30/2020    Left Right   Rung II  "49# 65#     Pinch Strength (Measured in psi)     7/30/2020 7/30/2020    Left Right   Key Pinch 6 psi 10 psi   3pt Pinch 3 psi 10 psi   2pt Pinch 3 psi 6 psi     Fine Motor Coordination: 9 Hole Peg Test  Left 7/30/2020 Right 7/30/2020   20 sec 20 sec     Gross motor coordination:   - HAYLEE (Rapid Alternating Movements): intact  - Finger to Nose (5 times): intact   - Finger Flicks (coordination moving from digit flexion to digit extension): intact   - Digit opposition: difficulty with left with increased speed     Sensation:  Rhett reports no decreased sensation but that he feels like his "muscles go to sleep"  Light touch: bilateral intact    Balance:   Static Sit - NORMAL: No deviations seen in posture held statically  Dynamic sit- NORMAL: No deviations seen in posture held statically  Static Stand - NORMAL: No deviations seen in posture held statically  Dynamic stand - GOOD-: Takes MODERATE challenges from all directions but inconsistently per pt report    Endurance Deficit: moderate per pt report                    Functional Status      Functional Mobility:  Bed mobility: I  Roll to left: I  Roll to right: I  Supine to sit: I  Sit to supine: I  Transfers to bed: I  Transfers to toilet: I  Car transfers: I    ADL's:    Feeding: Mod I  Grooming: Mod I  Hygiene: Mod I  UB Dressing: Mod I  LB Dressing: Mod I  Toileting: I  Bathing: Mod I    IADL's:  Homecare: Mod I  Cooking: Mod I  Laundry: Mod I  Yard work: Mod A   Use of telephone: I  Money management: I  Medication management: I  Handwriting:I  Technology Use:I    Comments: Pt reports he requires increased time to perform ADLs secondary to muscle weakness and that it is difficult for him to grasp onto objects such as a spoon for feeding. He also reports he is able to perform home management tasks and other IADLs, but again they are more challenging for him than they were prior to medical complications experienced earlier this year. However, he is trying to stay " active and continue to take part in his daily routines.       Assessment     Rhett Quarles is a 45 y.o. male referred to outpatient occupational therapy and presents with a medical diagnosis of weakness secondary to post COVID-19 infection and s/p tumor resection found on pituitary gland, resulting in pain, decreased range of motion, decreased muscle strength, impaired function and decreased work ability and demonstrates limitations as described in the chart below. Pt presents to intial evaluation today demonstrating increased upper extremity weakness, decreased  strength, and decreased fine motor strength on the left>right, although bilateral deficits are noted. However, upon PT initial evaluation increased upper extremity weakness was noted on the right>left per manual muscle grades. This will continue to be monitored to determine variations. Following medical record review it is determined that patient will benefit from occupational therapy services in order to maximize pain free and/or functional use of bilateral upper extremities to maximize participation in ADLs, IADLs, functional mobility, and work/leisure activities.    Patient prognosis is Good due to high motivation.   Patient will benefit from skilled outpatient Occupational Therapy to address the deficits stated above and in the chart below, provide patient/family education, and to maximize patient's level of independence.     Plan of care discussed with patient: Yes  Patient's spiritual, cultural and educational needs considered and patient is agreeable to the plan of care and goals as stated below:     Anticipated Barriers for therapy: transportation    Medical Necessity is demonstrated by the following  Profile and History Assessment of Occupational Performance Level of Clinical Decision Making Complexity Score   Occupational Profile:   Rhett Quarles is a 45 y.o. male who lives with their spouse and is currently employed as video  . Rhett Quarles has difficulty with  feeding, bathing, grooming and dressing  driving/transportation management and housework/household chores  affecting his/her daily functional abilities. His/her main goal for therapy is to improve hand strength and fine motor coordination to improve performance with meaningful occupations.     Comorbidities:   anxiety and depression    Medical and Therapy History Review:   Expanded               Performance Deficits    Physical:  Muscle Power/Strength  Muscle Endurance   Strength  Pinch Strength  Gross Motor Coordination  Fine Motor Coordination  Pain    Cognitive:  No Deficits    Psychosocial:    No Deficits     Clinical Decision Making:  moderate    Assessment Process:  Problem-Focused Assessments    Modification/Need for Assistance:  Not Necessary    Intervention Selection:  Several Treatment Options       moderate  Based on PMHX, co morbidities , data from assessments and functional level of assistance required with task and clinical presentation directly impacting function.       The following goals were discussed with the patient and patient is in agreement with them as to be addressed in the treatment plan.     Goals:  Short Term Goals: 2-3 weeks   1) Pt will demonstrate understanding of pacing/energy conservation strategies to maximize performance with daily routine.   2) Pt will download chandra ([employer] Timer/[employer]) to develop coping strategies to decrease anxiety/stress/depression.   3) Pt will increase  strength on the left to 54# or more to increase independence with ADLs and IADLs (gripping spoon).   4) Pt will increase left and right shoulder flexion and left abduction to at least 4/5 MMT grade to improve upper extremity strength needed for high level IADLs.   5) Pt will increase left and right elbow flexion to greater than or equal to 4/5 to improve upper extremity strength needed for home management tasks.   6) Pt will increase left pinch  strength by greater than or equal to 3 psi all conditions (lateral: to 9 psi, 3pt and 2pt: to 6 psi) to improve strength needed for pressing on guitar strings.     Long Term Goals: 4 weeks   1) Pt will decrease score on 9HPT by 2 seconds or more bilaterally to improve fine motor coordination needed for leisure activities (playing guitar).   2) Pt will increase upper extremity AROM for L shoulder flexion and abduction by greater than or equal to 8 degrees (shld flex: to at least 143*; abd: 131*) to increase performance with kitchen tasks (reaching into overhead cabinets).   3) Pt will increase upper extremity AROM for R shoulder flexion and abduction by greater than or equal to 8 degrees (shld flex: to least 143*; abd: 141*) to increase performance with kitchen tasks (reaching into overhead cabinets).   4) Pt will increase bilateral shoulder flexion and abduction to greater than or equal to 4+/5 to improve upper extremity strength needed for high level IADLs.    5) Pt will increase left and right elbow flexion to greater than or equal to 4+/5 to improve upper extremity strength needed for home management tasks.  6) Pt will increase L  strength to 60# or more to increase independence with ADLs and IADLs.   7) Pt will increase R  strength to 70# or more to increase independence with ADLs and IADLs.   8) Pt will play guitar for 15 minutes or more with reports of increased performance since initial eval.   9) Pt will be independent with feeding, grooming, and dressing, reporting performing activities with decreased time and increased confidence.     Plan   Certification Period/Plan of care expiration: 7/30/2020 to 8/28/2020.    Outpatient Occupational Therapy 2 times weekly for 4 weeks to include the following interventions: Manual Therapy, Neuromuscular Re-ed, Patient Education, Self Care, Therapeutic Activites and Therapeutic Exercise.    Keturah Pierre OT      I certify the need for these services  furnished under this plan of treatment and while under my care.  ____________________________________ Physician/Referring Practitioner   Date of Signature

## 2020-08-04 ENCOUNTER — PROCEDURE VISIT (OUTPATIENT)
Dept: NEUROLOGY | Facility: CLINIC | Age: 45
End: 2020-08-04
Payer: COMMERCIAL

## 2020-08-04 DIAGNOSIS — G60.3 IDIOPATHIC PROGRESSIVE POLYNEUROPATHY: ICD-10-CM

## 2020-08-04 PROCEDURE — 95910 PR NERVE CONDUCTION STUDY; 7-8 STUDIES: ICD-10-PCS | Mod: S$GLB,,, | Performed by: PSYCHIATRY & NEUROLOGY

## 2020-08-04 PROCEDURE — 95886 MUSC TEST DONE W/N TEST COMP: CPT | Mod: S$GLB,,, | Performed by: PSYCHIATRY & NEUROLOGY

## 2020-08-04 PROCEDURE — 95886 PR EMG COMPLETE, W/ NERVE CONDUCTION STUDIES, 5+ MUSCLES: ICD-10-PCS | Mod: S$GLB,,, | Performed by: PSYCHIATRY & NEUROLOGY

## 2020-08-04 PROCEDURE — 95910 NRV CNDJ TEST 7-8 STUDIES: CPT | Mod: S$GLB,,, | Performed by: PSYCHIATRY & NEUROLOGY

## 2020-08-06 ENCOUNTER — DOCUMENTATION ONLY (OUTPATIENT)
Dept: REHABILITATION | Facility: HOSPITAL | Age: 45
End: 2020-08-06

## 2020-08-06 ENCOUNTER — CLINICAL SUPPORT (OUTPATIENT)
Dept: REHABILITATION | Facility: HOSPITAL | Age: 45
End: 2020-08-06
Attending: PSYCHIATRY & NEUROLOGY
Payer: COMMERCIAL

## 2020-08-06 DIAGNOSIS — Z74.09 IMPAIRED FUNCTIONAL MOBILITY, BALANCE, GAIT, AND ENDURANCE: Primary | ICD-10-CM

## 2020-08-06 DIAGNOSIS — R29.898 LEG WEAKNESS, BILATERAL: ICD-10-CM

## 2020-08-06 DIAGNOSIS — R29.898 DECREASED GRIP STRENGTH: ICD-10-CM

## 2020-08-06 DIAGNOSIS — Z78.9 IMPAIRED MOBILITY AND ADLS: ICD-10-CM

## 2020-08-06 DIAGNOSIS — Z74.09 IMPAIRED MOBILITY AND ADLS: ICD-10-CM

## 2020-08-06 DIAGNOSIS — R29.898 FINE MOTOR IMPAIRMENT: ICD-10-CM

## 2020-08-06 DIAGNOSIS — R29.898 UPPER EXTREMITY WEAKNESS: ICD-10-CM

## 2020-08-06 DIAGNOSIS — R29.818 FINE MOTOR IMPAIRMENT: ICD-10-CM

## 2020-08-06 PROCEDURE — 97110 THERAPEUTIC EXERCISES: CPT | Mod: PO

## 2020-08-06 PROCEDURE — 97530 THERAPEUTIC ACTIVITIES: CPT | Mod: PO

## 2020-08-06 NOTE — PROGRESS NOTES
Physical Therapy Treatment Note     [facility]  Clinic Number: [id number]    Therapy Diagnosis:   Encounter Diagnoses   Name Primary?    Impaired functional mobility, balance, gait, and endurance Yes    Leg weakness, bilateral      Physician: Sangeetha Boyer MD    Visit Date: 2020    Physician Orders: PT Eval and Treat   Medical Diagnosis from Referral: Idiopathic progressive polyneuropathy  Evaluation Date: 2020  Authorization Period Expiration: 20 to 20  Plan of Care Expiration: 20  Visit # / Visits authorized:     Time In: 1530  Time Out: 1612  Total Billable Time: 42 minutes    Precautions: Standard, h/o COVID-19 infection, s/p macroadenoma resection    Subjective     Pt reports: He has had so many tests since last being seen by PT. No news from any of these tests yet but his legs are slightly sore from his EMG yesterday.    He was provided with home exercise program today.  Response to previous treatment: felt fine  Functional change: ongoing    Pain: 0/10  Location: n/a     Objective     Rhett received therapeutic exercises to develop strength, endurance, ROM, flexibility, posture and core stabilization for 42 minutes includin minutes SciFit Steeper lv 1.0  3 x 10 reps of standing hip flexion with B LE, light B UE support  2 x 10 reps of B LE hip abduction  3 x 10 reps of heel raises with light B UE support  3 x 10 reps of toe raises with light B UE support   3 x 10 reps of hamstring curls with block between knees  2 x 10 reps of mini squats with intermittent UE support    3 x 30 sec standing gastroc stretch on wedge  2 x 30 sec ea LE standing hamstring stretch on step  2 x 30 sec seated piriformis stretch    Rhett participated in neuromuscular re-education activities to improve: Balance, Coordination, Kinesthetic, Sense, Proprioception and Posture for 0 minutes. The following activities were included:      Home Exercises Provided and Patient Education  Provided     Education provided:   - HEP provided    Written Home Exercises Provided: yes.  Exercises were reviewed and Rhett was able to demonstrate them prior to the end of the session.  Rhett demonstrated good  understanding of the education provided.     See EMR under Patient Instructions for exercises provided 8/6/2020.    Assessment     Rhett did well during session today. He demonstrates muscular fatigue quickly with standing therex though is able to tolerate all without seated rest. PT only incorporates standing therex into HEP today due to weakness noted during eval but with stretching to prevent soreness, pt may benefit from print out of these as well in coming sessions. Pt remains appropriate for skilled PT services.     Rhett is progressing well towards his goals.   Pt prognosis is Good.     Pt will continue to benefit from skilled outpatient physical therapy to address the deficits listed in the problem list box on initial evaluation, provide pt/family education and to maximize pt's level of independence in the home and community environment.     Pt's spiritual, cultural and educational needs considered and pt agreeable to plan of care and goals.     Anticipated barriers to physical therapy: co-morbidities, emotional deficits    Goals:   Short Term Goals: 4 weeks   1. Patient to be (I) with established HEP. ongoing  2. Patient to complete OT evaluation for further assessment of UE and fine motor deficits. ongoing  3. Patient to improve bilateral hip flexion and hip extension strength scores to at least 4-/5 for improved stability with community ambulation. ongoing  4. Patient to improve bilateral knee flexion and extension strength scores to at least 4/5 for improved stability with community ambulation. ongoing  5. Patient to improve FGA score to at least 25/30 for improved balance and decreased fall risk in community settings. ongoing  6. Patient to improve BLE SLS scores to at least 8 sec for improved  balance with standing ADL. ongoing  7. PT to formally assess 6 minute walk test and establish appropriate goals for formal assessment of endurance deficits. ongoing     Long Term Goals: 8 weeks   1. Patient to be (I) with advanced HEP. ongoing  2. Patient to improve bilateral gross hip strength scores to at least 4/5 for improved stability with community ambulation. ongoing  3. Patient to improve bilateral knee flexion and extension strength scores to at least 4+/5 for improved stability with community ambulation. ongoing  4. Patient to improve FGA score to at least 28/30 for improved balance and decreased fall risk in community settings. ongoing  5. Patient to improve BLE SLS scores to at least 12 sec for improved balance with standing ADL. ongoing     Plan   Plan of care Certification: 7/23/2020 to 09/17/20.    Continue to work on improving endurance, strength, and balance, progressing as able.    Robert Hobson, PT

## 2020-08-06 NOTE — PROGRESS NOTES
"  Physical Therapy Treatment Note     Name: Rhett [facility]  Clinic Number: [id number]    Therapy Diagnosis: No diagnosis found.  Physician: Sangeetha Boyer MD    Visit Date: 8/7/2020    Physician Orders: PT Eval and Treat   Medical Diagnosis from Referral: Idiopathic progressive polyneuropathy  Evaluation Date: 7/23/2020  Authorization Period Expiration: 07/20/20 to 12/31/20  Plan of Care Expiration: 09/17/20  Visit # / Visits authorized: 03/ 20    Time In: 1330  Time Out: 1415  Total Billable Time: 45 minutes    Precautions: Standard, h/o COVID-19 infection, s/p macroadenoma resection    Subjective     Pt reports: " I'm doing pretty good, I"m a little sore today."   He will be given an HEP today   Response to previous treatment: sore  Functional change: ongoing    Pain: 0/10  Location: N/A     Objective     Rhett received therapeutic exercises to develop strength, endurance, ROM, flexibility, posture and core stabilization for 45 minutes including:  X 8 min on Nu step recumbent stepper.  B UE/B LE on level 4.0    Brown Bench:  2 x 30 sec of sitting piriformis stretches   2 x 30 sec of B LE HS stretches    Standing:  2 x 30 sec of B LE heel cord stretches with 2 UE support  3 x 10 reps of B LE heel/toe raises with 1 UE support by counter  3 x 10 reps of B LE single leg hip flexion/ext on pilates chair with 2 black and 2 white on level 1.0  X 4 laps of side stepping with Red sports band.  ~15 ft each way    // bars:  2 x 10 reps of B LE single leg step up onto 4 inch box step with 1 UE support.  1 x 10 reps of B LE single leg side step up onto 4 inch box step with 1 UE support    Rhett participated in neuromuscular re-education activities to improve: Balance, Coordination, Kinesthetic, Sense, Proprioception and Posture for 0 minutes. The following activities were included:      Rhett participated in dynamic functional therapeutic activities to improve functional performance for 0  minutes, including:      Rhett " participated in gait training to improve functional mobility and safety for 0 minutes, including:            Home Exercises Provided and Patient Education Provided     Education provided:   HEP    Written Home Exercises Provided: yes.  Exercises were reviewed and Rhett was able to demonstrate them prior to the end of the session.  Rhett demonstrated good  understanding of the education provided.     See EMR under Patient Instructions for exercises provided 8/6/2020.    Assessment     Rhett tolerated tx session well despite being sore from yesterday's tx session. Pt was given HEP and has not started it yet 2* coming to therapy two days in a row.   Rhett cont with stretching and strengthening exercises and did not have any complaints that stopped him from working out.  Pt reports some tingling on and off during tx session, but able to complete all activities.  Cont with plan of care.    Rhett is progressing well towards his goals.   Pt prognosis is Good.     Pt will continue to benefit from skilled outpatient physical therapy to address the deficits listed in the problem list box on initial evaluation, provide pt/family education and to maximize pt's level of independence in the home and community environment.     Pt's spiritual, cultural and educational needs considered and pt agreeable to plan of care and goals.     Anticipated barriers to physical therapy: co-morbidities, emotional deficits     Medical Necessity is demonstrated by the following  History  Co-morbidities and personal factors that may impact the plan of care Co-morbidities:   Muscle twitching, diplopia, convergence insufficiency, pituitary adenoma c/ macroadenoma resection, fatigue, COVID-19 virus, snoring     Personal Factors:   coping style       high   Examination  Body Structures and Functions, activity limitations and participation restrictions that may impact the plan of care Body Regions:   lower extremities  trunk     Body Systems:    gross  symmetry  ROM  strength  gross coordinated movement  balance  gait  transfers  transitions  motor control  motor learning     Participation Restrictions:   None noted     Activity limitations:   Learning and applying knowledge  no deficits     General Tasks and Commands  no deficits     Communication  no deficits     Mobility  lifting and carrying objects  fine hand use (grasping/picking up)  walking  driving (bike, car, motorcycle)     Self care  no deficits     Domestic Life  no deficits     Interactions/Relationships  no deficits     Life Areas  employment     Community and Social Life  community life  recreation and leisure             moderate   Clinical Presentation evolving clinical presentation with changing clinical characteristics moderate   Decision Making/ Complexity Score: moderate      Goals:   Short Term Goals: 4 weeks   1. Patient to be (I) with established HEP.   2. Patient to complete OT evaluation for further assessment of UE and fine motor deficits.   3. Patient to improve bilateral hip flexion and hip extension strength scores to at least 4-/5 for improved stability with community ambulation.   4. Patient to improve bilateral knee flexion and extension strength scores to at least 4/5 for improved stability with community ambulation.   5. Patient to improve FGA score to at least 25/30 for improved balance and decreased fall risk in community settings.   6. Patient to improve BLE SLS scores to at least 8 sec for improved balance with standing ADL.   7. PT to formally assess 6 minute walk test and establish appropriate goals for formal assessment of endurance deficits.      Long Term Goals: 8 weeks   1. Patient to be (I) with advanced HEP.   2. Patient to improve bilateral gross hip strength scores to at least 4/5 for improved stability with community ambulation.   3. Patient to improve bilateral knee flexion and extension strength scores to at least 4+/5 for improved stability with community  ambulation.   4. Patient to improve FGA score to at least 28/30 for improved balance and decreased fall risk in community settings.   5. Patient to improve BLE SLS scores to at least 12 sec for improved balance with standing ADL.       Plan     Cont with strengthening, endurance and balance    Renee Chakraborty, PTA

## 2020-08-06 NOTE — PROGRESS NOTES
PT/PTA met face to face to discuss pt's treatment plan and progress towards established goals.  Continue with current PT POC with focus on endurance, strengthening and balance.  Patient will be seen by physical therapist at least every sixth treatment or 30 days, whichever occurs first.    Renee Chakraborty, PTA  08/06/2020    Face to face performed on 8/5/20

## 2020-08-06 NOTE — PROGRESS NOTES
Occupational Therapy Treatment Note     Date: 8/6/2020  Name: Rhett Quarles  [facility] Number: [id number]    Therapy Diagnosis:   Encounter Diagnoses   Name Primary?    Impaired mobility and ADLs     Upper extremity weakness     Decreased  strength     Fine motor impairment      Physician: Sangeetha Boyer MD    Physician Orders: Eval and Treat (Comment - Finger dexterity. Weakness all over)  Medical Diagnosis: R53.1 (ICD-10-CM) - Weakness  Evaluation Date: 7/30/2020  Insurance Authorization Period Expiration: 7/30/2021  Plan of Care Certification Period: 8/28/2020    Visit # / Visits authorized: 1 / 1  Time In: 1615  Time Out: 1700  Total Billable Time: 45 minutes    Precautions:  Standard      Subjective     Pt reports: he feels like he's a little bit stronger since last session, at the very least definitely not any worse. He also reported that his wife noticed that his arm muscles are beginning to look smaller   Response to previous treatment: initial eval was last session  Functional change: initial eval was last session    Involved Side: bilateral (L worse than R)   Dominant Side: Ambidextrous    Pain: 0/10  Location: N/A    Patient's Goals for Therapy: to increase strength in his hands/upper extremities and fine motor skills to be able to play guitar and participate in leisure activities; improve activity tolerance    Objective     Rhett received therapeutic exercises for 35 minutes including:  Reassessed Bilateral UE strength to monitor changes:   Strength 8/6/2020 8/6/2020   **within available ROM** Left Right   Shoulder flex 4/5 4+/5   Shoulder abd 4/5 4+/5   Shoulder Extension 4/5 4+/5   Elbow flex 4/5 4+/5   Elbow ext 5/5 (prior to session) and 4/5 (after putty exercises) 5/5 (prior to session) and 4/5 (after putty exercises)     Pt administered red theraputty this date and performed the following exercises:   - Putty squeezes x1 minute each hand   - Finger abduction with putty loop x5 reps  each hand   - Lateral pinch x1 minute each hand     - Green digiflex for isolated digit flexion x1 minute each hand     Pt administered orange theraband this date and performed the following exercises:  - Standing tricep dips 2 x15 alternating each arm    - Standing bicep curls 2 x 15 alternating each arm  - Standing shoulder abduction x15 reps alternating each arm   - Seated rows x15 reps     Rhett participated in dynamic functional therapeutic activities to improve functional performance for 10 minutes, including:  - FOTO administered: see full results in media   - Picked up and placed 5 flat marbles using palm<>finger translation x3 trials each hand     Home Exercises and Education Provided     Education provided:   - Putty and theraband HEP; pt educated to take breaks and practice pacing throughout the week   - Progress towards goals     Written Home Exercises Provided: yes.  Exercises were reviewed and Rhett was able to demonstrate them prior to the end of the session.  Rhett demonstrated good  understanding of the HEP provided.   .   See EMR under Patient Instructions for exercises provided 8/6/2020.        Assessment   Pt continues with bilateral weakness and impaired fine motor control. During today's reassessment of upper extremity strength, pt with increased muscle grades bilaterally since initial OT eval, but cont with increased weakness in left>right. Pt also with increased difficulty performing  strengthening exercises and fine motor activities with his left hand. Pt also presents with quick muscle fatigue and varying degrees of muscle strength throughout and between sessions. Will continue to monitor patient progress and any updates with M.D. Pt would continue to benefit from skilled occupational therapy services for HEP guidance, upper extremity strengthening, and fine motor coordination training to maximize performance with meaningful occupations.     Rhett is progressing well towards his goals and  there are no updates to goals at this time. Pt prognosis is Good.     Pt will continue to benefit from skilled outpatient occupational therapy to address the deficits listed in the problem list on initial evaluation provide pt/family education and to maximize pt's level of independence in the home and community environment.     Anticipated barriers to occupational therapy: transportation; specific diagnosis unknown     Pt's spiritual, cultural and educational needs considered and pt agreeable to plan of care and goals.    Goals:  Short Term Goals: 2-3 weeks   1) Pt will demonstrate understanding of pacing/energy conservation strategies to maximize performance with daily routine. ongoing  2) Pt will download chandra ([employer] Timer/[employer]) to develop coping strategies to decrease anxiety/stress/depression. ongoing  3) Pt will increase  strength on the left to 54# or more to increase independence with ADLs and IADLs (gripping spoon).  ongoing  4) Pt will increase left and right shoulder flexion and left abduction to at least 4/5 MMT grade to improve upper extremity strength needed for high level IADLs. ongoing  5) Pt will increase left and right elbow flexion to greater than or equal to 4/5 to improve upper extremity strength needed for home management tasks. ongoing  6) Pt will increase left pinch strength by greater than or equal to 3 psi all conditions (lateral: to 9 psi, 3pt and 2pt: to 6 psi) to improve strength needed for pressing on guitar strings. ongoing     Long Term Goals: 4 weeks   1) Pt will decrease score on 9HPT by 2 seconds or more bilaterally to improve fine motor coordination needed for leisure activities (playing guitar). ongoing  2) Pt will increase upper extremity AROM for L shoulder flexion and abduction by greater than or equal to 8 degrees (shld flex: to at least 143*; abd: 131*) to increase performance with kitchen tasks (reaching into overhead cabinets). ongoing  3) Pt will increase upper  extremity AROM for R shoulder flexion and abduction by greater than or equal to 8 degrees (shld flex: to least 143*; abd: 141*) to increase performance with kitchen tasks (reaching into overhead cabinets). ongoing  4) Pt will increase bilateral shoulder flexion and abduction to greater than or equal to 4+/5 to improve upper extremity strength needed for high level IADLs. ongoing  5) Pt will increase left and right elbow flexion to greater than or equal to 4+/5 to improve upper extremity strength needed for home management tasks. ongoing  6) Pt will increase L  strength to 60# or more to increase independence with ADLs and IADLs. ongoing  7) Pt will increase R  strength to 70# or more to increase independence with ADLs and IADLs. ongoing  8) Pt will play guCanaryr for 15 minutes or more with reports of increased performance since initial eval. ongoing  9) Pt will be independent with feeding, grooming, and dressing, reporting performing activities with decreased time and increased confidence. ongoing    Plan   Certification Period/Plan of care expiration: 7/30/2020 to 8/28/2020.     Outpatient Occupational Therapy 2 times weekly for 4 weeks to include the following interventions: Manual Therapy, Neuromuscular Re-ed, Patient Education, Self Care, Therapeutic Activites and Therapeutic Exercise.    Updates/Grading for next session: cont strengthening and FM coordination training, cont to monitor UE strength with MMT       Keturah Pierre, OT

## 2020-08-06 NOTE — PATIENT INSTRUCTIONS
Hip Abduction: Standing Side Leg Lift (Eccentric)        Lift leg out to side quickly. Slowly lower for 3-5 seconds. Perform reps per set, _2x10__ sets per day       https://ecce.[employer].us/69     Copyright © I. All rights reserved.   Heel Raises        Stand with support. Tighten pelvic floor and hold. With knees straight, raise heels off ground.   Repeat _20__ times. Do _1__ times a day.    Copyright © I. All rights reserved.   FUNCTIONAL MOBILITY: Marching - Standing        March in place by lifting left leg up, then right. Alternate.  __10_ reps per set, _2__ sets per day, ___ days per week Hold onto a support.    Copyright © I. All rights reserved.   Leg Flexion        Inhale. While exhaling, lift one ankle toward buttocks, keeping knees together. Slowly return to starting position.  Repeat __10__ times each leg. Do _2___ sets per session. Do ____ sessions per day.      Copyright © I. All rights reserved.   Hip Extension (Standing)        Stand with support. Squeeze pelvic floor and hold. Move right leg backward with straight knee. Hold for _2__ seconds. Relax for __2_ seconds.  Repeat _2x10__ times. Do ___ times a day.  Repeat with other leg.      Copyright © I. All rights reserved.   Mini-Squats (Standing)        Stand with support. Bend knees slightly. Tighten pelvic floor. Hold for 5___ seconds. Return to straight standing.   Repeat _2x10__ times.     Copyright © I. All rights reserved.

## 2020-08-06 NOTE — PROCEDURES
Procedures     EMG/NCS was performed in the lab on 8/4/2020. Report scanned into the chart          Sangeetha Boyer MD  Medicine-Neurology, Clinical Neurophysiology

## 2020-08-06 NOTE — PATIENT INSTRUCTIONS
Strengthening (Resistive Putty)        Squeeze putty using thumb and all fingers.    Copyright © I. All rights reserved.     Finger / Thumb Extensors        Place right fingers and thumb in center of putty donut, stretch out.    Copyright © I. All rights reserved.     Palmar Pinch Strengthening (Resistive Putty)        Pinch putty between thumb and each fingertip in turn.    Copyright © I. All rights reserved.     Lateral Pinch Strengthening (Resistive Putty)        Squeeze between thumb and side of each finger in turn.  Copyright © [employer]. All rights reserved.

## 2020-08-07 ENCOUNTER — CLINICAL SUPPORT (OUTPATIENT)
Dept: REHABILITATION | Facility: HOSPITAL | Age: 45
End: 2020-08-07
Attending: PSYCHIATRY & NEUROLOGY
Payer: COMMERCIAL

## 2020-08-07 DIAGNOSIS — Z78.9 IMPAIRED MOBILITY AND ADLS: ICD-10-CM

## 2020-08-07 DIAGNOSIS — R29.898 DECREASED GRIP STRENGTH: ICD-10-CM

## 2020-08-07 DIAGNOSIS — R29.898 UPPER EXTREMITY WEAKNESS: ICD-10-CM

## 2020-08-07 DIAGNOSIS — Z74.09 IMPAIRED FUNCTIONAL MOBILITY, BALANCE, GAIT, AND ENDURANCE: ICD-10-CM

## 2020-08-07 DIAGNOSIS — R29.818 FINE MOTOR IMPAIRMENT: ICD-10-CM

## 2020-08-07 DIAGNOSIS — Z74.09 IMPAIRED MOBILITY AND ADLS: ICD-10-CM

## 2020-08-07 DIAGNOSIS — R29.898 FINE MOTOR IMPAIRMENT: ICD-10-CM

## 2020-08-07 DIAGNOSIS — R29.898 LEG WEAKNESS, BILATERAL: ICD-10-CM

## 2020-08-07 PROCEDURE — 97110 THERAPEUTIC EXERCISES: CPT | Mod: PO,CQ

## 2020-08-07 PROCEDURE — 97110 THERAPEUTIC EXERCISES: CPT | Mod: PO

## 2020-08-07 PROCEDURE — 97530 THERAPEUTIC ACTIVITIES: CPT | Mod: PO

## 2020-08-07 NOTE — PROGRESS NOTES
Occupational Therapy Treatment Note     Date: 8/7/2020  Name: Rhett Quarles  [facility] Number: [id number]    Therapy Diagnosis:   Encounter Diagnoses   Name Primary?    Impaired mobility and ADLs     Upper extremity weakness     Decreased  strength     Fine motor impairment      Physician: Sangeetha Boyer MD    Physician Orders: Eval and Treat (Comment - Finger dexterity. Weakness all over)  Medical Diagnosis: R53.1 (ICD-10-CM) - Weakness  Evaluation Date: 7/30/2020  Insurance Authorization Period Expiration: 7/30/2021  Plan of Care Certification Period: 8/28/2020    Visit # / Visits authorized: 2 / 1 (pending further authorization)   Time In: 1415  Time Out: 1500  Total Billable Time: 45 minutes    Precautions:  Standard      Subjective     Pt reports: he feels like his muscles are getting smaller.   Response to previous treatment: pt reports he does not feel like he's getting worse  Functional change: ongoing    Involved Side: bilateral (L worse than R)   Dominant Side: Ambidextrous    Pain: 0/10  Location: N/A    Patient's Goals for Therapy: to increase strength in his hands/upper extremities and fine motor skills to be able to play guitar and participate in leisure activities; improve activity tolerance    Objective     Rhett received therapeutic exercises for 25 minutes including:  - UBE in standing on 3.0 resistance x8 min reversing midway; pt educated to use arms rather than trunk and on proper breathing techniques   - Isolated digit flexion with green digiflex with left hand and blue digiflex with right hand x2 minutes    With orange theraband pt performed the following exercises:  - Standing tricep dips 2 x15    - Standing bicep curls 2 x 15     Rhett participated in dynamic functional therapeutic activities to improve functional performance for 20 minutes, including:  - Digit opposition to  and release small poms, using green clothespin with left hand and blue clothespin with right   -  Using in hand manipulation (palm<>finger translation) pt picked up and placed small pegs into peg board x5 pegs at a time; x2 trials each hand  - Pt used green putty to perform tip pinches with left hand x1 minute; pt alternated digits and held putty as if it were his guitar handle  - Pt used right hand to  and release 20 small rings with tweezers     Home Exercises and Education Provided     Education provided:   - Alternate HEP days with rest days  - energy conservation and pacing   - practice playing his guitar  - Progress towards goals     Written Home Exercises Provided: yes.  Exercises were reviewed and Rhett was able to demonstrate them prior to the end of the session.  Rhett demonstrated good  understanding of the HEP provided.   .   See EMR under Patient Instructions for exercises provided 8/6/2020.        Assessment   Pt continues with bilateral weakness and impaired fine motor control. Pt cont to have increased difficulty performing fine motor coordination and strengthening activities with his left hand and cont with fast muscle fatigue. Pt would continue to benefit from skilled occupational therapy services for HEP guidance, upper extremity strengthening, and fine motor coordination training to maximize performance with meaningful occupations.     Rhett is progressing well towards his goals and there are no updates to goals at this time. Pt prognosis is Good.     Pt will continue to benefit from skilled outpatient occupational therapy to address the deficits listed in the problem list on initial evaluation provide pt/family education and to maximize pt's level of independence in the home and community environment.     Anticipated barriers to occupational therapy: transportation; specific diagnosis unknown     Pt's spiritual, cultural and educational needs considered and pt agreeable to plan of care and goals.    Goals:  Short Term Goals: 2-3 weeks   1) Pt will demonstrate understanding of  pacing/energy conservation strategies to maximize performance with daily routine. ongoing  2) Pt will download chandra ([employer] Timer/[employer]) to develop coping strategies to decrease anxiety/stress/depression. ongoing  3) Pt will increase  strength on the left to 54# or more to increase independence with ADLs and IADLs (gripping spoon).  ongoing  4) Pt will increase left and right shoulder flexion and left abduction to at least 4/5 MMT grade to improve upper extremity strength needed for high level IADLs. ongoing  5) Pt will increase left and right elbow flexion to greater than or equal to 4/5 to improve upper extremity strength needed for home management tasks. ongoing  6) Pt will increase left pinch strength by greater than or equal to 3 psi all conditions (lateral: to 9 psi, 3pt and 2pt: to 6 psi) to improve strength needed for pressing on guitar strings. ongoing     Long Term Goals: 4 weeks   1) Pt will decrease score on 9HPT by 2 seconds or more bilaterally to improve fine motor coordination needed for leisure activities (playing guitar). ongoing  2) Pt will increase upper extremity AROM for L shoulder flexion and abduction by greater than or equal to 8 degrees (shld flex: to at least 143*; abd: 131*) to increase performance with kitchen tasks (reaching into overhead cabinets). ongoing  3) Pt will increase upper extremity AROM for R shoulder flexion and abduction by greater than or equal to 8 degrees (shld flex: to least 143*; abd: 141*) to increase performance with kitchen tasks (reaching into overhead cabinets). ongoing  4) Pt will increase bilateral shoulder flexion and abduction to greater than or equal to 4+/5 to improve upper extremity strength needed for high level IADLs. ongoing  5) Pt will increase left and right elbow flexion to greater than or equal to 4+/5 to improve upper extremity strength needed for home management tasks. ongoing  6) Pt will increase L  strength to 60# or more to increase  independence with ADLs and IADLs. ongoing  7) Pt will increase R  strength to 70# or more to increase independence with ADLs and IADLs. ongoing  8) Pt will play guitar for 15 minutes or more with reports of increased performance since initial eval. ongoing  9) Pt will be independent with feeding, grooming, and dressing, reporting performing activities with decreased time and increased confidence. ongoing    Plan   Certification Period/Plan of care expiration: 7/30/2020 to 8/28/2020.     Outpatient Occupational Therapy 2 times weekly for 4 weeks to include the following interventions: Manual Therapy, Neuromuscular Re-ed, Patient Education, Self Care, Therapeutic Activites and Therapeutic Exercise.    Updates/Grading for next session: cont strengthening and FM coordination training, cont to monitor UE strength with MMT       Keturah Pierre, OT

## 2020-08-11 ENCOUNTER — PATIENT MESSAGE (OUTPATIENT)
Dept: ENDOCRINOLOGY | Facility: CLINIC | Age: 45
End: 2020-08-11

## 2020-08-11 DIAGNOSIS — D35.2 PITUITARY ADENOMA: Primary | ICD-10-CM

## 2020-08-14 ENCOUNTER — CLINICAL SUPPORT (OUTPATIENT)
Dept: REHABILITATION | Facility: HOSPITAL | Age: 45
End: 2020-08-14
Attending: PSYCHIATRY & NEUROLOGY
Payer: COMMERCIAL

## 2020-08-14 DIAGNOSIS — R29.898 FINE MOTOR IMPAIRMENT: ICD-10-CM

## 2020-08-14 DIAGNOSIS — Z78.9 IMPAIRED MOBILITY AND ADLS: ICD-10-CM

## 2020-08-14 DIAGNOSIS — Z74.09 IMPAIRED FUNCTIONAL MOBILITY, BALANCE, GAIT, AND ENDURANCE: ICD-10-CM

## 2020-08-14 DIAGNOSIS — Z74.09 IMPAIRED MOBILITY AND ADLS: ICD-10-CM

## 2020-08-14 DIAGNOSIS — R29.818 FINE MOTOR IMPAIRMENT: ICD-10-CM

## 2020-08-14 DIAGNOSIS — R29.898 UPPER EXTREMITY WEAKNESS: ICD-10-CM

## 2020-08-14 DIAGNOSIS — R29.898 DECREASED GRIP STRENGTH: ICD-10-CM

## 2020-08-14 DIAGNOSIS — R29.898 LEG WEAKNESS, BILATERAL: ICD-10-CM

## 2020-08-14 PROCEDURE — 97110 THERAPEUTIC EXERCISES: CPT | Mod: PO,CQ

## 2020-08-14 PROCEDURE — 97530 THERAPEUTIC ACTIVITIES: CPT | Mod: PO

## 2020-08-14 PROCEDURE — 97110 THERAPEUTIC EXERCISES: CPT | Mod: PO

## 2020-08-14 NOTE — PROGRESS NOTES
"  Physical Therapy Treatment Note     Name: Rhett Ng [facility]  Clinic Number: [id number]    Therapy Diagnosis: No diagnosis found.  Physician: Sangeetha Boyer MD    Visit Date: 8/14/2020    Physician Orders: PT Eval and Treat   Medical Diagnosis from Referral: Idiopathic progressive polyneuropathy  Evaluation Date: 7/23/2020  Authorization Period Expiration: 07/20/20 to 12/31/20  Plan of Care Expiration: 09/17/20  Visit # / Visits authorized: 04/ 20    Time In: 1545  Time Out: 1630  Total Billable Time: 45 minutes    Precautions: Standard, h/o COVID-19 infection, s/p macroadenoma resection    Subjective     Pt reports: " I had a pretty bad week this week, my arms and legs are feeling more weak this week and I'm having a lot of twitching in my muscles."   He will be given an HEP today   Response to previous treatment: sore  Functional change: ongoing    Pain: 0/10  Location: N/A     Objective     Rhett received therapeutic exercises to develop strength, endurance, ROM, flexibility, posture and core stabilization for 45 minutes including:  X 10 min on  recumbent bike.  B UE/B LE on level 1.0    EOM:  2 x 30 sec of sitting piriformis stretches   2 x 30 sec of B LE HS stretches    Supine:   X 2 min of LTR with green physioball  X 2 min of DKTC with green physioball  2 x 10 reps of bridges with 3 sec hold  2 x 10 reps of B LE SLR    Tall Kneeling:  1 x 10 reps of B UE overhead flex/ext with yellow weighted ball, SBA    Rhett participated in neuromuscular re-education activities to improve: Balance, Coordination, Kinesthetic, Sense, Proprioception and Posture for 0 minutes. The following activities were included:      Rhett participated in dynamic functional therapeutic activities to improve functional performance for 0  minutes, including:      Rhett participated in gait training to improve functional mobility and safety for 0 minutes, including:            Home Exercises Provided and Patient Education Provided "     Education provided:   HEP    Written Home Exercises Provided: yes.  Exercises were reviewed and Rhett was able to demonstrate them prior to the end of the session.  Rhett demonstrated good  understanding of the education provided.     See EMR under Patient Instructions for exercises provided 8/6/2020.    Assessment     Rhett tolerated tx session well despite feeling weaker this week.  Rhett was able to increase time on the bike and cont with stretching and strengthening exercises.  Pt began core strengthening exercises today in supine and tall kneeling with no problems.  Pt is very motivated.    Cont with plan of care.    Rhett is progressing well towards his goals.   Pt prognosis is Good.     Pt will continue to benefit from skilled outpatient physical therapy to address the deficits listed in the problem list box on initial evaluation, provide pt/family education and to maximize pt's level of independence in the home and community environment.     Pt's spiritual, cultural and educational needs considered and pt agreeable to plan of care and goals.     Anticipated barriers to physical therapy: co-morbidities, emotional deficits     Medical Necessity is demonstrated by the following  History  Co-morbidities and personal factors that may impact the plan of care Co-morbidities:   Muscle twitching, diplopia, convergence insufficiency, pituitary adenoma c/ macroadenoma resection, fatigue, COVID-19 virus, snoring     Personal Factors:   coping style       high   Examination  Body Structures and Functions, activity limitations and participation restrictions that may impact the plan of care Body Regions:   lower extremities  trunk     Body Systems:    gross symmetry  ROM  strength  gross coordinated movement  balance  gait  transfers  transitions  motor control  motor learning     Participation Restrictions:   None noted     Activity limitations:   Learning and applying knowledge  no deficits     General Tasks and  Commands  no deficits     Communication  no deficits     Mobility  lifting and carrying objects  fine hand use (grasping/picking up)  walking  driving (bike, car, motorcycle)     Self care  no deficits     Domestic Life  no deficits     Interactions/Relationships  no deficits     Life Areas  employment     Community and Social Life  community life  recreation and leisure             moderate   Clinical Presentation evolving clinical presentation with changing clinical characteristics moderate   Decision Making/ Complexity Score: moderate      Goals:   Short Term Goals: 4 weeks   1. Patient to be (I) with established HEP.   2. Patient to complete OT evaluation for further assessment of UE and fine motor deficits.   3. Patient to improve bilateral hip flexion and hip extension strength scores to at least 4-/5 for improved stability with community ambulation.   4. Patient to improve bilateral knee flexion and extension strength scores to at least 4/5 for improved stability with community ambulation.   5. Patient to improve FGA score to at least 25/30 for improved balance and decreased fall risk in community settings.   6. Patient to improve BLE SLS scores to at least 8 sec for improved balance with standing ADL.   7. PT to formally assess 6 minute walk test and establish appropriate goals for formal assessment of endurance deficits.      Long Term Goals: 8 weeks   1. Patient to be (I) with advanced HEP.   2. Patient to improve bilateral gross hip strength scores to at least 4/5 for improved stability with community ambulation.   3. Patient to improve bilateral knee flexion and extension strength scores to at least 4+/5 for improved stability with community ambulation.   4. Patient to improve FGA score to at least 28/30 for improved balance and decreased fall risk in community settings.   5. Patient to improve BLE SLS scores to at least 12 sec for improved balance with standing ADL.       Plan     Cont with  strengthening, endurance and balance    Renee Chakraborty, PTA

## 2020-08-14 NOTE — PROGRESS NOTES
Occupational Therapy Treatment Note     Date: 8/14/2020  Name: Rhett Quarles  [facility] Number: [id number]    Therapy Diagnosis:   Encounter Diagnoses   Name Primary?    Impaired mobility and ADLs     Upper extremity weakness     Decreased  strength     Fine motor impairment      Physician: Sangeetha Boyer MD    Physician Orders: Eval and Treat (Comment - Finger dexterity. Weakness all over)  Medical Diagnosis: R53.1 (ICD-10-CM) - Weakness  Evaluation Date: 7/30/2020  Insurance Authorization Period Expiration: 7/30/2021  Plan of Care Certification Period: 8/28/2020    Visit # / Visits authorized: 3 / 1 (pending further authorization)   Time In: 1500  Time Out: 1546  Total Billable Time: 46 minutes    Precautions:  Standard      Subjective     Pt reports: he has been feeling really weak this past week. He says that his muscles are twitching a lot, especially at rest. Pt reports increased frustration, stating that the doctors still don't have any definite answers. Pt also reported that it is very difficulty for him to hold things (such as his guitar pic) and that picking up things like his phone and his guitar seem extremely heavy.   Response to previous treatment: ongoing  Functional change: ongoing    Involved Side: bilateral (L worse than R)   Dominant Side: Ambidextrous    Pain: 0/10  Location: N/A    Patient's Goals for Therapy: to increase strength in his hands/upper extremities and fine motor skills to be able to play guitar and participate in leisure activities; improve activity tolerance    Objective     Rhett received therapeutic exercises for 15 minutes including:  - Gross grasp with blue digiflex x1 minute each hand  - Isolated digit flexion with green digiflex with left hand and blue digiflex with right hand x2 minutes    - UBE in standing on 5.0 resistance x5 min reversing midway; pt educated to use arms rather than trunk and on proper breathing techniques     Rhett participated in dynamic  functional therapeutic activities to improve functional performance for 31 minutes, including:  - Therapeutic listening: Pt arrived to therapy session this date expressing increased frustration with his current condition. He has been feeling weaker this past week and feels like he fatigues extremely quickly. A discussion was held regarding coping strategies, acknowledging his feelings of anger/stress/depression so that he can then address them, and information regarding continuing to see his therapist and/or setting time aside for relaxation/meditation. Pt was provided with information on the [email or web address], and pt reported that he will look at it before next session. Pt was agreeable to all education topics.   - Using in hand manipulation (palm<>finger translation) pt picked up and placed small pegs into peg board x10 pegs at a time; x1 trial each hand  - To simulate playing the guitar, pt used both hands to place rubber bands around small pegs placed in peg board. While challenging tip pinch strength to hold guitar pick with right hand to strum resistive strings, pt simultaneously performed isolated digit flexion with green digiflex with left hand. Pt performed activity x3 minutes.   - Pt used green putty to perform tip pinches with right hand x3 trials    Home Exercises and Education Provided     Education provided:   - [employer] wilda/coping strategies  - Continue HEP/alternating theraband with putty days  - energy conservation and pacing   - practice playing his guitar  - Progress towards goals     Written Home Exercises Provided: yes.  Exercises were reviewed and Rhett was able to demonstrate them prior to the end of the session.  Rhett demonstrated good  understanding of the HEP provided.   .   See EMR under Patient Instructions for exercises provided 8/6/2020.        Assessment   Pt continues with bilateral upper extremity weakness and impaired fine motor control with varying severity week to week. Pt  cont to have increased difficulty performing fine motor coordination, picking things up, controlling his UEs, and fast muscle fatigue. Pt would continue to benefit from skilled occupational therapy services for HEP guidance, upper extremity strengthening, and fine motor coordination training to maximize performance with meaningful occupations.     Rhett is progressing well towards his goals and there are no updates to goals at this time. Pt prognosis is Good.     Pt will continue to benefit from skilled outpatient occupational therapy to address the deficits listed in the problem list on initial evaluation provide pt/family education and to maximize pt's level of independence in the home and community environment.     Anticipated barriers to occupational therapy: transportation; specific diagnosis unknown     Pt's spiritual, cultural and educational needs considered and pt agreeable to plan of care and goals.    Goals:  Short Term Goals: 2-3 weeks   1) Pt will demonstrate understanding of pacing/energy conservation strategies to maximize performance with daily routine. ongoing  2) Pt will download chandra ([employer] Timer/Dine in) to develop coping strategies to decrease anxiety/stress/depression. ongoing  3) Pt will increase  strength on the left to 54# or more to increase independence with ADLs and IADLs (gripping spoon).  ongoing  4) Pt will increase left and right shoulder flexion and left abduction to at least 4/5 MMT grade to improve upper extremity strength needed for high level IADLs. ongoing  5) Pt will increase left and right elbow flexion to greater than or equal to 4/5 to improve upper extremity strength needed for home management tasks. ongoing  6) Pt will increase left pinch strength by greater than or equal to 3 psi all conditions (lateral: to 9 psi, 3pt and 2pt: to 6 psi) to improve strength needed for pressing on guitar strings. ongoing     Long Term Goals: 4 weeks   1) Pt will decrease score on 9HPT  by 2 seconds or more bilaterally to improve fine motor coordination needed for leisure activities (playing guitar). ongoing  2) Pt will increase upper extremity AROM for L shoulder flexion and abduction by greater than or equal to 8 degrees (shld flex: to at least 143*; abd: 131*) to increase performance with kitchen tasks (reaching into overhead cabinets). ongoing  3) Pt will increase upper extremity AROM for R shoulder flexion and abduction by greater than or equal to 8 degrees (shld flex: to least 143*; abd: 141*) to increase performance with kitchen tasks (reaching into overhead cabinets). ongoing  4) Pt will increase bilateral shoulder flexion and abduction to greater than or equal to 4+/5 to improve upper extremity strength needed for high level IADLs. ongoing  5) Pt will increase left and right elbow flexion to greater than or equal to 4+/5 to improve upper extremity strength needed for home management tasks. ongoing  6) Pt will increase L  strength to 60# or more to increase independence with ADLs and IADLs. ongoing  7) Pt will increase R  strength to 70# or more to increase independence with ADLs and IADLs. ongoing  8) Pt will play guitar for 15 minutes or more with reports of increased performance since initial eval. ongoing  9) Pt will be independent with feeding, grooming, and dressing, reporting performing activities with decreased time and increased confidence. ongoing    Plan   Certification Period/Plan of care expiration: 7/30/2020 to 8/28/2020.     Outpatient Occupational Therapy 2 times weekly for 4 weeks to include the following interventions: Manual Therapy, Neuromuscular Re-ed, Patient Education, Self Care, Therapeutic Activites and Therapeutic Exercise.    Updates/Grading for next session: cont strengthening and FM coordination training, cont to monitor UE strength with MMT       Keturah Pierre, OT

## 2020-08-17 ENCOUNTER — LAB VISIT (OUTPATIENT)
Dept: LAB | Facility: HOSPITAL | Age: 45
End: 2020-08-17
Attending: HOSPITALIST
Payer: COMMERCIAL

## 2020-08-17 DIAGNOSIS — E29.1 HYPOGONADISM IN MALE: ICD-10-CM

## 2020-08-17 DIAGNOSIS — D35.2 PITUITARY ADENOMA: ICD-10-CM

## 2020-08-17 DIAGNOSIS — R53.83 FATIGUE, UNSPECIFIED TYPE: ICD-10-CM

## 2020-08-17 LAB
25(OH)D3+25(OH)D2 SERPL-MCNC: 43 NG/ML (ref 30–96)
CORTIS SERPL-MCNC: 9.1 UG/DL (ref 4.3–22.4)
ESTIMATED AVG GLUCOSE: 108 MG/DL (ref 68–131)
HBA1C MFR BLD HPLC: 5.4 % (ref 4–5.6)
SARS-COV-2 IGG SERPLBLD QL IA.RAPID: POSITIVE
TESTOST SERPL-MCNC: 276 NG/DL (ref 304–1227)
TSH SERPL DL<=0.005 MIU/L-ACNC: 2.17 UIU/ML (ref 0.4–4)

## 2020-08-17 PROCEDURE — 84403 ASSAY OF TOTAL TESTOSTERONE: CPT

## 2020-08-17 PROCEDURE — 84443 ASSAY THYROID STIM HORMONE: CPT

## 2020-08-17 PROCEDURE — 84305 ASSAY OF SOMATOMEDIN: CPT

## 2020-08-17 PROCEDURE — 82533 TOTAL CORTISOL: CPT

## 2020-08-17 PROCEDURE — 86769 SARS-COV-2 COVID-19 ANTIBODY: CPT

## 2020-08-17 PROCEDURE — 36415 COLL VENOUS BLD VENIPUNCTURE: CPT

## 2020-08-17 PROCEDURE — 82306 VITAMIN D 25 HYDROXY: CPT

## 2020-08-17 PROCEDURE — 83036 HEMOGLOBIN GLYCOSYLATED A1C: CPT

## 2020-08-19 LAB
IGF-I SERPL-MCNC: 83 NG/ML (ref 44–275)
IGF-I Z-SCORE SERPL: -1.04 SD

## 2020-08-21 ENCOUNTER — CLINICAL SUPPORT (OUTPATIENT)
Dept: REHABILITATION | Facility: HOSPITAL | Age: 45
End: 2020-08-21
Attending: PSYCHIATRY & NEUROLOGY
Payer: COMMERCIAL

## 2020-08-21 DIAGNOSIS — R29.898 UPPER EXTREMITY WEAKNESS: ICD-10-CM

## 2020-08-21 DIAGNOSIS — R29.898 LEG WEAKNESS, BILATERAL: ICD-10-CM

## 2020-08-21 DIAGNOSIS — R29.818 FINE MOTOR IMPAIRMENT: ICD-10-CM

## 2020-08-21 DIAGNOSIS — Z74.09 IMPAIRED FUNCTIONAL MOBILITY, BALANCE, GAIT, AND ENDURANCE: ICD-10-CM

## 2020-08-21 DIAGNOSIS — Z78.9 IMPAIRED MOBILITY AND ADLS: ICD-10-CM

## 2020-08-21 DIAGNOSIS — R29.898 FINE MOTOR IMPAIRMENT: ICD-10-CM

## 2020-08-21 DIAGNOSIS — Z74.09 IMPAIRED MOBILITY AND ADLS: ICD-10-CM

## 2020-08-21 DIAGNOSIS — R29.898 DECREASED GRIP STRENGTH: ICD-10-CM

## 2020-08-21 PROCEDURE — 97530 THERAPEUTIC ACTIVITIES: CPT | Mod: PO

## 2020-08-21 PROCEDURE — 97110 THERAPEUTIC EXERCISES: CPT | Mod: PO,CQ

## 2020-08-21 PROCEDURE — 97110 THERAPEUTIC EXERCISES: CPT | Mod: PO

## 2020-08-21 NOTE — PROGRESS NOTES
"  Physical Therapy Treatment Note     Name: Rhett Ng [facility]  Clinic Number: [id number]    Therapy Diagnosis:   Encounter Diagnoses   Name Primary?    Impaired functional mobility, balance, gait, and endurance     Leg weakness, bilateral      Physician: Sangeetha Boyer MD    Visit Date: 8/21/2020    Physician Orders: PT Eval and Treat   Medical Diagnosis from Referral: Idiopathic progressive polyneuropathy  Evaluation Date: 7/23/2020  Authorization Period Expiration: 07/20/20 to 12/31/20  Plan of Care Expiration: 09/17/20  Visit # / Visits authorized: 05/ 20    Time In: 1415  Time Out: 1500  Total Billable Time: 45 minutes    Precautions: Standard, h/o COVID-19 infection, s/p macroadenoma resection    Subjective     Pt reports: " I'm feeling a little bit better today, well at least better than last week."   He will be given an HEP today   Response to previous treatment: sore  Functional change: ongoing    Pain: 0/10  Location: N/A     Objective     Rhett received therapeutic exercises to develop strength, endurance, ROM, flexibility, posture and core stabilization for 45 minutes including:  X 10 min on Stationary  bike.  B UE/B LE on level 5.0    Standing B LE HS stretches 2 x 30 sec on steps with 1 UE support   2 x 30 sec of B LE HC stretches on incline with     Sitting EOM:  2 x 30 sec of B LE piriformis stretches    Supine:   2 x 10 reps of B LE SLR, #1.5 added  2 x 10 reps of B LE hooklying marching with #1.5 cuff weights  2 x 10 reps of bridges with green physioball, 3 sec hold.    Tall Kneeling:  2 x 10 reps of B UE overhead flex/ext with yellow weighted ball  2 x 10 reps of B UE chest press with  Yellow weighted ball  X 60 sec of LTR with yellow weighted ball    Quadriped:  1 x 10 reps of B LE hip flex/ext    Rhett participated in neuromuscular re-education activities to improve: Balance, Coordination, Kinesthetic, Sense, Proprioception and Posture for 0 minutes. The following activities were " included:      Rhett participated in dynamic functional therapeutic activities to improve functional performance for 0  minutes, including:      Rhett participated in gait training to improve functional mobility and safety for 0 minutes, including:            Home Exercises Provided and Patient Education Provided     Education provided:   HEP    Written Home Exercises Provided: yes.  Exercises were reviewed and Rhett was able to demonstrate them prior to the end of the session.  Rhett demonstrated good  understanding of the education provided.     See EMR under Patient Instructions for exercises provided 8/6/2020.    Assessment     Rhett tolerated tx session well and cont to report compliance with HEP even though he gets sore from exercises.  Rhett reports doing exercises every other day and progressed to the stationary bike today with added resistance.  Rhett cont with B LE and core strengthening and progressed to weights for SLR, quadriped and the green theraball for supine core strengthening.    Cont with plan of care.    Rhett is progressing well towards his goals.   Pt prognosis is Good.     Pt will continue to benefit from skilled outpatient physical therapy to address the deficits listed in the problem list box on initial evaluation, provide pt/family education and to maximize pt's level of independence in the home and community environment.     Pt's spiritual, cultural and educational needs considered and pt agreeable to plan of care and goals.     Anticipated barriers to physical therapy: co-morbidities, emotional deficits     Medical Necessity is demonstrated by the following  History  Co-morbidities and personal factors that may impact the plan of care Co-morbidities:   Muscle twitching, diplopia, convergence insufficiency, pituitary adenoma c/ macroadenoma resection, fatigue, COVID-19 virus, snoring     Personal Factors:   coping style       high   Examination  Body Structures and Functions, activity limitations  and participation restrictions that may impact the plan of care Body Regions:   lower extremities  trunk     Body Systems:    gross symmetry  ROM  strength  gross coordinated movement  balance  gait  transfers  transitions  motor control  motor learning     Participation Restrictions:   None noted     Activity limitations:   Learning and applying knowledge  no deficits     General Tasks and Commands  no deficits     Communication  no deficits     Mobility  lifting and carrying objects  fine hand use (grasping/picking up)  walking  driving (bike, car, motorcycle)     Self care  no deficits     Domestic Life  no deficits     Interactions/Relationships  no deficits     Life Areas  employment     Community and Social Life  community life  recreation and leisure             moderate   Clinical Presentation evolving clinical presentation with changing clinical characteristics moderate   Decision Making/ Complexity Score: moderate      Goals:   Short Term Goals: 4 weeks   1. Patient to be (I) with established HEP.   2. Patient to complete OT evaluation for further assessment of UE and fine motor deficits.   3. Patient to improve bilateral hip flexion and hip extension strength scores to at least 4-/5 for improved stability with community ambulation.   4. Patient to improve bilateral knee flexion and extension strength scores to at least 4/5 for improved stability with community ambulation.   5. Patient to improve FGA score to at least 25/30 for improved balance and decreased fall risk in community settings.   6. Patient to improve BLE SLS scores to at least 8 sec for improved balance with standing ADL.   7. PT to formally assess 6 minute walk test and establish appropriate goals for formal assessment of endurance deficits.      Long Term Goals: 8 weeks   1. Patient to be (I) with advanced HEP.   2. Patient to improve bilateral gross hip strength scores to at least 4/5 for improved stability with community ambulation.   3.  Patient to improve bilateral knee flexion and extension strength scores to at least 4+/5 for improved stability with community ambulation.   4. Patient to improve FGA score to at least 28/30 for improved balance and decreased fall risk in community settings.   5. Patient to improve BLE SLS scores to at least 12 sec for improved balance with standing ADL.       Plan     Cont with strengthening, endurance and balance    Renee Chakraborty, PTA

## 2020-08-21 NOTE — PROGRESS NOTES
Occupational Therapy Treatment Note     Date: 8/21/2020  Name: Rhett Quarles  [facility] Number: [id number]    Therapy Diagnosis:   Encounter Diagnoses   Name Primary?    Impaired mobility and ADLs     Upper extremity weakness     Decreased  strength     Fine motor impairment      Physician: Sangeetha Boyer MD    Physician Orders: Eval and Treat (Comment - Finger dexterity. Weakness all over)  Medical Diagnosis: R53.1 (ICD-10-CM) - Weakness  Evaluation Date: 7/30/2020  Insurance Authorization Period Expiration: 8/7/2021  Plan of Care Certification Period: 8/28/2020    Visit # / Visits authorized: 4 / 1 (pending further authorization)   Time In: 1503  Time Out: 1546  Total Billable Time: 42 minutes    Precautions:  Standard      Subjective     Pt reports: he has an appointment with his endocrinologist on Monday. He reports that he is still losing weight, but that he feels better this week than last.   Response to previous treatment: ongoing  Functional change: ongoing    Involved Side: bilateral (L worse than R)   Dominant Side: Ambidextrous    Pain: 0/10  Location: N/A    Patient's Goals for Therapy: to increase strength in his hands/upper extremities and fine motor skills to be able to play guitar and participate in leisure activities; improve activity tolerance    Objective     Rhett received therapeutic exercises for 34 minutes including:  - UBE in standing to increase UE strength and activity tolerance, on 3.5 resistance x8 min reversing midway; pt educated to use arms rather than trunk and on proper breathing techniques   - Reciprocal ball toss with yellow medicine ball, 2 10 reps   - IR/ER towel stretch   - Gross grasp with blue digiflex x1 minute each hand  - Isolated digit flexion with Varigrip on 7#, x1 minute each hand   - Wrist flexion/extension stretches     Rhett participated in dynamic functional therapeutic activities to improve functional performance for 8 minutes, including:  - Picked up  and placed 5 coins at time into 'bank' using palm<>finger translation     Home Exercises and Education Provided     Education provided:   - Insight timer chandra/coping strategies  - Continue HEP/alternating theraband with putty days  - energy conservation and pacing   - practice playing his guitar  - Progress towards goals     Written Home Exercises Provided: yes.  Exercises were reviewed and Rhett was able to demonstrate them prior to the end of the session.  Rhett demonstrated good  understanding of the HEP provided.   .   See EMR under Patient Instructions for exercises provided 8/6/2020.      Assessment   Pt continues with bilateral upper extremity weakness and impaired fine motor control with varying severity week to week. Pt cont to have increased difficulty performing fine motor coordination, grasping objects, controlling his UEs, and fast muscle fatigue. Pt would continue to benefit from skilled occupational therapy services for HEP guidance, upper extremity strengthening, and fine motor coordination training to maximize performance with meaningful occupations.     Rhett is progressing well towards his goals and there are no updates to goals at this time. Pt prognosis is Good.     Pt will continue to benefit from skilled outpatient occupational therapy to address the deficits listed in the problem list on initial evaluation provide pt/family education and to maximize pt's level of independence in the home and community environment.     Anticipated barriers to occupational therapy: transportation; specific diagnosis unknown     Pt's spiritual, cultural and educational needs considered and pt agreeable to plan of care and goals.    Goals:  Short Term Goals: 2-3 weeks   1) Pt will demonstrate understanding of pacing/energy conservation strategies to maximize performance with daily routine. ongoing  2) Pt will download chandra ([employer] Timer/MyVerse) to develop coping strategies to decrease anxiety/stress/depression.  ongoing  3) Pt will increase  strength on the left to 54# or more to increase independence with ADLs and IADLs (gripping spoon).  ongoing  4) Pt will increase left and right shoulder flexion and left abduction to at least 4/5 MMT grade to improve upper extremity strength needed for high level IADLs. ongoing  5) Pt will increase left and right elbow flexion to greater than or equal to 4/5 to improve upper extremity strength needed for home management tasks. ongoing  6) Pt will increase left pinch strength by greater than or equal to 3 psi all conditions (lateral: to 9 psi, 3pt and 2pt: to 6 psi) to improve strength needed for pressing on guitar strings. ongoing     Long Term Goals: 4 weeks   1) Pt will decrease score on 9HPT by 2 seconds or more bilaterally to improve fine motor coordination needed for leisure activities (playing guitar). ongoing  2) Pt will increase upper extremity AROM for L shoulder flexion and abduction by greater than or equal to 8 degrees (shld flex: to at least 143*; abd: 131*) to increase performance with kitchen tasks (reaching into overhead cabinets). ongoing  3) Pt will increase upper extremity AROM for R shoulder flexion and abduction by greater than or equal to 8 degrees (shld flex: to least 143*; abd: 141*) to increase performance with kitchen tasks (reaching into overhead cabinets). ongoing  4) Pt will increase bilateral shoulder flexion and abduction to greater than or equal to 4+/5 to improve upper extremity strength needed for high level IADLs. ongoing  5) Pt will increase left and right elbow flexion to greater than or equal to 4+/5 to improve upper extremity strength needed for home management tasks. ongoing  6) Pt will increase L  strength to 60# or more to increase independence with ADLs and IADLs. ongoing  7) Pt will increase R  strength to 70# or more to increase independence with ADLs and IADLs. ongoing  8) Pt will play guitar for 15 minutes or more with reports  of increased performance since initial eval. ongoing  9) Pt will be independent with feeding, grooming, and dressing, reporting performing activities with decreased time and increased confidence. ongoing    Plan   Certification Period/Plan of care expiration: 7/30/2020 to 8/28/2020.     Outpatient Occupational Therapy 2 times weekly for 4 weeks to include the following interventions: Manual Therapy, Neuromuscular Re-ed, Patient Education, Self Care, Therapeutic Activites and Therapeutic Exercise.    Updates/Grading for next session: cont strengthening and FM coordination training, cont to monitor UE strength with MMT, add rows to HEP, Update POC on 8/27/2020      Keturah Pierre, OT

## 2020-08-25 ENCOUNTER — OFFICE VISIT (OUTPATIENT)
Dept: ENDOCRINOLOGY | Facility: CLINIC | Age: 45
End: 2020-08-25
Payer: COMMERCIAL

## 2020-08-25 DIAGNOSIS — D35.2: Primary | ICD-10-CM

## 2020-08-25 DIAGNOSIS — D75.1 POLYCYTHEMIA: ICD-10-CM

## 2020-08-25 DIAGNOSIS — R53.83 FATIGUE, UNSPECIFIED TYPE: ICD-10-CM

## 2020-08-25 DIAGNOSIS — R53.1 WEAKNESS: ICD-10-CM

## 2020-08-25 DIAGNOSIS — E29.1 HYPOGONADISM IN MALE: ICD-10-CM

## 2020-08-25 PROCEDURE — 99214 PR OFFICE/OUTPT VISIT, EST, LEVL IV, 30-39 MIN: ICD-10-PCS | Mod: 95,,, | Performed by: INTERNAL MEDICINE

## 2020-08-25 PROCEDURE — 99214 OFFICE O/P EST MOD 30 MIN: CPT | Mod: 95,,, | Performed by: INTERNAL MEDICINE

## 2020-08-25 RX ORDER — DEXAMETHASONE 1 MG/1
1 TABLET ORAL ONCE
Qty: 1 TABLET | Refills: 0 | Status: SHIPPED | OUTPATIENT
Start: 2020-08-25 | End: 2020-08-25

## 2020-08-25 NOTE — Clinical Note
Please arrange for patient to come  salivary cortisol kits and urine collection container w/ urine lab slips. Needs to schedule 8 am cortisol in the next 1-2 weeks and have f/u HVF.      3 mo f/u in person pit clinic with 8 am testosterone, TSH, fT4.

## 2020-08-25 NOTE — PROGRESS NOTES
[facility] ENDOCRINOLOGY POSTOP FOLLOW-UP      The patient location is:  home  The chief complaint leading to consultation is:  Follow-up pituitary tumor status post resection    Visit type: audiovisual    Face to Face time with patient:  25   minutes of total time spent on the encounter, which includes face to face time and non-face to face time preparing to see the patient (eg, review of tests), Obtaining and/or reviewing separately obtained history, Documenting clinical information in the electronic or other health record, Independently interpreting results (not separately reported) and communicating results to the patient/family/caregiver, or Care coordination (not separately reported).     Each patient to whom he or she provides medical services by telemedicine is:  (1) informed of the relationship between the physician and patient and the respective role of any other health care provider with respect to management of the patient; and (2) notified that he or she may decline to receive medical services by telemedicine and may withdraw from such care at any time.    The patient's last visit with me was on 5/19/2020.     Patient ID: Rhett Quarles is a 45 y.o. male.    Chief Complaint:   follow-up for corticotroph macroadenoma s/p resection     HPI:   Rhett Quarles is a 45 y.o.  male who underwent transsphenoidal resection (by Dr. Argueta) for 2.7 cm pituitary adenoma found on MRI done for diplopia.  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      Underwent TSR 02/10/2020  Pathology - [facility]: CORTICOTROPH ADENOMA, IMMUNOREACTIVE FOR ACTH AND T-Pit  Initial presentation: MRI done for diplopia w/ finding of macroadenoma.     Imaging:                                   MRI 1/3/2020 - Pituitary is enlarged measuring 2.5 x 2.7 x 1.8 cm with thickening of the infundibulum, consistent with macroadenoma.  The pituitary gland approaches optic nerves  "particularly on the RIGHT yet does not grossly displace the optic nerves.  There is remodeling of the sella and extension into the sphenoid sinus    Interval Hx:   He has recovered from COVID infection.  Has lost 17 lbs since surgery which started prior to covid infection.  Weight loss has recently slowed down.      Has been having muscle twitching, seeing neurology, had EMG.  Also noted to have elevated H/H.  No fhx of polycythemia or hemochromatosis that he knows of.  Testosterone remains low but has been improving.      Been off Hydrocortisone 20/10 mg since 3/4/2020.    Not on thyroid medication    No headache no blurry vision  Low libdo, but able to have erection     Current symptoms:  Headache: reports headaches-- states improving after surgery  Vision change:  Reports improvement     Formal VF:  Dr. Winston 1/29/2020 (pre-op)  "no evidence of visual pathway effects from the pituitary adenoma"  due for repeat in 2 mo     hypogonadotropic hypogonadism:  Testosterone improving, now in high 200s.  (preop had level of 94)  Reports normal libido  Denies ED.  Not on testosterone replacement  DEXA: not done  Does have persistently elevated H/H, no hx of clots.      ROS: denies CP/SOB. + for muscle twitching, + fatigue and wt loss.    Objective:   Physical Exam  Constitutional:       Comments: Physical exam and Vital signs were not done due to virtual visit given current COVID pandemic     Constitutional:  Pleasant,  in no acute distress.   HENT:   Eyes:     No scleral icterus.   Respiratory:   Effort normal   Neurological:  normal speech  Psych:   Normal mood and affect.      There were no vitals taken for this visit.    There is no height or weight on file to calculate BMI.    Lab Review:   Lab Results   Component Value Date    HGBA1C 5.4 08/17/2020     Lab Results   Component Value Date    CHOL 194 12/03/2019    HDL 38 (L) 12/03/2019    LDLCALC 122.6 12/03/2019    TRIG 167 (H) 12/03/2019    CHOLHDL 19.6 (L) " 12/03/2019     Lab Results   Component Value Date     07/20/2020    K 4.2 07/20/2020     07/20/2020    CO2 27 07/20/2020    GLU 90 07/20/2020    BUN 10 07/20/2020    CREATININE 1.0 07/20/2020    CALCIUM 9.6 07/20/2020    PROT 8.3 07/20/2020    ALBUMIN 4.7 07/20/2020    BILITOT 0.7 07/20/2020    ALKPHOS 76 07/20/2020    AST 27 07/20/2020    ALT 36 07/20/2020    ANIONGAP 13 07/20/2020    ESTGFRAFRICA >60 07/20/2020    EGFRNONAA >60 07/20/2020    TSH 2.167 08/17/2020     Vit D, 25-Hydroxy   Date Value Ref Range Status   08/17/2020 43 30 - 96 ng/mL Final     Comment:     Vitamin D deficiency.........<10 ng/mL                              Vitamin D insufficiency......10-29 ng/mL       Vitamin D sufficiency........> or equal to 30 ng/mL  Vitamin D toxicity............>100 ng/mL         Assessment and Plan     Problem List Items Addressed This Visit        1 - High    Corticotroph adenoma - Primary     Did not have symptoms of hypercortisolemia prior to TSR however has had unexplained wt loss since resection.  We discussed that he may have had corticotroph adenoma that was not making biochemically active ACTH or may have had undetected hypercortisolemia prior to surgery.  Will check LNSC x2, 24H UFC, and 1 mg DST to confirm no evidence of ongoing hypercortisolemia.           Relevant Medications    dexAMETHasone (DECADRON) 1 MG Tab    Other Relevant Orders    Cortisol, urine, free [facility]; 24 Hours    Creatinine, urine, timed 24 Hours    Cortisol, Saliva    Cortisol, Saliva    Cortisol, 8AM    Dexamethasone       2     Hypogonadism in male     Testosterone improving, hold off on replacement, check in 3 mo.           Relevant Orders    CBC auto differential    Testosterone       3     Fatigue     Repeat testosterone and thyroid labs in 3 mo            4     Polycythemia     Persistently elevated hematocrit.  I encouraged him to follow up w/ PCP for further evaluation (r/p PV, hemochromatosis, etc).  Testosterone  rising but still low.   Would not start replacement as this will likely further increase Hct.             Unprioritized    Weakness    Relevant Orders    TSH    T4, free        Patient Instructions:    Patient Instructions   We will need to come  the saliva cortisol kits and the 24 hr urine collection container so that we can check cortisol levels.  Only after you have completed the saliva and urine collections, I would like you to take the 1 mg tablet of dexamethasone at 11 pm the night before your 8:00 a.m.  blood draw.  It is extremely important that you do not take the dexamethasone pill before doing your saliva and urine cortisol collections and as it will interfere with the results.    We will plan on repeating your thyroid labs and 8:00 a.m. testosterone level in 3 months to see if it has improved.    Please follow-up with your primary care doctor to be evaluated for your high hemoglobin and hematocrit as I would not recommend starting testosterone while those levels are high as testosterone could make it worse and increase your risk of blood clots.            Please arrange for patient to come  salivary cortisol kits and urine collection container w/ urine lab slips. Needs to schedule 8 am cortisol in the next 1-2 weeks and have f/u HVF.       3 mo f/u in person [facility] [place] clinic with 8 am testosterone, TSH, fT4 prior    Lazarus Franz MD

## 2020-08-25 NOTE — PATIENT INSTRUCTIONS
We will need to come  the saliva cortisol kits and the 24 hr urine collection container so that we can check cortisol levels.  Only after you have completed the saliva and urine collections, I would like you to take the 1 mg tablet of dexamethasone at 11 pm the night before your 8:00 a.m.  blood draw.  It is extremely important that you do not take the dexamethasone pill before doing your saliva and urine cortisol collections and as it will interfere with the results.    We will plan on repeating your thyroid labs and 8:00 a.m. testosterone level in 3 months to see if it has improved.    Please follow-up with your primary care doctor to be evaluated for your high hemoglobin and hematocrit as I would not recommend starting testosterone while those levels are high as testosterone could make it worse and increase your risk of blood clots.

## 2020-08-25 NOTE — ASSESSMENT & PLAN NOTE
Did not have symptoms of hypercortisolemia prior to TSR however has had unexplained wt loss since resection.  We discussed that he may have had corticotroph adenoma that was not making biochemically active ACTH or may have had undetected hypercortisolemia prior to surgery.  Will check LNSC x2, 24H UFC, and 1 mg DST to confirm no evidence of ongoing hypercortisolemia.

## 2020-08-25 NOTE — ASSESSMENT & PLAN NOTE
Persistently elevated hematocrit.  I encouraged him to follow up w/ PCP for further evaluation (r/p PV, hemochromatosis, etc).  Testosterone rising but still low.   Would not start replacement as this will likely further increase Hct.

## 2020-09-02 ENCOUNTER — CLINICAL SUPPORT (OUTPATIENT)
Dept: REHABILITATION | Facility: HOSPITAL | Age: 45
End: 2020-09-02
Attending: PSYCHIATRY & NEUROLOGY
Payer: COMMERCIAL

## 2020-09-02 DIAGNOSIS — R29.898 LEG WEAKNESS, BILATERAL: ICD-10-CM

## 2020-09-02 DIAGNOSIS — R29.898 FINE MOTOR IMPAIRMENT: ICD-10-CM

## 2020-09-02 DIAGNOSIS — Z74.09 IMPAIRED FUNCTIONAL MOBILITY, BALANCE, GAIT, AND ENDURANCE: ICD-10-CM

## 2020-09-02 DIAGNOSIS — R29.818 FINE MOTOR IMPAIRMENT: ICD-10-CM

## 2020-09-02 DIAGNOSIS — R29.898 UPPER EXTREMITY WEAKNESS: ICD-10-CM

## 2020-09-02 DIAGNOSIS — R29.898 DECREASED GRIP STRENGTH: ICD-10-CM

## 2020-09-02 DIAGNOSIS — Z74.09 IMPAIRED MOBILITY AND ADLS: ICD-10-CM

## 2020-09-02 DIAGNOSIS — Z78.9 IMPAIRED MOBILITY AND ADLS: ICD-10-CM

## 2020-09-02 PROCEDURE — 97110 THERAPEUTIC EXERCISES: CPT | Mod: PO

## 2020-09-02 NOTE — PROGRESS NOTES
Physical Therapy Treatment Note     [id number]    Therapy Diagnosis:   Encounter Diagnoses   Name Primary?    Impaired functional mobility, balance, gait, and endurance     Leg weakness, bilateral      Physician: Sangeetha Boyer MD    Visit Date: 9/2/2020    Physician Orders: PT Eval and Treat   Medical Diagnosis from Referral: Idiopathic progressive polyneuropathy  Evaluation Date: 7/23/2020  Authorization Period Expiration: 07/20/20 to 12/31/20  Plan of Care Expiration: 09/02/20 to 10/28/20  Visit # / Visits authorized: 06/ 20    Time In: 1420  Time Out: 1500  Total Billable Time: 40 minutes    Precautions: Standard, h/o COVID-19 infection, s/p macroadenoma resection    Subjective     Pt reports: that symptoms still vary but overall his strength is feeling better. He gets weak at times but then can bounce back. He followed up with neurology a few weeks ago and had an EMG. No rapid demyelinating processes seen. He followed up with ENT as well and testosterone levels are being monitored.     He was compliant with his HEP.    Response to previous treatment: no adverse effects  Functional change: ongoing    Pain: 0/10  Location: N/A     Objective     Rhett received therapeutic exercises to develop strength, endurance, ROM, flexibility, posture and core stabilization for 40 minutes including:    Lower Extremity Strength- performed with patient in sitting  Right LE  evaluation 09/02/20 Left LE  evaluation 09/02/20   Hip Flexion: 3/5 3+/5* Hip Flexion: 3/5 3+/5*   Hip Extension:  3/5 3+/5* Hip Extension: 3/5 3+/5*   Hip Abduction: 4/5 4+/5* Hip Abduction: 4/5 4+/5*   Hip Adduction: 4-/5 4/5 Hip Adduction 4-/5 4/5   Knee Extension: 4-/5 4/5* Knee Extension: 4/5 4+/5   Knee Flexion: 4/5 4+/5 Knee Flexion: 4-/5 4+/5   Ankle Dorsiflexion: 4+/5 4+/5 Ankle Dorsiflexion: 4+/5 4+/5   Ankle Plantarflexion: 4+/5 4+/5 Ankle Plantarflexion: 4+/5 4+/5    *motor impersistence        Evaluation 09/02/20   Single Limb Stance R LE 4 sec  (<10 sec = HIGH FALL RISK) 9 sec  (<10 sec = HIGH FALL RISK)   Single Limb Stance L LE 3 sec  (<10 sec = HIGH FALL RISK) 14 sec  (<10 sec = HIGH FALL RISK)        Evaluation 09/02/20   5 times sit-stand  (adults 18-65 y/o) 25 seconds, no UE support- fatigue noted as activity progressed  >12 sec= fall risk for general elderly  >16 sec= fall risk for Parkinson's disease  >10 sec= balance/vestibular dysfunction (<61 y/o)  >14.2 sec= balance/vestibular dysfunction (>61 y/o)  >12 sec= fall risk for CVA 13 seconds, no UE support      6 minute walk test: 1,202.08 feet     Gait Assessment:   - AD used: none  - Assistance: independent  - Distance: community distances     GAIT DEVIATIONS:  Rhett displays the following deviations with ambulation: occasional lateral sway when patient appears fatigued     Impairments contributing to deviations: decreased muscular endurance, decreased muscular strength     Endurance Deficit: significant- LE fatigue noted quickly with sit <> stand activity        Evaluation 09/02/20   Self Selected Walking Speed 1.0 m/sec (6m/6s) 1.2 m/sec (6m/5s)   Fast Walking Speed 1.2 m/sec (6m/5s) 1.2 m/sec (6m/5s)         Functional Gait Assessment:   1. Gait on level surface =  3  2. Change in Gait Speed = 3  3. Gait with horizontal head turns  = 2  4. Gait with vertical head turns = 3  5. Gait with pivot turns = 2  6. Step over obstacle = 2  7. Gait with Narrow MINAL = 3  8. Gait with eyes closed = 2  9. Ambulating Backwards = 2  10. Steps = 3     Score 25/30 (22/30 at evaluation)   Score:   <22/30 fall risk   <20/30 fall risk in older adults   <18/30 fall risk in Parkinsons       Home Exercises Provided and Patient Education Provided     Education provided:   -POC    Written Home Exercises Provided: yes. Continue current HEP.   Exercises were reviewed and Rhett was able to demonstrate them prior to the end of the session.  Rhett demonstrated good   understanding of the education provided.     See EMR under Patient Instructions for exercises provided 8/6/2020.    Assessment     Rhett tolerated reassessment well this afternoon with reports of improved strength since starting therapy. Patient remains compliant with HEP and motivated with therapy activities. Reassessment period: 07/23/20 to 09/02/20. Rhett demonstrates good progress with therapy with improvements noted with LE strength, activity tolerance, and static/dynamic balance. During LE strength testing, strength scores improved in each major muscle region, but motor impersistence noted. Chair rise score improved greatly, but current score remains slightly elevated for age related norm.  SSWS score improved to now place patient in independent community ambulator category. SLS both improved from evaluation but RLE SLS score still places patient in elevated fall risk category. FGA score improved by 3 points with current score placing patient out of elevated fall risk category. Able to formally test endurance with ambulation with 6 minute walk test. Patient demonstrates good baseline performance with this test, but muscular fatigue noted toward end of trial and gait mechanics diminished. Patient to benefit from continued PT intervention to further progress remaining strength, balance, and endurance deficits. PT to extend POC x 8 week.     Rhett is progressing well towards his goals.   Pt prognosis is Good.     Pt will continue to benefit from skilled outpatient physical therapy to address the deficits listed in the problem list box on initial evaluation, provide pt/family education and to maximize pt's level of independence in the home and community environment.     Pt's spiritual, cultural and educational needs considered and pt agreeable to plan of care and goals.     Anticipated barriers to physical therapy: co-morbidities, emotional deficits       Goals:   Short Term Goals: 4 weeks   1. Patient to be (I) with  established HEP. MET 09/02/20  2. Patient to complete OT evaluation for further assessment of UE and fine motor deficits. MET 09/02/20  3. Patient to improve bilateral hip flexion and hip extension strength scores to at least 4-/5 for improved stability with community ambulation. MET 09/02/20  4. Patient to improve bilateral knee flexion and extension strength scores to at least 4/5 for improved stability with community ambulation. MET 09/02/20  5. Patient to improve FGA score to at least 25/30 for improved balance and decreased fall risk in community settings. MET 09/02/20  6. Patient to improve BLE SLS scores to at least 8 sec for improved balance with standing ADL. MET 09/02/20  7. PT to formally assess 6 minute walk test and establish appropriate goals for formal assessment of endurance deficits. MET 09/02/20     Long Term Goals: 8 weeks   1. Patient to be (I) with advanced HEP. Ongoing  2. Patient to improve bilateral gross hip strength scores to at least 4/5 for improved stability with community ambulation. Ongoing  3. Patient to improve bilateral knee flexion and extension strength scores to at least 4+/5 for improved stability with community ambulation. Ongoing  4. Patient to improve FGA score to at least 28/30 for improved balance and decreased fall risk in community settings. Ongoing  5. Patient to improve BLE SLS scores to at least 12 sec for improved balance with standing ADL. Progressing  6. Updated 09/02/20:  Patient to improve 6 minute walk test score to at least 1,350.00 feet for improved endurance with community ambulation. Ongoing    Plan   PT to extend POC x 8 weeks.     Continue outpatient physical therapy 2x weekly under current established Plan of Care, 09/02/20 to 10/28/20, with treatment to include: pt education, HEP, therapeutic exercises, neuromuscular re-education/balance exercises, therapeutic activities, joint mobilizations, and modalities PRN, to work towards established goals. Pt may  be seen by PTA to carry out plan of care.     Continue with LE strengthening. Progress endurance and balance training as tolerated. Update HEP as needed.     Wilda Turcios, PT

## 2020-09-02 NOTE — PLAN OF CARE
Outpatient Therapy Updated Plan of Care     Visit Date: 9/2/2020    Name: Rhett Quarles  Clinic Number: [id number]    Therapy Diagnosis:   Encounter Diagnoses   Name Primary?    Impaired mobility and ADLs     Upper extremity weakness     Decreased  strength     Fine motor impairment      Physician: Sangeetha Boyer MD    Physician Orders: Eval and Treat (Comment - Finger dexterity. Weakness all over)  Medical Diagnosis from Referral: R53.1 (ICD-10-CM) - Weakness  Evaluation Date: 7/30/2020  Current Certification Period: 7/30/2020 to 8/28/2020  Authorization Period Expiration: 8/7/2021  Updated Plan of Care Expiration: 10/30/2020  Visit # / Visits authorized: 5 / 1 (pending further authorization)     Time In: 1432  Time Out: 1515  Total Billable Time: 43 minutes    Precautions: Standard  Functional Level Prior to Evaluation:  Independent     Subjective     Update:   Pt reports: he is feeling stronger but that he finds his muscles fatigue very quickly   Response to previous treatment: LUE is stronger   Functional change: ongoing    Involved Side: bilateral (L worse than R)   Dominant Side: Ambidextrous    Pain: 0/10  Location: N/A    Patient's Goals for Therapy: to increase strength in his hands/upper extremities and fine motor skills to be able to play guitar and participate in leisure activities; improve activity tolerance    Objective     Rhett received therapeutic exercises for 43 minutes including:  Reassessment:  Strength 8/6/2020 8/6/2020 9/2/2020 9/2/2020   **within available ROM** Left Right Left Right   Shoulder flex 4/5 4+/5 4/5 4/5   Shoulder abd 4/5 4+/5 4+/5 4/5   Shoulder Extension 4/5 4+/5 5/5 5/5   Elbow flex 4/5 4+/5 4/5 4+/5   Elbow ext 5/5 (prior to session) and 4/5 (after putty exercises) 5/5 (prior to session) and 4/5 (after putty exercises) 4+/5 4+/5      Strength: (JAYCEE Dynamometer in lbs.) One maximum trial; Position II:       7/30/2020 7/30/2020 9/2/2020 9/2/2020     Left  Right Left Right   Rung II 49# 65# 49# 46#      Pinch Strength (Measured in psi)       7/30/2020 7/30/2020 9/2/2020 9/2/2020     Left Right Left Right   Key Pinch 6 psi 10 psi 5 psi 9 psi   3pt Pinch 3 psi 10 psi 3.5 psi 5 psi   2pt Pinch 3 psi 6 psi 1.5 psi 2 psi      Fine Motor Coordination: 9 Hole Peg Test  Left 7/30/2020 Right 7/30/2020 Left 9/2/2020 Right 9/2/2020   20 sec 20 sec 22 sec 19.74 sec     - Isolated digit flexion with green digiflex, x2 minutes each hand   - Reciprocal ball toss with pink medicine ball, 2 x 5 reps; pt with increased focus to use LEs to absorb weight   - Fluid chest to overhead press, x10 reps  - Bicep curls, 2 x 10   - Repeated fluid chest to overhead press, x10 reps   - Alternating tip pinches with green theraputty, x1 minute each hand     Home Exercises and Education Provided     Education provided:   - Continue HEP/alternating theraband with putty days  - Progress towards goals     Written Home Exercises Provided: yes.  Exercises were reviewed and Rhett was able to demonstrate them prior to the end of the session.  Rhett demonstrated good  understanding of the HEP provided.     See EMR under Patient Instructions for exercises provided 8/6/2020.     Assessment     Update: Pt continues with varying severity of bilateral upper extremity weakness from week to week. During today's reassessment, pt demonstrated increased strength in his left upper extremity but a decline in his right upper extremity strength since last assessment on 8/6/2020. Pt also presented with decreased  and pinch strength in his right hand as compared to intial evaluation. However, pt did demonstrate improved fine motor coordination with his right hand as determined by the 9HPT. Pt cont to have increased difficulty performing fine motor coordination, grasping objects, controlling his UEs, and decreased muscle endurance. Pt would continue to benefit from skilled occupational therapy services for HEP guidance,  upper extremity strengthening, and fine motor coordination training to maximize performance with meaningful occupations.      Rhett is progressing well towards his goals and there are no updates to goals at this time. Pt prognosis is Good.      Pt will continue to benefit from skilled outpatient occupational therapy to address the deficits listed in the problem list on initial evaluation provide pt/family education and to maximize pt's level of independence in the home and community environment.      Anticipated barriers to occupational therapy: transportation; specific diagnosis unknown      Pt's spiritual, cultural and educational needs considered and pt agreeable to plan of care and goals.     Goals:  Short Term Goals: 2-3 weeks   1) Pt will demonstrate understanding of pacing/energy conservation strategies to maximize performance with daily routine. ongoing  2) Pt will download chandra (HOTEL[employer] Timer/LogLog[employer]) to develop coping strategies to decrease anxiety/stress/depression. ongoing  3) Pt will increase  strength on the left to 54# or more to increase independence with ADLs and IADLs (gripping spoon).  ongoing  4) Pt will increase left and right shoulder flexion and left abduction to at least 4/5 MMT grade to improve upper extremity strength needed for high level IADLs. MET 9/2/2020  5) Pt will increase left and right elbow flexion to greater than or equal to 4/5 to improve upper extremity strength needed for home management tasks. MET 9/2/2020  6) Pt will increase left pinch strength by greater than or equal to 3 psi all conditions (lateral: to 9 psi, 3pt and 2pt: to 6 psi) to improve strength needed for pressing on guitar strings. ongoing     Long Term Goals: 4 weeks   1) Pt will decrease score on 9HPT by 2 seconds or more bilaterally to improve fine motor coordination needed for leisure activities (playing guitar). Partially MET (MET with R hand, ongoing with L)   2) Pt will increase upper extremity AROM  for L shoulder flexion and abduction by greater than or equal to 8 degrees (shld flex: to at least 143*; abd: 131*) to increase performance with kitchen tasks (reaching into overhead cabinets). ongoing  3) Pt will increase upper extremity AROM for R shoulder flexion and abduction by greater than or equal to 8 degrees (shld flex: to least 143*; abd: 141*) to increase performance with kitchen tasks (reaching into overhead cabinets). ongoing  4) Pt will increase bilateral shoulder flexion and abduction to greater than or equal to 4+/5 to improve upper extremity strength needed for high level IADLs. ongoing  5) Pt will increase left and right elbow flexion to greater than or equal to 4+/5 to improve upper extremity strength needed for home management tasks. ongoing  6) Pt will increase L  strength to 60# or more to increase independence with ADLs and IADLs. ongoing  7) Pt will increase R  strength to 70# or more to increase independence with ADLs and IADLs. ongoing  8) Pt will play guRemicalm for 15 minutes or more with reports of increased performance since initial eval. ongoing  9) Pt will be independent with feeding, grooming, and dressing, reporting performing activities with decreased time and increased confidence. ongoing    Previous Short Term Goals Status: Pt has MET 2/6 STG  New Short Term Goals Status: continue to progress towards other stated goals  Long Term Goal Status:   continue per initial plan of care.  Reasons for Recertification of Therapy:  Pt will cont to benefit from skilled OT services for cont UE strengthening and fine motor coordination training, as he has demonstrated some improvements in UE strength in his LUE and improved fine motor coordination with his right hand.     Plan     Updated Certification Period: 9/2/2020 to 10/30/2020  Recommended Treatment Plan: 1-2 times per week for 8 weeks: Manual Therapy, Moist Heat/ Ice, Neuromuscular Re-ed, Patient Education, Self Care, Therapeutic  Activites and Therapeutic Exercise    Updates/Grading for next session: cont strengthening and FM coordination training, cont to monitor UE strength with MMT, add rows to HEP    Keturah Pierre OT  9/2/2020      I CERTIFY THE NEED FOR THESE SERVICES FURNISHED UNDER THIS PLAN OF TREATMENT AND WHILE UNDER MY CARE    Physician's comments:        Physician's Signature: ___________________________________________________

## 2020-09-02 NOTE — PLAN OF CARE
Physical Therapy Treatment Note     [id number]    Therapy Diagnosis:   Encounter Diagnoses   Name Primary?    Impaired functional mobility, balance, gait, and endurance     Leg weakness, bilateral      Physician: Sangeetha Boyer MD    Visit Date: 9/2/2020    Physician Orders: PT Eval and Treat   Medical Diagnosis from Referral: Idiopathic progressive polyneuropathy  Evaluation Date: 7/23/2020  Authorization Period Expiration: 07/20/20 to 12/31/20  Plan of Care Expiration: 09/02/20 to 10/28/20  Visit # / Visits authorized: 06/ 20    Time In: 1420  Time Out: 1500  Total Billable Time: 40 minutes    Precautions: Standard, h/o COVID-19 infection, s/p macroadenoma resection    Subjective     Pt reports: that symptoms still vary but overall his strength is feeling better. He gets weak at times but then can bounce back. He followed up with neurology a few weeks ago and had an EMG. No rapid demyelinating processes seen. He followed up with ENT as well and testosterone levels are being monitored.     He was compliant with his HEP.    Response to previous treatment: no adverse effects  Functional change: ongoing    Pain: 0/10  Location: N/A     Objective     Rhett received therapeutic exercises to develop strength, endurance, ROM, flexibility, posture and core stabilization for 40 minutes including:    Lower Extremity Strength- performed with patient in sitting  Right LE  evaluation 09/02/20 Left LE  evaluation 09/02/20   Hip Flexion: 3/5 3+/5* Hip Flexion: 3/5 3+/5*   Hip Extension:  3/5 3+/5* Hip Extension: 3/5 3+/5*   Hip Abduction: 4/5 4+/5* Hip Abduction: 4/5 4+/5*   Hip Adduction: 4-/5 4/5 Hip Adduction 4-/5 4/5   Knee Extension: 4-/5 4/5* Knee Extension: 4/5 4+/5   Knee Flexion: 4/5 4+/5 Knee Flexion: 4-/5 4+/5   Ankle Dorsiflexion: 4+/5 4+/5 Ankle Dorsiflexion: 4+/5 4+/5   Ankle Plantarflexion: 4+/5 4+/5 Ankle Plantarflexion: 4+/5 4+/5    *motor impersistence        Evaluation 09/02/20   Single Limb Stance R LE 4 sec  (<10 sec = HIGH FALL RISK) 9 sec  (<10 sec = HIGH FALL RISK)   Single Limb Stance L LE 3 sec  (<10 sec = HIGH FALL RISK) 14 sec  (<10 sec = HIGH FALL RISK)        Evaluation 09/02/20   5 times sit-stand  (adults 18-63 y/o) 25 seconds, no UE support- fatigue noted as activity progressed  >12 sec= fall risk for general elderly  >16 sec= fall risk for Parkinson's disease  >10 sec= balance/vestibular dysfunction (<59 y/o)  >14.2 sec= balance/vestibular dysfunction (>59 y/o)  >12 sec= fall risk for CVA 13 seconds, no UE support      6 minute walk test: 1,202.08 feet     Gait Assessment:   - AD used: none  - Assistance: independent  - Distance: community distances     GAIT DEVIATIONS:  Rhett displays the following deviations with ambulation: occasional lateral sway when patient appears fatigued     Impairments contributing to deviations: decreased muscular endurance, decreased muscular strength     Endurance Deficit: significant- LE fatigue noted quickly with sit <> stand activity        Evaluation 09/02/20   Self Selected Walking Speed 1.0 m/sec (6m/6s) 1.2 m/sec (6m/5s)   Fast Walking Speed 1.2 m/sec (6m/5s) 1.2 m/sec (6m/5s)         Functional Gait Assessment:   1. Gait on level surface =  3  2. Change in Gait Speed = 3  3. Gait with horizontal head turns  = 2  4. Gait with vertical head turns = 3  5. Gait with pivot turns = 2  6. Step over obstacle = 2  7. Gait with Narrow MINAL = 3  8. Gait with eyes closed = 2  9. Ambulating Backwards = 2  10. Steps = 3     Score 25/30 (22/30 at evaluation)   Score:   <22/30 fall risk   <20/30 fall risk in older adults   <18/30 fall risk in Parkinsons       Home Exercises Provided and Patient Education Provided     Education provided:   -POC    Written Home Exercises Provided: yes. Continue current HEP.   Exercises were reviewed and Rhett was able to demonstrate them prior to the end of the session.  Rhett demonstrated good   understanding of the education provided.     See EMR under Patient Instructions for exercises provided 8/6/2020.    Assessment     Rhett tolerated reassessment well this afternoon with reports of improved strength since starting therapy. Patient remains compliant with HEP and motivated with therapy activities. Reassessment period: 07/23/20 to 09/02/20. Rhett demonstrates good progress with therapy with improvements noted with LE strength, activity tolerance, and static/dynamic balance. During LE strength testing, strength scores improved in each major muscle region, but motor impersistence noted. Chair rise score improved greatly, but current score remains slightly elevated for age related norm.  SSWS score improved to now place patient in independent community ambulator category. SLS both improved from evaluation but RLE SLS score still places patient in elevated fall risk category. FGA score improved by 3 points with current score placing patient out of elevated fall risk category. Able to formally test endurance with ambulation with 6 minute walk test. Patient demonstrates good baseline performance with this test, but muscular fatigue noted toward end of trial and gait mechanics diminished. Patient to benefit from continued PT intervention to further progress remaining strength, balance, and endurance deficits. PT to extend POC x 8 week.     Rhett is progressing well towards his goals.   Pt prognosis is Good.     Pt will continue to benefit from skilled outpatient physical therapy to address the deficits listed in the problem list box on initial evaluation, provide pt/family education and to maximize pt's level of independence in the home and community environment.     Pt's spiritual, cultural and educational needs considered and pt agreeable to plan of care and goals.     Anticipated barriers to physical therapy: co-morbidities, emotional deficits       Goals:   Short Term Goals: 4 weeks   1. Patient to be (I) with  established HEP. MET 09/02/20  2. Patient to complete OT evaluation for further assessment of UE and fine motor deficits. MET 09/02/20  3. Patient to improve bilateral hip flexion and hip extension strength scores to at least 4-/5 for improved stability with community ambulation. MET 09/02/20  4. Patient to improve bilateral knee flexion and extension strength scores to at least 4/5 for improved stability with community ambulation. MET 09/02/20  5. Patient to improve FGA score to at least 25/30 for improved balance and decreased fall risk in community settings. MET 09/02/20  6. Patient to improve BLE SLS scores to at least 8 sec for improved balance with standing ADL. MET 09/02/20  7. PT to formally assess 6 minute walk test and establish appropriate goals for formal assessment of endurance deficits. MET 09/02/20     Long Term Goals: 8 weeks   1. Patient to be (I) with advanced HEP. Ongoing  2. Patient to improve bilateral gross hip strength scores to at least 4/5 for improved stability with community ambulation. Ongoing  3. Patient to improve bilateral knee flexion and extension strength scores to at least 4+/5 for improved stability with community ambulation. Ongoing  4. Patient to improve FGA score to at least 28/30 for improved balance and decreased fall risk in community settings. Ongoing  5. Patient to improve BLE SLS scores to at least 12 sec for improved balance with standing ADL. Progressing  6. Updated 09/02/20:  Patient to improve 6 minute walk test score to at least 1,350.00 feet for improved endurance with community ambulation. Ongoing    Plan   PT to extend POC x 8 weeks.     Continue outpatient physical therapy 2x weekly under current established Plan of Care, 09/02/20 to 10/28/20, with treatment to include: pt education, HEP, therapeutic exercises, neuromuscular re-education/balance exercises, therapeutic activities, joint mobilizations, and modalities PRN, to work towards established goals. Pt may  be seen by PTA to carry out plan of care.     Continue with LE strengthening. Progress endurance and balance training as tolerated. Update HEP as needed.     Wilda Turcios, PT

## 2020-09-08 ENCOUNTER — OFFICE VISIT (OUTPATIENT)
Dept: INTERNAL MEDICINE | Facility: CLINIC | Age: 45
End: 2020-09-08
Attending: INTERNAL MEDICINE
Payer: COMMERCIAL

## 2020-09-08 DIAGNOSIS — D35.2: ICD-10-CM

## 2020-09-08 DIAGNOSIS — R71.8 MEAN RED BLOOD CELL VOLUME INCREASED: Primary | ICD-10-CM

## 2020-09-08 PROCEDURE — 99214 PR OFFICE/OUTPT VISIT, EST, LEVL IV, 30-39 MIN: ICD-10-PCS | Mod: 95,,, | Performed by: INTERNAL MEDICINE

## 2020-09-08 PROCEDURE — 99214 OFFICE O/P EST MOD 30 MIN: CPT | Mod: 95,,, | Performed by: INTERNAL MEDICINE

## 2020-09-08 NOTE — PROGRESS NOTES
"Subjective:       Patient ID: Rhett Quarles is a 45 y.o. male.    Chief Complaint: No chief complaint on file.    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  Started magnesium and b12     Formal VF:  Dr. Winston 1/29/2020 (pre-op)  "no evidence of visual pathway effects from the pituitary adenoma"  due for repeat in 2 months    Recent labs with increased RBC.          Review of Systems   Constitutional: Positive for unexpected weight change. Negative for activity change.   HENT: Negative for hearing loss, rhinorrhea and trouble swallowing.    Eyes: Negative for discharge and visual disturbance.   Respiratory: Negative for chest tightness and wheezing.    Cardiovascular: Negative for chest pain and palpitations.   Gastrointestinal: Negative for blood in stool, constipation, diarrhea and vomiting.   Endocrine: Negative for polydipsia and polyuria.   Genitourinary: Negative for difficulty urinating, hematuria and urgency.   Musculoskeletal: Positive for arthralgias. Negative for joint swelling and neck pain.   Neurological: Positive for weakness. Negative for headaches.   Psychiatric/Behavioral: Negative for confusion and dysphoric mood.       Objective:      There were no vitals filed for this visit.   Physical Exam  Constitutional:       General: He is not in acute distress.     Appearance: Normal appearance. He is well-developed. He is not diaphoretic.   HENT:      Head: Normocephalic and atraumatic.   Eyes:      General: No " scleral icterus.        Right eye: No discharge.         Left eye: No discharge.      Conjunctiva/sclera: Conjunctivae normal.   Pulmonary:      Effort: Pulmonary effort is normal. No respiratory distress.   Abdominal:      General: There is no distension.   Skin:     General: Skin is warm and dry.   Neurological:      Mental Status: He is alert and oriented to person, place, and time.   Psychiatric:         Speech: Speech normal.         Assessment:       1. Mean red blood cell volume increased    2. Corticotroph adenoma        Plan:       Diagnoses and all orders for this visit:    Mean red blood cell volume increased  -     CBC auto differential; Future  -     JAK2 [id number] MUTATION DETECTION, BLOOD; Future  -     Pathologist Interpretation Differential; Future    Corticotroph adenoma   F/u endocrine         Bryce Merida MD  Internal Medicine-[facility]        Side effects of medication(s) were discussed in detail and patient voiced understanding.  Patient will call back for any issues or complications.

## 2020-09-09 ENCOUNTER — CLINICAL SUPPORT (OUTPATIENT)
Dept: REHABILITATION | Facility: HOSPITAL | Age: 45
End: 2020-09-09
Attending: PSYCHIATRY & NEUROLOGY
Payer: COMMERCIAL

## 2020-09-09 DIAGNOSIS — Z74.09 IMPAIRED FUNCTIONAL MOBILITY, BALANCE, GAIT, AND ENDURANCE: ICD-10-CM

## 2020-09-09 DIAGNOSIS — R29.898 LEG WEAKNESS, BILATERAL: ICD-10-CM

## 2020-09-09 PROCEDURE — 97110 THERAPEUTIC EXERCISES: CPT | Mod: PO

## 2020-09-09 NOTE — PROGRESS NOTES
"  Physical Therapy Treatment Note     Name: Rhett Ng [facility] Number: [id number]    Therapy Diagnosis:   Encounter Diagnoses   Name Primary?    Impaired functional mobility, balance, gait, and endurance     Leg weakness, bilateral      Physician: Sangeetha Boyer MD    Visit Date: 9/9/2020    Physician Orders: PT Eval and Treat   Medical Diagnosis from Referral: Idiopathic progressive polyneuropathy  Evaluation Date: 7/23/2020  Authorization Period Expiration: 07/20/20 to 12/31/20  Plan of Care Expiration: 09/02/20 to 10/28/20  Visit # / Visits authorized: 07/ 20    Time In: 1420  Time Out: 1500  Total Billable Time: 40 minutes    Precautions: Standard, h/o COVID-19 infection, s/p macroadenoma resection    Subjective     Pt reports: that he is doing OK today    He was compliant with his HEP.    Response to previous treatment: no adverse effects  Functional change: ongoing    Pain: 0/10  Location: N/A     Objective     Rhett received therapeutic exercises to develop strength, endurance, ROM, flexibility, posture and core stabilization for 40 minutes including:    X 10 min seated recumbent stepper, BUE and BLE for CV endurance, L1    2 x 30" bilateral standing hamstring stretch at steps  3 x 30" bilateral standing GS stretch on incline    X 4 laps alternating marching in // bars, 3" hold, occ touchdown support, SBA    X 10 reps, RLE forward step up onto 8" step, no UE support, SBA  X 10 reps, LLE forward step up onto 8" step, no UE support, SBA    X 8 reps, RLE leading, forward step up onto BOSU, soft side up with LLE moving up and over <> back and over, BUE touchdown support, CGA  X 8 reps, LLE leading, forward step up onto BOSU, soft side up with RLE moving up and over <> back and over, BUE touchdown support, CGA    2 x 10 reps, R<>L side stepping across BOSU, soft side up, CGA to occ Min A, occ UE support    Foam pad:  2 X 10 reps, RLE SLS with LLE tapping 2 large cones in front, no UE support, CGA to occ " Min A  2 X 10 reps, LLE SLS with RLE tapping 2 large cones in front, no UE support, CGA to occ Min A    Home Exercises Provided and Patient Education Provided     Education provided:   -POC    Written Home Exercises Provided: yes. Continue current HEP.   Exercises were reviewed and Rhett was able to demonstrate them prior to the end of the session.  Rhett demonstrated good  understanding of the education provided.     See EMR under Patient Instructions for exercises provided 8/6/2020.    Assessment     Rhett tolerated therapy session well this afternoon with no significant complaints throughout. Able to progress difficulty of balance challenges to mainly focus on SLS deficits and remaining strength deficits. Activities performed today appeared appropriately challenging, but patient able to complete without need for rest breaks. Continue POC to progress mobility deficits as tolerated.     Rhett is progressing well towards his goals.   Pt prognosis is Good.     Pt will continue to benefit from skilled outpatient physical therapy to address the deficits listed in the problem list box on initial evaluation, provide pt/family education and to maximize pt's level of independence in the home and community environment.     Pt's spiritual, cultural and educational needs considered and pt agreeable to plan of care and goals.     Anticipated barriers to physical therapy: co-morbidities, emotional deficits       Goals:   Short Term Goals: 4 weeks   1. Patient to be (I) with established HEP. MET 09/02/20  2. Patient to complete OT evaluation for further assessment of UE and fine motor deficits. MET 09/02/20  3. Patient to improve bilateral hip flexion and hip extension strength scores to at least 4-/5 for improved stability with community ambulation. MET 09/02/20  4. Patient to improve bilateral knee flexion and extension strength scores to at least 4/5 for improved stability with community ambulation. MET 09/02/20  5. Patient to  improve FGA score to at least 25/30 for improved balance and decreased fall risk in community settings. MET 09/02/20  6. Patient to improve BLE SLS scores to at least 8 sec for improved balance with standing ADL. MET 09/02/20  7. PT to formally assess 6 minute walk test and establish appropriate goals for formal assessment of endurance deficits. MET 09/02/20     Long Term Goals: 8 weeks   1. Patient to be (I) with advanced HEP. Ongoing  2. Patient to improve bilateral gross hip strength scores to at least 4/5 for improved stability with community ambulation. Ongoing  3. Patient to improve bilateral knee flexion and extension strength scores to at least 4+/5 for improved stability with community ambulation. Ongoing  4. Patient to improve FGA score to at least 28/30 for improved balance and decreased fall risk in community settings. Ongoing  5. Patient to improve BLE SLS scores to at least 12 sec for improved balance with standing ADL. Progressing  6. Updated 09/02/20:  Patient to improve 6 minute walk test score to at least 1,350.00 feet for improved endurance with community ambulation. Ongoing    Plan     Continue with LE strengthening. Progress endurance and balance training as tolerated. Update HEP as needed.     Wilda Turcios, PT

## 2020-09-16 ENCOUNTER — PATIENT MESSAGE (OUTPATIENT)
Dept: ENDOCRINOLOGY | Facility: CLINIC | Age: 45
End: 2020-09-16

## 2020-09-17 ENCOUNTER — DOCUMENTATION ONLY (OUTPATIENT)
Dept: REHABILITATION | Facility: HOSPITAL | Age: 45
End: 2020-09-17

## 2020-09-17 NOTE — PROGRESS NOTES
PT/PTA met face to face to discuss pt's treatment plan and progress towards established goals.  Continue with current PT POC with focus on strengthening, balance and endurance.  Patient will be seen by physical therapist at least every sixth treatment or 30 days, whichever occurs first.    Renee Chakraborty, PTA  09/17/2020    Face to face performed on 9/16/20

## 2020-09-18 ENCOUNTER — CLINICAL SUPPORT (OUTPATIENT)
Dept: REHABILITATION | Facility: HOSPITAL | Age: 45
End: 2020-09-18
Attending: PSYCHIATRY & NEUROLOGY
Payer: COMMERCIAL

## 2020-09-18 DIAGNOSIS — R29.898 FINE MOTOR IMPAIRMENT: ICD-10-CM

## 2020-09-18 DIAGNOSIS — R29.818 FINE MOTOR IMPAIRMENT: ICD-10-CM

## 2020-09-18 DIAGNOSIS — R29.898 DECREASED GRIP STRENGTH: ICD-10-CM

## 2020-09-18 DIAGNOSIS — Z78.9 IMPAIRED MOBILITY AND ADLS: ICD-10-CM

## 2020-09-18 DIAGNOSIS — R29.898 LEG WEAKNESS, BILATERAL: ICD-10-CM

## 2020-09-18 DIAGNOSIS — R29.898 UPPER EXTREMITY WEAKNESS: ICD-10-CM

## 2020-09-18 DIAGNOSIS — Z74.09 IMPAIRED MOBILITY AND ADLS: ICD-10-CM

## 2020-09-18 DIAGNOSIS — Z74.09 IMPAIRED FUNCTIONAL MOBILITY, BALANCE, GAIT, AND ENDURANCE: ICD-10-CM

## 2020-09-18 PROCEDURE — 97530 THERAPEUTIC ACTIVITIES: CPT | Mod: PO

## 2020-09-18 PROCEDURE — 97110 THERAPEUTIC EXERCISES: CPT | Mod: PO

## 2020-09-18 PROCEDURE — 97110 THERAPEUTIC EXERCISES: CPT | Mod: PO,CQ

## 2020-09-18 PROCEDURE — 97112 NEUROMUSCULAR REEDUCATION: CPT | Mod: PO,CQ

## 2020-09-18 NOTE — PROGRESS NOTES
"    Physical Therapy Treatment Note     Name: Rhett Ng [facility] Number: [id number]    Therapy Diagnosis:   Encounter Diagnoses   Name Primary?    Impaired functional mobility, balance, gait, and endurance     Leg weakness, bilateral      Physician: Sangeetha Boyer MD    Visit Date: 9/18/2020    Physician Orders: PT Eval and Treat   Medical Diagnosis from Referral: Idiopathic progressive polyneuropathy  Evaluation Date: 7/23/2020  Authorization Period Expiration: 07/20/20 to 12/31/20  Plan of Care Expiration: 09/02/20 to 10/28/20  Visit # / Visits authorized: 08/ 20    Time In: 1530  Time Out: 1615  Total Billable Time: 45 minutes    Precautions: Standard, h/o COVID-19 infection, s/p macroadenoma resection    Subjective     Pt reports: " I'm doing good, just tired."     He was compliant with his HEP.    Response to previous treatment: no adverse effects  Functional change: ongoing    Pain: 0/10  Location: N/A     Objective     Rhett received therapeutic exercises to develop strength, endurance, ROM, flexibility, posture and core stabilization for 30 minutes including:    X 10 min seated recumbent stepper, BUE and BLE for CV endurance, L1    2 x 30" bilateral standing hamstring stretch at steps  3 x 30" bilateral standing GS stretch on incline    2 x 10 reps of B LE single leg hip flex/ext on pilates chair.  2 black and 2 white on level 2    X 10 reps, RLE forward step up onto 8" step with unilateral hip flexion on top of step for balance, no UE support, SBA  X 10 reps, LLE forward step up onto 8" step,unilateral hip flexion on top of step for balance, , no UE support, SBA    2 x 10 reps of B LE squats on machine with #80lbs        Patient participated in neuromuscular re-education activities to improve: Balance, Coordination, Kinesthetic, Sense, Proprioception and Posture for 15 minutes. The following activities were included:     Biodex:  Limits of stability, no UE support, occasional CGA for safety  31%, " 35% and 41% each trial.  1st trial was static, last two trials were on level 12     Baseball, level 12 dynamic floor, no UE support x 3 mins      Home Exercises Provided and Patient Education Provided     Education provided:   -POC    Written Home Exercises Provided: yes. Continue current HEP.   Exercises were reviewed and Rhett was able to demonstrate them prior to the end of the session.  Rhett demonstrated good  understanding of the education provided.     See EMR under Patient Instructions for exercises provided 8/6/2020.    Assessment     Rhett tolerated therapy session well and did not have any complaints.  Rhett progressed to strength training on the squat machine and balance training on the Biodex with no UE support.  Pt with increased resistance noted on the pilates chair.   Continue POC to progress mobility deficits as tolerated.     Rhett is progressing well towards his goals.   Pt prognosis is Good.     Pt will continue to benefit from skilled outpatient physical therapy to address the deficits listed in the problem list box on initial evaluation, provide pt/family education and to maximize pt's level of independence in the home and community environment.     Pt's spiritual, cultural and educational needs considered and pt agreeable to plan of care and goals.     Anticipated barriers to physical therapy: co-morbidities, emotional deficits       Goals:   Short Term Goals: 4 weeks   1. Patient to be (I) with established HEP. MET 09/02/20  2. Patient to complete OT evaluation for further assessment of UE and fine motor deficits. MET 09/02/20  3. Patient to improve bilateral hip flexion and hip extension strength scores to at least 4-/5 for improved stability with community ambulation. MET 09/02/20  4. Patient to improve bilateral knee flexion and extension strength scores to at least 4/5 for improved stability with community ambulation. MET 09/02/20  5. Patient to improve FGA score to at least 25/30 for improved  balance and decreased fall risk in community settings. MET 09/02/20  6. Patient to improve BLE SLS scores to at least 8 sec for improved balance with standing ADL. MET 09/02/20  7. PT to formally assess 6 minute walk test and establish appropriate goals for formal assessment of endurance deficits. MET 09/02/20     Long Term Goals: 8 weeks   1. Patient to be (I) with advanced HEP. Ongoing  2. Patient to improve bilateral gross hip strength scores to at least 4/5 for improved stability with community ambulation. Ongoing  3. Patient to improve bilateral knee flexion and extension strength scores to at least 4+/5 for improved stability with community ambulation. Ongoing  4. Patient to improve FGA score to at least 28/30 for improved balance and decreased fall risk in community settings. Ongoing  5. Patient to improve BLE SLS scores to at least 12 sec for improved balance with standing ADL. Progressing  6. Updated 09/02/20:  Patient to improve 6 minute walk test score to at least 1,350.00 feet for improved endurance with community ambulation. Ongoing    Plan     Continue with LE strengthening. Progress endurance and balance training as tolerated. Update HEP as needed.     Renee Chakraborty, PTA

## 2020-09-18 NOTE — PROGRESS NOTES
Occupational Therapy Treatment Note     Date: 9/18/2020  [id number]    Therapy Diagnosis:   Encounter Diagnoses   Name Primary?    Impaired mobility and ADLs     Upper extremity weakness     Decreased  strength     Fine motor impairment      Physician: Sangeetha Boyer MD    Physician Orders: Eval and Treat (Comment - Finger dexterity. Weakness all over)  Medical Diagnosis: R53.1 (ICD-10-CM) - Weakness  Evaluation Date: 7/30/2020  Insurance Authorization Period Expiration: 8/7/2021  Plan of Care Certification Period: 10/30/2020  Date of Return to MD: no set date    Visit # / Visits authorized: 6 / 1 (pending further authorization)   Time In: 1549  Time Out: 1630  Total Billable Time: 41 minutes    Precautions:  Standard    Subjective     Pt reports: he could not open a jar of pickles and it was really frustrating. Pt also reports that he feels a little better today, but that he does feel like he went backwards with his progress this week. However, he does feel like he is stronger than he was when he started therapy.   Response to previous treatment: LUE is stronger   Functional change: ongoing     Involved Side: bilateral (L worse than R)   Dominant Side: Ambidextrous     Pain: 0/10  Location: N/A     Patient's Goals for Therapy: to increase strength in his hands/upper extremities and fine motor skills to be able to play guitar and participate in leisure activities; improve activity tolerance     Objective     Rhett received therapeutic exercises for 26 minutes including:  - UBE in standing to increase UE strength and activity tolerance, on 4.5 resistance x10 min reversing midway; pt educated to use arms rather than trunk, posture, and on proper breathing techniques   - Wrist flexion/extension with 3# free weight, x10 reps each hand   - RD/UD with 3# free weight, x10 reps each hand   - Supination/pronation with 3# free weight, x10 reps each hand   - Wrist  flexion/extension stretches with each hand  - Gross grasp with black digiflex, x1 minute each hand  - Isolated digit flexion with Varigrip on 7#, x1 minute each hand     Rhett participated in dynamic functional therapeutic activities to improve functional performance for 15 minutes, including:  - Purdue peg board: Picked up and placed pegs and washers into board using palm<>finger translation, x5 each hand. Pt used tweezers to remove pegs  - Nuts and bolts: screwed/unscrewed 1 bolt, simultaneously with each hand   - BAPS task: loosened/tighted tightly sealed knobs, x1 trial each hand    Home Exercises and Education Provided     Education provided:   - Continue HEP/alternating theraband with putty days  - Progress towards goals      Written Home Exercises Provided: yes.  Exercises were reviewed and Rhett was able to demonstrate them prior to the end of the session.  Rhett demonstrated good  understanding of the HEP provided.      See EMR under Patient Instructions for exercises provided 8/6/2020.      Assessment     Pt continues with varying severity of bilateral upper extremity weakness from week to week. Pt is still without definitive diagnosis, and decreased upper extremity strength and impaired fine motor skills continue to limit functional performance with meaningful occupations. However, pt is making progress with his upper extremity strength and activity tolerance, as demonstrated by good performance with all activity/exercise progressions. Pt also demonstrated improved fine motor coordination this date, performing all fine motor activities with increased control and no drops. Pt would continue to benefit from skilled occupational therapy services for HEP guidance, upper extremity strengthening, and fine motor coordination training to maximize performance with meaningful occupations.      Rhett is progressing well towards his goals and there are no updates to goals at this time. Pt prognosis is Good.      Pt will  continue to benefit from skilled outpatient occupational therapy to address the deficits listed in the problem list on initial evaluation provide pt/family education and to maximize pt's level of independence in the home and community environment.      Anticipated barriers to occupational therapy: transportation; specific diagnosis unknown      Pt's spiritual, cultural and educational needs considered and pt agreeable to plan of care and goals.     Goals:  Short Term Goals: 2-3 weeks   1) Pt will demonstrate understanding of pacing/energy conservation strategies to maximize performance with daily routine. ongoing  2) Pt will download chandra (AREVS Timer/Aconex) to develop coping strategies to decrease anxiety/stress/depression. ongoing  3) Pt will increase  strength on the left to 54# or more to increase independence with ADLs and IADLs (gripping spoon).  ongoing  4) Pt will increase left and right shoulder flexion and left abduction to at least 4/5 MMT grade to improve upper extremity strength needed for high level IADLs. MET 9/2/2020  5) Pt will increase left and right elbow flexion to greater than or equal to 4/5 to improve upper extremity strength needed for home management tasks. MET 9/2/2020  6) Pt will increase left pinch strength by greater than or equal to 3 psi all conditions (lateral: to 9 psi, 3pt and 2pt: to 6 psi) to improve strength needed for pressing on guitar strings. ongoing     Long Term Goals: 4 weeks   1) Pt will decrease score on 9HPT by 2 seconds or more bilaterally to improve fine motor coordination needed for leisure activities (playing guitar). Partially MET (MET with R hand, ongoing with L)   2) Pt will increase upper extremity AROM for L shoulder flexion and abduction by greater than or equal to 8 degrees (shld flex: to at least 143*; abd: 131*) to increase performance with kitchen tasks (reaching into overhead cabinets). ongoing  3) Pt will increase upper extremity AROM for R  shoulder flexion and abduction by greater than or equal to 8 degrees (shld flex: to least 143*; abd: 141*) to increase performance with kitchen tasks (reaching into overhead cabinets). ongoing  4) Pt will increase bilateral shoulder flexion and abduction to greater than or equal to 4+/5 to improve upper extremity strength needed for high level IADLs. ongoing  5) Pt will increase left and right elbow flexion to greater than or equal to 4+/5 to improve upper extremity strength needed for home management tasks. ongoing  6) Pt will increase L  strength to 60# or more to increase independence with ADLs and IADLs. ongoing  7) Pt will increase R  strength to 70# or more to increase independence with ADLs and IADLs. ongoing  8) Pt will play guitar for 15 minutes or more with reports of increased performance since initial eval. ongoing  9) Pt will be independent with feeding, grooming, and dressing, reporting performing activities with decreased time and increased confidence. ongoing    Plan   Updated Certification Period: 9/2/2020 to 10/30/2020  Recommended Treatment Plan: 1-2 times per week for 8 weeks: Manual Therapy, Moist Heat/ Ice, Neuromuscular Re-ed, Patient Education, Self Care, Therapeutic Activites and Therapeutic Exercise     Updates/Grading for next session: cont strengthening and FM coordination training, cont to monitor UE strength with MMT, add rows to YASMIN Pierre, OT

## 2020-09-23 ENCOUNTER — TELEPHONE (OUTPATIENT)
Dept: ENDOCRINOLOGY | Facility: CLINIC | Age: 45
End: 2020-09-23

## 2020-09-23 NOTE — TELEPHONE ENCOUNTER
----- Message from Nikki Steward sent at 9/23/2020  4:34 PM CDT -----  Contact: [phone]  Pt needs information on where to send Brandy kit

## 2020-09-24 ENCOUNTER — LAB VISIT (OUTPATIENT)
Dept: LAB | Facility: HOSPITAL | Age: 45
End: 2020-09-24
Payer: COMMERCIAL

## 2020-09-24 DIAGNOSIS — D35.2: ICD-10-CM

## 2020-09-24 LAB
CREAT 24H UR-MRATE: 47.9 MG/HR (ref 40–75)
CREAT UR-MCNC: 115 MG/DL (ref 23–375)
CREATININE, URINE (MG/SPEC): 1150 MG/SPEC
URINE COLLECTION DURATION: 24 HR
URINE VOLUME: 1000 ML

## 2020-09-24 PROCEDURE — 82530 CORTISOL FREE: CPT

## 2020-09-24 PROCEDURE — 82570 ASSAY OF URINE CREATININE: CPT

## 2020-09-25 ENCOUNTER — LAB VISIT (OUTPATIENT)
Dept: LAB | Facility: HOSPITAL | Age: 45
End: 2020-09-25
Attending: INTERNAL MEDICINE
Payer: COMMERCIAL

## 2020-09-25 DIAGNOSIS — E29.1 HYPOGONADISM IN MALE: ICD-10-CM

## 2020-09-25 DIAGNOSIS — R53.1 WEAKNESS: ICD-10-CM

## 2020-09-25 DIAGNOSIS — D35.2: ICD-10-CM

## 2020-09-25 LAB
BASOPHILS # BLD AUTO: 0.01 K/UL (ref 0–0.2)
BASOPHILS NFR BLD: 0.2 % (ref 0–1.9)
CORTIS SERPL-MCNC: <1 UG/DL (ref 4.3–22.4)
DIFFERENTIAL METHOD: ABNORMAL
EOSINOPHIL # BLD AUTO: 0 K/UL (ref 0–0.5)
EOSINOPHIL NFR BLD: 0.2 % (ref 0–8)
ERYTHROCYTE [DISTWIDTH] IN BLOOD BY AUTOMATED COUNT: 12.3 % (ref 11.5–14.5)
HCT VFR BLD AUTO: 52.6 % (ref 40–54)
HGB BLD-MCNC: 16.7 G/DL (ref 14–18)
IMM GRANULOCYTES # BLD AUTO: 0.01 K/UL (ref 0–0.04)
IMM GRANULOCYTES NFR BLD AUTO: 0.2 % (ref 0–0.5)
LYMPHOCYTES # BLD AUTO: 1.2 K/UL (ref 1–4.8)
LYMPHOCYTES NFR BLD: 18.6 % (ref 18–48)
MCH RBC QN AUTO: 28.1 PG (ref 27–31)
MCHC RBC AUTO-ENTMCNC: 31.7 G/DL (ref 32–36)
MCV RBC AUTO: 88 FL (ref 82–98)
MONOCYTES # BLD AUTO: 0.2 K/UL (ref 0.3–1)
MONOCYTES NFR BLD: 3.2 % (ref 4–15)
NEUTROPHILS # BLD AUTO: 5.1 K/UL (ref 1.8–7.7)
NEUTROPHILS NFR BLD: 77.6 % (ref 38–73)
NRBC BLD-RTO: 0 /100 WBC
PLATELET # BLD AUTO: 293 K/UL (ref 150–350)
PMV BLD AUTO: 9.9 FL (ref 9.2–12.9)
RBC # BLD AUTO: 5.95 M/UL (ref 4.6–6.2)
T4 FREE SERPL-MCNC: 1.18 NG/DL (ref 0.71–1.51)
TESTOST SERPL-MCNC: 306 NG/DL (ref 304–1227)
TSH SERPL DL<=0.005 MIU/L-ACNC: 0.71 UIU/ML (ref 0.4–4)
WBC # BLD AUTO: 6.5 K/UL (ref 3.9–12.7)

## 2020-09-25 PROCEDURE — 84443 ASSAY THYROID STIM HORMONE: CPT

## 2020-09-25 PROCEDURE — 82542 COL CHROMOTOGRAPHY QUAL/QUAN: CPT

## 2020-09-25 PROCEDURE — 82533 TOTAL CORTISOL: CPT

## 2020-09-25 PROCEDURE — 84439 ASSAY OF FREE THYROXINE: CPT

## 2020-09-25 PROCEDURE — 85025 COMPLETE CBC W/AUTO DIFF WBC: CPT

## 2020-09-25 PROCEDURE — 84403 ASSAY OF TOTAL TESTOSTERONE: CPT

## 2020-09-28 ENCOUNTER — PATIENT MESSAGE (OUTPATIENT)
Dept: ENDOCRINOLOGY | Facility: CLINIC | Age: 45
End: 2020-09-28

## 2020-09-28 ENCOUNTER — PATIENT MESSAGE (OUTPATIENT)
Dept: INTERNAL MEDICINE | Facility: CLINIC | Age: 45
End: 2020-09-28

## 2020-09-29 ENCOUNTER — LAB VISIT (OUTPATIENT)
Dept: LAB | Facility: HOSPITAL | Age: 45
End: 2020-09-29
Attending: INTERNAL MEDICINE
Payer: COMMERCIAL

## 2020-09-29 DIAGNOSIS — R71.8 MEAN RED BLOOD CELL VOLUME INCREASED: ICD-10-CM

## 2020-09-29 LAB
BASOPHILS # BLD AUTO: 0.04 K/UL (ref 0–0.2)
BASOPHILS NFR BLD: 0.6 % (ref 0–1.9)
COLLECT DURATION TIME UR: 24 H
CORTIS 24H UR-MRATE: 11 MCG/24 H (ref 3.5–45)
DIFFERENTIAL METHOD: ABNORMAL
EOSINOPHIL # BLD AUTO: 0.2 K/UL (ref 0–0.5)
EOSINOPHIL NFR BLD: 2.6 % (ref 0–8)
ERYTHROCYTE [DISTWIDTH] IN BLOOD BY AUTOMATED COUNT: 12.4 % (ref 11.5–14.5)
HCT VFR BLD AUTO: 46 % (ref 40–54)
HGB BLD-MCNC: 14.6 G/DL (ref 14–18)
IMM GRANULOCYTES # BLD AUTO: 0.01 K/UL (ref 0–0.04)
IMM GRANULOCYTES NFR BLD AUTO: 0.2 % (ref 0–0.5)
LYMPHOCYTES # BLD AUTO: 2.3 K/UL (ref 1–4.8)
LYMPHOCYTES NFR BLD: 36.6 % (ref 18–48)
MCH RBC QN AUTO: 28.2 PG (ref 27–31)
MCHC RBC AUTO-ENTMCNC: 31.7 G/DL (ref 32–36)
MCV RBC AUTO: 89 FL (ref 82–98)
MONOCYTES # BLD AUTO: 0.5 K/UL (ref 0.3–1)
MONOCYTES NFR BLD: 8.3 % (ref 4–15)
NEUTROPHILS # BLD AUTO: 3.2 K/UL (ref 1.8–7.7)
NEUTROPHILS NFR BLD: 51.7 % (ref 38–73)
NRBC BLD-RTO: 0 /100 WBC
PATH REV BLD -IMP: NORMAL
PLATELET # BLD AUTO: 261 K/UL (ref 150–350)
PMV BLD AUTO: 10.1 FL (ref 9.2–12.9)
RBC # BLD AUTO: 5.18 M/UL (ref 4.6–6.2)
SPECIMEN VOL ?TM UR: 1000 ML
WBC # BLD AUTO: 6.23 K/UL (ref 3.9–12.7)

## 2020-09-29 PROCEDURE — 85025 COMPLETE CBC W/AUTO DIFF WBC: CPT

## 2020-09-29 PROCEDURE — 85060 PATHOLOGIST REVIEW: ICD-10-PCS | Mod: ,,, | Performed by: PATHOLOGY

## 2020-09-29 PROCEDURE — 85060 BLOOD SMEAR INTERPRETATION: CPT | Mod: ,,, | Performed by: PATHOLOGY

## 2020-10-01 LAB — PATH REV BLD -IMP: NORMAL

## 2020-10-03 ENCOUNTER — IMMUNIZATION (OUTPATIENT)
Dept: INTERNAL MEDICINE | Facility: CLINIC | Age: 45
End: 2020-10-03
Payer: COMMERCIAL

## 2020-10-03 PROCEDURE — 90686 IIV4 VACC NO PRSV 0.5 ML IM: CPT | Mod: S$GLB,,, | Performed by: FAMILY MEDICINE

## 2020-10-03 PROCEDURE — 90471 FLU VACCINE (QUAD) GREATER THAN OR EQUAL TO 3YO PRESERVATIVE FREE IM: ICD-10-PCS | Mod: S$GLB,,, | Performed by: FAMILY MEDICINE

## 2020-10-03 PROCEDURE — 99999 PR PBB SHADOW E&M-EST. PATIENT-LVL I: CPT | Mod: PBBFAC,,,

## 2020-10-03 PROCEDURE — 90471 IMMUNIZATION ADMIN: CPT | Mod: S$GLB,,, | Performed by: FAMILY MEDICINE

## 2020-10-03 PROCEDURE — 90686 FLU VACCINE (QUAD) GREATER THAN OR EQUAL TO 3YO PRESERVATIVE FREE IM: ICD-10-PCS | Mod: S$GLB,,, | Performed by: FAMILY MEDICINE

## 2020-10-03 PROCEDURE — 99999 PR PBB SHADOW E&M-EST. PATIENT-LVL I: ICD-10-PCS | Mod: PBBFAC,,,

## 2020-10-05 LAB — DEXAMETHASONE SERPL-MCNC: NORMAL NG/DL

## 2020-10-06 ENCOUNTER — CLINICAL SUPPORT (OUTPATIENT)
Dept: OPHTHALMOLOGY | Facility: CLINIC | Age: 45
End: 2020-10-06
Payer: COMMERCIAL

## 2020-10-06 ENCOUNTER — OFFICE VISIT (OUTPATIENT)
Dept: OPHTHALMOLOGY | Facility: CLINIC | Age: 45
End: 2020-10-06
Payer: COMMERCIAL

## 2020-10-06 DIAGNOSIS — D35.2 PITUITARY MACROADENOMA: ICD-10-CM

## 2020-10-06 PROCEDURE — 92083 HUMPHREY VISUAL FIELD - OU - BOTH EYES: ICD-10-PCS | Mod: S$GLB,,, | Performed by: OPHTHALMOLOGY

## 2020-10-06 PROCEDURE — 92012 PR EYE EXAM, EST PATIENT,INTERMED: ICD-10-PCS | Mod: S$GLB,,, | Performed by: OPHTHALMOLOGY

## 2020-10-06 PROCEDURE — 92083 EXTENDED VISUAL FIELD XM: CPT | Mod: S$GLB,,, | Performed by: OPHTHALMOLOGY

## 2020-10-06 PROCEDURE — 92012 INTRM OPH EXAM EST PATIENT: CPT | Mod: S$GLB,,, | Performed by: OPHTHALMOLOGY

## 2020-10-06 PROCEDURE — 99999 PR PBB SHADOW E&M-EST. PATIENT-LVL III: CPT | Mod: PBBFAC,,, | Performed by: OPHTHALMOLOGY

## 2020-10-06 PROCEDURE — 99999 PR PBB SHADOW E&M-EST. PATIENT-LVL III: ICD-10-PCS | Mod: PBBFAC,,, | Performed by: OPHTHALMOLOGY

## 2020-10-06 NOTE — PROGRESS NOTES
HPI     Concerns About Ocular Health      Additional comments: Pituitary adenoma              Comments     DLS:01/29/2020 [place]  Patient here for overdue follow up Convergence insufficiency. Sill using   prism eyeglasses for near, but blurry at dist.  Vision seem the same.  No eye pain.    I have personally interviewed the patient, reviewed the history and   examined the patient and agree with the technician's exam.          Last edited by Bradley Winston MD on 10/6/2020  2:49 PM. (History)            Assessment /Plan     For exam results, see Encounter Report.    Pituitary macroadenoma  -     Mejia Visual Field - OU - Extended - Both Eyes      Optic chiasm and cranial nerve function were normal. I found no evidence of recurrence of the pituitary adenoma. He will return to me as needed.

## 2020-10-09 ENCOUNTER — DOCUMENTATION ONLY (OUTPATIENT)
Dept: REHABILITATION | Facility: HOSPITAL | Age: 45
End: 2020-10-09

## 2020-10-09 DIAGNOSIS — Z74.09 IMPAIRED FUNCTIONAL MOBILITY, BALANCE, GAIT, AND ENDURANCE: Primary | ICD-10-CM

## 2020-10-09 DIAGNOSIS — R29.898 LEG WEAKNESS, BILATERAL: ICD-10-CM

## 2020-10-09 NOTE — PROGRESS NOTES
OUTPATIENT PHYSICAL THERAPY DISCHARGE SUMMARY     Name: Rhett Ng [facility] Number: [id number]    Diagnosis:   Encounter Diagnoses   Name Primary?    Impaired functional mobility, balance, gait, and endurance Yes    Leg weakness, bilateral      Physician: No ref. provider found  Treatment Orders: PT Eval and Treat  Past Medical History:   Diagnosis Date    Pituitary macroadenoma        Initial visit: Sangeetha Boyer MD  Date of Last visit: 09/18/20  Date of Discharge Note:  10/09/20  Total Visits Received: 8  Missed Visits: 2 cancelled visits  ASSESSMENT   Status Towards Goals Met:   Please refer to last follow up note on 09/18/20 for most updated functional status.      Goals Not achieved and why: Patient cancelled his last 2 follow up appointments and has not re-scheduled any further appointments.     Discharge reason : Pt has not re-scheduled further follow-up sessions    PLAN   This patient is discharged from Outpatient Physical Therapy Services.     Wilda Turcios, PT  10/09/2020

## 2020-10-12 ENCOUNTER — DOCUMENTATION ONLY (OUTPATIENT)
Dept: REHABILITATION | Facility: HOSPITAL | Age: 45
End: 2020-10-12

## 2020-10-12 NOTE — PROGRESS NOTES
After Visit Summary      Facility     Name Address Phone       [facility]  [facility] [address]  [place] [id number] [phone]            Patient Discharge Summary   5/23/2017    Aracelis Ibrahim            Please bring this medication reconciliation form to your next doctor’s appointment(s). Please update the form if you stop taking any of these medications or you start taking any new medications including over the counter medications. Also please carry a copy of this form with you at all times in the event of an emergency.    A copy of these discharge instructions was reviewed with and given to the patient/patient representative/Guardian/Caregiver at discharge.          The doctor who took care of you in the hospital     Provider Specialty    Ortega Mosqueda MD Emergency Medicine    Drake Gomez MD Hospitalist    Andie Bonner MD Internal Medicine      Allergies as of 5/24/2017        Reactions    Maple Other (See Comments)    Runny nose and sneezing    Mold Other (See Comments)    Runny Nose and sneezing           Discharge Medications              What to Do with Your Medications      START taking these medications today unless otherwise stated        Details    Morning Noon Evening Bedtime As needed    benzonatate 100 MG capsule   Commonly known as:  TESSALON PERLES        Take 1 capsule by mouth 3 times daily as needed for Cough.   Authorizing Provider:  Andie Bonner                                                        CONTINUE taking these medications which have NOT CHANGED        Details    Morning Noon Evening Bedtime As needed    ALBUTEROL SULFATE IN        Inhale 2 puffs into the lungs as needed.   Last Dose:  12.5 mg on 5/23/2017 10:25 PM                               amoxicillin-clavulanate 875-125 MG per tablet   Commonly known as:  AUGMENTIN   Next Dose Due:  5/24 pm        Take 1 tablet by mouth 2 times daily for 10 days. X 10 days      Outpatient Therapy Discharge Summary     Name: Rhett Quarles  [facility] Number: [id number]    Therapy Diagnosis: No diagnosis found.  Physician: No ref. provider found    Physician Orders: Eval and Treat (Comment - Finger dexterity. Weakness all over)  Medical Diagnosis: R53.1 (ICD-10-CM) - Weakness  Evaluation Date: 7/30/2020    Date of Last visit: 9/18/2020  Total Visits Received: 6  Cancelled Visits: 2  No Show Visits: 0    Assessment    Pt was making fair progress towards his goals due to unknown diagnosis. Prior to cancelled visits, pt had MET 1/6 STG and Partially MET 1/9 LTG.     Goals:   Short Term Goals: 2-3 weeks   1) Pt will demonstrate understanding of pacing/energy conservation strategies to maximize performance with daily routine. NOT MET  2) Pt will download chandra ([email or web address] Timer/Cardeeo) to develop coping strategies to decrease anxiety/stress/depression. NOT MET  3) Pt will increase  strength on the left to 54# or more to increase independence with ADLs and IADLs (gripping spoon). NOT MET  4) Pt will increase left and right shoulder flexion and left abduction to at least 4/5 MMT grade to improve upper extremity strength needed for high level IADLs. MET 9/2/2020  5) Pt will increase left and right elbow flexion to greater than or equal to 4/5 to improve upper extremity strength needed for home management tasks. MET 9/2/2020  6) Pt will increase left pinch strength by greater than or equal to 3 psi all conditions (lateral: to 9 psi, 3pt and 2pt: to 6 psi) to improve strength needed for pressing on guitar strings. NOT MET     Long Term Goals: 4 weeks   1) Pt will decrease score on 9HPT by 2 seconds or more bilaterally to improve fine motor coordination needed for leisure activities (playing guitar). Partially MET (MET with R hand, ongoing with L)   2) Pt will increase upper extremity AROM for L shoulder flexion and abduction by greater than or equal to 8 degrees (shld flex: to at least 143*;    Authorizing Provider:  Naomi Encinas   Last Dose:  1 tablet on 5/24/2017  9:17 AM        With food           With food               aspirin 81 MG tablet   Next Dose Due:  5/15 am        Take 81 mg by mouth daily.                               budesonide/formoterol 80-4.5 MCG/ACT inhaler   Commonly known as:  SYMBICORT   Next Dose Due:  5/24 pm        Inhale 2 puffs into the lungs 2 times daily.                                  carvedilol 12.5 MG tablet   Commonly known as:  COREG   Next Dose Due:  5/24 pm        Take 12.5 mg by mouth 2 times daily (with meals).   Last Dose:  12.5 mg on 5/24/2017 11:58 AM        With food           With food               cyanocobalamin 2000 MCG tablet   Next Dose Due:  5/25 am        Take 2,000 mcg by mouth daily.   Last Dose:  2,000 mcg on 5/24/2017 11:58 AM                               furosemide 20 MG tablet   Commonly known as:  LASIX   Next Dose Due:  5/25 am        Take 20 mg by mouth daily.   Last Dose:  20 mg on 5/24/2017 11:59 AM                               INDOCIN 50 MG capsule   Notes to Patient:  Take 1 capsule by mouth 3 times daily as needed for pain        Generic drug:  indomethacin   1 CAPSULE 3 TIMES DAILY PRN                               losartan 25 MG tablet   Commonly known as:  COZAAR   Next Dose Due:  5/24 pm        Take 25 mg by mouth nightly.                               predniSONE 20 MG tablet   Commonly known as:  DELTASONE   Next Dose Due:  5/25 am        Take 2 tablets by mouth daily for 5 days.   Authorizing Provider:  Andie Bonner        With food                       simvastatin 20 MG tablet   Commonly known as:  ZOCOR   Next Dose Due:  5/24 pm        Take 20 mg by mouth nightly.                               SPIRIVA RESPIMAT IN   Next Dose Due:  5/25 am        Inhale 1 capsule into the lungs daily.                               triamcinolone 0.1 % cream   Commonly known as:  ARISTOCORT   Next Dose Due:  5/24 pm        Apply topically  abd: 131*) to increase performance with kitchen tasks (reaching into overhead cabinets). NOT MET  3) Pt will increase upper extremity AROM for R shoulder flexion and abduction by greater than or equal to 8 degrees (shld flex: to least 143*; abd: 141*) to increase performance with kitchen tasks (reaching into overhead cabinets). NOT MET  4) Pt will increase bilateral shoulder flexion and abduction to greater than or equal to 4+/5 to improve upper extremity strength needed for high level IADLs. NOT MET  5) Pt will increase left and right elbow flexion to greater than or equal to 4+/5 to improve upper extremity strength needed for home management tasks. NOT MET  6) Pt will increase L  strength to 60# or more to increase independence with ADLs and IADLs. NOT MET  7) Pt will increase R  strength to 70# or more to increase independence with ADLs and IADLs. NOT MET  8) Pt will play guAltheaDxr for 15 minutes or more with reports of increased performance since initial eval. NOT MET  9) Pt will be independent with feeding, grooming, and dressing, reporting performing activities with decreased time and increased confidence. NOT MET    Discharge reason: Patient has not attended therapy since 9/18/2020; pt cancelled last two therapy appointments and did not reschedule future visits.     Plan   This patient is discharged from Occupational Therapy.    CHANTELLE Rendon LOTR  10/12/2020           2 times daily.                                  TYLENOL/CODEINE #3 300-30 MG per tablet   Next Dose Due:  5/24 pm   Notes to Patient:  Take 1 tablet at bedtime as needed for pain        Generic drug:  acetaminophen-codeine   1 tab po Qhs prn pain                               vitamin - therapeutic multivitamins w/minerals Tab   Commonly known as:  CENTRUM SILVER,THERA-M   Next Dose Due:  5/25 am        Take 1 tablet by mouth daily.                                                           Where to Get Your Medications      These medications were sent to [id number] - [place], [place] -  [address]   [address], [place] [address]    Hours:  M-F 8:30-6,SA/NARANJO Closed Phone:  [phone]     benzonatate 100 MG capsule    predniSONE 20 MG tablet               Your To Do List     Future Appointments Provider Department Dept Phone    5/30/2017 2:00 PM Dada Booker MD [facility] [phone]        Discharge Instructions       Mrs. Ibrahim,     It was a privilege taking care of you and being a part of the healthcare team during your hospitalization. I wish you all the best for your future health. Thank you for choosing [facility].     Dr. Andie Bonner  [facility]  Board Certified, Internal Medicine        Discharge References/Attachments     BENZONATATE ORAL CAPSULE (ENGLISH)      Pending Labs/Results     Any lab or diagnostic test results that are \"pending\" at time of discharge are listed below. If no results display then none were \"pending\" at time of discharge. Depending on when the test was completed results may be available within 3-5 days. Results can be reviewed with your provider at your next visits or through [email or web address]. If needed contact the provider office for additional information.          Order Current Status    Respiratory Pathogen Panel In process    Blood Culture Preliminary result    Blood Culture Preliminary result        Your Opinion Matters To Us  If you receive a patient satisfaction survey in the mail, please complete and return it in the postage-paid envelope.    We truly value and appreciate your feedback.           Aracelis Ibrahim        Your To Do List     Future Appointments Provider Department Dept Phone    5/30/2017 2:00 PM Dada Booker MD [facility] [phone]      Contact your doctor for follow-up appointment if not already scheduled.     Follow up with Bryce Foss MD. Schedule an appointment as soon as possible for a visit in 1 week.    Specialty:  Internal Medicine - Pulmonary Disease    Contact information    [address] [place] [id number]  [phone]          Follow up with Dada Booker MD On 5/30/2017.    Specialty:  Family Practice    Comments:  SCHEDDULED AT 2:00PM    Contact information    [address] [address]  [phone]           Aracelis Ibrahim           Summary of your Discharge Medications      Take these Medications        Details    Morning Noon Evening Bedtime As needed    ALBUTEROL SULFATE IN    Inhale 2 puffs into the lungs as needed.                               amoxicillin-clavulanate 875-125 MG per tablet   Commonly known as:  AUGMENTIN    Take 1 tablet by mouth 2 times daily for 10 days. X 10 days        With food           With food               aspirin 81 MG tablet    Take 81 mg by mouth daily.                               benzonatate 100 MG capsule   Commonly known as:  TESSALON PERLES    Take 1 capsule by mouth 3 times daily as needed for Cough.                               budesonide/formoterol 80-4.5 MCG/ACT inhaler   Commonly known as:  SYMBICORT    Inhale 2 puffs into the lungs 2 times daily.                                  carvedilol 12.5 MG tablet   Commonly known as:  COREG    Take 12.5 mg by mouth 2 times daily (with meals).        With food           With food               cyanocobalamin 2000 MCG  tablet    Take 2,000 mcg by mouth daily.                               furosemide 20 MG tablet   Commonly known as:  LASIX    Take 20 mg by mouth daily.                               INDOCIN 50 MG capsule    Generic drug:  indomethacin   1 CAPSULE 3 TIMES DAILY PRN                               losartan 25 MG tablet   Commonly known as:  COZAAR    Take 25 mg by mouth nightly.                               predniSONE 20 MG tablet   Commonly known as:  DELTASONE    Take 2 tablets by mouth daily for 5 days.        With food                       simvastatin 20 MG tablet   Commonly known as:  ZOCOR    Take 20 mg by mouth nightly.                               SPIRIVA RESPIMAT IN    Inhale 1 capsule into the lungs daily.                               triamcinolone 0.1 % cream   Commonly known as:  ARISTOCORT    Apply topically 2 times daily.                                  TYLENOL/CODEINE #3 300-30 MG per tablet    Generic drug:  acetaminophen-codeine   1 tab po Qhs prn pain                               vitamin - therapeutic multivitamins w/minerals Tab   Commonly known as:  CENTRUM SILVER,THERA-M    Take 1 tablet by mouth daily.                                                             Outpatient Administered Medication List      Notice     You have not been prescribed any facility-administered medications.

## 2020-10-13 ENCOUNTER — DOCUMENTATION ONLY (OUTPATIENT)
Dept: REHABILITATION | Facility: HOSPITAL | Age: 45
End: 2020-10-13

## 2020-10-22 ENCOUNTER — OFFICE VISIT (OUTPATIENT)
Dept: NEUROLOGY | Facility: CLINIC | Age: 45
End: 2020-10-22
Payer: COMMERCIAL

## 2020-10-22 ENCOUNTER — LAB VISIT (OUTPATIENT)
Dept: LAB | Facility: OTHER | Age: 45
End: 2020-10-22
Attending: PSYCHIATRY & NEUROLOGY
Payer: COMMERCIAL

## 2020-10-22 VITALS
HEART RATE: 63 BPM | WEIGHT: 148.13 LBS | BODY MASS INDEX: 25.29 KG/M2 | DIASTOLIC BLOOD PRESSURE: 75 MMHG | SYSTOLIC BLOOD PRESSURE: 122 MMHG | HEIGHT: 64 IN

## 2020-10-22 DIAGNOSIS — G60.3 IDIOPATHIC PROGRESSIVE POLYNEUROPATHY: ICD-10-CM

## 2020-10-22 DIAGNOSIS — G60.3 IDIOPATHIC PROGRESSIVE POLYNEUROPATHY: Primary | ICD-10-CM

## 2020-10-22 DIAGNOSIS — G93.31 POST VIRAL SYNDROME: ICD-10-CM

## 2020-10-22 LAB — VIT B12 SERPL-MCNC: 489 PG/ML (ref 210–950)

## 2020-10-22 PROCEDURE — 3008F PR BODY MASS INDEX (BMI) DOCUMENTED: ICD-10-PCS | Mod: CPTII,S$GLB,, | Performed by: PSYCHIATRY & NEUROLOGY

## 2020-10-22 PROCEDURE — 99215 PR OFFICE/OUTPT VISIT, EST, LEVL V, 40-54 MIN: ICD-10-PCS | Mod: S$GLB,,, | Performed by: PSYCHIATRY & NEUROLOGY

## 2020-10-22 PROCEDURE — 99999 PR PBB SHADOW E&M-EST. PATIENT-LVL III: CPT | Mod: PBBFAC,,, | Performed by: PSYCHIATRY & NEUROLOGY

## 2020-10-22 PROCEDURE — 82607 VITAMIN B-12: CPT

## 2020-10-22 PROCEDURE — 99215 OFFICE O/P EST HI 40 MIN: CPT | Mod: S$GLB,,, | Performed by: PSYCHIATRY & NEUROLOGY

## 2020-10-22 PROCEDURE — 86255 FLUORESCENT ANTIBODY SCREEN: CPT | Mod: 59

## 2020-10-22 PROCEDURE — 99999 PR PBB SHADOW E&M-EST. PATIENT-LVL III: ICD-10-PCS | Mod: PBBFAC,,, | Performed by: PSYCHIATRY & NEUROLOGY

## 2020-10-22 PROCEDURE — 3008F BODY MASS INDEX DOCD: CPT | Mod: CPTII,S$GLB,, | Performed by: PSYCHIATRY & NEUROLOGY

## 2020-10-22 PROCEDURE — 83036 HEMOGLOBIN GLYCOSYLATED A1C: CPT

## 2020-10-22 PROCEDURE — 83519 RIA NONANTIBODY: CPT | Mod: 59

## 2020-10-22 NOTE — PATIENT INSTRUCTIONS
Thank you for coming.    You have episodic migraine with aura:  Continue Mag oxide 800mg daily  Riboflavin 400mg daily  Continue Advil for migraine acute management     Twitching:  Continue magnesium oxide     Check your B12 today   Check paraneoplastic antibody panel   Hemoglobin A1c today     Follow up in 6 months

## 2020-10-22 NOTE — PROGRESS NOTES
NEUROLOGY  Outpatient follow-up Clinic visit note    [address] [address]  [phone] (office) / [phone] ( (fax)    Patient Name:  Rhett Quarles  :  1975  MR #:  [id number]  Acct #:  [id number]    Date of Neurology Visit: 10/25/2020  Name of Neurologist: Sangeetha Boyer MD    Other Physicians:  Bryce Robert MD (Primary Care Physician); No ref. provider found (Referring)      Chief Complaint: Follow-up      History of Present Illness (HPI):  Rhett Quarles is a 45 y.o. male presents for a follow-up visit.      I saw the patient on 2020 for evaluation of numbness/tingling involving different parts of his body.  He was then diagnosed with COVID-19 and after the diagnosis he noted that fine motor skills became difficult to perform and he had muscle twitching all over his body including his tongue.  He also noted tongue numbness and heaviness in his muscles with difficulty with walking.  I recommended EMG/NCS study, also blood work to evaluate for causes of neuropathy  Which were all within normal limits.  I recommended MRI of the C and T-spine as well given ankle clonus and hyperreflexia at the knees.    Today the patient reports he has good days and bad days. He has symptoms mainly the twitching which involves different muscles thorughout his body. He had a day when his right biceps was twitching for a whole day. He has days when he is very tired. He goes on walks can walk about 3 miles. He is unable to perform exercises like he can typicaly perform. He overall feels better. He thinks he is losing weight. He denies any falls. Since his last visit with me in 2020 he has lost 4 pounds.When the muscles twitch more than usual he feel weaker in his muscles.     He denies any pins/ needles and numbness.       He has headaches 1-2 times/ week.   Headache:  Severity: 7/10  Type: hit with hammer type of pain   Location: center of  "forehead where the eyebrows meet  Frequency: 1-2 times/ week   Duration: all day   Aura: sees white flashes of light out of both eyes  Which can happen right before headache but an happen without a headache following his symptoms   Photophobia: ++  Phonophobia: none   Nausea/ vomiting: none   Aggravating factors: having eyes open, bright lights   Relieving factors: advil , sleeping, putting ice on head   Previously failed therapies: none   Autonomic symptoms: none   Activity during headache: lays down in a dark room   Smoking: none     Headaches have been happening since he was a kid.   No family h/o migraine     Initial HPI:    The patient states that he had abrupt onset of numbness/tingling involving various areas on 10/30/2019.  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 He underwent adenoma resection.  He states he had headaches for a very long time and the headaches were attributed to the pituitary macroadenoma.     30 days later he was diagnosed with COVID-19, he was symptomatic at that time.  Since then, he has noticed that everything feels difficult to do including fine motor skills like playing his guitar. It is getting tougher and he has muscle twitches all over his body - including his tongue. He also noted tongue numbness. He has intermittent tingling involving various parts of his body.  He has noted heaviness in his muscles and difficulty with walking periodically.    The twitching came on after COVID-19 and the tongue heaviness came on after COVID-19 as well.Since the infection he has had a " brain fog" it takes longer for him to say a word he wants to say and he has to concentrate to remember things.    Some days are good and somes days are not so good. He is keeping a journal and cannot find a pattern to his symptoms.  He has joined a COVID-19 patient group and states that several patients on the group have had symptoms that he is currently " having.    Breathing: okay  Speech: is okay but tongue feels heavier  Swallowing: no episodes of choking  Weight loss: 14 pound weight loss which is non-intenional  Drinks alcohol several days a week: 1-2 drinks/ night  Family history: none     He is a .    He has headaches, these are chronic and do not bother him as much. He has anxiety because of the possiblity of a terrible diagnosis.       Duration:  Original symptoms since 8-9 months, additional symptoms since after COVID diagnosis 4 months ago  Location:  Different areas of his body- arm/leg/back/tongue/face  Intensity:  Moderate to severe  Aggravating factors:  None  Relieving factors:  Alcohol help somewhat  Frequency:  Several times a day  Timing:  During the day  Associated symptoms:  Heaviness of muscles        Treatment to date:    None for paresthesias    Review of Systems:    General: Weight gain: No, Weight Loss: Yes, Fatigue: Yes,   Fever: No, Chills: No, Night Sweats: No, Insomnia: No, Excessive sleeping: No   Respiratory:  Cough: No, Shortness of Breath: No,   Wheezing: No, Excessive Snoring: Yes, Coughing up blood: No  Endocrine: Heat Intolerance: No, Cold Intolerance: No,   Excessive Thirst: No, Excessive Hunger: No,   Eyes:  Blurred Vision: No, Double Vision: Yes,   Light Sensitivity: No, Eye pain: No  Musculoskeletal: Muscle Aches/Pain: Yes, Joint Pain/Swelling: Yes, Muscle Cramps: No, Muscle Weakness: Yes, Neck Pain: Yes, Back Pain: Yes   Neurological: Difficulty Walking/Falls: Yes, Headache Migraine: Yes, Dizziness/Vertigo: Yes, Fainting: No, Difficulty with Speech: Yes, Weakness: Yes, Tingling/Numbness: Yes, Tremors: Yes, Memory Problems: Yes, Seizures: No, Difficulty Swallowing: No, Altered Taste: No.  Cardiovascular: Chest Pain: No, Shortness of Breath: No,   Palpitations: No,  Gastrointestinal: Nausea/Vomiting: No, Constipation: No, Diarrhea: No, Bloody Stools: No   Psych/Cog:  Depression: No, Anxiety: Yes, Hallucinations: No,  "Problems Concentrating: No  : Frequent Urination: No, Incontinence: No, Blood of Urine: No, Urinary Infections: No,   ENT:Hearing Loss: No, Earache: No, Ringing in Ears: No,   Facial Pain: No, Chronic Congestion: No   Immune: Seasonal Allergies: Yes, Hives and/or Rashes: No  The remainder of the review of twelve body systems was reviewed and normal.    Past Medical, Surgical, Family & Social History:   Past Medical History:   Diagnosis Date    Pituitary macroadenoma        Home Medications:     Current Outpatient Medications:     albuterol (PROVENTIL/VENTOLIN HFA) 90 mcg/actuation inhaler, Inhale 2 puffs into the lungs every 4 (four) hours as needed for Wheezing or Shortness of Breath. Rescue, Disp: 18 g, Rfl: 0    ascorbic acid (VITAMIN C ORAL), Take by mouth once daily., Disp: , Rfl:     benzonatate (TESSALON PERLES) 100 MG capsule, 1 or 2 every 8 hours prn cough, Disp: 30 capsule, Rfl: 1    multivitamin capsule, Take 1 capsule by mouth once daily., Disp: , Rfl:     Physical Examination:  /75   Pulse 63   Ht 5' 4" (1.626 m)   Wt 67.2 kg (148 lb 2.4 oz)   BMI 25.43 kg/m²     GENERAL:  General appearance: Well, non-toxic appearing.  No apparent distress.  Fundi exam: normal.  Neck: supple.  Carotid auscultation: normal.  Heart auscultation: normal.  Peripheral pulses: normal.  Extremities: normal.    MENTAL STATUS:  Alertness, attention span & concentration: normal.  Language: normal.  Orientation to self, place & time:  normal.  Memory, recent & remote: normal.  Fund of knowledge: normal.      SPEECH:  Clear and fluent.  Follows complex commands.    CRANIAL NERVES:  Cranial Nerves II-XII were examined.  II - Visual fields: normal.  III, IV, VI: PERRL, EOMI, No ptosis, No nystagmus.  V - Facial sensation: normal.  VII - Face symmetry & mobility: normal.  VIII - Hearing: normal.  IX, X - Palate: mobile & midline.  XI - Shoulder shrug: normal.  XII - Tongue protrusion: normal.    GROSS MOTOR:  Gait & " station: normal.  Tone: normal.  Abnormal movements: none.  Finger-nose & Heel-knee-shin: normal.  Rapid alternating movements & drift: normal.  Romberg: absent    MUSCLE STRENGTH:     Fascics Atrophy RIGHT    LEFT Atrophy Fascics     5 Neck Ext. 5       5 Neck Flex 5       5 Deltoids 5       5 Sh.Ext.Rot. 5       5 Sh.Int.Rot. 5       5 Biceps 5  ++     5 Triceps 5       5 Forearm.Pr. 5  ++     5 Wrist Ext. 5       5 Wrist Flex 5       5 Finger Ext. 5       5 Finger Flex 5       5 FPL 5       5 Inteross. 5                         5 Iliopsoas 5       5 Hip Abduct 5       5 Hip Adduct 5       5 Quads 5       5 Hams 5       5 Dorsiflex 5       5 Plantar Flex 5       5 Ankle Mane 5       5 Ankle Invert 5       5 Toe Ext. 5       5 Toe Flex 5                         REFLEXES:    RIGHT Reflex   LEFT   2+ Biceps 2+   3+ Brachiorad. 3+   2+ Triceps 2+        3+ Patellar 3+   2 beat clonus Ankle 2 beat clonus        Down PLANTAR Down      SENSORY:  Light touch: Normal throughout.  Sharp touch: Normal throughout.  Vibration: 8 sceonds at great toes bilaterally  Temperature: Normal throughout.  Joint Position: Normal throughout.    Diagnostic Data Reviewed:     EMG/NCS of bilateral lower extremities on 08/04/2020:    Conclusion:   There is electrophysiologic evidence of fasciculations involving the proximal and distal left lower extremity muscles.   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       Blood work    07/20/2020:  CK-136  Ace-22  Magnesium -2.2  Zinc-76  Vitamin-E-1835 ( normal levels between 500-1800)  Copper -1336  Vitamin B2-6  RPR-negative  SPEP, OMER-within normal limits    12/03/2019:  TSH -0.417  Hemoglobin A1c -5.6  Vitamin   Vitamin B1-62  Vitamin B6-10  RPR-negative  Serum protein immunofixation:  Negative  Acetylcholine receptor binding antibody:  Negative  ANAND  screen:  Negative  Sjogren syndrome:  Negative  Lyme  disease antibodies:  Negative  ESR-5  Vitamin D-28  Folate-8  Anti cardiolipin antibody-negative      MRI brain with and without contrast on 06/15/2020:    Impression:     Postoperative changes consistent with transsphenoidal surgery with resection of the vast majority of the pituitary macroadenoma.  Fat packing material within the anterior aspect of the sella and sphenoid sinus.  Thin rim of residual enhancing tissue along the sella floor, nonspecific in the postoperative setting.  No evidence of compression upon the optic chiasm or extension into the cavernous sinus.     Exam can serve as baseline for future comparison studies.     Brain parenchyma is unremarkable.     Small volume right mastoid air cell fluid.    CT head without contrast on 02/10/2020:    Impression:     Postsurgical change of recent transsphenoidal resection of a sellar mass protruding into the sphenoid sinus as above.  No apparent intracranial complication.      EMG/NCS of bilateral  upper extremities:  Normal study per Dr. Mora's note, unable to review data    EMG/NCS on 08/04/2020:    Conclusion:   There is electrophysiologic evidence of fasciculations involving the proximal and distal left lower extremity muscles.   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     MRI C-spine with and without contrast on 07/24/2020:  Impression:     Mild degenerative change in the cervical and thoracic spine as above.  No significant spinal canal stenosis or cord compression.     Spinal cord maintains normal signal.  No focal lesions or abnormal enhancement.     MRI thoracic spine on 07/24/2020:  Impression:     Mild degenerative change in the cervical and thoracic spine as above.  No significant spinal canal stenosis or cord compression.     Spinal cord maintains  normal signal.  No focal lesions or abnormal enhancement.      Assessment and Plan:  45-year-old previously healthy male with recently diagnosed pituitary macroadenoma status post resection presents to the Neurology Clinic for a follow-up visit.    The patient has been previously seen by Dr. Mora.    The patient states that he had abrupt onset of numbness/tingling involving various areas on 10/30/2019.  He was evaluated by Dr. 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 He underwent adenoma resection.  He states he had headaches for a very long time and the headaches were attributed to the pituitary macroadenoma.  However the etiology of his paresthesias was unclear.  He was then diagnosed with COVID-19 in March 2020 after which he developed fasciculations involving different muscles including his tongue.  He noted muscle fatigue and difficulty with using his muscles it is which weight reason intensity.     His neurological examination is within normal limits except for hyperreflexia and fasciculations as noted above.  There is no evidence of muscle atrophy.  MRI brain reviewed with the patient and did not reveal evidence of demyelinating lesions. The patient's fasciculations came on after his COVID-19 infection as well as the muscle heaviness came on after the infection.  A possibility would be postviral syndrome causing his symptoms.  The previously reported 1st paresthesias have now resolved.    EMG did reveal evidence of fasciculations involving the lower extremity without any other evidence of neuropathy or acute/ongoing denervation or chronic reinnervation changes.    MRI cervical spine and T-spine reviewed.  The patient has minimal disc bulges involving the C-spine with posterior disc osteophyte complex at C5-C6, no evidence of spinal stenosis.  There is evidence of disc bulge at T9-T10 with no significant spinal stenosis.  The etiology of the patient's ankle clonus is  unclear.  Imaging and blood work to evaluate for underlying cause of myelopathy has been negative so far.  I will continue to monitor him clinically. CK- wnl   There is no evidence of weakness or atrophy at this time.      Assessment:    1.  Intermittent paresthesias- resolved   2.  Diffuse fasciculations  3.  Pituitary macroadenoma status post resection  4.  Post viral syndrome ( post COVID-19)  5.  Vitamin-D deficiency  6.  Low vitamin B12 level  7.   Episodic migraine with aura     Recommendations:  1.  I reassured the patient that fasciculations by themselves do not carry a grave prognosis as he has no evidence of weakness/atrophy on examination.   2.  Complete workup would check paraneoplastic panel  3.  Repeat hemoglobin A1c  4.  Repeat B12   5.  Encouraged continuation of magnesium oxide 800 mg nightly which can help with both the fasciculations and for migraine prophylaxis.  Additionally can add riboflavin 400 mg daily for migraine prophylaxis  6.  The patient reports resolution of headaches with Advil.  Would continue the same and consider addition of Triptan in the future    Future considerations:  Check vitamin-D follow-up  Send out SMA gene panel , Hexosaminidase A level         The patient will return to clinic in 6 months.    Important to note, also  has a past medical history of Pituitary macroadenoma.         Sangeetha Boyer MD  Medicine-Neurology, Clinical Neurophysiology    Time Spent: 40 minutes spent face-to-face, >50% spent advising about: counseling and/or coordination of care    This note was created with voice recognition software.  Grammatical, syntax and spelling errors may be inevitable.

## 2020-10-23 LAB
ESTIMATED AVG GLUCOSE: 105 MG/DL (ref 68–131)
HBA1C MFR BLD HPLC: 5.3 % (ref 4–5.6)

## 2020-10-27 ENCOUNTER — OFFICE VISIT (OUTPATIENT)
Dept: DERMATOLOGY | Facility: CLINIC | Age: 45
End: 2020-10-27
Payer: COMMERCIAL

## 2020-10-27 DIAGNOSIS — L82.1 SK (SEBORRHEIC KERATOSIS): Primary | ICD-10-CM

## 2020-10-27 DIAGNOSIS — D22.9 MULTIPLE BENIGN NEVI: ICD-10-CM

## 2020-10-27 DIAGNOSIS — D22.9 BENIGN MOLE: ICD-10-CM

## 2020-10-27 PROCEDURE — 99999 PR PBB SHADOW E&M-EST. PATIENT-LVL III: CPT | Mod: PBBFAC,,, | Performed by: DERMATOLOGY

## 2020-10-27 PROCEDURE — 99203 PR OFFICE/OUTPT VISIT, NEW, LEVL III, 30-44 MIN: ICD-10-PCS | Mod: S$GLB,,, | Performed by: DERMATOLOGY

## 2020-10-27 PROCEDURE — 99203 OFFICE O/P NEW LOW 30 MIN: CPT | Mod: S$GLB,,, | Performed by: DERMATOLOGY

## 2020-10-27 PROCEDURE — 99999 PR PBB SHADOW E&M-EST. PATIENT-LVL III: ICD-10-PCS | Mod: PBBFAC,,, | Performed by: DERMATOLOGY

## 2020-10-27 NOTE — PATIENT INSTRUCTIONS
Limit sun exposure to before 10am and after 4 pm daily.  Use sunscreen and sun protective clothing  Will recheck these moles on the abdomen and back in 6 months.

## 2020-10-27 NOTE — PROGRESS NOTES
Subjective:       Patient ID:  Rhett Quarles is a 45 y.o. male who presents for   Chief Complaint   Patient presents with    Skin Check     UBSE     Newer spot past 6 mths on the abd.  Feels dry.      Review of Systems   Constitutional: Negative for fever, chills, fatigue and malaise.   HENT: Negative for congestion and sore throat.    Respiratory: Negative for cough and shortness of breath.    Skin: Negative for rash and daily sunscreen use.        Objective:    Physical Exam   Constitutional: He appears well-developed and well-nourished. No distress.   Eyes: No conjunctival no injection.   Neurological: He is alert and oriented to person, place, and time. He is not disoriented.   Psychiatric: He has a normal mood and affect.   Skin:   Areas Examined (abnormalities noted in diagram):   Neck Inspection Performed  Chest / Axilla Inspection Performed  Abdomen Inspection Performed  Back Inspection Performed  RUE Inspected  LUE Inspection Performed              Diagram Legend     Erythematous scaling macule/papule c/w actinic keratosis       Vascular papule c/w angioma      Pigmented verrucoid papule/plaque c/w seborrheic keratosis      Yellow umbilicated papule c/w sebaceous hyperplasia      Irregularly shaped tan macule c/w lentigo     1-2 mm smooth white papules consistent with Milia      Movable subcutaneous cyst with punctum c/w epidermal inclusion cyst      Subcutaneous movable cyst c/w pilar cyst      Firm pink to brown papule c/w dermatofibroma      Pedunculated fleshy papule(s) c/w skin tag(s)      Evenly pigmented macule c/w junctional nevus     Mildly variegated pigmented, slightly irregular-bordered macule c/w mildly atypical nevus      Flesh colored to evenly pigmented papule c/w intradermal nevus       Pink pearly papule/plaque c/w basal cell carcinoma      Erythematous hyperkeratotic cursted plaque c/w SCC      Surgical scar with no sign of skin cancer recurrence      Open and closed comedones       Inflammatory papules and pustules      Verrucoid papule consistent consistent with wart     Erythematous eczematous patches and plaques     Dystrophic onycholytic nail with subungual debris c/w onychomycosis     Umbilicated papule    Erythematous-base heme-crusted tan verrucoid plaque consistent with inflamed seborrheic keratosis     Erythematous Silvery Scaling Plaque c/w Psoriasis     See annotation      Assessment / Plan:        SK (seborrheic keratosis)  Discussed with patient the benign nature of these lesions and that no treatment is indicated.    Instructed patient to use petroleum jelly at least daily on affected areas.  Brochure given for patient education.    Multiple benign nevi  Discussed all of the following with the patient.    Patient to check their breasts (if applicable), buttocks, and groin with a mirror.  Patient deferred our examination of these areas today.    Each month, check your body for any spots such as freckles, age spots, and moles.  Watch for color changes and shape changes and growth in size.    Have your lee or  pay attention to any dark color changes to moles of the scalp.    Moles, also called nevi, are small, colored (pigmented) marks on the skin. They have no known purpose. Many moles appear before age 30, but they also increase frequently as people age. Moles most often are not cancer (benign) and are harmless. But some become cancerous (malignant). Thats why you need to watch the moles on your body and tell your healthcare provider about any that concern you.  Brochure given for patient education.    Benign mole  Discussed with patient the benign nature of these lesions and that no treatment is indicated.    We will recheck the abd and upper back at our follow up appointment.  Consider biopsy of any suspicious moles or lesions later.               Follow up in about 6 months (around 4/27/2021) for UBSE to recheck moles.

## 2020-10-29 ENCOUNTER — OFFICE VISIT (OUTPATIENT)
Dept: UROLOGY | Facility: CLINIC | Age: 45
End: 2020-10-29
Attending: UROLOGY
Payer: COMMERCIAL

## 2020-10-29 VITALS
HEART RATE: 60 BPM | SYSTOLIC BLOOD PRESSURE: 130 MMHG | DIASTOLIC BLOOD PRESSURE: 88 MMHG | HEIGHT: 64 IN | BODY MASS INDEX: 25.27 KG/M2 | WEIGHT: 148 LBS

## 2020-10-29 DIAGNOSIS — R10.2 MALE PELVIC-PERINEAL PAIN SYNDROME: Primary | ICD-10-CM

## 2020-10-29 LAB
BILIRUB SERPL-MCNC: NEGATIVE MG/DL
BLOOD URINE, POC: NEGATIVE
CLARITY, POC UA: CLEAR
COLOR, POC UA: YELLOW
GLUCOSE UR QL STRIP: NORMAL
KETONES UR QL STRIP: NEGATIVE
LEUKOCYTE ESTERASE URINE, POC: NEGATIVE
NITRITE, POC UA: NEGATIVE
PH, POC UA: 7
PROTEIN, POC: NEGATIVE
SPECIFIC GRAVITY, POC UA: 1.01
UROBILINOGEN, POC UA: NORMAL

## 2020-10-29 PROCEDURE — 99204 OFFICE O/P NEW MOD 45 MIN: CPT | Mod: 25,S$GLB,, | Performed by: UROLOGY

## 2020-10-29 PROCEDURE — 81002 POCT URINE DIPSTICK WITHOUT MICROSCOPE: ICD-10-PCS | Mod: S$GLB,,, | Performed by: UROLOGY

## 2020-10-29 PROCEDURE — 81002 URINALYSIS NONAUTO W/O SCOPE: CPT | Mod: S$GLB,,, | Performed by: UROLOGY

## 2020-10-29 PROCEDURE — 99204 PR OFFICE/OUTPT VISIT, NEW, LEVL IV, 45-59 MIN: ICD-10-PCS | Mod: 25,S$GLB,, | Performed by: UROLOGY

## 2020-10-29 PROCEDURE — 3008F BODY MASS INDEX DOCD: CPT | Mod: CPTII,S$GLB,, | Performed by: UROLOGY

## 2020-10-29 PROCEDURE — 3008F PR BODY MASS INDEX (BMI) DOCUMENTED: ICD-10-PCS | Mod: CPTII,S$GLB,, | Performed by: UROLOGY

## 2020-10-29 RX ORDER — CIPROFLOXACIN 500 MG/1
500 TABLET ORAL EVERY 12 HOURS
Qty: 60 TABLET | Refills: 0 | Status: SHIPPED | OUTPATIENT
Start: 2020-10-29 | End: 2020-11-28

## 2020-10-29 NOTE — PROGRESS NOTES
"Subjective:      Rhett Quarles is a 45 y.o. male who was self-referred for evaluation of pelvic pain.    He reports a 1 year history of pelvic/prostatic pain.  He describes the pain as a dull pressure that is continuous in nature (while sitting) and was gradual in onset.  He reports no prior similar episodes.  He notes that pain is improved with standing (hasn't tried medications) and worsened with sitting.  He reports no significant associated urinary symptoms during this time.  He has no family history of prostate cancer. He reports a history of no complicating symptoms. He denies flank pain, gross hematuria, kidney stones and recurrent UTI.      He also had recent labs demonstrating low testosterone. He had a pituitary adenoma resected in February and has been low since then, though levels have slowly been improving over the past year. He does have low energy and hair loss. He has not had any TRT thus far.    The following portions of the patient's history were reviewed and updated as appropriate: allergies, current medications, past family history, past medical history, past social history, past surgical history and problem list.    Review of Systems  Constitutional: no fever or chills  ENT: no nasal congestion or sore throat  Respiratory: no cough or shortness of breath  Cardiovascular: no chest pain or palpitations  Gastrointestinal: no nausea or vomiting, tolerating diet  Genitourinary: as per HPI  Hematologic/Lymphatic: no easy bruising or lymphadenopathy  Musculoskeletal: no arthralgias or myalgias  Neurological: no seizures or tremors  Behavioral/Psych: no auditory or visual hallucinations     Objective:   Vitals: /88 (BP Location: Right arm, Patient Position: Sitting, BP Method: Large (Automatic))   Pulse 60   Ht 5' 4" (1.626 m)   Wt 67.1 kg (148 lb)   BMI 25.40 kg/m²     Physical Exam   General: alert and oriented, no acute distress  Head: normocephalic, atraumatic  Neck: supple, no " lymphadenopathy, normal ROM, no masses  Respiratory: Symmetric expansion, non-labored breathing  Cardiovascular: regular rate and rhythm, nomal pulses, no peripheral edema  Abdomen: soft, non tender, non distended, no palpable masses, no hernias, no hepatomegaly or splenomegaly  Genitourinary:   Penis: normal, no lesions, patent orthotopic meatus, no plaques  Scrotum: no rashes or skin changes;   Testes: descended bilaterally, no masses, nontender, normal epididymides bilaterally, no hydroceles  Perineum: TTP along perineal body with light pressure  Lymphatic: no inguinal nodes  Skin: normal coloration and turgor, no rashes, no suspicious skin lesions noted  Neuro: alert and oriented x3, no gross deficits  Psych: normal judgment and insight, normal mood/affect and non-anxious    Lab Review   Urinalysis demonstrates negative for all components  Lab Results   Component Value Date    WBC 6.23 09/29/2020    HGB 14.6 09/29/2020    HCT 46.0 09/29/2020    MCV 89 09/29/2020     09/29/2020     Lab Results   Component Value Date    CREATININE 1.0 07/20/2020    BUN 10 07/20/2020     Assessment:     1. 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  I further explained that symptomatic episodes can be intermittent and recurring with various potential triggers including high stress and bladder irritants such as alcohol, caffeine, and spicy and/or acidic foods.  We also reviewed the various treatment options including antibiotics, NSAIDs, anticholenergics, and alpha blockers.     I explained that per guidelines we will start with abx for prostatitis to rule that out. If not successful, then we will move on to other treatments to treat what is likely MSK etiology.    I spent over 45 minutes with the patient. Over 50% of the visit was spent in counseling and  coordination of care.

## 2020-11-02 ENCOUNTER — PATIENT MESSAGE (OUTPATIENT)
Dept: NEUROLOGY | Facility: CLINIC | Age: 45
End: 2020-11-02

## 2020-11-02 LAB
AMPHIPHYSIN AB TITR SER: NEGATIVE TITER
CV2 IGG TITR SER: NEGATIVE TITER
GLIAL NUC TYPE 1 AB TITR SER: NEGATIVE TITER
HU1 AB TITR SER: NEGATIVE TITER
HU2 AB TITR SER IF: NEGATIVE TITER
HU3 AB TITR SER: NEGATIVE TITER
IMMUNOLOGIST REVIEW: NORMAL
NACHR AB SER-SCNC: 0 NMOL/L
PAVAL REFLEX TEST ADDED: NORMAL
PCA-1 AB TITR SER: NEGATIVE TITER
PCA-2 AB TITR SER: NEGATIVE TITER
PCA-TR AB TITR SER: NEGATIVE TITER
STRIA MUS AB TITR SER: NEGATIVE TITER
VGCC-N BIND AB SER-SCNC: 0 NMOL/L
VGCC-P/Q BIND AB SER-SCNC: 0 NMOL/L
VGKC AB SER-SCNC: 0 NMOL/L

## 2020-11-05 ENCOUNTER — OFFICE VISIT (OUTPATIENT)
Dept: GASTROENTEROLOGY | Facility: CLINIC | Age: 45
End: 2020-11-05
Payer: COMMERCIAL

## 2020-11-05 VITALS
DIASTOLIC BLOOD PRESSURE: 82 MMHG | HEIGHT: 64 IN | WEIGHT: 148.06 LBS | HEART RATE: 59 BPM | SYSTOLIC BLOOD PRESSURE: 141 MMHG | BODY MASS INDEX: 25.28 KG/M2

## 2020-11-05 DIAGNOSIS — R10.31 RIGHT LOWER QUADRANT PAIN: ICD-10-CM

## 2020-11-05 PROCEDURE — 3008F PR BODY MASS INDEX (BMI) DOCUMENTED: ICD-10-PCS | Mod: CPTII,S$GLB,, | Performed by: INTERNAL MEDICINE

## 2020-11-05 PROCEDURE — 99204 PR OFFICE/OUTPT VISIT, NEW, LEVL IV, 45-59 MIN: ICD-10-PCS | Mod: S$GLB,,, | Performed by: INTERNAL MEDICINE

## 2020-11-05 PROCEDURE — 3008F BODY MASS INDEX DOCD: CPT | Mod: CPTII,S$GLB,, | Performed by: INTERNAL MEDICINE

## 2020-11-05 PROCEDURE — 99999 PR PBB SHADOW E&M-EST. PATIENT-LVL III: CPT | Mod: PBBFAC,,, | Performed by: INTERNAL MEDICINE

## 2020-11-05 PROCEDURE — 99204 OFFICE O/P NEW MOD 45 MIN: CPT | Mod: S$GLB,,, | Performed by: INTERNAL MEDICINE

## 2020-11-05 PROCEDURE — 99999 PR PBB SHADOW E&M-EST. PATIENT-LVL III: ICD-10-PCS | Mod: PBBFAC,,, | Performed by: INTERNAL MEDICINE

## 2020-11-05 NOTE — H&P (VIEW-ONLY)
Reason for visit:  Right lower quadrant abdominal pain    HPI:  is a 45-year-old gentleman has been complaining of right-sided abdominal pain for the past 2 years but mostly over the past 2 weeks has noted worsening of the pain.  He has also been noticing or constipation recently.  He has been taking MiraLax daily with softer bowel movements.  His pain has not improved significantly although it is better over the past week.  Denies any fevers, chills, nausea or vomiting.The patient states that he had abrupt onset of numbness/tingling involving various areas on 10/30/2019.  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 He underwent adenoma resection.  He states he had headaches for a very long time and the headaches were attributed to the pituitary macroadenoma. 30 days later he was diagnosed with COVID-19, he was symptomatic at that time.  Since then, he has noticed that everything feels difficult to do including fine motor skills like playing his guitar. It is getting tougher and he has muscle twitches all over his body - including his tongue. He also noted tongue numbness. He has intermittent tingling involving various parts of his body.  He has noted heaviness in his muscles and difficulty with walking periodically.  He has never undergone any gastrointestinal workup in the past.  Denies any dysphagia, odynophagia, heartburn or acid regurgitation.  No family history of inflammatory bowel disease.    Past medical, surgical, social family history reviewed in epic    Medication allergies reviewed in epic    Review of systems:  Constitutional:  No fever, no chills, no weight loss, appetite is normal  Eyes:  No visual changes or red eyes  ENT:  No odynophagia or hoarseness of voice  Cardiovascular:  No angina palpitation  Respiratory:  No shortness breath or wheezing  Genitourinary:  No dysuria or frequency  Musculoskeletal:  No myalgias  arthralgias  Skin:  No pruritus or eczema  Neurologic:  No headaches, no seizures,  Psychiatric:  No anxiety depression  Gastrointestinal:  See HPI    Physical exam:  Vitals see epic, awake, alert, oriented x3 in no acute distress  Neck:  Supple, no carotid bruit, no cervical adenopathy  Abdomen:  Slightly obese, soft, nondistended, tenderness to palpation in right lower quadrant but no guarding rigidity, bowel sounds are normal, no abdominal bruits heard.  Scarring noted in the right lower quadrant of the abdomen from fat harvesting for fat graft after endonasal transsphenoidal resection of pituitary tumor  Eyes:  Conjunctivae icteric, injected  ENT:  Oral mucosa moist  Cardiovascular:  S1, S2 normal, no murmurs or gallops  Respiratory:  Bilateral entry equal no rhonchi crackles  Skin:  No palmar erythema or spider angiomata  Neurologic:  No asterixis or tremors  Lower extremities:  No pedal edema    Recent labs reviewed    Impression:  Right lower quadrant abdominal pain-etiology unclear-needs to rule out inflammatory bowel disease.    Recommendation:  1.  CT scan of the abdomen pelvis with p.o. and IV contrast  2.  If the above investigation is unrevealing will proceed with endoscopy and colonoscopy.  3.  Continue with MiraLax p.o. daily.  4.  Further recommendation based on above.

## 2020-11-09 ENCOUNTER — HOSPITAL ENCOUNTER (OUTPATIENT)
Dept: RADIOLOGY | Facility: HOSPITAL | Age: 45
Discharge: HOME | End: 2020-11-09
Attending: INTERNAL MEDICINE
Payer: COMMERCIAL

## 2020-11-09 DIAGNOSIS — R10.9 RIGHT SIDED ABDOMINAL PAIN: Primary | ICD-10-CM

## 2020-11-09 DIAGNOSIS — R10.31 RIGHT LOWER QUADRANT PAIN: ICD-10-CM

## 2020-11-09 PROCEDURE — 74177 CT ABDOMEN PELVIS WITH CONTRAST: ICD-10-PCS | Mod: 26,,, | Performed by: RADIOLOGY

## 2020-11-09 PROCEDURE — 74177 CT ABD & PELVIS W/CONTRAST: CPT | Mod: TC

## 2020-11-09 PROCEDURE — 25500020 PHARM REV CODE 255: Performed by: INTERNAL MEDICINE

## 2020-11-09 PROCEDURE — 74177 CT ABD & PELVIS W/CONTRAST: CPT | Mod: 26,,, | Performed by: RADIOLOGY

## 2020-11-09 RX ADMIN — IOHEXOL 75 ML: 350 INJECTION, SOLUTION INTRAVENOUS at 03:11

## 2020-11-09 RX ADMIN — IOHEXOL 15 ML: 350 INJECTION, SOLUTION INTRAVENOUS at 01:11

## 2020-11-09 NOTE — PROGRESS NOTES
CT scan looks fine with minimal inflammation at the site of surgery whee fat was harvested. Will proceed with EGD and Colonoscopy.

## 2020-11-10 ENCOUNTER — TELEPHONE (OUTPATIENT)
Dept: GASTROENTEROLOGY | Facility: CLINIC | Age: 45
End: 2020-11-10

## 2020-11-10 DIAGNOSIS — Z12.11 SPECIAL SCREENING FOR MALIGNANT NEOPLASMS, COLON: Primary | ICD-10-CM

## 2020-11-10 DIAGNOSIS — Z01.812 PRE-PROCEDURE LAB EXAM: ICD-10-CM

## 2020-11-10 RX ORDER — POLYETHYLENE GLYCOL 3350, SODIUM SULFATE ANHYDROUS, SODIUM BICARBONATE, SODIUM CHLORIDE, POTASSIUM CHLORIDE 236; 22.74; 6.74; 5.86; 2.97 G/4L; G/4L; G/4L; G/4L; G/4L
4 POWDER, FOR SOLUTION ORAL ONCE
Qty: 4000 ML | Refills: 0 | Status: SHIPPED | OUTPATIENT
Start: 2020-11-10 | End: 2020-11-10

## 2020-11-10 NOTE — TELEPHONE ENCOUNTER
----- Message from Avery Paredes MD sent at 11/9/2020  4:02 PM CST -----  CT scan looks fine with minimal inflammation at the site of surgery whee fat was harvested. Will proceed with EGD and Colonoscopy.

## 2020-11-10 NOTE — TELEPHONE ENCOUNTER
Spoke with patient , results given as written by Dr. Paredes  Pt verbalizes understadning and appreciates the call.  Thanks MA

## 2020-11-10 NOTE — TELEPHONE ENCOUNTER
----- Message from Anish Branch sent at 11/10/2020  1:13 PM CST -----  Pt returning call concerning his results     Contact  [phone]

## 2020-11-30 ENCOUNTER — LAB VISIT (OUTPATIENT)
Dept: SURGERY | Facility: CLINIC | Age: 45
End: 2020-11-30
Payer: COMMERCIAL

## 2020-11-30 DIAGNOSIS — Z01.812 PRE-PROCEDURE LAB EXAM: ICD-10-CM

## 2020-11-30 LAB — SARS-COV-2 RNA RESP QL NAA+PROBE: NOT DETECTED

## 2020-11-30 PROCEDURE — U0003 INFECTIOUS AGENT DETECTION BY NUCLEIC ACID (DNA OR RNA); SEVERE ACUTE RESPIRATORY SYNDROME CORONAVIRUS 2 (SARS-COV-2) (CORONAVIRUS DISEASE [COVID-19]), AMPLIFIED PROBE TECHNIQUE, MAKING USE OF HIGH THROUGHPUT TECHNOLOGIES AS DESCRIBED BY CMS-2020-01-R: HCPCS

## 2020-12-01 ENCOUNTER — OFFICE VISIT (OUTPATIENT)
Dept: ENDOCRINOLOGY | Facility: CLINIC | Age: 45
End: 2020-12-01
Payer: COMMERCIAL

## 2020-12-01 DIAGNOSIS — D35.2: ICD-10-CM

## 2020-12-01 DIAGNOSIS — D75.1 POLYCYTHEMIA: ICD-10-CM

## 2020-12-01 DIAGNOSIS — D35.2 PITUITARY ADENOMA: ICD-10-CM

## 2020-12-01 DIAGNOSIS — R53.83 FATIGUE, UNSPECIFIED TYPE: ICD-10-CM

## 2020-12-01 DIAGNOSIS — E29.1 HYPOGONADISM IN MALE: Primary | ICD-10-CM

## 2020-12-01 PROCEDURE — 99214 PR OFFICE/OUTPT VISIT, EST, LEVL IV, 30-39 MIN: ICD-10-PCS | Mod: 95,,, | Performed by: INTERNAL MEDICINE

## 2020-12-01 PROCEDURE — 99214 OFFICE O/P EST MOD 30 MIN: CPT | Mod: 95,,, | Performed by: INTERNAL MEDICINE

## 2020-12-01 NOTE — ASSESSMENT & PLAN NOTE
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  He will be due for follow-up with Dr. Argueta with pituitary MRI in June 2021.

## 2020-12-01 NOTE — ASSESSMENT & PLAN NOTE
Testosterone has been trending since his pituitary surgery and 3 months ago was in the low end of normal.  I anticipate that testosterone will remain normal or trend up slightly.  He is having some symptoms of hot flashes (unclear if this could be related to low testosterone).  Will plan to repeat testosterone level in February 2021, 1 year after his pituitary surgery and at that time if low, can consider testosterone replacement.  He does have a history of elevated hemoglobin and hematocrit for which I have asked him to have a sleep study to rule out sleep apnea as starting testosterone may further increase H/H and increase risk for blood clots.

## 2020-12-01 NOTE — ASSESSMENT & PLAN NOTE
Low suspicion for adrenal insufficiency as he has had normal 8:00 a.m. cortisol levels after his pituitary surgery.  He does reports some intermittent orthostatic hypotension and some abdominal pain but overall, symptoms are somewhat nonspecific.  Will repeat 8:00 a.m. cortisol level in 3 months.  He will notify me if he has persistent hypotension in which case we may need to further investigate for adrenal insufficiency.

## 2020-12-01 NOTE — Clinical Note
Needs 8 am fasting labs in feb 2021, in person f/u with me in June 2021 (same day as MRI and visit w/ Dr. Argueta)

## 2020-12-01 NOTE — PROGRESS NOTES
[facility] ENDOCRINOLOGY FOLLOW-UP   12/01/2020     The patient location is:  home  The chief complaint leading to consultation is:  Follow-up pituitary tumor status post resection, hypogonadotropic hypogonadism     Visit type: audiovisual    Face to Face time with patient:  25   minutes of total time spent on the encounter, which includes face to face time and non-face to face time preparing to see the patient (eg, review of tests), Obtaining and/or reviewing separately obtained history, Documenting clinical information in the electronic or other health record, Independently interpreting results (not separately reported) and communicating results to the patient/family/caregiver, or Care coordination (not separately reported).     Each patient to whom he or she provides medical services by telemedicine is:  (1) informed of the relationship between the physician and patient and the respective role of any other health care provider with respect to management of the patient; and (2) notified that he or she may decline to receive medical services by telemedicine and may withdraw from such care at any time.    The patient's last visit with me was on 8/25/2020.     Patient ID: Rhett Quarles is a 45 y.o. male.    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  male who underwent transsphenoidal resection (by Dr. Argueta) for 2.7 cm pituitary adenoma found on MRI done for diplopia.  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   off Hydrocortisone 20/10 mg since 3/4/2020.     Underwent TSR 02/10/2020  Pathology - [facility]: CORTICOTROPH ADENOMA, IMMUNOREACTIVE FOR ACTH AND T-Pit  Initial presentation: MRI done for diplopia w/ finding of macroadenoma.     Imaging:                                   MRI 1/3/2020 (preop)  - Pituitary is enlarged measuring 2.5 x 2.7 x 1.8 cm with thickening  of the infundibulum, consistent with macroadenoma.  The pituitary gland approaches optic nerves particularly on the RIGHT yet does not grossly displace the optic nerves.  There is remodeling of the sella and extension into the sphenoid sinus    MRI brain 6/15/2020  Postoperative changes consistent with transsphenoidal surgery with resection of the vast majority of the pituitary macroadenoma.  Fat packing material within the anterior aspect of the sella and sphenoid sinus.  Thin rim of residual enhancing tissue along the sella floor, nonspecific in the postoperative setting.  No evidence of compression upon the optic chiasm or extension into the cavernous sinus.    Interval Hx:   Has some intermittent orthostatic hypotension, no persistent hypotension.  Gradual weight loss, down 30 lbs over 9 mo (did have significant wt loss 2/2 covid), now more stable.  Having issues with both cold and heat intolerance.  He has low testosterone in the past but it has been trending up and a few months ago was actually at the low end of the normal range.  Also having hair loss.     Since last visit he had normal low dose dexamethasone suppression test, 24H UFC normal (11),  fT4, TSH, HbA1c.  LNSC x 2 normal.    He is followed by neurology for muscle fasciculations with negative workup.       Current symptoms:  Headache: infrequent headaches now (significantly improved since surgery)    Vision change:  Reports improvement since surgery bot no recent change.  Normal HVF prior to surgery     Formal VF:  Dr Winston 10/2020 (post-op)  Optic chiasm and cranial nerve function were normal. I found no evidence of recurrence of the pituitary adenoma. He will return to me as needed.         hypogonadotropic hypogonadism:  Testosterone improved to low normal.  (preop had level of 94)  Reports normal libido  Denies ED.  Not on testosterone replacement  DEXA: not done  No fractures  Does have persistently elevated H/H, no hx of clots. Had normal  workup by pcp with labs.  + snoring and fatigue, never had sleep study    Lab Results   Component Value Date    TOTALTESTOST 306 09/25/2020    TOTALTESTOST 276 (L) 08/17/2020    TOTALTESTOST 200 (L) 05/20/2020       ROS: denies CP/SOB. + hair loss, + fatigue and wt loss.    Objective:   There were no vitals filed for this visit.   Wt Readings from Last 3 Encounters:   11/05/20 67.1 kg (148 lb 0.6 oz)   10/29/20 67.1 kg (148 lb)   10/22/20 67.2 kg (148 lb 2.4 oz)      Constitutional:  Pleasant,  in no acute distress.   HENT:   Eyes:     No scleral icterus.   Respiratory:   Effort normal   Neurological:  normal speech  Psych:   Normal mood and affect.      Lab Review:   Lab Results   Component Value Date    HGBA1C 5.3 10/22/2020     Lab Results   Component Value Date    CHOL 194 12/03/2019    HDL 38 (L) 12/03/2019    LDLCALC 122.6 12/03/2019    TRIG 167 (H) 12/03/2019    CHOLHDL 19.6 (L) 12/03/2019     Lab Results   Component Value Date     07/20/2020    K 4.2 07/20/2020     07/20/2020    CO2 27 07/20/2020    GLU 90 07/20/2020    BUN 10 07/20/2020    CREATININE 1.0 07/20/2020    CALCIUM 9.6 07/20/2020    PROT 8.3 07/20/2020    ALBUMIN 4.7 07/20/2020    BILITOT 0.7 07/20/2020    ALKPHOS 76 07/20/2020    AST 27 07/20/2020    ALT 36 07/20/2020    ANIONGAP 13 07/20/2020    ESTGFRAFRICA >60 07/20/2020    EGFRNONAA >60 07/20/2020    TSH 0.710 09/25/2020     Vit D, 25-Hydroxy   Date Value Ref Range Status   08/17/2020 43 30 - 96 ng/mL Final     Comment:     Vitamin D deficiency.........<10 ng/mL                              Vitamin D insufficiency......10-29 ng/mL       Vitamin D sufficiency........> or equal to 30 ng/mL  Vitamin D toxicity............>100 ng/mL         Assessment and Plan     Problem List Items Addressed This Visit        1 - High    Corticotroph adenoma     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  He will be due for follow-up with Dr. Argueta with pituitary MRI in June 2021.            2     Hypogonadism in male - Primary     Testosterone has been trending since his pituitary surgery and 3 months ago was in the low end of normal.  I anticipate that testosterone will remain normal or trend up slightly.  He is having some symptoms of hot flashes (unclear if this could be related to low testosterone).  Will plan to repeat testosterone level in February 2021, 1 year after his pituitary surgery and at that time if low, can consider testosterone replacement.  He does have a history of elevated hemoglobin and hematocrit for which I have asked him to have a sleep study to rule out sleep apnea as starting testosterone may further increase H/H and increase risk for blood clots.         Relevant Orders    Testosterone Panel       3     Fatigue     Low suspicion for adrenal insufficiency as he has had normal 8:00 a.m. cortisol levels after his pituitary surgery.  He does reports some intermittent orthostatic hypotension and some abdominal pain but overall, symptoms are somewhat nonspecific.  Will repeat 8:00 a.m. cortisol level in 3 months.  He will notify me if he has persistent hypotension in which case we may need to further investigate for adrenal insufficiency.            4     Polycythemia     Recommend sleep study.           Other Visit Diagnoses     Pituitary adenoma        Relevant Orders    Cortisol, 8AM    TSH    T4, Free    Prolactin         Labs in feb 2021, in person f/u same day as MRI and visit w/ Dr. Argueta in 6/2021    Lazarus Franz MD

## 2020-12-02 ENCOUNTER — TELEPHONE (OUTPATIENT)
Dept: GASTROENTEROLOGY | Facility: CLINIC | Age: 45
End: 2020-12-02

## 2020-12-02 NOTE — TELEPHONE ENCOUNTER
----- Message from Dionicio Myrick sent at 12/2/2020  1:35 PM CST -----  Regarding: arrival time  Pt is calling to speak with someone regarding getting his arrival time for surgery tomorrow. Please give pt a call back at [phone] .

## 2020-12-03 ENCOUNTER — ANESTHESIA EVENT (OUTPATIENT)
Dept: ENDOSCOPY | Facility: HOSPITAL | Age: 45
End: 2020-12-03
Payer: COMMERCIAL

## 2020-12-03 ENCOUNTER — ANESTHESIA (OUTPATIENT)
Dept: ENDOSCOPY | Facility: HOSPITAL | Age: 45
End: 2020-12-03
Payer: COMMERCIAL

## 2020-12-03 ENCOUNTER — HOSPITAL ENCOUNTER (OUTPATIENT)
Facility: HOSPITAL | Age: 45
Discharge: HOME | End: 2020-12-03
Attending: INTERNAL MEDICINE | Admitting: INTERNAL MEDICINE
Payer: COMMERCIAL

## 2020-12-03 VITALS
SYSTOLIC BLOOD PRESSURE: 124 MMHG | RESPIRATION RATE: 17 BRPM | OXYGEN SATURATION: 100 % | TEMPERATURE: 98 F | HEART RATE: 82 BPM | DIASTOLIC BLOOD PRESSURE: 81 MMHG

## 2020-12-03 DIAGNOSIS — R10.31 RLQ ABDOMINAL PAIN: Primary | ICD-10-CM

## 2020-12-03 PROCEDURE — 43239 EGD BIOPSY SINGLE/MULTIPLE: CPT | Mod: 51,,, | Performed by: INTERNAL MEDICINE

## 2020-12-03 PROCEDURE — 25000003 PHARM REV CODE 250: Performed by: INTERNAL MEDICINE

## 2020-12-03 PROCEDURE — 45385 PR COLONOSCOPY,REMV LESN,SNARE: ICD-10-PCS | Mod: ,,, | Performed by: INTERNAL MEDICINE

## 2020-12-03 PROCEDURE — 88305 TISSUE EXAM BY PATHOLOGIST: ICD-10-PCS | Mod: 26,,, | Performed by: PATHOLOGY

## 2020-12-03 PROCEDURE — 27201089 HC SNARE, DISP (ANY): Performed by: INTERNAL MEDICINE

## 2020-12-03 PROCEDURE — 45385 COLONOSCOPY W/LESION REMOVAL: CPT | Mod: ,,, | Performed by: INTERNAL MEDICINE

## 2020-12-03 PROCEDURE — 88305 TISSUE EXAM BY PATHOLOGIST: CPT | Mod: 26,,, | Performed by: PATHOLOGY

## 2020-12-03 PROCEDURE — 43239 PR EGD, FLEX, W/BIOPSY, SGL/MULTI: ICD-10-PCS | Mod: 51,,, | Performed by: INTERNAL MEDICINE

## 2020-12-03 PROCEDURE — E9220 PRA ENDO ANESTHESIA: HCPCS | Mod: ,,, | Performed by: NURSE ANESTHETIST, CERTIFIED REGISTERED

## 2020-12-03 PROCEDURE — E9220 PRA ENDO ANESTHESIA: ICD-10-PCS | Mod: ,,, | Performed by: NURSE ANESTHETIST, CERTIFIED REGISTERED

## 2020-12-03 PROCEDURE — 37000008 HC ANESTHESIA 1ST 15 MINUTES: Performed by: INTERNAL MEDICINE

## 2020-12-03 PROCEDURE — 43239 EGD BIOPSY SINGLE/MULTIPLE: CPT | Performed by: INTERNAL MEDICINE

## 2020-12-03 PROCEDURE — 88305 TISSUE EXAM BY PATHOLOGIST: CPT | Performed by: PATHOLOGY

## 2020-12-03 PROCEDURE — 25000003 PHARM REV CODE 250: Performed by: NURSE ANESTHETIST, CERTIFIED REGISTERED

## 2020-12-03 PROCEDURE — 63600175 PHARM REV CODE 636 W HCPCS: Performed by: NURSE ANESTHETIST, CERTIFIED REGISTERED

## 2020-12-03 PROCEDURE — 45385 COLONOSCOPY W/LESION REMOVAL: CPT | Performed by: INTERNAL MEDICINE

## 2020-12-03 PROCEDURE — 37000009 HC ANESTHESIA EA ADD 15 MINS: Performed by: INTERNAL MEDICINE

## 2020-12-03 RX ORDER — SODIUM CHLORIDE 9 MG/ML
INJECTION, SOLUTION INTRAVENOUS CONTINUOUS
Status: DISCONTINUED | OUTPATIENT
Start: 2020-12-03 | End: 2020-12-03 | Stop reason: HOSPADM

## 2020-12-03 RX ORDER — LIDOCAINE HCL/PF 100 MG/5ML
SYRINGE (ML) INTRAVENOUS
Status: DISCONTINUED | OUTPATIENT
Start: 2020-12-03 | End: 2020-12-03

## 2020-12-03 RX ORDER — PROPOFOL 10 MG/ML
VIAL (ML) INTRAVENOUS CONTINUOUS PRN
Status: DISCONTINUED | OUTPATIENT
Start: 2020-12-03 | End: 2020-12-03

## 2020-12-03 RX ORDER — PROPOFOL 10 MG/ML
VIAL (ML) INTRAVENOUS
Status: DISCONTINUED | OUTPATIENT
Start: 2020-12-03 | End: 2020-12-03

## 2020-12-03 RX ADMIN — Medication 100 MG: at 01:12

## 2020-12-03 RX ADMIN — PROPOFOL 50 MG: 10 INJECTION, EMULSION INTRAVENOUS at 01:12

## 2020-12-03 RX ADMIN — PROPOFOL 20 MG: 10 INJECTION, EMULSION INTRAVENOUS at 01:12

## 2020-12-03 RX ADMIN — PROPOFOL 150 MCG/KG/MIN: 10 INJECTION, EMULSION INTRAVENOUS at 01:12

## 2020-12-03 RX ADMIN — SODIUM CHLORIDE, SODIUM GLUCONATE, SODIUM ACETATE, POTASSIUM CHLORIDE, MAGNESIUM CHLORIDE, SODIUM PHOSPHATE, DIBASIC, AND POTASSIUM PHOSPHATE: .53; .5; .37; .037; .03; .012; .00082 INJECTION, SOLUTION INTRAVENOUS at 01:12

## 2020-12-03 RX ADMIN — SODIUM CHLORIDE 10 ML/HR: 0.9 INJECTION, SOLUTION INTRAVENOUS at 12:12

## 2020-12-03 NOTE — TRANSFER OF CARE
Anesthesia Transfer of Care Note    Patient: Rhett Quarles    Procedure(s) Performed: Procedure(s) (LRB):  EGD (ESOPHAGOGASTRODUODENOSCOPY) (N/A)  COLONOSCOPY (N/A)    Patient location: PACU    Anesthesia Type: general    Transport from OR: Transported from OR on room air with adequate spontaneous ventilation    Post pain: adequate analgesia    Post assessment: no apparent anesthetic complications    Post vital signs: stable    Level of consciousness: awake    Nausea/Vomiting: no nausea/vomiting    Complications: none    Transfer of care protocol was followed      Last vitals:   Visit Vitals  BP (!) 143/83 (BP Location: Left arm, Patient Position: Lying)   Pulse 73   Temp 36.6 °C (97.9 °F) (Temporal)   Resp 16   SpO2 98%

## 2020-12-03 NOTE — ANESTHESIA PREPROCEDURE EVALUATION
12/03/2020  Rhett Quarles is a 45 y.o., male.  Past Medical History:   Diagnosis Date    Keloid cicatrix     Pituitary macroadenoma      Past Surgical History:   Procedure Laterality Date    EXCISION, NEOPLASM, PITUITARY, TRANSSPHENOIDAL APPROACH, USING COMPUTER-ASSISTED NAVIGATION       Anesthesia Evaluation    I have reviewed the Patient Summary Reports.      I have reviewed the Medications.     Review of Systems  Anesthesia Hx:  Denies Hx of Anesthetic complications  Denies Family Hx of Anesthesia complications.   Denies Personal Hx of Anesthesia complications.   Hematology/Oncology:  Hematology Normal   Oncology Normal     EENT/Dental:EENT/Dental Normal   Cardiovascular:  Cardiovascular Normal     Pulmonary:  Pulmonary Normal    Renal/:  Renal/ Normal     Hepatic/GI:  Hepatic/GI Normal    Musculoskeletal:  Musculoskeletal Normal    Neurological:   Hx of brain tumor   Endocrine:  Endocrine Normal    Dermatological:  Skin Normal    Psych:  Psychiatric Normal           Physical Exam  General:  Well nourished    Airway/Jaw/Neck:  Airway Findings: Mouth Opening: Normal Tongue: Normal  General Airway Assessment: Adult  Mallampati: II  TM Distance: Normal, at least 6 cm      Dental:  Dental Findings: In tact   Chest/Lungs:  Chest/Lungs Clear    Heart/Vascular:  Heart Findings: Normal Heart murmur: negative Vascular Findings: Normal            Anesthesia Plan  Type of Anesthesia, risks & benefits discussed:  Anesthesia Type:  general  Patient's Preference:   Intra-op Monitoring Plan:   Intra-op Monitoring Plan Comments:   Post Op Pain Control Plan:   Post Op Pain Control Plan Comments:   Induction:   IV  Beta Blocker:  Patient is not currently on a Beta-Blocker (No further documentation required).       Informed Consent: Patient understands risks and agrees with Anesthesia plan.  Questions  answered. Anesthesia consent signed with patient.  ASA Score: 2     Day of Surgery Review of History & Physical: I have interviewed and examined the patient. I have reviewed the patient's H&P dated:    H&P update referred to the surgeon.         Ready For Surgery From Anesthesia Perspective.

## 2020-12-03 NOTE — ANESTHESIA POSTPROCEDURE EVALUATION
Anesthesia Post Evaluation    Patient: Rhett Quarles    Procedure(s) Performed: Procedure(s) (LRB):  EGD (ESOPHAGOGASTRODUODENOSCOPY) (N/A)  COLONOSCOPY (N/A)    Final Anesthesia Type: general    Patient location during evaluation: GI PACU  Patient participation: Yes- Able to Participate  Level of consciousness: awake and alert  Post-procedure vital signs: reviewed and stable  Pain management: adequate  Airway patency: patent    PONV status at discharge: No PONV  Anesthetic complications: no      Cardiovascular status: blood pressure returned to baseline  Respiratory status: unassisted  Hydration status: euvolemic  Follow-up not needed.          Vitals Value Taken Time   /81 12/03/20 1427   Temp 36.6 °C (97.9 °F) 12/03/20 1357   Pulse 82 12/03/20 1427   Resp 17 12/03/20 1427   SpO2 100 % 12/03/20 1427         Event Time   Out of Recovery 14:37:35         Pain/Abel Score: Abel Score: 10 (12/3/2020  2:27 PM)

## 2020-12-03 NOTE — DISCHARGE INSTRUCTIONS

## 2020-12-03 NOTE — [employer] PATIENT INSTRUCTIONS
Discharge Summary/Instructions after an Endoscopic Procedure  Patient Name: Rhett Quarles  Patient MRN: [id number]  Patient YOB: 1975  Thursday, December 3, 2020  Avery Paredes MD  RESTRICTIONS:  During your procedure today, you received medications for sedation.  These   medications may affect your judgment, balance and coordination.  Therefore,   for 24 hours, you have the following restrictions:   - DO NOT drive a car, operate machinery, make legal/financial decisions,   sign important papers or drink alcohol.    ACTIVITY:  Today: no heavy lifting, straining or running due to procedural   sedation/anesthesia.  The following day: return to full activity including work.  DIET:  Eat and drink normally unless instructed otherwise.     TREATMENT FOR COMMON SIDE EFFECTS:  - Mild abdominal pain, nausea, belching, bloating or excessive gas:  rest,   eat lightly and use a heating pad.  - Sore Throat: treat with throat lozenges and/or gargle with warm salt   water.  - Because air was used during the procedure, expelling large amounts of air   from your rectum or belching is normal.  - If a bowel prep was taken, you may not have a bowel movement for 1-3 days.    This is normal.  SYMPTOMS TO WATCH FOR AND REPORT TO YOUR PHYSICIAN:  1. Abdominal pain or bloating, other than gas cramps.  2. Chest pain.  3. Back pain.  4. Signs of infection such as: chills or fever occurring within 24 hours   after the procedure.  5. Rectal bleeding, which would show as bright red, maroon, or black stools.   (A tablespoon of blood from the rectum is not serious, especially if   hemorrhoids are present.)  6. Vomiting.  7. Weakness or dizziness.  GO DIRECTLY TO THE NEAREST EMERGENCY ROOM IF YOU HAVE ANY OF THE FOLLOWING:      Difficulty breathing              Chills and/or fever over 101 F   Persistent vomiting and/or vomiting blood   Severe abdominal pain   Severe chest pain   Black, tarry stools   Bleeding- more than one  tablespoon   Any other symptom or condition that you feel may need urgent attention  Your doctor recommends these additional instructions:  If any biopsies were taken, your doctors clinic will contact you in 1 to 2   weeks with any results.  - Patient has a contact number available for emergencies.  The signs and   symptoms of potential delayed complications were discussed with the   patient.  Return to normal activities tomorrow.  Written discharge   instructions were provided to the patient.   - Discharge patient to home.   - Resume previous diet.   - Continue present medications.   - Await pathology results.   - Repeat colonoscopy in 5 years for surveillance.  For questions, problems or results please call your physician - Avery Paredes MD at Work:  [phone].  [facility], EMERGENCY ROOM PHONE NUMBER: [phone]  IF A COMPLICATION OR EMERGENCY SITUATION ARISES AND YOU ARE UNABLE TO REACH   YOUR PHYSICIAN - GO DIRECTLY TO THE EMERGENCY ROOM.  Avery Paredes MD  12/3/2020 1:57:05 PM  This report has been verified and signed electronically.  [employer]

## 2020-12-03 NOTE — [employer] PATIENT INSTRUCTIONS
Discharge Summary/Instructions after an Endoscopic Procedure  Patient Name: Rhett Quarles  Patient MRN: [id number]  Patient YOB: 1975  Thursday, December 3, 2020  Avery Paredes MD  RESTRICTIONS:  During your procedure today, you received medications for sedation.  These   medications may affect your judgment, balance and coordination.  Therefore,   for 24 hours, you have the following restrictions:   - DO NOT drive a car, operate machinery, make legal/financial decisions,   sign important papers or drink alcohol.    ACTIVITY:  Today: no heavy lifting, straining or running due to procedural   sedation/anesthesia.  The following day: return to full activity including work.  DIET:  Eat and drink normally unless instructed otherwise.     TREATMENT FOR COMMON SIDE EFFECTS:  - Mild abdominal pain, nausea, belching, bloating or excessive gas:  rest,   eat lightly and use a heating pad.  - Sore Throat: treat with throat lozenges and/or gargle with warm salt   water.  - Because air was used during the procedure, expelling large amounts of air   from your rectum or belching is normal.  - If a bowel prep was taken, you may not have a bowel movement for 1-3 days.    This is normal.  SYMPTOMS TO WATCH FOR AND REPORT TO YOUR PHYSICIAN:  1. Abdominal pain or bloating, other than gas cramps.  2. Chest pain.  3. Back pain.  4. Signs of infection such as: chills or fever occurring within 24 hours   after the procedure.  5. Rectal bleeding, which would show as bright red, maroon, or black stools.   (A tablespoon of blood from the rectum is not serious, especially if   hemorrhoids are present.)  6. Vomiting.  7. Weakness or dizziness.  GO DIRECTLY TO THE NEAREST EMERGENCY ROOM IF YOU HAVE ANY OF THE FOLLOWING:      Difficulty breathing              Chills and/or fever over 101 F   Persistent vomiting and/or vomiting blood   Severe abdominal pain   Severe chest pain   Black, tarry stools   Bleeding- more than one  tablespoon   Any other symptom or condition that you feel may need urgent attention  Your doctor recommends these additional instructions:  If any biopsies were taken, your doctors clinic will contact you in 1 to 2   weeks with any results.  - Patient has a contact number available for emergencies.  The signs and   symptoms of potential delayed complications were discussed with the   patient.  Return to normal activities tomorrow.  Written discharge   instructions were provided to the patient.   - Discharge patient to home.   - Resume previous diet.   - Continue present medications.   - Await pathology results.   For questions, problems or results please call your physician - Avery Paredes MD at Work:  [phone].  [facility], EMERGENCY ROOM PHONE NUMBER: [phone]  IF A COMPLICATION OR EMERGENCY SITUATION ARISES AND YOU ARE UNABLE TO REACH   YOUR PHYSICIAN - GO DIRECTLY TO THE EMERGENCY ROOM.  Avery Paredes MD  12/3/2020 1:31:51 PM  This report has been verified and signed electronically.  [employer]

## 2020-12-03 NOTE — INTERVAL H&P NOTE
The patient has been examined and the H&P has been reviewed:    There is no interval changes since last encounter.  EGD/Colonoscopy: RLQ abdominal pain  Sedation: GA  ASA: Per anesthesia  Mallampati: Per anesthesia    Endoscopy risks, benefits and alternative options discussed and understood by patient/family.          Active Hospital Problems    Diagnosis  POA    RLQ abdominal pain [R10.31]  Yes      Resolved Hospital Problems   No resolved problems to display.

## 2020-12-09 ENCOUNTER — OFFICE VISIT (OUTPATIENT)
Dept: UROLOGY | Facility: CLINIC | Age: 45
End: 2020-12-09
Attending: UROLOGY
Payer: COMMERCIAL

## 2020-12-09 DIAGNOSIS — R10.2 MALE PELVIC-PERINEAL PAIN SYNDROME: Primary | ICD-10-CM

## 2020-12-09 PROCEDURE — 99213 PR OFFICE/OUTPT VISIT, EST, LEVL III, 20-29 MIN: ICD-10-PCS | Mod: 95,,, | Performed by: UROLOGY

## 2020-12-09 PROCEDURE — 99213 OFFICE O/P EST LOW 20 MIN: CPT | Mod: 95,,, | Performed by: UROLOGY

## 2020-12-09 NOTE — PROGRESS NOTES
The patient location is: Home  The chief complaint leading to consultation is: pelvic pain    Visit type: audiovisual    Face to Face time with patient: 10   14 minutes of total time spent on the encounter, which includes face to face time and non-face to face time preparing to see the patient (eg, review of tests), Obtaining and/or reviewing separately obtained history, Documenting clinical information in the electronic or other health record, Independently interpreting results (not separately reported) and communicating results to the patient/family/caregiver, or Care coordination (not separately reported).         Each patient to whom he or she provides medical services by telemedicine is:  (1) informed of the relationship between the physician and patient and the respective role of any other health care provider with respect to management of the patient; and (2) notified that he or she may decline to receive medical services by telemedicine and may withdraw from such care at any time.    Notes:      Subjective:      Rhett Quarles is a 45 y.o. male who returns today regarding his pelvic pain.    Recently completed 30 day course of abx for possible prostatitis. He does state symptoms are now less severe, though he continues to have perineal discomfort that is more severe after prolonged symptoms.    The following portions of the patient's history were reviewed and updated as appropriate: allergies, current medications, past family history, past medical history, past social history, past surgical history and problem list.    Review of Systems  A comprehensive multipoint review of systems was negative except as otherwise stated in the HPI.     Objective:   Vitals: There were no vitals taken for this visit.    Physical Exam   General: alert and oriented, no acute distress  Respiratory: Symmetric expansion, non-labored breathing  Neuro: no gross deficits  Psych: normal judgment and insight, normal mood/affect  and non-anxious    Lab Review     Lab Results   Component Value Date    WBC 6.23 09/29/2020    HGB 14.6 09/29/2020    HCT 46.0 09/29/2020    MCV 89 09/29/2020     09/29/2020     Lab Results   Component Value Date    CREATININE 1.0 07/20/2020    BUN 10 07/20/2020       Assessment and Plan:   1. 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

## 2020-12-14 LAB
FINAL PATHOLOGIC DIAGNOSIS: NORMAL
Lab: NORMAL

## 2020-12-15 ENCOUNTER — TELEPHONE (OUTPATIENT)
Dept: GASTROENTEROLOGY | Facility: CLINIC | Age: 45
End: 2020-12-15

## 2020-12-15 NOTE — TELEPHONE ENCOUNTER
----- Message from Chris Moseley MD sent at 12/15/2020  9:08 AM CST -----  Small bowel (duodenal) biopsy is normal, no celiac. Stomach biopsy is normal, no h pylori. Colon polyp is benign.

## 2020-12-15 NOTE — TELEPHONE ENCOUNTER
----- Message from Leesa Mccray sent at 12/15/2020 10:46 AM CST -----  Pt is returning a miss call from Zohreh and would like socorro to give him a call back at [phone]

## 2021-02-02 ENCOUNTER — LAB VISIT (OUTPATIENT)
Dept: LAB | Facility: HOSPITAL | Age: 46
End: 2021-02-02
Payer: COMMERCIAL

## 2021-02-02 DIAGNOSIS — E29.1 HYPOGONADISM IN MALE: ICD-10-CM

## 2021-02-02 DIAGNOSIS — D35.2 PITUITARY ADENOMA: ICD-10-CM

## 2021-02-02 LAB
CORTIS SERPL-MCNC: 11.3 UG/DL (ref 4.3–22.4)
PROLACTIN SERPL IA-MCNC: 7.3 NG/ML (ref 3.5–19.4)
T4 FREE SERPL-MCNC: 1 NG/DL (ref 0.71–1.51)
TSH SERPL DL<=0.005 MIU/L-ACNC: 2.05 UIU/ML (ref 0.4–4)

## 2021-02-02 PROCEDURE — 84443 ASSAY THYROID STIM HORMONE: CPT

## 2021-02-02 PROCEDURE — 84403 ASSAY OF TOTAL TESTOSTERONE: CPT

## 2021-02-02 PROCEDURE — 82533 TOTAL CORTISOL: CPT

## 2021-02-02 PROCEDURE — 84439 ASSAY OF FREE THYROXINE: CPT

## 2021-02-02 PROCEDURE — 84146 ASSAY OF PROLACTIN: CPT

## 2021-02-08 LAB
ALBUMIN SERPL-MCNC: 4.2 G/DL (ref 3.6–5.1)
SHBG SERPL-SCNC: 17 NMOL/L (ref 10–50)
TESTOST FREE SERPL-MCNC: 54.2 PG/ML (ref 46–224)
TESTOST SERPL-MCNC: 259 NG/DL (ref 250–1100)
TESTOSTERONE.FREE+WB SERPL-MCNC: 104.3 NG/DL (ref 110–575)

## 2021-03-12 ENCOUNTER — IMMUNIZATION (OUTPATIENT)
Dept: PRIMARY CARE CLINIC | Facility: CLINIC | Age: 46
End: 2021-03-12
Payer: COMMERCIAL

## 2021-03-12 DIAGNOSIS — Z23 NEED FOR VACCINATION: Primary | ICD-10-CM

## 2021-03-12 PROCEDURE — 0001A PR IMMUNIZ ADMIN, SARS-COV-2 COVID-19 VACC, 30MCG/0.3ML, 1ST DOSE: ICD-10-PCS | Mod: CV19,S$GLB,, | Performed by: INTERNAL MEDICINE

## 2021-03-12 PROCEDURE — 91300 PR SARS-COV- 2 COVID-19 VACCINE, NO PRSV, 30MCG/0.3ML, IM: ICD-10-PCS | Mod: S$GLB,,, | Performed by: INTERNAL MEDICINE

## 2021-03-12 PROCEDURE — 0001A PR IMMUNIZ ADMIN, SARS-COV-2 COVID-19 VACC, 30MCG/0.3ML, 1ST DOSE: CPT | Mod: CV19,S$GLB,, | Performed by: INTERNAL MEDICINE

## 2021-03-12 PROCEDURE — 91300 PR SARS-COV- 2 COVID-19 VACCINE, NO PRSV, 30MCG/0.3ML, IM: CPT | Mod: S$GLB,,, | Performed by: INTERNAL MEDICINE

## 2021-03-12 RX ADMIN — Medication 0.3 ML: at 10:03

## 2021-03-22 ENCOUNTER — CLINICAL SUPPORT (OUTPATIENT)
Dept: URGENT CARE | Facility: CLINIC | Age: 46
End: 2021-03-22

## 2021-03-22 DIAGNOSIS — Z23 IMMUNIZATION DUE: Primary | ICD-10-CM

## 2021-03-22 PROCEDURE — 90714 TD VACC NO PRESV 7 YRS+ IM: CPT | Mod: S$GLB,,, | Performed by: NURSE PRACTITIONER

## 2021-03-22 PROCEDURE — 90471 TD VACCINE GREATER THAN OR EQUAL TO 7YO WITH PRESERVATIVE IM: ICD-10-PCS | Mod: S$GLB,,, | Performed by: NURSE PRACTITIONER

## 2021-03-22 PROCEDURE — 90471 IMMUNIZATION ADMIN: CPT | Mod: S$GLB,,, | Performed by: NURSE PRACTITIONER

## 2021-03-22 PROCEDURE — 90714 TD VACCINE GREATER THAN OR EQUAL TO 7YO WITH PRESERVATIVE IM: ICD-10-PCS | Mod: S$GLB,,, | Performed by: NURSE PRACTITIONER

## 2021-04-01 ENCOUNTER — TELEPHONE (OUTPATIENT)
Dept: NEUROLOGY | Facility: CLINIC | Age: 46
End: 2021-04-01

## 2021-04-02 ENCOUNTER — IMMUNIZATION (OUTPATIENT)
Dept: PRIMARY CARE CLINIC | Facility: CLINIC | Age: 46
End: 2021-04-02
Payer: COMMERCIAL

## 2021-04-02 DIAGNOSIS — Z23 NEED FOR VACCINATION: Primary | ICD-10-CM

## 2021-04-02 PROCEDURE — 91300 PR SARS-COV- 2 COVID-19 VACCINE, NO PRSV, 30MCG/0.3ML, IM: CPT | Mod: S$GLB,,, | Performed by: INTERNAL MEDICINE

## 2021-04-02 PROCEDURE — 0002A PR IMMUNIZ ADMIN, SARS-COV-2 COVID-19 VACC, 30MCG/0.3ML, 2ND DOSE: ICD-10-PCS | Mod: CV19,S$GLB,, | Performed by: INTERNAL MEDICINE

## 2021-04-02 PROCEDURE — 91300 PR SARS-COV- 2 COVID-19 VACCINE, NO PRSV, 30MCG/0.3ML, IM: ICD-10-PCS | Mod: S$GLB,,, | Performed by: INTERNAL MEDICINE

## 2021-04-02 PROCEDURE — 0002A PR IMMUNIZ ADMIN, SARS-COV-2 COVID-19 VACC, 30MCG/0.3ML, 2ND DOSE: CPT | Mod: CV19,S$GLB,, | Performed by: INTERNAL MEDICINE

## 2021-04-02 RX ADMIN — Medication 0.3 ML: at 10:04

## 2021-04-28 ENCOUNTER — OFFICE VISIT (OUTPATIENT)
Dept: NEUROLOGY | Facility: CLINIC | Age: 46
End: 2021-04-28
Payer: COMMERCIAL

## 2021-04-28 VITALS
DIASTOLIC BLOOD PRESSURE: 78 MMHG | HEIGHT: 64 IN | HEART RATE: 59 BPM | WEIGHT: 142.19 LBS | BODY MASS INDEX: 24.28 KG/M2 | TEMPERATURE: 97 F | SYSTOLIC BLOOD PRESSURE: 128 MMHG

## 2021-04-28 DIAGNOSIS — G25.3 MYOCLONIC JERKING: Primary | ICD-10-CM

## 2021-04-28 PROCEDURE — 99999 PR PBB SHADOW E&M-EST. PATIENT-LVL III: ICD-10-PCS | Mod: PBBFAC,,, | Performed by: PSYCHIATRY & NEUROLOGY

## 2021-04-28 PROCEDURE — 1125F PR PAIN SEVERITY QUANTIFIED, PAIN PRESENT: ICD-10-PCS | Mod: S$GLB,,, | Performed by: PSYCHIATRY & NEUROLOGY

## 2021-04-28 PROCEDURE — 1125F AMNT PAIN NOTED PAIN PRSNT: CPT | Mod: S$GLB,,, | Performed by: PSYCHIATRY & NEUROLOGY

## 2021-04-28 PROCEDURE — 3008F BODY MASS INDEX DOCD: CPT | Mod: CPTII,S$GLB,, | Performed by: PSYCHIATRY & NEUROLOGY

## 2021-04-28 PROCEDURE — 99999 PR PBB SHADOW E&M-EST. PATIENT-LVL III: CPT | Mod: PBBFAC,,, | Performed by: PSYCHIATRY & NEUROLOGY

## 2021-04-28 PROCEDURE — 99214 PR OFFICE/OUTPT VISIT, EST, LEVL IV, 30-39 MIN: ICD-10-PCS | Mod: S$GLB,,, | Performed by: PSYCHIATRY & NEUROLOGY

## 2021-04-28 PROCEDURE — 3008F PR BODY MASS INDEX (BMI) DOCUMENTED: ICD-10-PCS | Mod: CPTII,S$GLB,, | Performed by: PSYCHIATRY & NEUROLOGY

## 2021-04-28 PROCEDURE — 99214 OFFICE O/P EST MOD 30 MIN: CPT | Mod: S$GLB,,, | Performed by: PSYCHIATRY & NEUROLOGY

## 2021-05-08 RX ORDER — SUMATRIPTAN 50 MG/1
50 TABLET, FILM COATED ORAL
Qty: 9 TABLET | Refills: 3 | Status: SHIPPED | OUTPATIENT
Start: 2021-05-08 | End: 2022-11-03

## 2021-10-04 ENCOUNTER — PATIENT MESSAGE (OUTPATIENT)
Dept: ADMINISTRATIVE | Facility: HOSPITAL | Age: 46
End: 2021-10-04

## 2022-03-29 ENCOUNTER — PATIENT MESSAGE (OUTPATIENT)
Dept: RESEARCH | Facility: HOSPITAL | Age: 47
End: 2022-03-29
Payer: COMMERCIAL

## 2022-03-30 ENCOUNTER — IMMUNIZATION (OUTPATIENT)
Dept: INTERNAL MEDICINE | Facility: CLINIC | Age: 47
End: 2022-03-30
Payer: COMMERCIAL

## 2022-03-30 DIAGNOSIS — Z23 NEED FOR VACCINATION: Primary | ICD-10-CM

## 2022-03-30 PROCEDURE — 0054A COVID-19, MRNA, LNP-S, PF, 30 MCG/0.3 ML DOSE VACCINE (PFIZER): CPT | Mod: PBBFAC | Performed by: INTERNAL MEDICINE

## 2022-05-30 ENCOUNTER — PATIENT MESSAGE (OUTPATIENT)
Dept: ADMINISTRATIVE | Facility: HOSPITAL | Age: 47
End: 2022-05-30
Payer: COMMERCIAL

## 2022-08-02 ENCOUNTER — OFFICE VISIT (OUTPATIENT)
Dept: GASTROENTEROLOGY | Facility: CLINIC | Age: 47
End: 2022-08-02
Payer: COMMERCIAL

## 2022-08-02 VITALS
BODY MASS INDEX: 25.61 KG/M2 | HEIGHT: 64 IN | DIASTOLIC BLOOD PRESSURE: 94 MMHG | SYSTOLIC BLOOD PRESSURE: 145 MMHG | WEIGHT: 150 LBS | HEART RATE: 78 BPM

## 2022-08-02 DIAGNOSIS — K59.00 CONSTIPATION, UNSPECIFIED CONSTIPATION TYPE: Primary | ICD-10-CM

## 2022-08-02 DIAGNOSIS — R10.31 RIGHT LOWER QUADRANT ABDOMINAL PAIN: ICD-10-CM

## 2022-08-02 PROCEDURE — 3077F PR MOST RECENT SYSTOLIC BLOOD PRESSURE >= 140 MM HG: ICD-10-PCS | Mod: CPTII,S$GLB,, | Performed by: STUDENT IN AN ORGANIZED HEALTH CARE EDUCATION/TRAINING PROGRAM

## 2022-08-02 PROCEDURE — 99214 PR OFFICE/OUTPT VISIT, EST, LEVL IV, 30-39 MIN: ICD-10-PCS | Mod: S$GLB,,, | Performed by: STUDENT IN AN ORGANIZED HEALTH CARE EDUCATION/TRAINING PROGRAM

## 2022-08-02 PROCEDURE — 99999 PR PBB SHADOW E&M-EST. PATIENT-LVL III: ICD-10-PCS | Mod: PBBFAC,,, | Performed by: STUDENT IN AN ORGANIZED HEALTH CARE EDUCATION/TRAINING PROGRAM

## 2022-08-02 PROCEDURE — 1159F MED LIST DOCD IN RCRD: CPT | Mod: CPTII,S$GLB,, | Performed by: STUDENT IN AN ORGANIZED HEALTH CARE EDUCATION/TRAINING PROGRAM

## 2022-08-02 PROCEDURE — 99999 PR PBB SHADOW E&M-EST. PATIENT-LVL III: CPT | Mod: PBBFAC,,, | Performed by: STUDENT IN AN ORGANIZED HEALTH CARE EDUCATION/TRAINING PROGRAM

## 2022-08-02 PROCEDURE — 3008F BODY MASS INDEX DOCD: CPT | Mod: CPTII,S$GLB,, | Performed by: STUDENT IN AN ORGANIZED HEALTH CARE EDUCATION/TRAINING PROGRAM

## 2022-08-02 PROCEDURE — 3080F DIAST BP >= 90 MM HG: CPT | Mod: CPTII,S$GLB,, | Performed by: STUDENT IN AN ORGANIZED HEALTH CARE EDUCATION/TRAINING PROGRAM

## 2022-08-02 PROCEDURE — 3077F SYST BP >= 140 MM HG: CPT | Mod: CPTII,S$GLB,, | Performed by: STUDENT IN AN ORGANIZED HEALTH CARE EDUCATION/TRAINING PROGRAM

## 2022-08-02 PROCEDURE — 3008F PR BODY MASS INDEX (BMI) DOCUMENTED: ICD-10-PCS | Mod: CPTII,S$GLB,, | Performed by: STUDENT IN AN ORGANIZED HEALTH CARE EDUCATION/TRAINING PROGRAM

## 2022-08-02 PROCEDURE — 1159F PR MEDICATION LIST DOCUMENTED IN MEDICAL RECORD: ICD-10-PCS | Mod: CPTII,S$GLB,, | Performed by: STUDENT IN AN ORGANIZED HEALTH CARE EDUCATION/TRAINING PROGRAM

## 2022-08-02 PROCEDURE — 99214 OFFICE O/P EST MOD 30 MIN: CPT | Mod: S$GLB,,, | Performed by: STUDENT IN AN ORGANIZED HEALTH CARE EDUCATION/TRAINING PROGRAM

## 2022-08-02 PROCEDURE — 3080F PR MOST RECENT DIASTOLIC BLOOD PRESSURE >= 90 MM HG: ICD-10-PCS | Mod: CPTII,S$GLB,, | Performed by: STUDENT IN AN ORGANIZED HEALTH CARE EDUCATION/TRAINING PROGRAM

## 2022-08-02 NOTE — PROGRESS NOTES
"    [facility] Consultation Note    Reason for Consult:  The primary encounter diagnosis was Constipation, unspecified constipation type. A diagnosis of Right lower quadrant abdominal pain was also pertinent to this visit.    PCP:   Bryce Robert       Referring MD:  No referring provider defined for this encounter.    HPI:  This is a 47 y.o. male here for evaluation of abdominal pain.    The patient was seen in 2020 for abdominal pain by my colleague, Avery Paredes.  At that time, he was scheduled for a CT scan which was unremarkable.  Subsequent EGD/Colonoscopy was completed, and also unrevealing.     He notes that he is having similar pain to what brought him in to GI clinic in 2020.      He has been having pain in his RLQ for about 3 weeks.  He notes that after his evaluation in 2020, his abdominal pain resolved on its own.  The pain began without any inciting event, but he does recall being unable to move his bowels.  He went about 3 days without a bowel movement and his abdominal pain has been worse.  He feels that there is a focal area of discomfort and when he applies pressure to it, the pain is worsening.      He had tried to increase his fiber, but this was worsening it.  He then transitioned to a diet lower in fiber which has helped.    He is moving his bowels once every 2 days.  He struggles to pass a bowel movement and feels that it is a "black."  He worries that pain is present due to straining.      He has also used a back brace which has helped.      He has tried fiber, stool softener, miralax and prune juice.  It eventually helped, but he is not taking anything consistently.     Interestingly, he notes that he felt great after his colonoscopy.    ROS:  Constitutional: No fevers, chills, No weight loss  ENT: No allergies  CV: No chest pain  Pulm: No cough, No shortness of breath  Ophtho: No vision changes  GI: see HPI  Derm: No rash  Heme: No lymphadenopathy, No " "bruising  MSK: No arthritis  : No dysuria, No hematuria  Endo: No hot or cold intolerance  Neuro: No syncope, No seizure  Psych: No anxiety, No depression    Medical History:  has a past medical history of Keloid cicatrix and Pituitary macroadenoma.    Surgical History:  has a past surgical history that includes Excision, neoplasm, pituitary, transsphenoidal approach, using computer-assisted navigation; Esophagogastroduodenoscopy (N/A, 12/3/2020); and Colonoscopy (N/A, 12/3/2020).    Family History: family history is not on file..     Social History:  reports that he has never smoked. He has never used smokeless tobacco. He reports current alcohol use. He reports previous drug use.    Review of patient's allergies indicates:  No Known Allergies    Current Outpatient Rx   Medication Sig Dispense Refill    multivitamin capsule Take 1 capsule by mouth once daily.      linaCLOtide (LINZESS) 145 mcg Cap capsule Take 1 capsule (145 mcg total) by mouth before breakfast. 30 capsule 3    sumatriptan (IMITREX) 50 MG tablet Take 1 tablet (50 mg total) by mouth every 2 (two) hours as needed for Migraine (No more than 2 doses in 24 hours). 9 tablet 3       Objective Findings:    Vital Signs:  BP (!) 145/94   Pulse 78   Ht 5' 4" (1.626 m)   Wt 68.1 kg (150 lb 0.4 oz)   BMI 25.75 kg/m²   Body mass index is 25.75 kg/m².    Physical Exam:  General Appearance: Well appearing in no acute distress  Head:   Normocephalic, without obvious abnormality  Eyes:    No scleral icterus, EOMI  ENT: Neck supple, Lips, mucosa, and tongue normal; teeth and gums normal  Lungs: CTA bilaterally in anterior and posterior fields, no wheezes, no crackles.  Heart:  Regular rate and rhythm, S1, S2 normal, no murmurs heard  Abdomen: Soft, non tender, non distended with positive bowel sounds in all four quadrants. No hepatosplenomegaly, ascites, or mass  Extremities: 2+ pulses, no clubbing, cyanosis or edema  Skin: No rash  Neurologic: CN II-XII " intact      Labs:  Lab Results   Component Value Date    WBC 6.23 09/29/2020    HGB 14.6 09/29/2020    HCT 46.0 09/29/2020     09/29/2020    CHOL 194 12/03/2019    TRIG 167 (H) 12/03/2019    HDL 38 (L) 12/03/2019    ALT 36 07/20/2020    AST 27 07/20/2020     07/20/2020    K 4.2 07/20/2020     07/20/2020    CREATININE 1.0 07/20/2020    BUN 10 07/20/2020    CO2 27 07/20/2020    TSH 2.052 02/02/2021    INR 1.0 02/03/2020    HGBA1C 5.3 10/22/2020       Imaging:  CT A/P 11/5/2020    Impression:     No acute intra-abdominal abnormality identified.     There is some subcutaneous fat stranding right anterior abdominal wall the level of the iliac crest.  This is nonspecific.    Endoscopy:    EGD 12/3/2020  Findings:        The Z-line was regular and was found 35 cm from the incisors.        The examined esophagus was normal.        The entire examined stomach was normal. Biopsies were taken with a        cold forceps for histology. Estimated blood loss was minimal.        The examined duodenum was normal.        Biopsies were taken with a cold forceps for histology. Estimated        blood loss was minimal.     Colonoscopy 12/3/2020  Findings:        The perianal and digital rectal examinations were normal.        A 3 mm polyp was found in the rectum. The polyp was sessile. The        polyp was removed with a cold snare. Resection and retrieval were        complete. Estimated blood loss was minimal.        The exam was otherwise normal throughout the examined colon.        The terminal ileum appeared normal.     A. DUODENUM, BIOPSY:   - Duodenal mucosa with no diagnostic abnormality.   - No increased inflammation, architectural distortion, or features of celiac   disease identified.   B. STOMACH, BIOPSY:   - Antral and fundic mucosa with no diagnostic abnormality.   - No increased inflammation or architectural distortion identified.   - No Helicobacter organisms identified on H&E sections.   C. RECTUM, 4 MM  POLYP BIOPSY:   -Tubular adenoma.   - No high grade epithelial dysplasia identified.     Assessment:  1. Constipation, unspecified constipation type    2. Right lower quadrant abdominal pain         In brief, the patient is a 47-year-old man who presents to GI clinic for follow-up appointment setting of abdominal pain.    The patient has had similar abdominal pain in the past, but notes that improved after his EGD and colonoscopy.  I suspect the patient's abdominal pain is related to constipation.  He notes a bout of severe constipation after which his abdominal pain significantly worsened.  Interestingly, he also notes improvement after completing a bowel prep in the past.    I have recommended we treat him for constipation with Linzess 145 mcg daily.  I have also suggested he take 1 dose of magnesium citrate prior to beginning Linzess to help clear out his bowels.    Regarding his right lower quadrant abdominal pain, I suspect this is related to his constipation.  He had a CT scan in the past which was notable for some mild fat stranding, but otherwise unremarkable.  If symptoms persist despite appropriately treating his constipation we will consider repeat abdominal imaging.    Follow up in about 2 months (around 10/2/2022).      Order summary:  Orders Placed This Encounter    linaCLOtide (LINZESS) 145 mcg Cap capsule         Thank you so much for allowing me to participate in the care of Rhett Courtney MD

## 2022-08-03 ENCOUNTER — PATIENT MESSAGE (OUTPATIENT)
Dept: GASTROENTEROLOGY | Facility: CLINIC | Age: 47
End: 2022-08-03
Payer: COMMERCIAL

## 2022-08-04 ENCOUNTER — TELEPHONE (OUTPATIENT)
Dept: ENDOSCOPY | Facility: HOSPITAL | Age: 47
End: 2022-08-04
Payer: COMMERCIAL

## 2022-08-04 NOTE — TELEPHONE ENCOUNTER
----- Message from Anyi Calles LPN sent at 8/4/2022  1:08 PM CDT -----  Patient returned call states you had called him. He returned your call would like a return call.

## 2022-08-11 NOTE — TELEPHONE ENCOUNTER
Did call the insurance company for prior auth. Linzess is covered, but since it is not generic, he has to pay a higher cost. I think it would be the same with any of the medication like that.  Please advise if you want to send something else

## 2022-08-12 RX ORDER — LUBIPROSTONE 8 UG/1
24 CAPSULE ORAL 2 TIMES DAILY
Qty: 180 CAPSULE | Refills: 3 | Status: SHIPPED | OUTPATIENT
Start: 2022-08-12 | End: 2022-09-11

## 2022-08-12 RX ORDER — SENNOSIDES 8.6 MG/1
2 TABLET ORAL 2 TIMES DAILY
Qty: 120 TABLET | Refills: 3 | Status: SHIPPED | OUTPATIENT
Start: 2022-08-12 | End: 2022-09-11

## 2022-11-03 ENCOUNTER — LAB VISIT (OUTPATIENT)
Dept: LAB | Facility: OTHER | Age: 47
End: 2022-11-03
Attending: INTERNAL MEDICINE
Payer: COMMERCIAL

## 2022-11-03 ENCOUNTER — OFFICE VISIT (OUTPATIENT)
Dept: INTERNAL MEDICINE | Facility: CLINIC | Age: 47
End: 2022-11-03
Attending: INTERNAL MEDICINE
Payer: COMMERCIAL

## 2022-11-03 VITALS
SYSTOLIC BLOOD PRESSURE: 134 MMHG | OXYGEN SATURATION: 98 % | HEIGHT: 64 IN | DIASTOLIC BLOOD PRESSURE: 92 MMHG | BODY MASS INDEX: 25.78 KG/M2 | HEART RATE: 76 BPM | WEIGHT: 151 LBS

## 2022-11-03 DIAGNOSIS — Z11.4 ENCOUNTER FOR SCREENING FOR HIV: ICD-10-CM

## 2022-11-03 DIAGNOSIS — Z00.00 ANNUAL PHYSICAL EXAM: ICD-10-CM

## 2022-11-03 DIAGNOSIS — D35.2 PITUITARY MACROADENOMA: ICD-10-CM

## 2022-11-03 DIAGNOSIS — Z00.00 ANNUAL PHYSICAL EXAM: Primary | ICD-10-CM

## 2022-11-03 DIAGNOSIS — D35.2: ICD-10-CM

## 2022-11-03 DIAGNOSIS — R10.31 RIGHT LOWER QUADRANT PAIN: ICD-10-CM

## 2022-11-03 DIAGNOSIS — R10.31 RIGHT LOWER QUADRANT ABDOMINAL PAIN: ICD-10-CM

## 2022-11-03 LAB
ALBUMIN SERPL BCP-MCNC: 4.6 G/DL (ref 3.5–5.2)
ALP SERPL-CCNC: 86 U/L (ref 55–135)
ALT SERPL W/O P-5'-P-CCNC: 27 U/L (ref 10–44)
ANION GAP SERPL CALC-SCNC: 8 MMOL/L (ref 8–16)
AST SERPL-CCNC: 20 U/L (ref 10–40)
BASOPHILS # BLD AUTO: 0.04 K/UL (ref 0–0.2)
BASOPHILS NFR BLD: 0.6 % (ref 0–1.9)
BILIRUB SERPL-MCNC: 0.5 MG/DL (ref 0.1–1)
BUN SERPL-MCNC: 11 MG/DL (ref 6–20)
CALCIUM SERPL-MCNC: 9.7 MG/DL (ref 8.7–10.5)
CHLORIDE SERPL-SCNC: 103 MMOL/L (ref 95–110)
CHOLEST SERPL-MCNC: 231 MG/DL (ref 120–199)
CHOLEST/HDLC SERPL: 3.6 {RATIO} (ref 2–5)
CO2 SERPL-SCNC: 29 MMOL/L (ref 23–29)
CREAT SERPL-MCNC: 1 MG/DL (ref 0.5–1.4)
DIFFERENTIAL METHOD: ABNORMAL
EOSINOPHIL # BLD AUTO: 0.1 K/UL (ref 0–0.5)
EOSINOPHIL NFR BLD: 1.5 % (ref 0–8)
ERYTHROCYTE [DISTWIDTH] IN BLOOD BY AUTOMATED COUNT: 11.9 % (ref 11.5–14.5)
EST. GFR  (NO RACE VARIABLE): >60 ML/MIN/1.73 M^2
ESTIMATED AVG GLUCOSE: 111 MG/DL (ref 68–131)
GLUCOSE SERPL-MCNC: 82 MG/DL (ref 70–110)
HBA1C MFR BLD: 5.5 % (ref 4–5.6)
HCT VFR BLD AUTO: 54.4 % (ref 40–54)
HDLC SERPL-MCNC: 64 MG/DL (ref 40–75)
HDLC SERPL: 27.7 % (ref 20–50)
HGB BLD-MCNC: 17.8 G/DL (ref 14–18)
HIV 1+2 AB+HIV1 P24 AG SERPL QL IA: NORMAL
IMM GRANULOCYTES # BLD AUTO: 0.03 K/UL (ref 0–0.04)
IMM GRANULOCYTES NFR BLD AUTO: 0.4 % (ref 0–0.5)
LDLC SERPL CALC-MCNC: 136.2 MG/DL (ref 63–159)
LYMPHOCYTES # BLD AUTO: 2.4 K/UL (ref 1–4.8)
LYMPHOCYTES NFR BLD: 36.4 % (ref 18–48)
MCH RBC QN AUTO: 29.1 PG (ref 27–31)
MCHC RBC AUTO-ENTMCNC: 32.7 G/DL (ref 32–36)
MCV RBC AUTO: 89 FL (ref 82–98)
MONOCYTES # BLD AUTO: 0.5 K/UL (ref 0.3–1)
MONOCYTES NFR BLD: 7.2 % (ref 4–15)
NEUTROPHILS # BLD AUTO: 3.6 K/UL (ref 1.8–7.7)
NEUTROPHILS NFR BLD: 53.9 % (ref 38–73)
NONHDLC SERPL-MCNC: 167 MG/DL
NRBC BLD-RTO: 0 /100 WBC
PLATELET # BLD AUTO: 267 K/UL (ref 150–450)
PMV BLD AUTO: 9.8 FL (ref 9.2–12.9)
POTASSIUM SERPL-SCNC: 3.9 MMOL/L (ref 3.5–5.1)
PROT SERPL-MCNC: 8.2 G/DL (ref 6–8.4)
RBC # BLD AUTO: 6.11 M/UL (ref 4.6–6.2)
SODIUM SERPL-SCNC: 140 MMOL/L (ref 136–145)
TRIGL SERPL-MCNC: 154 MG/DL (ref 30–150)
TSH SERPL DL<=0.005 MIU/L-ACNC: 1.8 UIU/ML (ref 0.4–4)
WBC # BLD AUTO: 6.67 K/UL (ref 3.9–12.7)

## 2022-11-03 PROCEDURE — 1160F RVW MEDS BY RX/DR IN RCRD: CPT | Mod: CPTII,S$GLB,, | Performed by: INTERNAL MEDICINE

## 2022-11-03 PROCEDURE — 3008F BODY MASS INDEX DOCD: CPT | Mod: CPTII,S$GLB,, | Performed by: INTERNAL MEDICINE

## 2022-11-03 PROCEDURE — 99999 PR PBB SHADOW E&M-EST. PATIENT-LVL IV: ICD-10-PCS | Mod: PBBFAC,,, | Performed by: INTERNAL MEDICINE

## 2022-11-03 PROCEDURE — 3008F PR BODY MASS INDEX (BMI) DOCUMENTED: ICD-10-PCS | Mod: CPTII,S$GLB,, | Performed by: INTERNAL MEDICINE

## 2022-11-03 PROCEDURE — 3080F PR MOST RECENT DIASTOLIC BLOOD PRESSURE >= 90 MM HG: ICD-10-PCS | Mod: CPTII,S$GLB,, | Performed by: INTERNAL MEDICINE

## 2022-11-03 PROCEDURE — 3075F SYST BP GE 130 - 139MM HG: CPT | Mod: CPTII,S$GLB,, | Performed by: INTERNAL MEDICINE

## 2022-11-03 PROCEDURE — 85025 COMPLETE CBC W/AUTO DIFF WBC: CPT | Performed by: INTERNAL MEDICINE

## 2022-11-03 PROCEDURE — 80053 COMPREHEN METABOLIC PANEL: CPT | Performed by: INTERNAL MEDICINE

## 2022-11-03 PROCEDURE — 83036 HEMOGLOBIN GLYCOSYLATED A1C: CPT | Performed by: INTERNAL MEDICINE

## 2022-11-03 PROCEDURE — 80061 LIPID PANEL: CPT | Performed by: INTERNAL MEDICINE

## 2022-11-03 PROCEDURE — 1159F PR MEDICATION LIST DOCUMENTED IN MEDICAL RECORD: ICD-10-PCS | Mod: CPTII,S$GLB,, | Performed by: INTERNAL MEDICINE

## 2022-11-03 PROCEDURE — 99396 PREV VISIT EST AGE 40-64: CPT | Mod: S$GLB,,, | Performed by: INTERNAL MEDICINE

## 2022-11-03 PROCEDURE — 99396 PR PREVENTIVE VISIT,EST,40-64: ICD-10-PCS | Mod: S$GLB,,, | Performed by: INTERNAL MEDICINE

## 2022-11-03 PROCEDURE — 99999 PR PBB SHADOW E&M-EST. PATIENT-LVL IV: CPT | Mod: PBBFAC,,, | Performed by: INTERNAL MEDICINE

## 2022-11-03 PROCEDURE — 1159F MED LIST DOCD IN RCRD: CPT | Mod: CPTII,S$GLB,, | Performed by: INTERNAL MEDICINE

## 2022-11-03 PROCEDURE — 84443 ASSAY THYROID STIM HORMONE: CPT | Performed by: INTERNAL MEDICINE

## 2022-11-03 PROCEDURE — 36415 COLL VENOUS BLD VENIPUNCTURE: CPT | Performed by: INTERNAL MEDICINE

## 2022-11-03 PROCEDURE — 3075F PR MOST RECENT SYSTOLIC BLOOD PRESS GE 130-139MM HG: ICD-10-PCS | Mod: CPTII,S$GLB,, | Performed by: INTERNAL MEDICINE

## 2022-11-03 PROCEDURE — 3080F DIAST BP >= 90 MM HG: CPT | Mod: CPTII,S$GLB,, | Performed by: INTERNAL MEDICINE

## 2022-11-03 PROCEDURE — 1160F PR REVIEW ALL MEDS BY PRESCRIBER/CLIN PHARMACIST DOCUMENTED: ICD-10-PCS | Mod: CPTII,S$GLB,, | Performed by: INTERNAL MEDICINE

## 2022-11-03 PROCEDURE — 87389 HIV-1 AG W/HIV-1&-2 AB AG IA: CPT | Performed by: INTERNAL MEDICINE

## 2022-11-03 RX ORDER — MODERNA COVID-19 VACCINE, BIVALENT 25; 25 UG/.5ML; UG/.5ML
INJECTION, SUSPENSION INTRAMUSCULAR
COMMUNITY
Start: 2022-09-10

## 2022-11-03 RX ORDER — INFLUENZA VIRUS VACCINE 15; 15; 15; 15 UG/.5ML; UG/.5ML; UG/.5ML; UG/.5ML
SUSPENSION INTRAMUSCULAR
COMMUNITY
Start: 2022-09-10

## 2022-11-03 NOTE — PROGRESS NOTES
"Subjective:       Patient ID: Rhett Quarles is a 47 y.o. male.    Chief Complaint: Annual Exam    Here for annual exam    Having lots of abdominal pain for past 6 months. Has seen GI for this. Unable to get linzess. Taking fiber and senna and continues to have daily symptoms. He is having symptoms currently.       Vision remains poor. Does not drive often.  Once a month    4/28/2021 Neuro Dr Boyer writes "Imitrex 50 mg at migraine onset to help with migraine management, can repeat dose 2 hours later, no more than 2 doses in 24 hrs. Encouraged continuation of magnesium oxide 800 mg nightly which can help with both the fasciculations and for migraine prophylaxis.  Additionally can add riboflavin 400 mg daily for migraine prophylaxis. Today the patient reports intermittent muscle jerking, description is concerning for myoclonic jerks, this happens on a near constant basis, check EEG        8/2/2022 CV GI Dr Paredes writes If symptoms persist despite appropriately treating his constipation we will consider repeat abdominal imaging   I suspect the patient's abdominal pain is related to constipation.       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  Started magnesium and b12      Formal VF:  Dr. Winston 1/29/2020 (pre-op)  "no evidence of visual pathway effects from the pituitary adenoma"  due for repeat in 2 months     Recent labs with increased RBC.        Review of Systems   Constitutional:  Negative for appetite change, chills, fever and unexpected weight " "change.   HENT:  Negative for hearing loss, sore throat and trouble swallowing.    Eyes:  Negative for visual disturbance.   Respiratory:  Negative for cough, chest tightness and shortness of breath.    Cardiovascular:  Negative for chest pain and leg swelling.   Gastrointestinal:  Negative for abdominal pain, blood in stool, constipation, diarrhea, nausea and vomiting.   Endocrine: Negative for polydipsia and polyuria.   Genitourinary:  Negative for decreased urine volume, difficulty urinating, dysuria, frequency and urgency.   Musculoskeletal:  Negative for gait problem.   Skin:  Negative for rash.   Neurological:  Negative for dizziness and numbness.   Psychiatric/Behavioral:  The patient is not nervous/anxious.      Objective:      Vitals:    11/03/22 1052   BP: (!) 134/92   BP Location: Left arm   Pulse: 76   SpO2: 98%   Weight: 68.5 kg (151 lb 0.2 oz)   Height: 5' 4" (1.626 m)      Physical Exam  Vitals and nursing note reviewed.   Constitutional:       General: He is not in acute distress.     Appearance: Normal appearance. He is well-developed.   HENT:      Head: Normocephalic and atraumatic.      Mouth/Throat:      Pharynx: No oropharyngeal exudate.   Eyes:      General: No scleral icterus.     Conjunctiva/sclera: Conjunctivae normal.      Pupils: Pupils are equal, round, and reactive to light.   Neck:      Thyroid: No thyromegaly.   Cardiovascular:      Rate and Rhythm: Normal rate and regular rhythm.      Heart sounds: Normal heart sounds. No murmur heard.  Pulmonary:      Effort: Pulmonary effort is normal.      Breath sounds: Normal breath sounds. No wheezing or rales.   Abdominal:      General: There is no distension.      Tenderness: There is abdominal tenderness in the right lower quadrant.       Musculoskeletal:         General: No tenderness.   Lymphadenopathy:      Cervical: No cervical adenopathy.   Skin:     General: Skin is warm and dry.   Neurological:      Mental Status: He is alert and " oriented to person, place, and time.   Psychiatric:         Behavior: Behavior normal.       Assessment:       1. Annual physical exam    2. Encounter for screening for HIV    3. Right lower quadrant abdominal pain    4. Right lower quadrant pain    5. Pituitary macroadenoma    6. Corticotroph adenoma          Plan:       Rhett was seen today for annual exam.    Diagnoses and all orders for this visit:    Annual physical exam  -     CBC Auto Differential; Future  -     Comprehensive Metabolic Panel; Future  -     Hemoglobin A1C; Future  -     Lipid Panel; Future  -     TSH; Future    Encounter for screening for HIV  -     HIV 1/2 Ag/Ab (4th Gen); Future    Right lower quadrant abdominal pain  -     CT Abdomen Pelvis With Contrast; Future    Right lower quadrant pain      He does have constipation but I wonder if there is any MSK abdominal wall/fascia component to his presentation, again exacerbated by constipation. CT scan and f/u Gi in case this is not the case    Corticotroph adenoma  F/u BELTRAN Merida MD  Internal Medicine-[facility]        Side effects of medication(s) were discussed in detail and patient voiced understanding.  Patient will call back for any issues or complications.

## 2022-11-08 ENCOUNTER — HOSPITAL ENCOUNTER (OUTPATIENT)
Dept: RADIOLOGY | Facility: OTHER | Age: 47
Discharge: HOME | End: 2022-11-08
Attending: INTERNAL MEDICINE
Payer: COMMERCIAL

## 2022-11-08 DIAGNOSIS — R10.31 RIGHT LOWER QUADRANT ABDOMINAL PAIN: ICD-10-CM

## 2022-11-08 PROCEDURE — A9698 NON-RAD CONTRAST MATERIALNOC: HCPCS | Performed by: INTERNAL MEDICINE

## 2022-11-08 PROCEDURE — 25500020 PHARM REV CODE 255: Performed by: INTERNAL MEDICINE

## 2022-11-08 PROCEDURE — 74177 CT ABD & PELVIS W/CONTRAST: CPT | Mod: 26,,, | Performed by: RADIOLOGY

## 2022-11-08 PROCEDURE — 74177 CT ABDOMEN PELVIS WITH CONTRAST: ICD-10-PCS | Mod: 26,,, | Performed by: RADIOLOGY

## 2022-11-08 PROCEDURE — 74177 CT ABD & PELVIS W/CONTRAST: CPT | Mod: TC

## 2022-11-08 RX ADMIN — IOHEXOL 1000 ML: 9 SOLUTION ORAL at 02:11

## 2022-11-08 RX ADMIN — IOHEXOL 75 ML: 350 INJECTION, SOLUTION INTRAVENOUS at 02:11

## 2022-11-10 ENCOUNTER — OFFICE VISIT (OUTPATIENT)
Dept: GASTROENTEROLOGY | Facility: CLINIC | Age: 47
End: 2022-11-10
Payer: COMMERCIAL

## 2022-11-10 VITALS
SYSTOLIC BLOOD PRESSURE: 118 MMHG | HEART RATE: 65 BPM | WEIGHT: 148 LBS | DIASTOLIC BLOOD PRESSURE: 82 MMHG | HEIGHT: 63 IN | BODY MASS INDEX: 26.22 KG/M2

## 2022-11-10 DIAGNOSIS — K59.09 CHRONIC CONSTIPATION: Primary | ICD-10-CM

## 2022-11-10 PROCEDURE — 3079F DIAST BP 80-89 MM HG: CPT | Mod: CPTII,S$GLB,, | Performed by: INTERNAL MEDICINE

## 2022-11-10 PROCEDURE — 99999 PR PBB SHADOW E&M-EST. PATIENT-LVL III: ICD-10-PCS | Mod: PBBFAC,,, | Performed by: INTERNAL MEDICINE

## 2022-11-10 PROCEDURE — 3044F PR MOST RECENT HEMOGLOBIN A1C LEVEL <7.0%: ICD-10-PCS | Mod: CPTII,S$GLB,, | Performed by: INTERNAL MEDICINE

## 2022-11-10 PROCEDURE — 3008F BODY MASS INDEX DOCD: CPT | Mod: CPTII,S$GLB,, | Performed by: INTERNAL MEDICINE

## 2022-11-10 PROCEDURE — 99214 PR OFFICE/OUTPT VISIT, EST, LEVL IV, 30-39 MIN: ICD-10-PCS | Mod: S$GLB,,, | Performed by: INTERNAL MEDICINE

## 2022-11-10 PROCEDURE — 3008F PR BODY MASS INDEX (BMI) DOCUMENTED: ICD-10-PCS | Mod: CPTII,S$GLB,, | Performed by: INTERNAL MEDICINE

## 2022-11-10 PROCEDURE — 3044F HG A1C LEVEL LT 7.0%: CPT | Mod: CPTII,S$GLB,, | Performed by: INTERNAL MEDICINE

## 2022-11-10 PROCEDURE — 3074F SYST BP LT 130 MM HG: CPT | Mod: CPTII,S$GLB,, | Performed by: INTERNAL MEDICINE

## 2022-11-10 PROCEDURE — 3079F PR MOST RECENT DIASTOLIC BLOOD PRESSURE 80-89 MM HG: ICD-10-PCS | Mod: CPTII,S$GLB,, | Performed by: INTERNAL MEDICINE

## 2022-11-10 PROCEDURE — 99214 OFFICE O/P EST MOD 30 MIN: CPT | Mod: S$GLB,,, | Performed by: INTERNAL MEDICINE

## 2022-11-10 PROCEDURE — 99999 PR PBB SHADOW E&M-EST. PATIENT-LVL III: CPT | Mod: PBBFAC,,, | Performed by: INTERNAL MEDICINE

## 2022-11-10 PROCEDURE — 3074F PR MOST RECENT SYSTOLIC BLOOD PRESSURE < 130 MM HG: ICD-10-PCS | Mod: CPTII,S$GLB,, | Performed by: INTERNAL MEDICINE

## 2022-11-10 RX ORDER — AMOXICILLIN 250 MG
2 CAPSULE ORAL DAILY
Qty: 60 TABLET | Refills: 11 | Status: SHIPPED | OUTPATIENT
Start: 2022-11-10

## 2022-11-10 NOTE — PROGRESS NOTES
"Reason for visit:Abdominal pain    HPI:  is a 47 year old gentleman previously seen by  in August 2022 for evaluation of abdominal pain. I had seen him in 2020 for abdominal pain. He also had abrupt onset of numbness/tingling involving various areas on 10/30/2019.  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 He underwent adenoma resection.  He had headaches for a very long time and the headaches were attributed to the pituitary macroadenoma. 30 days later he was diagnosed with COVID-19, he was symptomatic at that time.  Since then, he has noticed that everything feels difficult to do including fine motor skills like playing his guitar. It is getting tougher and he has muscle twitches all over his body - including his tongue. He also noted tongue numbness. He has intermittent tingling involving various parts of his body.  He has noted heaviness in his muscles and difficulty with walking periodically.     CT scan in Nov 202 was unremarkable.  Subsequent EGD/Colonoscopy in Dec 2020 was also unrevealing.     During the most recent visit in August 2022 he was complaining of pain in his RLQ for about 3 weeks.  He noted that after his evaluation in 2020, his abdominal pain resolved on its own.  The pain began without any inciting event, but he does recall being unable to move his bowels.  He went about 3 days without a bowel movement and his abdominal pain has been worse.  He feels that there is a focal area of discomfort and when he applies pressure to it, the pain is worsening.  He had tried to increase his fiber, but this was worsening it.  He then transitioned to a diet lower in fiber which has helped. He is moving his bowels once every 2 days.  He struggles to pass a bowel movement and feels that it is a "black."  He worries that pain is present due to straining.  He has also used a back brace which has helped.  He has tried fiber, " stool softener, miralax and prune juice.  It eventually helped, but he is not taking anything consistently. Interestingly, he notes that he felt great after his colonoscopy. Since the suspicion was constipation as the main reason for abdominal pain, Linzess 145 mcg was prescribed, however due to cost of medication he recommended Senna 2 tabs (17.2mg) twice daily and Milk of Magnesia  one capful daily. CT scan in Nov 2022 revealed a right intrarenal stone measuring 2 mm. With an unremarkable urinary bladder. Recently has been wearing abdominal binder with improvement.      Past medical, surgical, social and family history reviewed in epic    Medication allergies reviewed in epic    Review of systems:    Constitutional:  No fever, no chills, no weight loss, appetite is normal  Eyes:  No visual changes or red eyes  ENT:  No odynophagia or hoarseness of voice  Cardiovascular:  No angina or palpitation  Respiratory:  No shortness breath or wheezing  Genitourinary:  No dysuria or frequency  Musculoskeletal:  No myalgias or arthralgias  Skin:  No pruritus or eczema  Neurologic:  No headache or seizures  Psychiatric:  No anxiety depression  Gastrointestinal:  See HPI    Physical exam:  Vitals see epic, awake, alert, oriented x3 in no acute distress    Neck:  Supple, no carotid bruit, no cervical adenopathy  Abdomen:  Obese, soft, focal tenderness in the RLQ adjacent to the umbilicus consistent with site of prior surgery, nondistended, no masses palpable, no hepatosplenomegaly detected, bowel sounds are normal, no ascites clinically detectable  Eyes:  Conjunctivae anicteric, not injected  ENT:  Oral mucosa moist  Cardiovascular:  S1, S2 normal, no murmurs, no gallops, no abdominal bruits heard  Respiratory:  Bilateral air entry equal, no rhonchi, no crackles, normal effort  Skin:  No palmar erythema or spider angiomata  Neurologic:  No asterixis or tremors  Psychiatric:  Affect appropriate, proper judgment, proper insight,  oriented to place and time  Lower extremities:  No pedal edema    Recent labs, prior imaging and endoscopy reports reviewed. 30 minutes spent with more than 50%  in counseling and reviewed both prior CT scans      Impression: Right sided abdominal pain- abdominal wall pain. Chronic constipation    Recommendations:   Consider abdominal wall injection  Miralax 1 capful in 8 oz of water every morning  Continue Fiber daily  Senokot S 2 nightly before bed  Follow up in Jan/Feb 2023

## 2022-11-10 NOTE — PATIENT INSTRUCTIONS
Miralax 1 capful in 8 oz of water every morning  Continue Fiber daily  Senokot S 2 tablets nightly before bed

## 2023-02-27 ENCOUNTER — OFFICE VISIT (OUTPATIENT)
Dept: UROLOGY | Facility: CLINIC | Age: 48
End: 2023-02-27
Attending: UROLOGY
Payer: COMMERCIAL

## 2023-02-27 VITALS
DIASTOLIC BLOOD PRESSURE: 79 MMHG | WEIGHT: 148 LBS | SYSTOLIC BLOOD PRESSURE: 146 MMHG | BODY MASS INDEX: 26.22 KG/M2 | HEART RATE: 71 BPM | HEIGHT: 63 IN

## 2023-02-27 DIAGNOSIS — N41.0 ACUTE PROSTATITIS: Primary | ICD-10-CM

## 2023-02-27 PROCEDURE — 3008F PR BODY MASS INDEX (BMI) DOCUMENTED: ICD-10-PCS | Mod: CPTII,S$GLB,, | Performed by: UROLOGY

## 2023-02-27 PROCEDURE — 1160F RVW MEDS BY RX/DR IN RCRD: CPT | Mod: CPTII,S$GLB,, | Performed by: UROLOGY

## 2023-02-27 PROCEDURE — 3077F SYST BP >= 140 MM HG: CPT | Mod: CPTII,S$GLB,, | Performed by: UROLOGY

## 2023-02-27 PROCEDURE — 3078F DIAST BP <80 MM HG: CPT | Mod: CPTII,S$GLB,, | Performed by: UROLOGY

## 2023-02-27 PROCEDURE — 1159F MED LIST DOCD IN RCRD: CPT | Mod: CPTII,S$GLB,, | Performed by: UROLOGY

## 2023-02-27 PROCEDURE — 99214 OFFICE O/P EST MOD 30 MIN: CPT | Mod: S$GLB,,, | Performed by: UROLOGY

## 2023-02-27 PROCEDURE — 1160F PR REVIEW ALL MEDS BY PRESCRIBER/CLIN PHARMACIST DOCUMENTED: ICD-10-PCS | Mod: CPTII,S$GLB,, | Performed by: UROLOGY

## 2023-02-27 PROCEDURE — 1159F PR MEDICATION LIST DOCUMENTED IN MEDICAL RECORD: ICD-10-PCS | Mod: CPTII,S$GLB,, | Performed by: UROLOGY

## 2023-02-27 PROCEDURE — 3077F PR MOST RECENT SYSTOLIC BLOOD PRESSURE >= 140 MM HG: ICD-10-PCS | Mod: CPTII,S$GLB,, | Performed by: UROLOGY

## 2023-02-27 PROCEDURE — 87086 URINE CULTURE/COLONY COUNT: CPT | Performed by: UROLOGY

## 2023-02-27 PROCEDURE — 3078F PR MOST RECENT DIASTOLIC BLOOD PRESSURE < 80 MM HG: ICD-10-PCS | Mod: CPTII,S$GLB,, | Performed by: UROLOGY

## 2023-02-27 PROCEDURE — 99214 PR OFFICE/OUTPT VISIT, EST, LEVL IV, 30-39 MIN: ICD-10-PCS | Mod: S$GLB,,, | Performed by: UROLOGY

## 2023-02-27 PROCEDURE — 3008F BODY MASS INDEX DOCD: CPT | Mod: CPTII,S$GLB,, | Performed by: UROLOGY

## 2023-02-27 RX ORDER — CIPROFLOXACIN 500 MG/1
500 TABLET ORAL EVERY 12 HOURS
Qty: 60 TABLET | Refills: 0 | Status: SHIPPED | OUTPATIENT
Start: 2023-02-27 | End: 2023-03-29

## 2023-02-27 NOTE — PROGRESS NOTES
"Subjective:      Rhett Quarles is a 48 y.o. male who returns today regarding his pelvic pain/LUTS.    Previously seen in 2020 with symptoms of male pelvic pain. At that time symptoms resolved w/ abx. Recently symptoms recurred along with increased bothersome LUTS.     The following portions of the patient's history were reviewed and updated as appropriate: allergies, current medications, past family history, past medical history, past social history, past surgical history and problem list.    Review of Systems  A comprehensive multipoint review of systems was negative except as otherwise stated in the HPI.     Objective:   Vitals: BP (!) 146/79   Pulse 71   Ht 5' 3" (1.6 m)   Wt 67.1 kg (148 lb)   BMI 26.22 kg/m²     Physical Exam   General: alert and oriented, no acute distress  Respiratory: Symmetric expansion, non-labored breathing  Neuro: no gross deficits  Psych: normal judgment and insight, normal mood/affect, and non-anxious    Lab Review   Urinalysis demonstrates negative for all components  Lab Results   Component Value Date    WBC 6.67 11/03/2022    HGB 17.8 11/03/2022    HCT 54.4 (H) 11/03/2022    MCV 89 11/03/2022     11/03/2022     Lab Results   Component Value Date    CREATININE 1.0 11/03/2022    BUN 11 11/03/2022         Assessment and Plan:   1. Acute prostatitis (vs pelvic pain syndrome)  -- Previously responded to abx, last > 2 years ago - will repeat now  -- Urine culture  -- FU if not improved in 4-6 weeks       "

## 2023-03-01 LAB — BACTERIA UR CULT: NO GROWTH

## 2023-04-03 ENCOUNTER — OFFICE VISIT (OUTPATIENT)
Dept: URGENT CARE | Facility: CLINIC | Age: 48
End: 2023-04-03
Payer: COMMERCIAL

## 2023-04-03 VITALS
HEIGHT: 63 IN | HEART RATE: 78 BPM | SYSTOLIC BLOOD PRESSURE: 142 MMHG | WEIGHT: 148 LBS | BODY MASS INDEX: 26.22 KG/M2 | TEMPERATURE: 97 F | OXYGEN SATURATION: 98 % | RESPIRATION RATE: 16 BRPM | DIASTOLIC BLOOD PRESSURE: 93 MMHG

## 2023-04-03 DIAGNOSIS — J10.1 INFLUENZA A: Primary | ICD-10-CM

## 2023-04-03 LAB
CTP QC/QA: YES
MOLECULAR STREP A: NEGATIVE
POC MOLECULAR INFLUENZA A AGN: POSITIVE
POC MOLECULAR INFLUENZA B AGN: NEGATIVE
SARS-COV-2 AG RESP QL IA.RAPID: NEGATIVE

## 2023-04-03 PROCEDURE — 99203 PR OFFICE/OUTPT VISIT, NEW, LEVL III, 30-44 MIN: ICD-10-PCS | Mod: S$GLB,,, | Performed by: NURSE PRACTITIONER

## 2023-04-03 PROCEDURE — 99203 OFFICE O/P NEW LOW 30 MIN: CPT | Mod: S$GLB,,, | Performed by: NURSE PRACTITIONER

## 2023-04-03 PROCEDURE — 87502 INFLUENZA DNA AMP PROBE: CPT | Mod: QW,S$GLB,, | Performed by: NURSE PRACTITIONER

## 2023-04-03 PROCEDURE — 87811 SARS-COV-2 COVID19 W/OPTIC: CPT | Mod: QW,S$GLB,, | Performed by: NURSE PRACTITIONER

## 2023-04-03 PROCEDURE — 87811 SARS CORONAVIRUS 2 ANTIGEN POCT, MANUAL READ: ICD-10-PCS | Mod: QW,S$GLB,, | Performed by: NURSE PRACTITIONER

## 2023-04-03 PROCEDURE — 87651 STREP A DNA AMP PROBE: CPT | Mod: QW,S$GLB,, | Performed by: NURSE PRACTITIONER

## 2023-04-03 PROCEDURE — 87502 POCT INFLUENZA A/B MOLECULAR: ICD-10-PCS | Mod: QW,S$GLB,, | Performed by: NURSE PRACTITIONER

## 2023-04-03 PROCEDURE — 87651 POCT STREP A MOLECULAR: ICD-10-PCS | Mod: QW,S$GLB,, | Performed by: NURSE PRACTITIONER

## 2023-04-03 RX ORDER — ACETAMINOPHEN 500 MG
1000 TABLET ORAL
Status: COMPLETED | OUTPATIENT
Start: 2023-04-03 | End: 2023-04-03

## 2023-04-03 RX ORDER — PROMETHAZINE HYDROCHLORIDE AND DEXTROMETHORPHAN HYDROBROMIDE 6.25; 15 MG/5ML; MG/5ML
5 SYRUP ORAL NIGHTLY PRN
Qty: 118 ML | Refills: 0 | Status: SHIPPED | OUTPATIENT
Start: 2023-04-03 | End: 2023-07-10

## 2023-04-03 RX ADMIN — Medication 1000 MG: at 11:04

## 2023-04-03 NOTE — PROGRESS NOTES
"Subjective:      Patient ID: Rhett Quarles is a 48 y.o. male.    Vitals:  height is 5' 3" (1.6 m) and weight is 67.1 kg (148 lb). His temperature is 97.4 °F (36.3 °C). His blood pressure is 142/93 (abnormal) and his pulse is 78. His respiration is 16 and oxygen saturation is 98%.     Chief Complaint: Cough    48-year-old male presents to clinic for evaluation of nasal congestion, cough, fever, chills, body aches x4 days.  He is vaccinated for COVID, denies any known sick contacts.  He reports taking NyQuil with minimal relief of symptoms.  He denies chest pain or shortness of breath.  He is awake and alert, answers questions appropriately, no acute distress noted on today's visit.    Cough  This is a new problem. The current episode started in the past 7 days (Friday). The problem has been gradually worsening. The problem occurs constantly. The cough is Non-productive. Associated symptoms include chills, a fever, nasal congestion and postnasal drip. Pertinent negatives include no chest pain, sore throat, shortness of breath or wheezing. Associated symptoms comments: Muscle aches/ fatigue/ loss of taste and smell . Nothing aggravates the symptoms. Treatments tried: Nyquil/ Mucinex. The treatment provided mild relief. There is no history of asthma, bronchitis or pneumonia.     Constitution: Positive for chills and fever. Negative for activity change, appetite change, sweating and fatigue.   HENT:  Positive for postnasal drip. Negative for sore throat.    Cardiovascular:  Negative for chest pain.   Respiratory:  Positive for cough. Negative for shortness of breath and wheezing.    Neurological:  Negative for dizziness.    Objective:     Physical Exam   Constitutional: He is oriented to person, place, and time. He appears well-developed.  Non-toxic appearance. He does not appear ill. No distress.   HENT:   Head: Normocephalic and atraumatic. Head is without abrasion, without contusion and without laceration. "   Ears:   Right Ear: Tympanic membrane and external ear normal.   Left Ear: Tympanic membrane and external ear normal.   Nose: Congestion present.   Mouth/Throat: Mucous membranes are normal. Mucous membranes are moist. Posterior oropharyngeal erythema present. No oropharyngeal exudate. Tonsils are 2+ on the right. Tonsils are 2+ on the left. No tonsillar exudate. Oropharynx is clear.   Eyes: Conjunctivae, EOM and lids are normal. Right eye exhibits no discharge. Left eye exhibits no discharge.   Neck: Trachea normal and phonation normal.   Cardiovascular: Normal rate.   Pulmonary/Chest: Effort normal and breath sounds normal. No stridor. No respiratory distress. He has no wheezes.   Abdominal: Normal appearance.   Musculoskeletal: Normal range of motion.         General: Normal range of motion.   Neurological: He is alert and oriented to person, place, and time.   Skin: Skin is warm, dry, intact, not diaphoretic and not pale. No abrasion, No burn and No ecchymosis   Psychiatric: His speech is normal and behavior is normal. Mood, judgment and thought content normal.   Nursing note and vitals reviewed.  Results for orders placed or performed in visit on 04/03/23   SARS Coronavirus 2 Antigen, POCT Manual Read   Result Value Ref Range    SARS Coronavirus 2 Antigen Negative Negative     Acceptable Yes    POCT Influenza A/B MOLECULAR   Result Value Ref Range    POC Molecular Influenza A Ag Positive (A) Negative, Not Reported    POC Molecular Influenza B Ag Negative Negative, Not Reported     Acceptable Yes    POCT Strep A, Molecular   Result Value Ref Range    Molecular Strep A, POC Negative Negative     Acceptable Yes        Assessment:     1. Influenza A        Plan:       Influenza A  -     SARS Coronavirus 2 Antigen, POCT Manual Read  -     POCT Influenza A/B MOLECULAR  -     POCT Strep A, Molecular  -     acetaminophen tablet 1,000 mg  -     promethazine-dextromethorphan  (PROMETHAZINE-DM) 6.25-15 mg/5 mL Syrp; Take 5 mLs by mouth nightly as needed (cough).  Dispense: 118 mL; Refill: 0      - positive influenza on today's visit.  Discussed symptomatic care.  Given 4 days of symptoms, out of window for Tamiflu.  Follow-up with PCP.  Patient verbalized understanding and is in agreement with plan.    Patient Instructions   - Follow up with your PCP or specialty clinic as directed in the next 1-2 weeks if not improved or as needed.  You can call [phone] to schedule an appointment with the appropriate provider.    - Go to the ER or seek medical attention immediately if you develop new or worsening symptoms.    - You must understand that you have received an Urgent Care treatment only and that you may be released before all of your medical problems are known or treated.   - You, the patient, will arrange for follow up care as instructed.   - If your condition worsens or fails to improve we recommend that you receive another evaluation at the ER immediately or contact your PCP to discuss your concerns or return here.        - Tylenol or Ibuprofen as directed as needed for fever/pain. Avoid tylenol if you have a history of liver disease. Do not take ibuprofen if you have a history of GI bleeding, kidney disease, or if you take blood thinners.   - Take ibuprofen 600-800 mg every 6-8 hours for pain and inflammation.  You can also take Tylenol/acetaminophen 650-1000 mg every 6-8 hours for added pain relief.     - You can take over-the-counter claritin, zyrtec, allegra, or xyzal as directed. These are antihistamines that can help with runny nose, nasal congestion, sneezing, and helps to dry up post-nasal drip, which usually causes sore throat and cough.              - If you do NOT have high blood pressure, you may use a decongestant form (D)  of this medication or if you do not take the D form, you can take sudafed (pseudoephedrine) over the counter, which is a decongestant.     - You  can use Flonase (fluticasone) nasal spray as directed for sinus congestion and postnasal drip. This is a steroid nasal spray that works locally over time to decrease the inflammation in your nose/sinuses and help with allergic symptoms. This is not an quick- relief spray like afrin, but it works well if used daily.  Discontinue if you develop nose bleed  - use nasal saline prior to Flonase.     - Use Ocean Spray Nasal Saline 1-3 puffs each nostril every 2-3 hours then blow out onto tissue. This is to irrigate the nasal passage way to clear the sinus openings. Use until sinus problem resolved.     - you can take plain Mucinex (guaifenesin) 1200 mg twice a day to help loosen mucous     -warm salt water gargles can help with sore throat     - warm tea with honey can help with cough. Honey is a natural cough suppressant.     - Follow up with your PCP or specialty clinic as directed in the next 1-2 weeks if not improved or as needed.  You can call [phone] to schedule an appointment with the appropriate provider.       - Go to the ER if you develop new or worsening symptoms.

## 2023-04-03 NOTE — PATIENT INSTRUCTIONS
- Follow up with your PCP or specialty clinic as directed in the next 1-2 weeks if not improved or as needed.  You can call [phone] to schedule an appointment with the appropriate provider.    - Go to the ER or seek medical attention immediately if you develop new or worsening symptoms.    - You must understand that you have received an Urgent Care treatment only and that you may be released before all of your medical problems are known or treated.   - You, the patient, will arrange for follow up care as instructed.   - If your condition worsens or fails to improve we recommend that you receive another evaluation at the ER immediately or contact your PCP to discuss your concerns or return here.        - Tylenol or Ibuprofen as directed as needed for fever/pain. Avoid tylenol if you have a history of liver disease. Do not take ibuprofen if you have a history of GI bleeding, kidney disease, or if you take blood thinners.   - Take ibuprofen 600-800 mg every 6-8 hours for pain and inflammation.  You can also take Tylenol/acetaminophen 650-1000 mg every 6-8 hours for added pain relief.     - You can take over-the-counter claritin, zyrtec, allegra, or xyzal as directed. These are antihistamines that can help with runny nose, nasal congestion, sneezing, and helps to dry up post-nasal drip, which usually causes sore throat and cough.              - If you do NOT have high blood pressure, you may use a decongestant form (D)  of this medication or if you do not take the D form, you can take sudafed (pseudoephedrine) over the counter, which is a decongestant.     - You can use Flonase (fluticasone) nasal spray as directed for sinus congestion and postnasal drip. This is a steroid nasal spray that works locally over time to decrease the inflammation in your nose/sinuses and help with allergic symptoms. This is not an quick- relief spray like afrin, but it works well if used daily.  Discontinue if you develop nose  bleed  - use nasal saline prior to Flonase.     - Use Ocean Spray Nasal Saline 1-3 puffs each nostril every 2-3 hours then blow out onto tissue. This is to irrigate the nasal passage way to clear the sinus openings. Use until sinus problem resolved.     - you can take plain Mucinex (guaifenesin) 1200 mg twice a day to help loosen mucous     -warm salt water gargles can help with sore throat     - warm tea with honey can help with cough. Honey is a natural cough suppressant.     - Follow up with your PCP or specialty clinic as directed in the next 1-2 weeks if not improved or as needed.  You can call [phone] to schedule an appointment with the appropriate provider.       - Go to the ER if you develop new or worsening symptoms.

## 2023-04-20 ENCOUNTER — OFFICE VISIT (OUTPATIENT)
Dept: DERMATOLOGY | Facility: CLINIC | Age: 48
End: 2023-04-20
Payer: COMMERCIAL

## 2023-04-20 DIAGNOSIS — D22.60 MULTIPLE BENIGN MELANOCYTIC NEVI OF UPPER EXTREMITY, LOWER EXTREMITY, AND TRUNK: ICD-10-CM

## 2023-04-20 DIAGNOSIS — L81.4 LENTIGINES: ICD-10-CM

## 2023-04-20 DIAGNOSIS — Z12.83 SKIN CANCER SCREENING: ICD-10-CM

## 2023-04-20 DIAGNOSIS — D48.5 NEOPLASM OF UNCERTAIN BEHAVIOR OF SKIN: Primary | ICD-10-CM

## 2023-04-20 DIAGNOSIS — D22.5 MULTIPLE BENIGN MELANOCYTIC NEVI OF UPPER EXTREMITY, LOWER EXTREMITY, AND TRUNK: ICD-10-CM

## 2023-04-20 DIAGNOSIS — D22.4 MELANOCYTIC NEVUS OF NECK: ICD-10-CM

## 2023-04-20 DIAGNOSIS — D18.01 ANGIOMA OF SKIN: ICD-10-CM

## 2023-04-20 DIAGNOSIS — D22.30 MELANOCYTIC NEVI OF FACE: ICD-10-CM

## 2023-04-20 DIAGNOSIS — D22.70 MULTIPLE BENIGN MELANOCYTIC NEVI OF UPPER EXTREMITY, LOWER EXTREMITY, AND TRUNK: ICD-10-CM

## 2023-04-20 PROCEDURE — 99213 PR OFFICE/OUTPT VISIT, EST, LEVL III, 20-29 MIN: ICD-10-PCS | Mod: 25,S$GLB,, | Performed by: DERMATOLOGY

## 2023-04-20 PROCEDURE — 88342 CHG IMMUNOCYTOCHEMISTRY: ICD-10-PCS | Mod: 26,,, | Performed by: DERMATOLOGY

## 2023-04-20 PROCEDURE — 1159F PR MEDICATION LIST DOCUMENTED IN MEDICAL RECORD: ICD-10-PCS | Mod: CPTII,S$GLB,, | Performed by: DERMATOLOGY

## 2023-04-20 PROCEDURE — 88305 TISSUE EXAM BY PATHOLOGIST: CPT | Performed by: DERMATOLOGY

## 2023-04-20 PROCEDURE — 11102 TANGNTL BX SKIN SINGLE LES: CPT | Mod: S$GLB,,, | Performed by: DERMATOLOGY

## 2023-04-20 PROCEDURE — 1160F RVW MEDS BY RX/DR IN RCRD: CPT | Mod: CPTII,S$GLB,, | Performed by: DERMATOLOGY

## 2023-04-20 PROCEDURE — 1160F PR REVIEW ALL MEDS BY PRESCRIBER/CLIN PHARMACIST DOCUMENTED: ICD-10-PCS | Mod: CPTII,S$GLB,, | Performed by: DERMATOLOGY

## 2023-04-20 PROCEDURE — 88342 IMHCHEM/IMCYTCHM 1ST ANTB: CPT | Mod: 26,,, | Performed by: DERMATOLOGY

## 2023-04-20 PROCEDURE — 88341 IMHCHEM/IMCYTCHM EA ADD ANTB: CPT | Mod: 26,,, | Performed by: DERMATOLOGY

## 2023-04-20 PROCEDURE — 11103 PR TANGENTIAL BIOPSY, SKIN, EA ADDTL LESION: ICD-10-PCS | Mod: S$GLB,,, | Performed by: DERMATOLOGY

## 2023-04-20 PROCEDURE — 88342 IMHCHEM/IMCYTCHM 1ST ANTB: CPT | Mod: 59 | Performed by: DERMATOLOGY

## 2023-04-20 PROCEDURE — 99213 OFFICE O/P EST LOW 20 MIN: CPT | Mod: 25,S$GLB,, | Performed by: DERMATOLOGY

## 2023-04-20 PROCEDURE — 88341 IMHCHEM/IMCYTCHM EA ADD ANTB: CPT | Performed by: DERMATOLOGY

## 2023-04-20 PROCEDURE — 1159F MED LIST DOCD IN RCRD: CPT | Mod: CPTII,S$GLB,, | Performed by: DERMATOLOGY

## 2023-04-20 PROCEDURE — 11103 TANGNTL BX SKIN EA SEP/ADDL: CPT | Mod: S$GLB,,, | Performed by: DERMATOLOGY

## 2023-04-20 PROCEDURE — 88305 TISSUE EXAM BY PATHOLOGIST: CPT | Mod: 26,,, | Performed by: DERMATOLOGY

## 2023-04-20 PROCEDURE — 88341 PR IHC OR ICC EACH ADD'L SINGLE ANTIBODY  STAINPR: ICD-10-PCS | Mod: 26,,, | Performed by: DERMATOLOGY

## 2023-04-20 PROCEDURE — 88305 TISSUE EXAM BY PATHOLOGIST: ICD-10-PCS | Mod: 26,,, | Performed by: DERMATOLOGY

## 2023-04-20 PROCEDURE — 11102 PR TANGENTIAL BIOPSY, SKIN, SINGLE LESION: ICD-10-PCS | Mod: S$GLB,,, | Performed by: DERMATOLOGY

## 2023-04-20 NOTE — PROGRESS NOTES
"  Patient Information  Name: Rhett Quarles  : 1975  MRN: [id number]     Referring Physician:  No ref. provider found   Primary Care Physician:  Bryce Robert MD   Date of Visit: 2023      Subjective:     History of Present lllness:    Rhett Quarles is a 48 y.o. male who presents with a chief complaint of moles and spots.  Patient is here today for a "mole" check.     Today, patient complains of lesion(s):  Location: abdomen  Duration: months  Symptoms: newer spots  Relieving factors/Previous treatments: none    Clinical documentation obtained by nursing staff reviewed.    Review of Systems    Objective:   Physical Exam   Constitutional: He appears well-developed and well-nourished. No distress.   Eyes: No conjunctival no injection.   Neurological: He is alert and oriented to person, place, and time. He is not disoriented.   Psychiatric: He has a normal mood and affect.   Skin:   Areas Examined (abnormalities noted in diagram):   Scalp / Hair Palpated and Inspected  Head / Face Inspection Performed  Neck Inspection Performed  Chest / Axilla Inspection Performed  Abdomen Inspection Performed  Genitals / Buttocks / Groin Inspection Performed  Back Inspection Performed  RUE Inspected  LUE Inspection Performed  RLE Inspected  LLE Inspection Performed  Nails and Digits Inspection Performed               Diagram Legend     Erythematous scaling macule/papule c/w actinic keratosis       Vascular papule c/w angioma      Pigmented verrucoid papule/plaque c/w seborrheic keratosis      Yellow umbilicated papule c/w sebaceous hyperplasia      Irregularly shaped tan macule c/w lentigo     1-2 mm smooth white papules consistent with Milia      Movable subcutaneous cyst with punctum c/w epidermal inclusion cyst      Subcutaneous movable cyst c/w pilar cyst      Firm pink to brown papule c/w dermatofibroma      Pedunculated fleshy papule(s) c/w skin tag(s)      Evenly pigmented macule c/w junctional " nevus     Mildly variegated pigmented, slightly irregular-bordered macule c/w mildly atypical nevus      Flesh colored to evenly pigmented papule c/w intradermal nevus       Pink pearly papule/plaque c/w basal cell carcinoma      Erythematous hyperkeratotic cursted plaque c/w SCC      Surgical scar with no sign of skin cancer recurrence      Open and closed comedones      Inflammatory papules and pustules      Verrucoid papule consistent consistent with wart     Erythematous eczematous patches and plaques     Dystrophic onycholytic nail with subungual debris c/w onychomycosis     Umbilicated papule    Erythematous-base heme-crusted tan verrucoid plaque consistent with inflamed seborrheic keratosis     Erythematous Silvery Scaling Plaque c/w Psoriasis     See annotation            [] Data reviewed  [] Prior external notes reviewed  [] Independent review of test  [] Management discussed with another provider  [] Independent historian    Assessment / Plan:      Pathology Orders:       Normal Orders This Visit    Specimen to Pathology, Dermatology     Questions:    Procedure Type: Dermatology and skin neoplasms    Number of Specimens: 2    ------------------------: -------------------------    Spec 1 Procedure: Biopsy    Spec 1 Clinical Impression: r/o dysplastic nevus    Spec 1 Source: midline upper abdomen    ------------------------: -------------------------    Spec 2 Procedure: Biopsy    Spec 2 Clinical Impression: r/o dysplastic nevus    Spec 2 Source: left upper back    Release to patient:           Neoplasm of uncertain behavior of skin  -     Specimen to Pathology, Dermatology    Shave biopsy procedure note x 2:  Risk, benefits, and alternatives of biopsy are discussed with the patient, including risk of infection, scar, recurrence, and need for additional treatment of site. The patient agrees to the procedure by verbal consent. The area is marked and prepped with alcohol.  Approximately 1 mL of lidocaine 1%  with epinephrine is used for local anesthesia. A sharp blade is used to remove the lesion. The specimen is sent for pathology. Hemostasis is obtained with aluminum chloride and/or monopolar hyfrecation if needed. The area is then dressed and bandaged. The patient tolerated the procedure well without adverse event. Written instructions on wound care were given and were reviewed with the patient, who is to call for any signs of bleeding or infection. The patient will be notified of the pathology results.    Melanocytic nevi of face  Melanocytic nevus of neck  Multiple benign melanocytic nevi of upper extremity, lower extremity, and trunk  Multiple benign-appearing nevi present on exam today. Reassurance provided. Counseled patient to periodically examine moles and return to clinic if any changes in size, shape, or color are noted or if it becomes symptomatic (bleeding, itching, pain, etc).  Recommend using a broad-spectrum, water-resistant sunscreen with SPF of 30 or higher--reapply every 2 hours. Seek shade, wear sun-protective clothing, and perform regular skin self-exams.    Lentigines  These are benign sun spots which should be monitored for changes. Daily sun protection will reduce the number of new lesions.   Recommend using a broad-spectrum, water-resistant sunscreen with SPF of 30 or higher--reapply every 2 hours. Seek shade, wear sun-protective clothing, and perform regular skin self-exams.    Angioma of skin  These are benign vascular lesions that are inherited. Treatment is not necessary.    Skin cancer screening  Total body skin examination performed today as noted in physical exam. Suspicious lesion(s) were noted and/or biopsied as above.  Recommend using a broad-spectrum, water-resistant sunscreen with SPF of 30 or higher--reapply every 2 hours. Seek shade, wear sun-protective clothing, and perform regular skin self-exams.    Follow up in about 1 year (around 4/20/2024) for TBSE, or sooner dependent on  pathology results.      Angelica Nelson MD, FAAD  [facility]

## 2023-04-20 NOTE — PATIENT INSTRUCTIONS
Biopsy Wound Care Instructions    Leave the bandage on for 24 hours without getting it wet.   Clean the area once a day with a gentle soap and water, then pat dry and apply Vaseline and a bandaid.  The site should be kept moist with Vaseline at all times to improve healing. Reapply a thick coating as needed. Do not let the site air out or form a scab, as this will delay healing and worsen scarring.  If any bleeding or oozing occurs once you return home, apply firm pressure to the area for 30 minutes straight without peeking. If bleeding continues, call the office immediately.  Please message us via [facility], call us at [phone], or return to the office at any sign of increasing redness, swelling, tenderness, pain, heat, yellow drainage/discharge, or continued bleeding.      Receiving Your Pathology Results    Your pathology results will be released to you on [facility] at the same time that Dr. Nelson receives them.   Dr. Nelson will then message you with her interpretation of the results and/or with the plan going forward.  If you do not use [facility] or if your pathology results require more of an explanation, you will receive your results via a phone call.  If 2 weeks go by and you have not received your results, please message us via [facility] or call us at [phone] to inform us.

## 2023-04-27 ENCOUNTER — TELEPHONE (OUTPATIENT)
Dept: DERMATOLOGY | Facility: CLINIC | Age: 48
End: 2023-04-27
Payer: COMMERCIAL

## 2023-04-27 ENCOUNTER — PATIENT MESSAGE (OUTPATIENT)
Dept: DERMATOLOGY | Facility: CLINIC | Age: 48
End: 2023-04-27
Payer: COMMERCIAL

## 2023-04-27 LAB
FINAL PATHOLOGIC DIAGNOSIS: NORMAL
GROSS: NORMAL
Lab: NORMAL
MICROSCOPIC EXAM: NORMAL

## 2023-04-27 NOTE — PROGRESS NOTES
Final Pathologic Diagnosis   1. Skin, midline upper abdomen, shave biopsy:   -COMPOUND NEVUS, DYSPLASTIC, WITH MILD CYTOLOGIC AND ARCHITECTURAL ATYPIA, EXTENDING TO THE BASE OF THE SPECIMEN   2. Skin, left upper back, shave biopsy:   -LENTIGINOUS COMPOUND MELANOCYTIC NEVUS, WITH MILD CYTOLOGICAL AND ARCHTIECHURAL ATYPIA, EXTENDING CLOSE (<0.3 mm) TO A PERIPHERAL MARGIN

## 2023-05-25 ENCOUNTER — IMMUNIZATION (OUTPATIENT)
Dept: PHARMACY | Facility: CLINIC | Age: 48
End: 2023-05-25
Payer: COMMERCIAL

## 2023-05-25 DIAGNOSIS — Z23 NEED FOR VACCINATION: Primary | ICD-10-CM

## 2023-07-10 ENCOUNTER — HOSPITAL ENCOUNTER (OUTPATIENT)
Dept: RADIOLOGY | Facility: OTHER | Age: 48
Discharge: HOME | End: 2023-07-10
Attending: INTERNAL MEDICINE
Payer: COMMERCIAL

## 2023-07-10 ENCOUNTER — OFFICE VISIT (OUTPATIENT)
Dept: INTERNAL MEDICINE | Facility: CLINIC | Age: 48
End: 2023-07-10
Attending: INTERNAL MEDICINE
Payer: COMMERCIAL

## 2023-07-10 ENCOUNTER — HOSPITAL ENCOUNTER (OUTPATIENT)
Dept: CARDIOLOGY | Facility: OTHER | Age: 48
Discharge: HOME | End: 2023-07-10
Attending: INTERNAL MEDICINE
Payer: COMMERCIAL

## 2023-07-10 VITALS
WEIGHT: 161.38 LBS | DIASTOLIC BLOOD PRESSURE: 82 MMHG | HEIGHT: 63 IN | BODY MASS INDEX: 28.59 KG/M2 | HEART RATE: 75 BPM | SYSTOLIC BLOOD PRESSURE: 136 MMHG | OXYGEN SATURATION: 98 %

## 2023-07-10 VITALS — SYSTOLIC BLOOD PRESSURE: 132 MMHG | DIASTOLIC BLOOD PRESSURE: 88 MMHG

## 2023-07-10 DIAGNOSIS — R53.1 WEAKNESS: ICD-10-CM

## 2023-07-10 DIAGNOSIS — U07.1 COVID-19 VIRUS DETECTED: Primary | ICD-10-CM

## 2023-07-10 DIAGNOSIS — R53.1 WEAKNESS: Primary | ICD-10-CM

## 2023-07-10 PROCEDURE — 71046 X-RAY EXAM CHEST 2 VIEWS: CPT | Mod: 26,,, | Performed by: RADIOLOGY

## 2023-07-10 PROCEDURE — 1159F MED LIST DOCD IN RCRD: CPT | Mod: CPTII,S$GLB,, | Performed by: INTERNAL MEDICINE

## 2023-07-10 PROCEDURE — 3008F BODY MASS INDEX DOCD: CPT | Mod: CPTII,S$GLB,, | Performed by: INTERNAL MEDICINE

## 2023-07-10 PROCEDURE — 1160F PR REVIEW ALL MEDS BY PRESCRIBER/CLIN PHARMACIST DOCUMENTED: ICD-10-PCS | Mod: CPTII,S$GLB,, | Performed by: INTERNAL MEDICINE

## 2023-07-10 PROCEDURE — 71046 X-RAY EXAM CHEST 2 VIEWS: CPT | Mod: TC,FY

## 2023-07-10 PROCEDURE — 93005 ELECTROCARDIOGRAM TRACING: CPT

## 2023-07-10 PROCEDURE — 99499 NO LOS: ICD-10-PCS | Mod: 95,,, | Performed by: INTERNAL MEDICINE

## 2023-07-10 PROCEDURE — 3079F DIAST BP 80-89 MM HG: CPT | Mod: CPTII,S$GLB,, | Performed by: INTERNAL MEDICINE

## 2023-07-10 PROCEDURE — 99999 PR PBB SHADOW E&M-EST. PATIENT-LVL III: CPT | Mod: PBBFAC,,, | Performed by: INTERNAL MEDICINE

## 2023-07-10 PROCEDURE — 3079F PR MOST RECENT DIASTOLIC BLOOD PRESSURE 80-89 MM HG: ICD-10-PCS | Mod: CPTII,S$GLB,, | Performed by: INTERNAL MEDICINE

## 2023-07-10 PROCEDURE — 99214 OFFICE O/P EST MOD 30 MIN: CPT | Mod: S$GLB,,, | Performed by: INTERNAL MEDICINE

## 2023-07-10 PROCEDURE — 1160F RVW MEDS BY RX/DR IN RCRD: CPT | Mod: CPTII,S$GLB,, | Performed by: INTERNAL MEDICINE

## 2023-07-10 PROCEDURE — 99214 PR OFFICE/OUTPT VISIT, EST, LEVL IV, 30-39 MIN: ICD-10-PCS | Mod: S$GLB,,, | Performed by: INTERNAL MEDICINE

## 2023-07-10 PROCEDURE — 3008F PR BODY MASS INDEX (BMI) DOCUMENTED: ICD-10-PCS | Mod: CPTII,S$GLB,, | Performed by: INTERNAL MEDICINE

## 2023-07-10 PROCEDURE — 93010 ELECTROCARDIOGRAM REPORT: CPT | Mod: ,,, | Performed by: INTERNAL MEDICINE

## 2023-07-10 PROCEDURE — 99499 UNLISTED E&M SERVICE: CPT | Mod: 95,,, | Performed by: INTERNAL MEDICINE

## 2023-07-10 PROCEDURE — 3075F SYST BP GE 130 - 139MM HG: CPT | Mod: CPTII,S$GLB,, | Performed by: INTERNAL MEDICINE

## 2023-07-10 PROCEDURE — 1159F PR MEDICATION LIST DOCUMENTED IN MEDICAL RECORD: ICD-10-PCS | Mod: CPTII,S$GLB,, | Performed by: INTERNAL MEDICINE

## 2023-07-10 PROCEDURE — 93010 EKG 12-LEAD: ICD-10-PCS | Mod: ,,, | Performed by: INTERNAL MEDICINE

## 2023-07-10 PROCEDURE — 99999 PR PBB SHADOW E&M-EST. PATIENT-LVL III: ICD-10-PCS | Mod: PBBFAC,,, | Performed by: INTERNAL MEDICINE

## 2023-07-10 PROCEDURE — 3075F PR MOST RECENT SYSTOLIC BLOOD PRESS GE 130-139MM HG: ICD-10-PCS | Mod: CPTII,S$GLB,, | Performed by: INTERNAL MEDICINE

## 2023-07-10 PROCEDURE — 71046 XR CHEST PA AND LATERAL: ICD-10-PCS | Mod: 26,,, | Performed by: RADIOLOGY

## 2023-07-10 NOTE — PROGRESS NOTES
Subjective:       Patient ID: Rhett Quarles is a 48 y.o. male.    Chief Complaint: Hypertension    Started as virtual visit but pt presentation necessitates exam.  First 20 minutes spent talking with pt getting HPI and explaining our differential and plan.  Pt able to come to clinic today so appt converted.    Charged in another encounter no LOS associated with this encounter    Hypertension  This is a new problem. The current episode started today. The problem has been gradually worsening since onset. The problem is resistant. Associated symptoms include headaches, malaise/fatigue and palpitations. Pertinent negatives include no anxiety, blurred vision, chest pain, neck pain, orthopnea, peripheral edema, PND, shortness of breath or sweats. There are no associated agents to hypertension. There are no known risk factors for coronary artery disease. Past treatments include nothing. The current treatment provides no improvement. There are no compliance problems.      Review of Systems   Constitutional:  Positive for malaise/fatigue.   Eyes:  Negative for blurred vision.   Respiratory:  Negative for shortness of breath.    Cardiovascular:  Positive for palpitations. Negative for chest pain, orthopnea and PND.   Musculoskeletal:  Negative for neck pain.   Neurological:  Positive for headaches.     Objective:      Vitals:    07/10/23 0840   BP: 132/88      Physical Exam  Vitals and nursing note reviewed.   Constitutional:       General: He is not in acute distress.     Appearance: Normal appearance. He is well-developed.   HENT:      Head: Normocephalic and atraumatic.      Mouth/Throat:      Pharynx: No oropharyngeal exudate.   Eyes:      General: No scleral icterus.     Conjunctiva/sclera: Conjunctivae normal.      Pupils: Pupils are equal, round, and reactive to light.   Neck:      Thyroid: No thyromegaly.   Cardiovascular:      Rate and Rhythm: Normal rate and regular rhythm.      Heart sounds: Normal heart  sounds. No murmur heard.  Pulmonary:      Effort: Pulmonary effort is normal.      Breath sounds: Normal breath sounds. No wheezing or rales.   Abdominal:      General: There is no distension.   Musculoskeletal:         General: No tenderness.   Lymphadenopathy:      Cervical: No cervical adenopathy.   Skin:     General: Skin is warm and dry.   Neurological:      Mental Status: He is alert and oriented to person, place, and time.   Psychiatric:         Behavior: Behavior normal.       Assessment:       No diagnosis found.    Plan:       There are no diagnoses linked to this encounter.       Bryce Merida MD  [facility]        Side effects of medication(s) were discussed in detail and patient voiced understanding.  Patient will call back for any issues or complications.

## 2023-07-10 NOTE — PROGRESS NOTES
"Subjective:       Patient ID: Rhett Quarles is a 48 y.o. male.    Chief Complaint: Hypertension    Here for urgent visit  First 20 minutes of visit were virtual then converted to in person and appt canceled and rescheduled under new appt type due to presentation    COVID 10 days prior, still having racing heart at rest and with exertion. Left ear is full and painful. Denies CP at rest or with exertion, dizziness with exertion, shortness of breath out of proportion to level of activity, orthopnea, PND, or LE edema.             Hypertension  This is a new problem. The current episode started today. The problem has been gradually worsening since onset. The problem is resistant. Associated symptoms include headaches, malaise/fatigue and palpitations. Pertinent negatives include no anxiety, blurred vision, chest pain, neck pain, orthopnea, peripheral edema, PND, shortness of breath or sweats. There are no associated agents to hypertension. There are no known risk factors for coronary artery disease. Past treatments include nothing. The current treatment provides no improvement. There are no compliance problems.      Review of Systems   Constitutional:  Positive for malaise/fatigue.   Eyes:  Negative for blurred vision.   Respiratory:  Negative for shortness of breath.    Cardiovascular:  Positive for palpitations. Negative for chest pain, orthopnea and PND.   Musculoskeletal:  Negative for neck pain.   Neurological:  Positive for headaches.     Objective:      Vitals:    07/10/23 1304   BP: 136/82   Pulse: 75   SpO2: 98%   Weight: 73.2 kg (161 lb 6 oz)   Height: 5' 3" (1.6 m)      Physical Exam  Vitals and nursing note reviewed.   Constitutional:       General: He is not in acute distress.     Appearance: Normal appearance. He is well-developed.   HENT:      Head: Normocephalic and atraumatic.      Mouth/Throat:      Pharynx: No oropharyngeal exudate.   Eyes:      General: No scleral icterus.     " Conjunctiva/sclera: Conjunctivae normal.      Pupils: Pupils are equal, round, and reactive to light.   Neck:      Thyroid: No thyromegaly.   Cardiovascular:      Rate and Rhythm: Normal rate and regular rhythm.      Heart sounds: Normal heart sounds. No murmur heard.  Pulmonary:      Effort: Pulmonary effort is normal.      Breath sounds: Normal breath sounds. No wheezing or rales.   Abdominal:      General: There is no distension.   Musculoskeletal:         General: No tenderness.   Lymphadenopathy:      Cervical: No cervical adenopathy.   Skin:     General: Skin is warm and dry.   Neurological:      Mental Status: He is alert and oriented to person, place, and time.   Psychiatric:         Behavior: Behavior normal.       Assessment:       1. Weakness        Plan:       Rhett was seen today for hypertension.    Diagnoses and all orders for this visit:    Weakness  -     SCHEDULED EKG 12-LEAD (to Muse); Future  -     CBC Auto Differential; Future  -     Comprehensive Metabolic Panel; Future  -     TSH; Future  -     X-Ray Chest PA And Lateral; Future           Bryce Merida MD  [facility]        Side effects of medication(s) were discussed in detail and patient voiced understanding.  Patient will call back for any issues or complications.

## 2023-08-07 ENCOUNTER — OFFICE VISIT (OUTPATIENT)
Dept: DERMATOLOGY | Facility: CLINIC | Age: 48
End: 2023-08-07
Payer: COMMERCIAL

## 2023-08-07 DIAGNOSIS — Z86.018 HISTORY OF DYSPLASTIC NEVUS: ICD-10-CM

## 2023-08-07 DIAGNOSIS — L90.5 SCAR: ICD-10-CM

## 2023-08-07 DIAGNOSIS — D22.9 RECURRENT NEVUS: Primary | ICD-10-CM

## 2023-08-07 PROCEDURE — 1160F PR REVIEW ALL MEDS BY PRESCRIBER/CLIN PHARMACIST DOCUMENTED: ICD-10-PCS | Mod: CPTII,S$GLB,, | Performed by: DERMATOLOGY

## 2023-08-07 PROCEDURE — 1160F RVW MEDS BY RX/DR IN RCRD: CPT | Mod: CPTII,S$GLB,, | Performed by: DERMATOLOGY

## 2023-08-07 PROCEDURE — 1159F MED LIST DOCD IN RCRD: CPT | Mod: CPTII,S$GLB,, | Performed by: DERMATOLOGY

## 2023-08-07 PROCEDURE — 99213 PR OFFICE/OUTPT VISIT, EST, LEVL III, 20-29 MIN: ICD-10-PCS | Mod: S$GLB,,, | Performed by: DERMATOLOGY

## 2023-08-07 PROCEDURE — 1159F PR MEDICATION LIST DOCUMENTED IN MEDICAL RECORD: ICD-10-PCS | Mod: CPTII,S$GLB,, | Performed by: DERMATOLOGY

## 2023-08-07 PROCEDURE — 99213 OFFICE O/P EST LOW 20 MIN: CPT | Mod: S$GLB,,, | Performed by: DERMATOLOGY

## 2023-08-07 NOTE — PATIENT INSTRUCTIONS
How to perform a skin self-exam:   https://www.aad.org/public/diseases/skin-cancer/find/check-skin  What to look for:  [email or web address]://www.aad.org/public/diseases/skin-cancer/find/[email or web address]/abcdes

## 2023-08-07 NOTE — PROGRESS NOTES
Patient Information  Name: Rhett Quarles  : 1975  MRN: [id number]     Referring Physician:  No ref. provider found   Primary Care Physician:  Bryce Robert MD   Date of Visit: 2023      Subjective:     History of Present lllness:    Rhett Quarles is a 48 y.o. male who presents with a chief complaint of mildly atypical moles that were biopsied at last visit.  Here today to recheck them.    Patient was last seen: 2023.  Prior notes by myself reviewed.   Clinical documentation obtained by nursing staff reviewed.    Review of Systems    Objective:   Physical Exam   Constitutional: He appears well-developed and well-nourished. No distress.   Neurological: He is alert and oriented to person, place, and time. He is not disoriented.   Psychiatric: He has a normal mood and affect.   Skin:   Areas Examined (abnormalities noted in diagram):   Neck Inspection Performed  Chest / Axilla Inspection Performed  Abdomen Inspection Performed  Back Inspection Performed            Diagram Legend     Erythematous scaling macule/papule c/w actinic keratosis       Vascular papule c/w angioma      Pigmented verrucoid papule/plaque c/w seborrheic keratosis      Yellow umbilicated papule c/w sebaceous hyperplasia      Irregularly shaped tan macule c/w lentigo     1-2 mm smooth white papules consistent with Milia      Movable subcutaneous cyst with punctum c/w epidermal inclusion cyst      Subcutaneous movable cyst c/w pilar cyst      Firm pink to brown papule c/w dermatofibroma      Pedunculated fleshy papule(s) c/w skin tag(s)      Evenly pigmented macule c/w junctional nevus     Mildly variegated pigmented, slightly irregular-bordered macule c/w mildly atypical nevus      Flesh colored to evenly pigmented papule c/w intradermal nevus       Pink pearly papule/plaque c/w basal cell carcinoma      Erythematous hyperkeratotic cursted plaque c/w SCC      Surgical scar with no sign of skin cancer  recurrence      Open and closed comedones      Inflammatory papules and pustules      Verrucoid papule consistent consistent with wart     Erythematous eczematous patches and plaques     Dystrophic onycholytic nail with subungual debris c/w onychomycosis     Umbilicated papule    Erythematous-base heme-crusted tan verrucoid plaque consistent with inflamed seborrheic keratosis     Erythematous Silvery Scaling Plaque c/w Psoriasis     See annotation    Results for orders placed or performed in visit on 04/20/23   Specimen to Pathology, Dermatology   Result Value Ref Range    Final Pathologic Diagnosis       1. Skin, midline upper abdomen, shave biopsy:  -COMPOUND NEVUS, DYSPLASTIC, WITH MILD CYTOLOGIC AND ARCHITECTURAL ATYPIA, EXTENDING TO THE BASE OF THE SPECIMEN    The lesion is atypical and further treatment may be required.  You will be contacted by our providers office.    2. Skin, left upper back, shave biopsy:  -LENTIGINOUS COMPOUND MELANOCYTIC NEVUS, WITH MILD CYTOLOGICAL AND ARCHTIECHURAL ATYPIA, EXTENDING CLOSE (<0.3 mm) TO A PERIPHERAL MARGIN    The lesion is atypical and further treatment may be required.  You will be contacted by our providers office.         Assessment / Plan:        Recurrent atypical nevus, mild atypia  Small area of repigmentation in center of scar.  Discussed risks, benefits, and alternatives of monitoring vs shave removal vs excision, including but not limited to scarring, bleeding, infection, recurrence, and need for additional treatment of site.  Pt chose to monitor.  Counseled patient to return to clinic if pigment enlarges, especially if pigment grows outside the margins of the scar, or if it becomes symptomatic (bleeding, itching, pain, etc).  Discussed ABCDE's of melanoma.  Monitor for any new moles or existing moles that are becoming bigger, darker, irritated, or developing irregular borders.    Scar  History of dysplastic nevus, mild atypia   - stable and chronic  Area(s) of  previous dysplastic nevus evaluated with no evidence of recurrence. Reassurance provided.  Recommend using a broad-spectrum, water-resistant sunscreen with SPF of 30 or higher--reapply every 2 hours. Seek shade and wear sun-protective clothing/hat.      Follow up in about 8 months (around 4/7/2024) for TBSE.      Angelica Nelson MD, FAAD  [facility]

## 2024-03-23 ENCOUNTER — ON-DEMAND VIRTUAL (OUTPATIENT)
Dept: URGENT CARE | Facility: CLINIC | Age: 49
End: 2024-03-23
Payer: COMMERCIAL

## 2024-03-23 DIAGNOSIS — B97.89 VIRAL RESPIRATORY ILLNESS: Primary | ICD-10-CM

## 2024-03-23 DIAGNOSIS — R05.9 COUGH, UNSPECIFIED TYPE: ICD-10-CM

## 2024-03-23 DIAGNOSIS — J98.8 VIRAL RESPIRATORY ILLNESS: Primary | ICD-10-CM

## 2024-03-23 PROCEDURE — 99213 OFFICE O/P EST LOW 20 MIN: CPT | Mod: 95,,,

## 2024-03-23 RX ORDER — FLUTICASONE PROPIONATE 50 MCG
1 SPRAY, SUSPENSION (ML) NASAL DAILY
Qty: 9.9 ML | Refills: 0 | Status: SHIPPED | OUTPATIENT
Start: 2024-03-23

## 2024-03-23 RX ORDER — BENZONATATE 100 MG/1
100 CAPSULE ORAL 3 TIMES DAILY PRN
Qty: 30 CAPSULE | Refills: 0 | Status: SHIPPED | OUTPATIENT
Start: 2024-03-23 | End: 2024-04-02

## 2024-03-23 RX ORDER — METHYLPREDNISOLONE 4 MG/1
TABLET ORAL
Qty: 21 EACH | Refills: 0 | Status: SHIPPED | OUTPATIENT
Start: 2024-03-23 | End: 2024-04-13

## 2024-03-23 NOTE — PROGRESS NOTES
Subjective:      Patient ID: Rhett Quarles is a 49 y.o. male at home    Vitals:  vitals were not taken for this visit.     Chief Complaint: Sinus Problem      Visit Type: TELE AUDIOVISUAL    Present with the patient at the time of consultation: TELEMED PRESENT WITH PATIENT: None    Past Medical History:   Diagnosis Date    Keloid cicatrix     Pituitary macroadenoma      Past Surgical History:   Procedure Laterality Date    COLONOSCOPY N/A 12/3/2020    Procedure: COLONOSCOPY;  Surgeon: Avery Paredes MD;  [facility]  Service: Endoscopy;  Laterality: N/A;  covid test 11/30 [facility]    ESOPHAGOGASTRODUODENOSCOPY N/A 12/3/2020    Procedure: EGD (ESOPHAGOGASTRODUODENOSCOPY);  Surgeon: Avery Paredes MD;  Location: [facility] ([address] [place] [address]);  Service: Endoscopy;  Laterality: N/A;    EXCISION, NEOPLASM, PITUITARY, TRANSSPHENOIDAL APPROACH, USING COMPUTER-ASSISTED NAVIGATION       Review of patient's allergies indicates:  No Known Allergies  Current Outpatient Medications on File Prior to Visit   Medication Sig Dispense Refill    FLULAVAL QUAD 2205-1347, PF, 60 mcg (15 mcg x 4)/0.5 mL Syrg       MODERNA COVID BIVAL,18Y UP,-PF 50 mcg/0.5 mL injection       multivitamin capsule Take 1 capsule by mouth once daily.      sars-cov-2, covid-19, (MODERNA COVID-19) 100 mcg/0.5 ml injection Inject into the muscle. 0.5 mL 0    senna-docusate 8.6-50 mg (SENOKOT-S) 8.6-50 mg per tablet Take 2 tablets by mouth once daily. 60 tablet 11     No current facility-administered medications on file prior to visit.     Family History   Problem Relation Age of Onset    Colon cancer Neg Hx     Esophageal cancer Neg Hx        Medications Ordered                [facility]/pharmacy #[id number] - [place], [place] - [address]   [address] [address]    Telephone: [phone]   Fax: [phone]   Hours: Not open 24 hours                         E-Prescribed (3 of 3)              benzonatate (TESSALON)  100 MG capsule    Sig: Take 1 capsule (100 mg total) by mouth 3 (three) times daily as needed for Cough.       Start: 3/23/24     Quantity: 30 capsule Refills: 0                         fluticasone propionate (FLONASE) 50 mcg/actuation nasal spray    Si spray (50 mcg total) by Each Nostril route once daily.       Start: 3/23/24     Quantity: 9.9 mL Refills: 0                         methylPREDNISolone (MEDROL DOSEPACK) 4 mg tablet    Sig: use as directed       Start: 3/23/24     Quantity: 21 each Refills: 0                           Ohs Peq Odvv Intake    3/23/2024  9:52 AM CDT - Filed by Patient   What is your current physical address in the event of a medical emergency? [address] [address]   Are you able to take your vital signs? No   Please attach any relevant images or files          Patient states that on Monday he began to have a dry cough, chest congestion, nasal drainage, and body ache. Patient denies any fever, sob or chest pain at this time. Patient states that he has taken otc cough medication and states that it is not helping him at this time. Patient states that he has taken a covid test and it was negative. Patient states that today his cough turned productive (green) Patient denies any other symptoms at this time        Constitution: Negative.   HENT:  Positive for congestion, postnasal drip, sinus pain and sinus pressure.    Neck: neck negative.   Cardiovascular: Negative.    Eyes: Negative.    Respiratory:  Positive for cough.    Gastrointestinal: Negative.    Endocrine: negative.   Genitourinary: Negative.    Musculoskeletal:  Positive for muscle ache.   Skin: Negative.    Allergic/Immunologic: Negative.    Neurological: Negative.    Hematologic/Lymphatic: Negative.    Psychiatric/Behavioral: Negative.          Objective:   The physical exam was conducted virtually.  Physical Exam   Constitutional: He is oriented to person, place, and time.   HENT:   Head:  Normocephalic and atraumatic.   Nose: Congestion present.   Eyes: Conjunctivae are normal. Pupils are equal, round, and reactive to light. Extraocular movement intact   Neck: Neck supple.   Pulmonary/Chest: Effort normal.   Abdominal: Normal appearance.   Musculoskeletal: Normal range of motion.         General: Normal range of motion.   Neurological: no focal deficit. He is oriented to person, place, and time.   Skin: Skin is warm.   Psychiatric: His behavior is normal. Mood, judgment and thought content normal.       Assessment:     1. Viral respiratory illness    2. Cough, unspecified type        Plan:       Viral respiratory illness  -     fluticasone propionate (FLONASE) 50 mcg/actuation nasal spray; 1 spray (50 mcg total) by Each Nostril route once daily.  Dispense: 9.9 mL; Refill: 0  -     benzonatate (TESSALON) 100 MG capsule; Take 1 capsule (100 mg total) by mouth 3 (three) times daily as needed for Cough.  Dispense: 30 capsule; Refill: 0  -     methylPREDNISolone (MEDROL DOSEPACK) 4 mg tablet; use as directed  Dispense: 21 each; Refill: 0    Cough, unspecified type  -     fluticasone propionate (FLONASE) 50 mcg/actuation nasal spray; 1 spray (50 mcg total) by Each Nostril route once daily.  Dispense: 9.9 mL; Refill: 0  -     benzonatate (TESSALON) 100 MG capsule; Take 1 capsule (100 mg total) by mouth 3 (three) times daily as needed for Cough.  Dispense: 30 capsule; Refill: 0  -     methylPREDNISolone (MEDROL DOSEPACK) 4 mg tablet; use as directed  Dispense: 21 each; Refill: 0

## 2024-04-16 ENCOUNTER — OFFICE VISIT (OUTPATIENT)
Dept: INTERNAL MEDICINE | Facility: CLINIC | Age: 49
End: 2024-04-16
Attending: INTERNAL MEDICINE
Payer: COMMERCIAL

## 2024-04-16 ENCOUNTER — HOSPITAL ENCOUNTER (OUTPATIENT)
Dept: CARDIOLOGY | Facility: HOSPITAL | Age: 49
Discharge: HOME | End: 2024-04-16
Attending: INTERNAL MEDICINE
Payer: COMMERCIAL

## 2024-04-16 ENCOUNTER — OFFICE VISIT (OUTPATIENT)
Dept: SLEEP MEDICINE | Facility: CLINIC | Age: 49
End: 2024-04-16
Attending: INTERNAL MEDICINE
Payer: COMMERCIAL

## 2024-04-16 ENCOUNTER — TELEPHONE (OUTPATIENT)
Dept: INTERNAL MEDICINE | Facility: CLINIC | Age: 49
End: 2024-04-16

## 2024-04-16 VITALS
OXYGEN SATURATION: 98 % | DIASTOLIC BLOOD PRESSURE: 90 MMHG | WEIGHT: 166 LBS | BODY MASS INDEX: 29.41 KG/M2 | HEART RATE: 78 BPM | SYSTOLIC BLOOD PRESSURE: 138 MMHG | HEIGHT: 63 IN

## 2024-04-16 VITALS
HEIGHT: 63 IN | DIASTOLIC BLOOD PRESSURE: 87 MMHG | HEART RATE: 70 BPM | HEIGHT: 63 IN | BODY MASS INDEX: 29.41 KG/M2 | HEART RATE: 77 BPM | BODY MASS INDEX: 29.3 KG/M2 | SYSTOLIC BLOOD PRESSURE: 126 MMHG | WEIGHT: 165.38 LBS | SYSTOLIC BLOOD PRESSURE: 138 MMHG | WEIGHT: 166 LBS | DIASTOLIC BLOOD PRESSURE: 90 MMHG

## 2024-04-16 DIAGNOSIS — R06.81 WITNESSED EPISODE OF APNEA: Primary | ICD-10-CM

## 2024-04-16 DIAGNOSIS — M79.89 LEG SWELLING: ICD-10-CM

## 2024-04-16 DIAGNOSIS — Z00.00 ANNUAL PHYSICAL EXAM: Primary | ICD-10-CM

## 2024-04-16 DIAGNOSIS — R51.9 FREQUENT HEADACHES: ICD-10-CM

## 2024-04-16 DIAGNOSIS — D35.2 PITUITARY MACROADENOMA: ICD-10-CM

## 2024-04-16 DIAGNOSIS — R35.1 NOCTURIA: ICD-10-CM

## 2024-04-16 DIAGNOSIS — Z82.0 FAMILY HISTORY OF SLEEP APNEA: ICD-10-CM

## 2024-04-16 DIAGNOSIS — R06.83 SNORING: ICD-10-CM

## 2024-04-16 DIAGNOSIS — G47.19 EXCESSIVE DAYTIME SLEEPINESS: ICD-10-CM

## 2024-04-16 DIAGNOSIS — R00.2 PALPITATIONS: ICD-10-CM

## 2024-04-16 DIAGNOSIS — D35.2: ICD-10-CM

## 2024-04-16 DIAGNOSIS — R42 DIZZINESS: ICD-10-CM

## 2024-04-16 DIAGNOSIS — F51.09 OTHER INSOMNIA NOT DUE TO A SUBSTANCE OR KNOWN PHYSIOLOGICAL CONDITION: ICD-10-CM

## 2024-04-16 LAB
ASCENDING AORTA: 3.04 CM
AV INDEX (PROSTH): 0.96
AV MEAN GRADIENT: 3 MMHG
AV PEAK GRADIENT: 5 MMHG
AV VALVE AREA BY VELOCITY RATIO: 3.3 CM²
AV VALVE AREA: 3.2 CM²
AV VELOCITY RATIO: 0.99
BSA FOR ECHO PROCEDURE: 1.83 M2
CV ECHO LV RWT: 0.31 CM
DOP CALC AO PEAK VEL: 1.08 M/S
DOP CALC AO VTI: 20.97 CM
DOP CALC LVOT AREA: 3.3 CM2
DOP CALC LVOT DIAMETER: 2.06 CM
DOP CALC LVOT PEAK VEL: 1.07 M/S
DOP CALC LVOT STROKE VOLUME: 67.02 CM3
DOP CALCLVOT PEAK VEL VTI: 20.12 CM
E WAVE DECELERATION TIME: 128.74 MSEC
E/A RATIO: 1.44
E/E' RATIO: 6.86 M/S
ECHO LV POSTERIOR WALL: 0.65 CM (ref 0.6–1.1)
EJECTION FRACTION: 55 %
FRACTIONAL SHORTENING: 35 % (ref 28–44)
INTERVENTRICULAR SEPTUM: 0.76 CM (ref 0.6–1.1)
IVC DIAMETER: 2.1 CM
IVRT: 102.76 MSEC
LA MAJOR: 4.31 CM
LA MINOR: 4.11 CM
LA WIDTH: 3.29 CM
LEFT ATRIUM SIZE: 2.97 CM
LEFT ATRIUM VOLUME INDEX MOD: 20.6 ML/M2
LEFT ATRIUM VOLUME INDEX: 19.5 ML/M2
LEFT ATRIUM VOLUME MOD: 36.93 CM3
LEFT ATRIUM VOLUME: 34.95 CM3
LEFT INTERNAL DIMENSION IN SYSTOLE: 2.78 CM (ref 2.1–4)
LEFT VENTRICLE DIASTOLIC VOLUME INDEX: 45.1 ML/M2
LEFT VENTRICLE DIASTOLIC VOLUME: 80.73 ML
LEFT VENTRICLE MASS INDEX: 49 G/M2
LEFT VENTRICLE SYSTOLIC VOLUME INDEX: 16.3 ML/M2
LEFT VENTRICLE SYSTOLIC VOLUME: 29.12 ML
LEFT VENTRICULAR INTERNAL DIMENSION IN DIASTOLE: 4.25 CM (ref 3.5–6)
LEFT VENTRICULAR MASS: 87.59 G
LV LATERAL E/E' RATIO: 7.2 M/S
LV SEPTAL E/E' RATIO: 6.55 M/S
MV A" WAVE DURATION": 15.41 MSEC
MV PEAK A VEL: 0.5 M/S
MV PEAK E VEL: 0.72 M/S
OHS CV RV/LV RATIO: 0.76 CM
PULM VEIN S/D RATIO: 1.1
PV PEAK D VEL: 0.31 M/S
PV PEAK S VEL: 0.34 M/S
RA MAJOR: 3.71 CM
RA PRESSURE ESTIMATED: 8 MMHG
RA WIDTH: 2.9 CM
RIGHT VENTRICULAR END-DIASTOLIC DIMENSION: 3.22 CM
SINUS: 3.36 CM
STJ: 2.96 CM
TASV: 13.4 CM/S
TDI LATERAL: 0.1 M/S
TDI SEPTAL: 0.11 M/S
TDI: 0.11 M/S
TRICUSPID ANNULAR PLANE SYSTOLIC EXCURSION: 2 CM
Z-SCORE OF LEFT VENTRICULAR DIMENSION IN END DIASTOLE: -1.52
Z-SCORE OF LEFT VENTRICULAR DIMENSION IN END SYSTOLE: -0.75

## 2024-04-16 PROCEDURE — 1160F RVW MEDS BY RX/DR IN RCRD: CPT | Mod: CPTII,S$GLB,, | Performed by: PHYSICIAN ASSISTANT

## 2024-04-16 PROCEDURE — 1159F MED LIST DOCD IN RCRD: CPT | Mod: CPTII,S$GLB,, | Performed by: INTERNAL MEDICINE

## 2024-04-16 PROCEDURE — 3074F SYST BP LT 130 MM HG: CPT | Mod: CPTII,S$GLB,, | Performed by: PHYSICIAN ASSISTANT

## 2024-04-16 PROCEDURE — 1159F MED LIST DOCD IN RCRD: CPT | Mod: CPTII,S$GLB,, | Performed by: PHYSICIAN ASSISTANT

## 2024-04-16 PROCEDURE — 1160F RVW MEDS BY RX/DR IN RCRD: CPT | Mod: CPTII,S$GLB,, | Performed by: INTERNAL MEDICINE

## 2024-04-16 PROCEDURE — 3008F BODY MASS INDEX DOCD: CPT | Mod: CPTII,S$GLB,, | Performed by: PHYSICIAN ASSISTANT

## 2024-04-16 PROCEDURE — 3075F SYST BP GE 130 - 139MM HG: CPT | Mod: CPTII,S$GLB,, | Performed by: INTERNAL MEDICINE

## 2024-04-16 PROCEDURE — 99396 PREV VISIT EST AGE 40-64: CPT | Mod: S$GLB,,, | Performed by: INTERNAL MEDICINE

## 2024-04-16 PROCEDURE — 93306 TTE W/DOPPLER COMPLETE: CPT | Mod: 26,,, | Performed by: INTERNAL MEDICINE

## 2024-04-16 PROCEDURE — 3008F BODY MASS INDEX DOCD: CPT | Mod: CPTII,S$GLB,, | Performed by: INTERNAL MEDICINE

## 2024-04-16 PROCEDURE — 99999 PR PBB SHADOW E&M-EST. PATIENT-LVL III: CPT | Mod: PBBFAC,,, | Performed by: INTERNAL MEDICINE

## 2024-04-16 PROCEDURE — 3080F DIAST BP >= 90 MM HG: CPT | Mod: CPTII,S$GLB,, | Performed by: INTERNAL MEDICINE

## 2024-04-16 PROCEDURE — 93306 TTE W/DOPPLER COMPLETE: CPT

## 2024-04-16 PROCEDURE — 99204 OFFICE O/P NEW MOD 45 MIN: CPT | Mod: S$GLB,,, | Performed by: PHYSICIAN ASSISTANT

## 2024-04-16 PROCEDURE — 3079F DIAST BP 80-89 MM HG: CPT | Mod: CPTII,S$GLB,, | Performed by: PHYSICIAN ASSISTANT

## 2024-04-16 PROCEDURE — 99999 PR PBB SHADOW E&M-EST. PATIENT-LVL III: CPT | Mod: PBBFAC,,, | Performed by: PHYSICIAN ASSISTANT

## 2024-04-16 RX ORDER — HYDROCHLOROTHIAZIDE 12.5 MG/1
12.5 TABLET ORAL DAILY
Qty: 90 TABLET | Refills: 1 | Status: SHIPPED | OUTPATIENT
Start: 2024-04-16 | End: 2024-10-13

## 2024-04-16 NOTE — PROGRESS NOTES
Subjective:       Patient ID: Rhett Quarles is a 49 y.o. male.    Chief Complaint: Hypertension    Here for annual exam    Feels like his BP is out of control. Feels aweful all the time. Yesterday had an episode where part of his face became numb.left perioral. BP was 149/94 at that time. Gets a HA and dizzy when he stands. Tired all the time. Was part of a BP study. He was contacted twice and triaged due to his consistently elevated BP levels. Feels his heart pounding while at rest, fast and hard.         Hypertension  This is a recurrent problem. The current episode started more than 1 year ago. The problem has been gradually worsening since onset. The problem is resistant. Associated symptoms include headaches, malaise/fatigue and peripheral edema. Pertinent negatives include no chest pain or shortness of breath. There are no associated agents to hypertension. Risk factors for coronary artery disease include obesity. Past treatments include nothing. The current treatment provides no improvement. There are no compliance problems.        Review of Systems   Constitutional:  Positive for activity change, malaise/fatigue and unexpected weight change. Negative for appetite change, chills and fever.   HENT:  Negative for hearing loss, sore throat and trouble swallowing.    Eyes:  Negative for visual disturbance.   Respiratory:  Negative for cough, chest tightness and shortness of breath.    Cardiovascular:  Negative for chest pain and leg swelling.   Gastrointestinal:  Negative for abdominal pain, blood in stool, constipation, diarrhea, nausea and vomiting.   Endocrine: Negative for polydipsia and polyuria.   Genitourinary:  Negative for decreased urine volume, difficulty urinating, dysuria, frequency and urgency.   Musculoskeletal:  Negative for gait problem.   Skin:  Negative for rash.   Neurological:  Positive for dizziness, weakness, light-headedness and headaches. Negative for tremors, seizures, syncope  "and numbness.   Psychiatric/Behavioral:  The patient is not nervous/anxious.        Objective:      Vitals:    04/16/24 1014   BP: (!) 138/90   BP Location: Right arm   Patient Position: Sitting   BP Method: Large (Manual)   Pulse: 78   SpO2: 98%   Weight: 75.3 kg (166 lb 0.1 oz)   Height: 5' 3" (1.6 m)      Physical Exam  Vitals and nursing note reviewed.   Constitutional:       General: He is not in acute distress.     Appearance: Normal appearance. He is well-developed.   HENT:      Head: Normocephalic and atraumatic.      Mouth/Throat:      Pharynx: No oropharyngeal exudate.   Eyes:      General: No scleral icterus.     Conjunctiva/sclera: Conjunctivae normal.      Pupils: Pupils are equal, round, and reactive to light.   Neck:      Thyroid: No thyromegaly.   Cardiovascular:      Rate and Rhythm: Normal rate and regular rhythm.      Heart sounds: Normal heart sounds. No murmur heard.  Pulmonary:      Effort: Pulmonary effort is normal.      Breath sounds: Normal breath sounds. No wheezing or rales.   Abdominal:      General: There is no distension.   Musculoskeletal:         General: No tenderness.   Lymphadenopathy:      Cervical: No cervical adenopathy.   Skin:     General: Skin is warm and dry.   Neurological:      Mental Status: He is alert and oriented to person, place, and time.   Psychiatric:         Behavior: Behavior normal.         Assessment:       1. Annual physical exam    2. Dizziness    3. Palpitations    4. Frequent headaches    5. Leg swelling    6. Corticotroph adenoma    7. Pituitary macroadenoma        Plan:       Rhett was seen today for hypertension.    Diagnoses and all orders for this visit:    Annual physical exam  -     Comprehensive Metabolic Panel; Future  -     Lipid Panel; Future  -     TSH; Future  -     CBC Auto Differential; Future  -     Hemoglobin A1C; Future    Dizziness  -     Vitamin B12; Future  -     Folate; Future    Palpitations  -     Holter monitor - 24 hour; " Future    Frequent headaches  -     Ambulatory referral/consult to Sleep Disorders; Future    Leg swelling  -     B-TYPE NATRIURETIC PEPTIDE; Future  -     Echo; Future    Corticotroph adenoma  -     MRI Brain Pituitary W WO Contrast; Future    Pituitary macroadenoma  -     MRI Brain Pituitary W WO Contrast; Future      -    START hydroCHLOROthiazide (HYDRODIURIL) 12.5 MG Tab; Take 1 tablet (12.5 mg total) by mouth once daily.           Bryce Merida MD  Internal Medicine-[facility]        Side effects of medication(s) were discussed in detail and patient voiced understanding.  Patient will call back for any issues or complications.

## 2024-04-16 NOTE — PROGRESS NOTES
Referred by Bryce Robert MD     NEW PATIENT VISIT    Rhett Quarles  is a pleasant 49 y.o. male  with PMH significant for HTN, pituitary macroadenoma, headaches, BMI 29+ who presents for sleep evaluation following referral from PCP        C/o snoring, witnessed pauses in breathing during sleep noted per wife, poor disrupted and non-refreshing sleep, and excessive daytime sleepiness and fatigue. States mother and father both have BON on CPAP. Denies drowsiness when driving, denies dream enactment behaviors. Does endorse hx of frequent headaches has hx of pituitary macroadenoma. Currently scheduled for follow up MRI, but does notice headaches are typically the first first thing in the AMs. States PCP recommended evaluation for BON, which is why he presents today.    SLEEP SCHEDULE   Environment    Bed Time 11PM-MN   Sleep Latency 1min   Arousals 2   Nocturia 2   Back to sleep 15min   Wake time 9-10AM   Naps None    Work          4/16/2024     2:40 PM   [facility] ROS    Difficulty breathing through the nose?  Sometimes   Sore throat or dry mouth in the morning? Sometimes   Irregular or very fast heart beat?  Yes   Shortness of breath?  No   Acid reflux? Sometimes   Body aches and pains?  Yes   Morning headaches? Yes   Dizziness? Yes   Mood changes?  Sometimes   Do you exercise?  Sometimes   Do you feel like moving your legs a lot?  No       Past Medical History:   Diagnosis Date    Keloid cicatrix     Pituitary macroadenoma      Patient Active Problem List   Diagnosis    Fatigue    Diplopia    Corticotroph adenoma    Hand numbness    Convergence insufficiency    Snoring    Abnormal LFTs    Brain tumor    COVID-19 virus detected    Muscle twitching    Impaired functional mobility, balance, gait, and endurance    Leg weakness, bilateral    Impaired mobility and ADLs    Upper extremity weakness    Decreased  strength    Fine motor impairment    Weakness    Hypogonadism in male    Polycythemia     "Pituitary macroadenoma    RLQ abdominal pain       Current Outpatient Medications:     fluticasone propionate (FLONASE) 50 mcg/actuation nasal spray, 1 spray (50 mcg total) by Each Nostril route once daily. (Patient taking differently: 1 spray by Each Nostril route once daily. PRN), Disp: 9.9 mL, Rfl: 0    hydroCHLOROthiazide (HYDRODIURIL) 12.5 MG Tab, Take 1 tablet (12.5 mg total) by mouth once daily., Disp: 90 tablet, Rfl: 1    multivitamin capsule, Take 1 capsule by mouth once daily., Disp: , Rfl:        Vitals:    04/16/24 1454   BP: 126/87   BP Location: Left arm   Patient Position: Sitting   BP Method: Small (Automatic)   Pulse: 77   Weight: 75 kg (165 lb 5.5 oz)   Height: 5' 3" (1.6 m)     Physical Exam:    GEN:   Well-appearing  Psych:  Appropriate affect, demonstrates insight  SKIN:  No rash on the face or bridge of the nose      LABS:   Lab Results   Component Value Date    HGB 16.9 04/16/2024    CO2 25 04/16/2024         RECORDS REVIEWED:    No previous sleep study     ASSESSMENT        4/16/2024     2:38 PM   EPWORTH SLEEPINESS SCALE   Sitting and reading 2   Watching TV 3   Sitting, inactive in a public place (e.g. a theatre or a meeting) 0   As a passenger in a car for an hour without a break 1   Lying down to rest in the afternoon when circumstances permit 2   Sitting and talking to someone 0   Sitting quietly after a lunch without alcohol 0   In a car, while stopped for a few minutes in traffic 0   Total score 8       PROBLEM DESCRIPTION/ Sx on Presentation  STATUS   Sx BON   + loud snoring, + witnessed apneas per wife  + wakes feeling un-refreshed  Mother and father both have BON  New   Daytime Sx   + sleepiness when inactive   Denies drowsiness when driving  ESS 8/24 on intake  New   Insomnia   Trouble falling asleep: no issues  Arousals:         2 x nightly  Hard to get back to sleep?: not usually difficult    Prior pertinent medications:  Current pertinent medications:   New   Nocturia   x 2 per " sleep period  New   Frequent Headaches Wakes with a headache 5 days weekly  Improve after he's awake for while  Takes advil as needed  New     Other issues:       PLAN      -recommend sleep testing   -HST ordered  -discussed trial therapy if BON present and the patient is open to a trial of CPAP therapy  -discussed BON and PAP with patient in detail, including possible complications of untreated BON like heart attack/stroke  -advised on strict driving precautions; advised never to drive drowsy     Advised on plan of care. Answered all patient questions. Patient verbalized understanding and voiced agreement with plan of care.     RTC if dx of BON made and CPAP ordered, will need follow up 31-90 days after receiving machine for compliance         The patient was given open opportunity to ask questions and/or express concerns about treatment plan. All questions/concerns were discussed.     Two patient identifiers used prior to evaluation.

## 2024-04-18 ENCOUNTER — PATIENT MESSAGE (OUTPATIENT)
Dept: INTERNAL MEDICINE | Facility: CLINIC | Age: 49
End: 2024-04-18
Payer: COMMERCIAL

## 2024-04-18 ENCOUNTER — TELEPHONE (OUTPATIENT)
Dept: SLEEP MEDICINE | Facility: OTHER | Age: 49
End: 2024-04-18
Payer: COMMERCIAL

## 2024-04-19 ENCOUNTER — HOSPITAL ENCOUNTER (OUTPATIENT)
Dept: SLEEP MEDICINE | Facility: OTHER | Age: 49
Discharge: HOME | End: 2024-04-19
Attending: PHYSICIAN ASSISTANT
Payer: COMMERCIAL

## 2024-04-19 DIAGNOSIS — F51.09 OTHER INSOMNIA NOT DUE TO A SUBSTANCE OR KNOWN PHYSIOLOGICAL CONDITION: ICD-10-CM

## 2024-04-19 DIAGNOSIS — R35.1 NOCTURIA: ICD-10-CM

## 2024-04-19 DIAGNOSIS — Z82.0 FAMILY HISTORY OF SLEEP APNEA: ICD-10-CM

## 2024-04-19 DIAGNOSIS — R06.81 WITNESSED EPISODE OF APNEA: ICD-10-CM

## 2024-04-19 DIAGNOSIS — G47.19 EXCESSIVE DAYTIME SLEEPINESS: ICD-10-CM

## 2024-04-19 DIAGNOSIS — R06.83 SNORING: ICD-10-CM

## 2024-04-19 DIAGNOSIS — R51.9 FREQUENT HEADACHES: ICD-10-CM

## 2024-04-19 PROCEDURE — 95800 SLP STDY UNATTENDED: CPT

## 2024-04-22 PROBLEM — R06.81 WITNESSED EPISODE OF APNEA: Status: ACTIVE | Noted: 2024-04-22

## 2024-04-23 ENCOUNTER — CLINICAL SUPPORT (OUTPATIENT)
Dept: CARDIOLOGY | Facility: HOSPITAL | Age: 49
End: 2024-04-23
Attending: INTERNAL MEDICINE
Payer: COMMERCIAL

## 2024-04-23 DIAGNOSIS — R00.2 PALPITATIONS: ICD-10-CM

## 2024-04-23 PROCEDURE — 93227 XTRNL ECG REC<48 HR R&I: CPT | Mod: ,,, | Performed by: STUDENT IN AN ORGANIZED HEALTH CARE EDUCATION/TRAINING PROGRAM

## 2024-04-23 PROCEDURE — 93225 XTRNL ECG REC<48 HRS REC: CPT

## 2024-04-24 PROCEDURE — 95806 SLEEP STUDY UNATT&RESP EFFT: CPT | Mod: 26,,, | Performed by: INTERNAL MEDICINE

## 2024-04-25 LAB
OHS CV EVENT MONITOR DAY: 0
OHS CV HOLTER LENGTH DECIMAL HOURS: 24
OHS CV HOLTER LENGTH HOURS: 24
OHS CV HOLTER LENGTH MINUTES: 0
OHS CV HOLTER SINUS AVERAGE HR: 76
OHS CV HOLTER SINUS MAX HR: 135
OHS CV HOLTER SINUS MIN HR: 49

## 2024-04-29 ENCOUNTER — PATIENT MESSAGE (OUTPATIENT)
Dept: SLEEP MEDICINE | Facility: CLINIC | Age: 49
End: 2024-04-29
Payer: COMMERCIAL

## 2024-04-29 ENCOUNTER — LAB VISIT (OUTPATIENT)
Dept: LAB | Facility: OTHER | Age: 49
End: 2024-04-29
Attending: INTERNAL MEDICINE
Payer: COMMERCIAL

## 2024-04-29 DIAGNOSIS — R74.01 TRANSAMINITIS: ICD-10-CM

## 2024-04-29 DIAGNOSIS — G47.33 OSA (OBSTRUCTIVE SLEEP APNEA): Primary | ICD-10-CM

## 2024-04-29 DIAGNOSIS — R79.89 LOW VITAMIN B12 LEVEL: ICD-10-CM

## 2024-04-29 LAB
ALBUMIN SERPL BCP-MCNC: 4.1 G/DL (ref 3.5–5.2)
ALP SERPL-CCNC: 61 U/L (ref 55–135)
ALT SERPL W/O P-5'-P-CCNC: 72 U/L (ref 10–44)
ANION GAP SERPL CALC-SCNC: 11 MMOL/L (ref 8–16)
AST SERPL-CCNC: 44 U/L (ref 10–40)
BILIRUB SERPL-MCNC: 0.7 MG/DL (ref 0.1–1)
BUN SERPL-MCNC: 12 MG/DL (ref 6–20)
CALCIUM SERPL-MCNC: 9.2 MG/DL (ref 8.7–10.5)
CERULOPLASMIN SERPL-MCNC: 28 MG/DL (ref 15–45)
CHLORIDE SERPL-SCNC: 100 MMOL/L (ref 95–110)
CK SERPL-CCNC: 229 U/L (ref 20–200)
CO2 SERPL-SCNC: 25 MMOL/L (ref 23–29)
CREAT SERPL-MCNC: 1 MG/DL (ref 0.5–1.4)
EST. GFR  (NO RACE VARIABLE): >60 ML/MIN/1.73 M^2
FERRITIN SERPL-MCNC: 306 NG/ML (ref 20–300)
GLUCOSE SERPL-MCNC: 84 MG/DL (ref 70–110)
HBV CORE AB SERPL QL IA: NORMAL
HBV SURFACE AG SERPL QL IA: NORMAL
IRON SERPL-MCNC: 92 UG/DL (ref 45–160)
POTASSIUM SERPL-SCNC: 4.1 MMOL/L (ref 3.5–5.1)
PROT SERPL-MCNC: 7.4 G/DL (ref 6–8.4)
SATURATED IRON: 23 % (ref 20–50)
SODIUM SERPL-SCNC: 136 MMOL/L (ref 136–145)
TOTAL IRON BINDING CAPACITY: 394 UG/DL (ref 250–450)
TRANSFERRIN SERPL-MCNC: 266 MG/DL (ref 200–375)
VIT B12 SERPL-MCNC: 300 PG/ML (ref 210–950)

## 2024-04-29 PROCEDURE — 80053 COMPREHEN METABOLIC PANEL: CPT | Performed by: INTERNAL MEDICINE

## 2024-04-29 PROCEDURE — 82728 ASSAY OF FERRITIN: CPT | Performed by: INTERNAL MEDICINE

## 2024-04-29 PROCEDURE — 86038 ANTINUCLEAR ANTIBODIES: CPT | Performed by: INTERNAL MEDICINE

## 2024-04-29 PROCEDURE — 36415 COLL VENOUS BLD VENIPUNCTURE: CPT | Performed by: INTERNAL MEDICINE

## 2024-04-29 PROCEDURE — 87340 HEPATITIS B SURFACE AG IA: CPT | Performed by: INTERNAL MEDICINE

## 2024-04-29 PROCEDURE — 83921 ORGANIC ACID SINGLE QUANT: CPT | Performed by: INTERNAL MEDICINE

## 2024-04-29 PROCEDURE — 82607 VITAMIN B-12: CPT | Performed by: INTERNAL MEDICINE

## 2024-04-29 PROCEDURE — 83540 ASSAY OF IRON: CPT | Performed by: INTERNAL MEDICINE

## 2024-04-29 PROCEDURE — 86015 ACTIN ANTIBODY EACH: CPT | Performed by: INTERNAL MEDICINE

## 2024-04-29 PROCEDURE — 86376 MICROSOMAL ANTIBODY EACH: CPT | Performed by: INTERNAL MEDICINE

## 2024-04-29 PROCEDURE — 82550 ASSAY OF CK (CPK): CPT | Performed by: INTERNAL MEDICINE

## 2024-04-29 PROCEDURE — 86704 HEP B CORE ANTIBODY TOTAL: CPT | Performed by: INTERNAL MEDICINE

## 2024-04-29 PROCEDURE — 82390 ASSAY OF CERULOPLASMIN: CPT | Performed by: INTERNAL MEDICINE

## 2024-04-30 ENCOUNTER — HOSPITAL ENCOUNTER (OUTPATIENT)
Dept: RADIOLOGY | Facility: OTHER | Age: 49
Discharge: HOME | End: 2024-04-30
Attending: INTERNAL MEDICINE
Payer: COMMERCIAL

## 2024-04-30 ENCOUNTER — OFFICE VISIT (OUTPATIENT)
Dept: INTERNAL MEDICINE | Facility: CLINIC | Age: 49
End: 2024-04-30
Attending: INTERNAL MEDICINE
Payer: COMMERCIAL

## 2024-04-30 VITALS
SYSTOLIC BLOOD PRESSURE: 109 MMHG | HEIGHT: 63 IN | HEART RATE: 69 BPM | WEIGHT: 165.38 LBS | DIASTOLIC BLOOD PRESSURE: 75 MMHG | BODY MASS INDEX: 29.3 KG/M2

## 2024-04-30 DIAGNOSIS — D35.2: ICD-10-CM

## 2024-04-30 DIAGNOSIS — G47.33 OSA (OBSTRUCTIVE SLEEP APNEA): ICD-10-CM

## 2024-04-30 DIAGNOSIS — R74.01 TRANSAMINITIS: ICD-10-CM

## 2024-04-30 DIAGNOSIS — D35.2 PITUITARY MACROADENOMA: ICD-10-CM

## 2024-04-30 DIAGNOSIS — Z13.6 ENCOUNTER FOR SCREENING FOR CARDIOVASCULAR DISORDERS: Primary | ICD-10-CM

## 2024-04-30 PROBLEM — Z74.09 IMPAIRED FUNCTIONAL MOBILITY, BALANCE, GAIT, AND ENDURANCE: Status: RESOLVED | Noted: 2020-07-23 | Resolved: 2024-04-30

## 2024-04-30 PROBLEM — R20.0 HAND NUMBNESS: Status: RESOLVED | Noted: 2020-01-07 | Resolved: 2024-04-30

## 2024-04-30 PROBLEM — R53.1 WEAKNESS: Status: RESOLVED | Noted: 2020-07-30 | Resolved: 2024-04-30

## 2024-04-30 PROBLEM — R53.83 FATIGUE: Status: RESOLVED | Noted: 2019-12-18 | Resolved: 2024-04-30

## 2024-04-30 PROBLEM — R29.898 DECREASED GRIP STRENGTH: Status: RESOLVED | Noted: 2020-07-30 | Resolved: 2024-04-30

## 2024-04-30 PROBLEM — U07.1 COVID-19 VIRUS DETECTED: Status: RESOLVED | Noted: 2020-05-19 | Resolved: 2024-04-30

## 2024-04-30 PROBLEM — Z74.09 IMPAIRED MOBILITY AND ADLS: Status: RESOLVED | Noted: 2020-07-30 | Resolved: 2024-04-30

## 2024-04-30 PROBLEM — Z78.9 IMPAIRED MOBILITY AND ADLS: Status: RESOLVED | Noted: 2020-07-30 | Resolved: 2024-04-30

## 2024-04-30 PROBLEM — R10.31 RLQ ABDOMINAL PAIN: Status: RESOLVED | Noted: 2020-12-03 | Resolved: 2024-04-30

## 2024-04-30 PROBLEM — R06.81 WITNESSED EPISODE OF APNEA: Status: RESOLVED | Noted: 2024-04-22 | Resolved: 2024-04-30

## 2024-04-30 LAB — ANA SER QL IF: NORMAL

## 2024-04-30 PROCEDURE — 70553 MRI BRAIN STEM W/O & W/DYE: CPT | Mod: TC

## 2024-04-30 PROCEDURE — A9585 GADOBUTROL INJECTION: HCPCS | Performed by: INTERNAL MEDICINE

## 2024-04-30 PROCEDURE — 3074F SYST BP LT 130 MM HG: CPT | Mod: CPTII,95,, | Performed by: INTERNAL MEDICINE

## 2024-04-30 PROCEDURE — 3044F HG A1C LEVEL LT 7.0%: CPT | Mod: CPTII,95,, | Performed by: INTERNAL MEDICINE

## 2024-04-30 PROCEDURE — 25500020 PHARM REV CODE 255: Performed by: INTERNAL MEDICINE

## 2024-04-30 PROCEDURE — 3008F BODY MASS INDEX DOCD: CPT | Mod: CPTII,95,, | Performed by: INTERNAL MEDICINE

## 2024-04-30 PROCEDURE — 99214 OFFICE O/P EST MOD 30 MIN: CPT | Mod: 95,,, | Performed by: INTERNAL MEDICINE

## 2024-04-30 PROCEDURE — 1159F MED LIST DOCD IN RCRD: CPT | Mod: CPTII,95,, | Performed by: INTERNAL MEDICINE

## 2024-04-30 PROCEDURE — 3078F DIAST BP <80 MM HG: CPT | Mod: CPTII,95,, | Performed by: INTERNAL MEDICINE

## 2024-04-30 PROCEDURE — 70553 MRI BRAIN STEM W/O & W/DYE: CPT | Mod: 26,,, | Performed by: RADIOLOGY

## 2024-04-30 RX ORDER — GADOBUTROL 604.72 MG/ML
7.5 INJECTION INTRAVENOUS
Status: COMPLETED | OUTPATIENT
Start: 2024-04-30 | End: 2024-04-30

## 2024-04-30 RX ADMIN — GADOBUTROL 7.5 ML: 604.72 INJECTION INTRAVENOUS at 08:04

## 2024-04-30 NOTE — PROGRESS NOTES
Subjective:       Patient ID: Rhett Quarles is a 49 y.o. male.    Chief Complaint: Follow-up    Here for f/u    Would like to discuss recent abnormal labs and the next step for each in evaluation and tx.                 AST                      44 (H)              04/29/2024 11:40 AM        AST                      80 (H)              04/16/2024 11:20 AM        AST                      36                  07/10/2023 01:49 PM        ALT                      72 (H)              04/29/2024 11:40 AM        ALT                      125 (H)             04/16/2024 11:20 AM        ALT                      66 (H)              07/10/2023 01:49 PM        CPK                      229 (H)             04/29/2024 11:40 AM        CPK                      136                 07/20/2020 04:54 PM                  LDLCALC                  178.4 (H)           04/16/2024 11:20 AM        LDLCALC                  136.2               11/03/2022 11:45 AM        LDLCALC                  122.6               12/03/2019 04:50 PM         The 10-year ASCVD risk score (Benjamin VÁSQUEZ, et al., 2019) is: 4.2%    Values used to calculate the score:      Age: 49 years      Sex: Male      Is Non- : No      Diabetic: No      Tobacco smoker: No      Systolic Blood Pressure: 109 mmHg      Is BP treated: No      HDL Cholesterol: 40 mg/dL      Total Cholesterol: 264 mg/dL      Follow-up  Pertinent negatives include no abdominal pain, arthralgias, chest pain, chills, coughing, fever, headaches, joint swelling, nausea, neck pain, numbness, rash, sore throat, vomiting or weakness.       Review of Systems   Constitutional:  Negative for activity change, appetite change, chills, fever and unexpected weight change.   HENT:  Negative for hearing loss, rhinorrhea, sore throat and trouble swallowing.    Eyes:  Negative for discharge and visual disturbance.   Respiratory:  Negative for cough, chest tightness, shortness of breath and  "wheezing.    Cardiovascular:  Negative for chest pain, palpitations and leg swelling.   Gastrointestinal:  Negative for abdominal pain, blood in stool, constipation, diarrhea, nausea and vomiting.   Endocrine: Negative for polydipsia and polyuria.   Genitourinary:  Negative for decreased urine volume, difficulty urinating, dysuria, frequency, hematuria and urgency.   Musculoskeletal:  Negative for arthralgias, gait problem, joint swelling and neck pain.   Skin:  Negative for rash.   Neurological:  Negative for dizziness, weakness, numbness and headaches.   Psychiatric/Behavioral:  Negative for confusion and dysphoric mood. The patient is not nervous/anxious.        Objective:      Vitals:    04/30/24 1414   BP: 109/75   BP Location: Left arm   Patient Position: Sitting   Pulse: 69   Weight: 75 kg (165 lb 5.5 oz)   Height: 5' 3" (1.6 m)      Physical Exam  Vitals and nursing note reviewed.   Constitutional:       General: He is not in acute distress.     Appearance: Normal appearance. He is well-developed. He is not diaphoretic.   HENT:      Head: Normocephalic and atraumatic.      Mouth/Throat:      Pharynx: No oropharyngeal exudate.   Eyes:      General: No scleral icterus.        Right eye: No discharge.         Left eye: No discharge.      Conjunctiva/sclera: Conjunctivae normal.      Pupils: Pupils are equal, round, and reactive to light.   Neck:      Thyroid: No thyromegaly.   Cardiovascular:      Rate and Rhythm: Normal rate and regular rhythm.      Heart sounds: Normal heart sounds. No murmur heard.  Pulmonary:      Effort: Pulmonary effort is normal. No respiratory distress.      Breath sounds: Normal breath sounds. No wheezing or rales.   Abdominal:      General: There is no distension.   Musculoskeletal:         General: No tenderness.   Lymphadenopathy:      Cervical: No cervical adenopathy.   Skin:     General: Skin is warm and dry.   Neurological:      Mental Status: He is alert and oriented to person, " place, and time.   Psychiatric:         Speech: Speech normal.         Behavior: Behavior normal.         Assessment:       1. Encounter for screening for cardiovascular disorders    2. Transaminitis        Plan:       Rhett was seen today for follow-up.    Diagnoses and all orders for this visit:    Encounter for screening for cardiovascular disorders  -     CT Cardiac Scoring; Future    Transaminitis  -     Ambulatory referral/consult to Hepatology; Future           Bryce Merida MD  Internal Medicine-[facility]        Side effects of medication(s) were discussed in detail and patient voiced understanding.  Patient will call back for any issues or complications.

## 2024-05-01 LAB — SMOOTH MUSCLE AB TITR SER IF: NORMAL {TITER}

## 2024-05-02 LAB — LKM AB SER-ACNC: 1.9 UNITS

## 2024-05-03 LAB — METHYLMALONATE SERPL-SCNC: 0.19 UMOL/L

## 2024-05-13 ENCOUNTER — HOSPITAL ENCOUNTER (OUTPATIENT)
Dept: RADIOLOGY | Facility: OTHER | Age: 49
Discharge: HOME | End: 2024-05-13
Attending: INTERNAL MEDICINE
Payer: COMMERCIAL

## 2024-05-13 DIAGNOSIS — Z13.6 ENCOUNTER FOR SCREENING FOR CARDIOVASCULAR DISORDERS: ICD-10-CM

## 2024-05-13 PROCEDURE — 75571 CT HRT W/O DYE W/CA TEST: CPT | Mod: 26,,, | Performed by: RADIOLOGY

## 2024-05-13 PROCEDURE — 75571 CT HRT W/O DYE W/CA TEST: CPT | Mod: TC

## 2024-05-14 NOTE — PROGRESS NOTES
Your total calcium score is 0. Congratulations. No statin medication for this leanna!!!    Respectfully,  Bryce Merida

## 2024-05-20 ENCOUNTER — OFFICE VISIT (OUTPATIENT)
Dept: HEPATOLOGY | Facility: CLINIC | Age: 49
End: 2024-05-20
Payer: COMMERCIAL

## 2024-05-20 VITALS — HEIGHT: 64 IN | WEIGHT: 157.19 LBS | BODY MASS INDEX: 26.84 KG/M2

## 2024-05-20 DIAGNOSIS — R74.01 TRANSAMINITIS: Primary | ICD-10-CM

## 2024-05-20 PROBLEM — R79.89 ABNORMAL LFTS: Status: RESOLVED | Noted: 2020-02-03 | Resolved: 2024-05-20

## 2024-05-20 PROCEDURE — 1159F MED LIST DOCD IN RCRD: CPT | Mod: CPTII,S$GLB,, | Performed by: NURSE PRACTITIONER

## 2024-05-20 PROCEDURE — 99999 PR PBB SHADOW E&M-EST. PATIENT-LVL III: CPT | Mod: PBBFAC,,, | Performed by: NURSE PRACTITIONER

## 2024-05-20 PROCEDURE — 3044F HG A1C LEVEL LT 7.0%: CPT | Mod: CPTII,S$GLB,, | Performed by: NURSE PRACTITIONER

## 2024-05-20 PROCEDURE — 1160F RVW MEDS BY RX/DR IN RCRD: CPT | Mod: CPTII,S$GLB,, | Performed by: NURSE PRACTITIONER

## 2024-05-20 PROCEDURE — 99214 OFFICE O/P EST MOD 30 MIN: CPT | Mod: S$GLB,,, | Performed by: NURSE PRACTITIONER

## 2024-05-20 PROCEDURE — 3008F BODY MASS INDEX DOCD: CPT | Mod: CPTII,S$GLB,, | Performed by: NURSE PRACTITIONER

## 2024-05-20 NOTE — PROGRESS NOTES
[facility] VISIT NEW PT NOTE    REFERRING PROVIDER:  Dr. Bryce Robert  PCP: Bryce Robert MD     CHIEF COMPLAINT: elevated liver enzymes     HPI: This is a 49 y.o. patient with PMH noted below, presenting for evaluation of above    Previous serologic w/u negative for  Bolivar's, negative ANAND and viral hepatitis in 2020  Elevated ferritin but normal iron sat - can obtain HH DNA if repeat elevated     Prior serologic workup:   Lab Results   Component Value Date    SMOOTHMUSCAB Negative 1:40 04/29/2024    ANASCREEN Negative <1:80 04/29/2024    FERRITIN 306 (H) 04/29/2024    FESATURATED 23 04/29/2024    CERULOPLSM 28.0 04/29/2024    HEPBSAG Non-reactive 04/29/2024    HEPCAB Negative 07/20/2020    HEPAIGM Negative 07/20/2020       Liver fibrosis staging:  -- fibroscan with RTC    Risk factors for fatty liver include overweight and previous alcohol use     Interval HPI: Presents today alone. Stopped alcohol in the past 6 weeks (previously ~3 servings most days of the week). Lost 10 lbs in the past few months (previously 167 lbs)  + Herbal medications : milk thistle, tumeric   Walking for exercise    Labs done 4/2024 show elevated transaminase levels (ALT > AST, elevated since 2023)      Lab Results   Component Value Date    ALT 72 (H) 04/29/2024    AST 44 (H) 04/29/2024    ALKPHOS 61 04/29/2024    BILITOT 0.7 04/29/2024    ALBUMIN 4.1 04/29/2024    INR 1.0 02/03/2020     04/16/2024       Abd U/S to be done     Previous CT in 2022 noted as normal liver    + family history of liver disease: dad but unsure . Denies significant alcohol consumption currently   Social History     Substance and Sexual Activity   Alcohol Use Yes    Comment: 3 servings per day, most days of the week, x 10+ years, stopped 4/2024         Immunity to Hep A and B - will check with next labs          Allergy and medication list reviewed and updated     PMHX:  has a past medical history of Keloid cicatrix and Pituitary  "macroadenoma.    PSHX:  has a past surgical history that includes Excision, neoplasm, pituitary, transsphenoidal approach, using computer-assisted navigation; Esophagogastroduodenoscopy (N/A, 12/3/2020); and Colonoscopy (N/A, 12/3/2020).    FAMILY HISTORY: Updated and reviewed in EPIC    ROS:   GENERAL: Denies fatigue  CARDIOVASCULAR: Denies edema  GI: Denies abdominal pain  SKIN: Denies rash, itching   NEURO: Denies confusion, memory loss, or mood changes    PHYSICAL EXAM:   In no acute distress; alert and oriented to person, place and time  VITALS: Ht 5' 4" (1.626 m)   Wt 71.3 kg (157 lb 3 oz)   BMI 26.98 kg/m²   EYES: Sclerae anicteric  GI: Soft, non-tender, non-distended. No ascites.  EXTREMITIES:  No edema.  SKIN: Warm and dry. No jaundice. No telangectasias noted. No palmar erythema.  NEURO:  No asterixis.  PSYCH:  Thought and speech pattern appropriate. Behavior normal      EDUCATION:  See instructions discussed with patient in Instructions section of the After Visit Summary     ASSESSMENT & PLAN:  49 y.o. male with:  1. Elevated liver enzymes   -- Labs note elevated transaminase levels since 2023  --- synthetic liver function WNL  --- Abd US to be done   -- do not suspect any medications contributing   --- previous serological work up : will obtain   --- Hep A and B immunity: see HPI  -- labs and US soon   Orders Placed This Encounter   Procedures    FibroScan Transplant Hepatology(Vibration Controlled Transient Elastography)    US Abdomen Complete    Hepatitis A antibody, IgG    Hepatitis B Core Antibody, Total    Hepatitis B Surface Ab, Qualitative    Hepatitis C Antibody    IgG    Hepatic Function Panel    Antimitochondrial Antibody    Ceruloplasmin    Alpha-1-Antitrypsin    CK    Phosphatidylethanol (PETH)      -- fibroscan with RTC after lifestyle changes    2. Overweight, previous alcohol use  Body mass index is 26.98 kg/m².  Can increase risk of fatty liver  Continue recent lifestyle changes  -- " Recommendations discussed with patient:  Limit alcohol consumption, continue to avoid   2 Weight loss goal of 10 lbs  3. Low carb/sugar, high fiber and protein diet, limit your carb intake to LESS than 30-45 grams of carbs with a meal or LESS than 5-10 grams with any snack   4. Exercise, 5 days per week, 30 minutes per day, as tolerated  5. Recommend good cholesterol, blood pressure, blood sugar levels               Follow up in about 4 months (around 9/20/2024). with labs, US and fibroscan before  Orders Placed This Encounter   Procedures    FibroScan Transplant Hepatology(Vibration Controlled Transient Elastography)    US Abdomen Complete    Hepatitis A antibody, IgG    Hepatitis B Core Antibody, Total    Hepatitis B Surface Ab, Qualitative    Hepatitis C Antibody    IgG    Hepatic Function Panel    Antimitochondrial Antibody    Ceruloplasmin    Alpha-1-Antitrypsin    CK    Phosphatidylethanol (PETH)        Thank you for allowing me to participate in the care of ERWIN Valenzuela    I spent a total of 3- minutes on the day of the visit.This includes face to face time and non-face to face time preparing to see the patient (eg, review of tests), obtaining and/or reviewing separately obtained history, documenting clinical information in the electronic or other health record, independently interpreting results and communicating results to the patient/family/caregiver, and coordinating care.         CC'ed note to:   Bryce Robert MD

## 2024-05-22 ENCOUNTER — PATIENT MESSAGE (OUTPATIENT)
Dept: INTERNAL MEDICINE | Facility: CLINIC | Age: 49
End: 2024-05-22
Payer: COMMERCIAL

## 2024-05-23 ENCOUNTER — TELEPHONE (OUTPATIENT)
Dept: INTERNAL MEDICINE | Facility: CLINIC | Age: 49
End: 2024-05-23

## 2024-05-23 ENCOUNTER — OFFICE VISIT (OUTPATIENT)
Dept: INTERNAL MEDICINE | Facility: CLINIC | Age: 49
End: 2024-05-23
Attending: INTERNAL MEDICINE
Payer: COMMERCIAL

## 2024-05-23 ENCOUNTER — HOSPITAL ENCOUNTER (OUTPATIENT)
Dept: RADIOLOGY | Facility: OTHER | Age: 49
Discharge: HOME | End: 2024-05-23
Attending: INTERNAL MEDICINE
Payer: COMMERCIAL

## 2024-05-23 VITALS
HEIGHT: 64 IN | WEIGHT: 156.06 LBS | OXYGEN SATURATION: 98 % | DIASTOLIC BLOOD PRESSURE: 80 MMHG | HEART RATE: 66 BPM | SYSTOLIC BLOOD PRESSURE: 110 MMHG | BODY MASS INDEX: 26.64 KG/M2

## 2024-05-23 DIAGNOSIS — Z87.898 HISTORY OF URINE COLOR CHANGES: Primary | ICD-10-CM

## 2024-05-23 DIAGNOSIS — Z87.898 HISTORY OF URINE COLOR CHANGES: ICD-10-CM

## 2024-05-23 DIAGNOSIS — G47.33 OSA (OBSTRUCTIVE SLEEP APNEA): ICD-10-CM

## 2024-05-23 DIAGNOSIS — R74.01 TRANSAMINITIS: ICD-10-CM

## 2024-05-23 DIAGNOSIS — D75.1 POLYCYTHEMIA: ICD-10-CM

## 2024-05-23 DIAGNOSIS — N22 CALCULUS OF URINARY TRACT IN DISEASES CLASSIFIED ELSEWHERE: ICD-10-CM

## 2024-05-23 LAB
BILIRUB SERPL-MCNC: ABNORMAL MG/DL
BLOOD URINE, POC: 50
CLARITY, POC UA: CLEAR
COLOR, POC UA: YELLOW
GLUCOSE UR QL STRIP: NORMAL
KETONES UR QL STRIP: ABNORMAL
LEUKOCYTE ESTERASE URINE, POC: ABNORMAL
NITRITE, POC UA: ABNORMAL
PH, POC UA: 7
PROTEIN, POC: ABNORMAL
SPECIFIC GRAVITY, POC UA: 1
UROBILINOGEN, POC UA: NORMAL

## 2024-05-23 PROCEDURE — 99214 OFFICE O/P EST MOD 30 MIN: CPT | Mod: S$GLB,,, | Performed by: INTERNAL MEDICINE

## 2024-05-23 PROCEDURE — 3044F HG A1C LEVEL LT 7.0%: CPT | Mod: CPTII,S$GLB,, | Performed by: INTERNAL MEDICINE

## 2024-05-23 PROCEDURE — 3008F BODY MASS INDEX DOCD: CPT | Mod: CPTII,S$GLB,, | Performed by: INTERNAL MEDICINE

## 2024-05-23 PROCEDURE — 3079F DIAST BP 80-89 MM HG: CPT | Mod: CPTII,S$GLB,, | Performed by: INTERNAL MEDICINE

## 2024-05-23 PROCEDURE — 74018 RADEX ABDOMEN 1 VIEW: CPT | Mod: 26,,, | Performed by: RADIOLOGY

## 2024-05-23 PROCEDURE — 81002 URINALYSIS NONAUTO W/O SCOPE: CPT | Mod: S$GLB,,, | Performed by: INTERNAL MEDICINE

## 2024-05-23 PROCEDURE — 1159F MED LIST DOCD IN RCRD: CPT | Mod: CPTII,S$GLB,, | Performed by: INTERNAL MEDICINE

## 2024-05-23 PROCEDURE — 74018 RADEX ABDOMEN 1 VIEW: CPT | Mod: TC,FY

## 2024-05-23 PROCEDURE — 99999 PR PBB SHADOW E&M-EST. PATIENT-LVL III: CPT | Mod: PBBFAC,,, | Performed by: INTERNAL MEDICINE

## 2024-05-23 PROCEDURE — 1160F RVW MEDS BY RX/DR IN RCRD: CPT | Mod: CPTII,S$GLB,, | Performed by: INTERNAL MEDICINE

## 2024-05-23 PROCEDURE — 3074F SYST BP LT 130 MM HG: CPT | Mod: CPTII,S$GLB,, | Performed by: INTERNAL MEDICINE

## 2024-05-23 NOTE — PROGRESS NOTES
Subjective:       Patient ID: Rhett Quarles is a 49 y.o. male.    Chief Complaint: Hematuria (Since yesterday with lower back pain.)    Here for urgent visit      Yesterday went to have a bowel movement while seated had some cramping right lower back pain along with right lower quadrant pain that was a little atypical got his attention but did not persist went about his day.  Next time he came back to void he had painless void with significant change in urine.  His mind immediately thought that it looked like [employer].  He had his wife, over she said the same thing.  He has no other symptoms to report.  No preceding infections.  No recent trauma.  No known history of nephrolithiasis.  The new medications or supplements.  He eats beats approximately once a week reports has not had them in over a week. Only changes that he is taking his hydrochlorothiazide more consistently. He denies urinary frequency, urinary urgency, decreased force of stream, incomplete emptying of bladder, post void dribble, nocturia, or gross hematuria. After his transaminitis evaluation he has lost 10+ lb, not had anything alcohol to drink in 40 days. Reduced carbohydrates significantly. He has seen sleep and Dx with BON and is scheduled to get CPAP supplies.                 Review of Systems   Constitutional:  Negative for appetite change, chills, fever and unexpected weight change.   HENT:  Negative for hearing loss, sore throat and trouble swallowing.    Eyes:  Negative for visual disturbance.   Respiratory:  Negative for cough, chest tightness and shortness of breath.    Cardiovascular:  Negative for chest pain and leg swelling.   Gastrointestinal:  Positive for abdominal pain (chronic and unchanged). Negative for blood in stool, constipation, diarrhea, nausea and vomiting.   Endocrine: Negative for polydipsia and polyuria.   Genitourinary:  Negative for decreased urine volume, difficulty urinating, dysuria, frequency and urgency.  "  Musculoskeletal:  Negative for gait problem.   Skin:  Negative for rash.   Neurological:  Negative for dizziness and numbness.   Psychiatric/Behavioral:  The patient is not nervous/anxious.        Objective:      Vitals:    05/23/24 0947   BP: 110/80   BP Location: Left arm   Patient Position: Sitting   BP Method: Medium (Manual)   Pulse: 66   SpO2: 98%   Weight: 70.8 kg (156 lb 1.4 oz)   Height: 5' 4" (1.626 m)      Physical Exam  Vitals and nursing note reviewed.   Constitutional:       General: He is not in acute distress.     Appearance: Normal appearance. He is well-developed.   HENT:      Head: Normocephalic and atraumatic.      Mouth/Throat:      Pharynx: No oropharyngeal exudate.   Eyes:      General: No scleral icterus.     Conjunctiva/sclera: Conjunctivae normal.      Pupils: Pupils are equal, round, and reactive to light.   Neck:      Thyroid: No thyromegaly.   Cardiovascular:      Rate and Rhythm: Normal rate and regular rhythm.      Heart sounds: Normal heart sounds. No murmur heard.  Pulmonary:      Effort: Pulmonary effort is normal.      Breath sounds: Normal breath sounds. No wheezing or rales.   Abdominal:      General: There is no distension.      Tenderness: There is abdominal tenderness (RLQ chronic and focal and unchanged). There is no right CVA tenderness or left CVA tenderness.   Musculoskeletal:         General: No tenderness.   Lymphadenopathy:      Cervical: No cervical adenopathy.   Skin:     General: Skin is warm and dry.   Neurological:      Mental Status: He is alert and oriented to person, place, and time.   Psychiatric:         Behavior: Behavior normal.         Assessment:       1. History of urine color changes    2. Polycythemia    3. Transaminitis    4. BON (obstructive sleep apnea)        Plan:       Rhett was seen today for hematuria.    Diagnoses and all orders for this visit:    History of urine color changes   Urine visibly clear but does have 50+ blood on dip will send " down. Quick KUB to see if there is a large stone that can be seen. If urine confirms hematuria and no infection on Cx then will CT abdomen and send to urology. Kidney function and serum bilirubin pending. Office and Emergency Department prompts discussed.  -     Urinalysis, Reflex to Urine Culture Urine, Clean Catch; Future  -     Comprehensive Metabolic Panel; Future  -     X-Ray Abdomen AP 1 View; Future  -     POCT URINE DIPSTICK WITHOUT MICROSCOPE  -     Urine culture; Future  -     CBC Auto Differential; Future  -     CK; Future    Polycythemia    Transaminitis   Has follow-up with hepatology in coming months with plan for repeat FibroScan to monitor.  BON (obstructive sleep apnea)   Stressed adherence of and complications related to untreated BON.           Bryce Merida MD  Internal Medicine-[facility]        Side effects of medication(s) were discussed in detail and patient voiced understanding.  Patient will call back for any issues or complications.

## 2024-05-23 NOTE — PROGRESS NOTES
I think you may have a kidney stone that caused recent findings.  We will get a CT scan of abdomen without contrast to evaluate this to see how big.  If develop recurrent pains we will have to do this more urgently.  Labs are reassuring.    Respectfully,  Bryce Merida

## 2024-05-26 ENCOUNTER — HOSPITAL ENCOUNTER (EMERGENCY)
Facility: HOSPITAL | Age: 49
Discharge: HOME | End: 2024-05-26
Attending: EMERGENCY MEDICINE
Payer: COMMERCIAL

## 2024-05-26 VITALS
RESPIRATION RATE: 18 BRPM | BODY MASS INDEX: 26.78 KG/M2 | DIASTOLIC BLOOD PRESSURE: 94 MMHG | WEIGHT: 156 LBS | TEMPERATURE: 98 F | HEART RATE: 60 BPM | OXYGEN SATURATION: 99 % | SYSTOLIC BLOOD PRESSURE: 153 MMHG

## 2024-05-26 DIAGNOSIS — R03.0 ELEVATED BLOOD PRESSURE READING WITHOUT DIAGNOSIS OF HYPERTENSION: ICD-10-CM

## 2024-05-26 DIAGNOSIS — R31.9 HEMATURIA, UNSPECIFIED TYPE: ICD-10-CM

## 2024-05-26 DIAGNOSIS — N23 RENAL COLIC ON RIGHT SIDE: ICD-10-CM

## 2024-05-26 DIAGNOSIS — N20.0 KIDNEY STONE ON RIGHT SIDE: Primary | ICD-10-CM

## 2024-05-26 LAB
ALBUMIN SERPL BCP-MCNC: 4.1 G/DL (ref 3.5–5.2)
ALP SERPL-CCNC: 60 U/L (ref 55–135)
ALT SERPL W/O P-5'-P-CCNC: 33 U/L (ref 10–44)
ANION GAP SERPL CALC-SCNC: 15 MMOL/L (ref 8–16)
AST SERPL-CCNC: 31 U/L (ref 10–40)
BACTERIA #/AREA URNS AUTO: ABNORMAL /HPF
BASOPHILS # BLD AUTO: 0.03 K/UL (ref 0–0.2)
BASOPHILS NFR BLD: 0.6 % (ref 0–1.9)
BILIRUB SERPL-MCNC: 0.8 MG/DL (ref 0.1–1)
BILIRUB UR QL STRIP: NEGATIVE
BUN SERPL-MCNC: 14 MG/DL (ref 6–20)
CALCIUM SERPL-MCNC: 9 MG/DL (ref 8.7–10.5)
CHLORIDE SERPL-SCNC: 105 MMOL/L (ref 95–110)
CLARITY UR REFRACT.AUTO: CLEAR
CO2 SERPL-SCNC: 18 MMOL/L (ref 23–29)
COLOR UR AUTO: YELLOW
CREAT SERPL-MCNC: 1.1 MG/DL (ref 0.5–1.4)
DIFFERENTIAL METHOD BLD: ABNORMAL
EOSINOPHIL # BLD AUTO: 0.1 K/UL (ref 0–0.5)
EOSINOPHIL NFR BLD: 1 % (ref 0–8)
ERYTHROCYTE [DISTWIDTH] IN BLOOD BY AUTOMATED COUNT: 11.9 % (ref 11.5–14.5)
EST. GFR  (NO RACE VARIABLE): >60 ML/MIN/1.73 M^2
GLUCOSE SERPL-MCNC: 96 MG/DL (ref 70–110)
GLUCOSE UR QL STRIP: NEGATIVE
HCT VFR BLD AUTO: 43.9 % (ref 40–54)
HCV AB SERPL QL IA: NORMAL
HGB BLD-MCNC: 14 G/DL (ref 14–18)
HGB UR QL STRIP: ABNORMAL
HIV 1+2 AB+HIV1 P24 AG SERPL QL IA: NORMAL
IMM GRANULOCYTES # BLD AUTO: 0.01 K/UL (ref 0–0.04)
IMM GRANULOCYTES NFR BLD AUTO: 0.2 % (ref 0–0.5)
KETONES UR QL STRIP: ABNORMAL
LEUKOCYTE ESTERASE UR QL STRIP: NEGATIVE
LIPASE SERPL-CCNC: 19 U/L (ref 4–60)
LYMPHOCYTES # BLD AUTO: 1.2 K/UL (ref 1–4.8)
LYMPHOCYTES NFR BLD: 24.2 % (ref 18–48)
MCH RBC QN AUTO: 28.6 PG (ref 27–31)
MCHC RBC AUTO-ENTMCNC: 31.9 G/DL (ref 32–36)
MCV RBC AUTO: 90 FL (ref 82–98)
MICROSCOPIC COMMENT: ABNORMAL
MONOCYTES # BLD AUTO: 0.3 K/UL (ref 0.3–1)
MONOCYTES NFR BLD: 7 % (ref 4–15)
NEUTROPHILS # BLD AUTO: 3.3 K/UL (ref 1.8–7.7)
NEUTROPHILS NFR BLD: 67 % (ref 38–73)
NITRITE UR QL STRIP: NEGATIVE
NRBC BLD-RTO: 0 /100 WBC
PH UR STRIP: 6 [PH] (ref 5–8)
PLATELET # BLD AUTO: 205 K/UL (ref 150–450)
PMV BLD AUTO: 10.7 FL (ref 9.2–12.9)
POTASSIUM SERPL-SCNC: 4.5 MMOL/L (ref 3.5–5.1)
PROT SERPL-MCNC: 7.3 G/DL (ref 6–8.4)
PROT UR QL STRIP: ABNORMAL
RBC # BLD AUTO: 4.89 M/UL (ref 4.6–6.2)
RBC #/AREA URNS AUTO: 86 /HPF (ref 0–4)
SODIUM SERPL-SCNC: 138 MMOL/L (ref 136–145)
SP GR UR STRIP: 1.02 (ref 1–1.03)
URN SPEC COLLECT METH UR: ABNORMAL
WBC # BLD AUTO: 4.87 K/UL (ref 3.9–12.7)
WBC #/AREA URNS AUTO: 5 /HPF (ref 0–5)

## 2024-05-26 PROCEDURE — 81001 URINALYSIS AUTO W/SCOPE: CPT | Performed by: PHYSICIAN ASSISTANT

## 2024-05-26 PROCEDURE — 87389 HIV-1 AG W/HIV-1&-2 AB AG IA: CPT | Performed by: PHYSICIAN ASSISTANT

## 2024-05-26 PROCEDURE — 80053 COMPREHEN METABOLIC PANEL: CPT | Performed by: PHYSICIAN ASSISTANT

## 2024-05-26 PROCEDURE — 96361 HYDRATE IV INFUSION ADD-ON: CPT

## 2024-05-26 PROCEDURE — 86803 HEPATITIS C AB TEST: CPT | Performed by: PHYSICIAN ASSISTANT

## 2024-05-26 PROCEDURE — 96375 TX/PRO/DX INJ NEW DRUG ADDON: CPT

## 2024-05-26 PROCEDURE — 85025 COMPLETE CBC W/AUTO DIFF WBC: CPT | Performed by: PHYSICIAN ASSISTANT

## 2024-05-26 PROCEDURE — 96374 THER/PROPH/DIAG INJ IV PUSH: CPT

## 2024-05-26 PROCEDURE — 83690 ASSAY OF LIPASE: CPT | Performed by: PHYSICIAN ASSISTANT

## 2024-05-26 PROCEDURE — 63600175 PHARM REV CODE 636 W HCPCS: Performed by: PHYSICIAN ASSISTANT

## 2024-05-26 PROCEDURE — 99285 EMERGENCY DEPT VISIT HI MDM: CPT | Mod: 25

## 2024-05-26 PROCEDURE — 25000003 PHARM REV CODE 250: Performed by: PHYSICIAN ASSISTANT

## 2024-05-26 RX ORDER — TAMSULOSIN HYDROCHLORIDE 0.4 MG/1
0.4 CAPSULE ORAL DAILY
Qty: 14 CAPSULE | Refills: 0 | Status: SHIPPED | OUTPATIENT
Start: 2024-05-26 | End: 2024-06-18

## 2024-05-26 RX ORDER — HYDROCODONE BITARTRATE AND ACETAMINOPHEN 10; 325 MG/1; MG/1
1 TABLET ORAL EVERY 4 HOURS PRN
Qty: 15 TABLET | Refills: 0 | Status: SHIPPED | OUTPATIENT
Start: 2024-05-26 | End: 2024-05-29

## 2024-05-26 RX ORDER — KETOROLAC TROMETHAMINE 30 MG/ML
10 INJECTION, SOLUTION INTRAMUSCULAR; INTRAVENOUS
Status: COMPLETED | OUTPATIENT
Start: 2024-05-26 | End: 2024-05-26

## 2024-05-26 RX ORDER — ONDANSETRON 4 MG/1
4 TABLET, ORALLY DISINTEGRATING ORAL EVERY 6 HOURS PRN
Qty: 15 TABLET | Refills: 0 | Status: SHIPPED | OUTPATIENT
Start: 2024-05-26

## 2024-05-26 RX ORDER — ONDANSETRON HYDROCHLORIDE 2 MG/ML
4 INJECTION, SOLUTION INTRAVENOUS
Status: COMPLETED | OUTPATIENT
Start: 2024-05-26 | End: 2024-05-26

## 2024-05-26 RX ORDER — TAMSULOSIN HYDROCHLORIDE 0.4 MG/1
0.4 CAPSULE ORAL
Status: COMPLETED | OUTPATIENT
Start: 2024-05-26 | End: 2024-05-26

## 2024-05-26 RX ORDER — HYDROMORPHONE HYDROCHLORIDE 1 MG/ML
0.5 INJECTION, SOLUTION INTRAMUSCULAR; INTRAVENOUS; SUBCUTANEOUS
Status: COMPLETED | OUTPATIENT
Start: 2024-05-26 | End: 2024-05-26

## 2024-05-26 RX ORDER — KETOROLAC TROMETHAMINE 10 MG/1
10 TABLET, FILM COATED ORAL 2 TIMES DAILY PRN
Qty: 14 TABLET | Refills: 0 | Status: SHIPPED | OUTPATIENT
Start: 2024-05-26 | End: 2024-06-02

## 2024-05-26 RX ORDER — MORPHINE SULFATE 4 MG/ML
4 INJECTION, SOLUTION INTRAMUSCULAR; INTRAVENOUS
Status: COMPLETED | OUTPATIENT
Start: 2024-05-26 | End: 2024-05-26

## 2024-05-26 RX ADMIN — MORPHINE SULFATE 4 MG: 4 INJECTION INTRAVENOUS at 11:05

## 2024-05-26 RX ADMIN — TAMSULOSIN HYDROCHLORIDE 0.4 MG: 0.4 CAPSULE ORAL at 12:05

## 2024-05-26 RX ADMIN — KETOROLAC TROMETHAMINE 10 MG: 30 INJECTION, SOLUTION INTRAMUSCULAR at 12:05

## 2024-05-26 RX ADMIN — ONDANSETRON 4 MG: 2 INJECTION INTRAMUSCULAR; INTRAVENOUS at 11:05

## 2024-05-26 RX ADMIN — HYDROMORPHONE HYDROCHLORIDE 0.5 MG: 1 INJECTION, SOLUTION INTRAMUSCULAR; INTRAVENOUS; SUBCUTANEOUS at 01:05

## 2024-05-26 RX ADMIN — SODIUM CHLORIDE 1000 ML: 9 INJECTION, SOLUTION INTRAVENOUS at 12:05

## 2024-05-26 NOTE — ED NOTES
Patient identifiers for Rhett Quarles 49 y.o. male checked and correct.  Chief Complaint   Patient presents with    Flank Pain     Hematuria 3 days ago, did xray and was told potential kidney stone. CT scheduled Tuesday. Increased pain to R flank.      Past Medical History:   Diagnosis Date    Keloid cicatrix     Pituitary macroadenoma      Allergies reported: Review of patient's allergies indicates:  No Known Allergies      LOC: Patient is awake, alert, and aware of environment with an appropriate affect. Patient is oriented x 4 and speaking appropriately.  APPEARANCE: Patient is sitting and guarding R flank, grimacing in pain. Patient is clean and well groomed, patient's clothing is properly fastened.  HEENT: - JVD, + midline trach  SKIN: The skin is warm and dry. Patient has normal skin turgor and moist mucus membranes.   MUSKULOSKELETAL: Patient is moving all extremities well, no obvious deformities noted. Pulses intact. PMS x 4  RESPIRATORY: Airway is open and patent. Respirations are spontaneous and non-labored with normal effort and rate. = CBBS  CARDIAC: No peripheral edema noted. + 2 bilateral pedal and radial pulses, < 3 s cap refill. Endorses 9/10 R flank pain.  ABDOMEN: No distention noted. Soft and non-tender upon palpation.  NEUROLOGICAL: pupils 4 mm, PERRL. Facial expression is symmetrical. Hand grasps are equal bilaterally. Normal sensation in all extremities when touched with finger.

## 2024-05-26 NOTE — ED TRIAGE NOTES
48 y/o M presents to ER with c/c 9/10 R flank pain x 3 days. Pt states that he started having hematuria with pain, and today woke up with R flank pain at 9/10. H.x kidney stones. Denies c/p, SOB, N/V/D, fevers and chills.

## 2024-05-26 NOTE — ED PROVIDER NOTES
Encounter Date: 5/26/2024       History     Chief Complaint   Patient presents with    Flank Pain     Hematuria 3 days ago, did xray and was told potential kidney stone. CT scheduled Tuesday. Increased pain to R flank.      Patient is a 49-year-old male who presents to the ER for an urgent evaluation complaining of acute atraumatic right-sided flank pain and hematuria.  Symptoms have been present for the past 3-4 days.  He reports having moderate-to-severe pain to his right flank region that wraps around to his right lower abdomen intermittently.  He reports associated intermittent nausea, but denies any vomiting.  He reports seeing visible blood in the urine.  He denies any dysuria.  He denies any fevers or chills.  He did see his PCP for this and is scheduled for an outpatient CT to evaluate for suspected kidney stone, but the scan is not scheduled until next week.  He states that his pain is worse than before and now intolerable, prompting him to come to the ER.  He denies history of kidney stones in the past.  He denies any mitigating or exacerbating factors.      Review of patient's allergies indicates:  No Known Allergies  Past Medical History:   Diagnosis Date    Keloid cicatrix     Pituitary macroadenoma      Past Surgical History:   Procedure Laterality Date    COLONOSCOPY N/A 12/3/2020    Procedure: COLONOSCOPY;  Surgeon: Avery Paredes MD;  Location: [address]);  Service: Endoscopy;  Laterality: N/A;  covid test 11/30 [facility]    ESOPHAGOGASTRODUODENOSCOPY N/A 12/3/2020    Procedure: EGD (ESOPHAGOGASTRODUODENOSCOPY);  Surgeon: Avery Paredes MD;  Location: [address]);  Service: Endoscopy;  Laterality: N/A;    EXCISION, NEOPLASM, PITUITARY, TRANSSPHENOIDAL APPROACH, USING COMPUTER-ASSISTED NAVIGATION       Family History   Problem Relation Name Age of Onset    Colon cancer Neg Hx      Esophageal cancer Neg Hx       Social History     Tobacco Use    Smoking status: Never     Smokeless tobacco: Never   Substance Use Topics    Alcohol use: Yes     Comment: 3 servings per day, most days of the week, x 10+ years, stopped 4/2024    Drug use: Not Currently     Review of Systems   Constitutional:  Negative for chills and fever.   HENT:  Negative for sore throat.    Respiratory:  Negative for shortness of breath.    Cardiovascular:  Negative for chest pain.   Gastrointestinal:  Positive for nausea. Negative for abdominal distention, abdominal pain, blood in stool, constipation, diarrhea and vomiting.   Genitourinary:  Positive for flank pain and hematuria. Negative for decreased urine volume, difficulty urinating, dysuria, frequency, scrotal swelling, testicular pain and urgency.   Musculoskeletal:  Negative for gait problem.   Skin:  Negative for color change and rash.   Allergic/Immunologic: Negative for immunocompromised state.   Neurological:  Negative for dizziness, syncope, weakness, light-headedness, numbness and headaches.   Psychiatric/Behavioral:  Negative for confusion.        Physical Exam     Initial Vitals [05/26/24 1009]   BP Pulse Resp Temp SpO2   (!) 143/85 67 18 98 °F (36.7 °C) 100 %      MAP       --         Physical Exam    Nursing note and vitals reviewed.  Constitutional: He appears well-developed and well-nourished. He is not diaphoretic. He appears distressed.   HENT:   Head: Normocephalic.   Eyes: Conjunctivae are normal. No scleral icterus.   Cardiovascular:  Normal rate.           Pulmonary/Chest: No respiratory distress.   Abdominal: He exhibits no distension. There is no abdominal tenderness. There is no rebound and no guarding.   Musculoskeletal:         General: Normal range of motion.      Comments: Right flank pain     Neurological: He is alert and oriented to person, place, and time. He has normal strength. No sensory deficit.   Skin: Skin is warm and dry. No rash noted.   Psychiatric: He has a normal mood and affect. His behavior is normal.         ED Course    Procedures  Labs Reviewed   CBC W/ AUTO DIFFERENTIAL - Abnormal; Notable for the following components:       Result Value    MCHC 31.9 (*)     All other components within normal limits    Narrative:     Release to patient->Immediate   COMPREHENSIVE METABOLIC PANEL - Abnormal; Notable for the following components:    CO2 18 (*)     All other components within normal limits    Narrative:     Release to patient->Immediate   URINALYSIS, REFLEX TO URINE CULTURE - Abnormal; Notable for the following components:    Protein, UA Trace (*)     Ketones, UA 3+ (*)     Occult Blood UA 2+ (*)     All other components within normal limits    Narrative:     Specimen Source->Urine   URINALYSIS MICROSCOPIC - Abnormal; Notable for the following components:    RBC, UA 86 (*)     All other components within normal limits    Narrative:     Specimen Source->Urine   HIV 1 / 2 ANTIBODY    Narrative:     Release to patient->Immediate   HEPATITIS C ANTIBODY    Narrative:     Release to patient->Immediate   LIPASE    Narrative:     Release to patient->Immediate     Results for orders placed or performed during the hospital encounter of 05/26/24   HIV 1/2 Ag/Ab (4th Gen)   Result Value Ref Range    HIV 1/2 Ag/Ab Non-reactive Non-reactive   Hepatitis C Antibody   Result Value Ref Range    Hepatitis C Ab Non-reactive Non-reactive   CBC auto differential   Result Value Ref Range    WBC 4.87 3.90 - 12.70 K/uL    RBC 4.89 4.60 - 6.20 M/uL    Hemoglobin 14.0 14.0 - 18.0 g/dL    Hematocrit 43.9 40.0 - 54.0 %    MCV 90 82 - 98 fL    MCH 28.6 27.0 - 31.0 pg    MCHC 31.9 (L) 32.0 - 36.0 g/dL    RDW 11.9 11.5 - 14.5 %    Platelets 205 150 - 450 K/uL    MPV 10.7 9.2 - 12.9 fL    Immature Granulocytes 0.2 0.0 - 0.5 %    Gran # (ANC) 3.3 1.8 - 7.7 K/uL    Immature Grans (Abs) 0.01 0.00 - 0.04 K/uL    Lymph # 1.2 1.0 - 4.8 K/uL    Mono # 0.3 0.3 - 1.0 K/uL    Eos # 0.1 0.0 - 0.5 K/uL    Baso # 0.03 0.00 - 0.20 K/uL    nRBC 0 0 /100 WBC    Gran % 67.0 38.0 -  73.0 %    Lymph % 24.2 18.0 - 48.0 %    Mono % 7.0 4.0 - 15.0 %    Eosinophil % 1.0 0.0 - 8.0 %    Basophil % 0.6 0.0 - 1.9 %    Differential Method Automated    Comprehensive metabolic panel   Result Value Ref Range    Sodium 138 136 - 145 mmol/L    Potassium 4.5 3.5 - 5.1 mmol/L    Chloride 105 95 - 110 mmol/L    CO2 18 (L) 23 - 29 mmol/L    Glucose 96 70 - 110 mg/dL    BUN 14 6 - 20 mg/dL    Creatinine 1.1 0.5 - 1.4 mg/dL    Calcium 9.0 8.7 - 10.5 mg/dL    Total Protein 7.3 6.0 - 8.4 g/dL    Albumin 4.1 3.5 - 5.2 g/dL    Total Bilirubin 0.8 0.1 - 1.0 mg/dL    Alkaline Phosphatase 60 55 - 135 U/L    AST 31 10 - 40 U/L    ALT 33 10 - 44 U/L    eGFR >60.0 >60 mL/min/1.73 m^2    Anion Gap 15 8 - 16 mmol/L   Lipase   Result Value Ref Range    Lipase 19 4 - 60 U/L   Urinalysis, Reflex to Urine Culture Urine, Clean Catch    Specimen: Urine   Result Value Ref Range    Specimen UA Urine, Clean Catch     Color, UA Yellow Yellow, Straw, Rita    Appearance, UA Clear Clear    pH, UA 6.0 5.0 - 8.0    Specific Gravity, UA 1.025 1.005 - 1.030    Protein, UA Trace (A) Negative    Glucose, UA Negative Negative    Ketones, UA 3+ (A) Negative    Bilirubin (UA) Negative Negative    Occult Blood UA 2+ (A) Negative    Nitrite, UA Negative Negative    Leukocytes, UA Negative Negative   Urinalysis Microscopic   Result Value Ref Range    RBC, UA 86 (H) 0 - 4 /hpf    WBC, UA 5 0 - 5 /hpf    Bacteria Rare None-Occ /hpf    Microscopic Comment SEE COMMENT             Imaging Results              CT Renal Stone Study ABD Pelvis WO (Final result)  Result time 05/26/24 11:58:56      Final result by William Yuong MD (05/26/24 11:58:56)                   Impression:      1. Mild right hydroureteronephrosis noting right perinephric and periureteral inflammation secondary to a 4-5 mm calculus within the proximal aspect of the right ureter.  Correlation with urinalysis is recommended to exclude superimposed infection.  2. Please see above for  several additional findings.      Electronically signed by: William Young MD  Date:    05/26/2024  Time:    11:58               Narrative:    EXAMINATION:  CT RENAL STONE STUDY ABD PELVIS WO    CLINICAL HISTORY:  Flank pain, kidney stone suspected;    TECHNIQUE:  Low dose axial images, sagittal and coronal reformations were obtained from the lung bases to the pubic symphysis.  Contrast was not administered.    COMPARISON:  11/08/2022    FINDINGS:  Images of the lower thorax are unremarkable.    The liver, spleen, pancreas, gallbladder and adrenal glands are grossly unremarkable.  There is no biliary dilation or ascites.  No significant abdominal lymphadenopathy.    There is bilateral perinephric fat stranding.  There is left nonobstructive nephrolithiasis.  No left hydronephrosis.  The left ureter is unremarkable without calculi seen.  There is mild right hydroureteronephrosis noting right periureteral inflammation secondary to a 4-5 mm calculus within the proximal aspect of the right ureter.  The right ureter is unremarkable without calculi seen.  The urinary bladder is unremarkable.  The prostate is not enlarged.    There are a few scattered colonic diverticula without inflammation.  The terminal ileum and appendix are unremarkable.  The small bowel is grossly unremarkable.  There are a few scattered shotty periaortic, pericaval, and mesenteric lymph nodes.  No focal organized pelvic fluid collection.    There are degenerative changes of the spine.  There are bilateral fat containing inguinal hernias.                                       Medications   morphine injection 4 mg (4 mg Intravenous Given 5/26/24 1150)   ondansetron injection 4 mg (4 mg Intravenous Given 5/26/24 1150)   tamsulosin 24 hr capsule 0.4 mg (0.4 mg Oral Given 5/26/24 1224)   ketorolac injection 9.999 mg (9.999 mg Intravenous Given 5/26/24 1224)   sodium chloride 0.9% bolus 1,000 mL 1,000 mL (0 mLs Intravenous Stopped 5/26/24 1324)    HYDROmorphone injection 0.5 mg (0.5 mg Intravenous Given 5/26/24 1310)     Medical Decision Making  Amount and/or Complexity of Data Reviewed  Labs: ordered.  Radiology: ordered.    Risk  Prescription drug management.                          Medical Decision Making:   Initial Assessment:   41-year-old male presents to the ER for an emergent evaluation complaining of acute atraumatic right-sided flank pain with associated hematuria and nausea times 2-3 days  Differential Diagnosis:   Renal lithiasis, obstructive uropathy, pyelonephritis, WILLIAM, gallstone, musculoskeletal pain, etc.  Clinical Tests:   Lab Tests: Ordered and Reviewed  Radiological Study: Ordered and Reviewed  ED Management:  Vital signs reviewed, afebrile, no tachycardia  Chart review completed, notes from primary care visit earlier this week were reviewed  UA is notable for hematuria and negative for UTI  Normal WBC count   CMP completed, unremarkable, no WILLIAM  CT confirms right-sided kidney stone, measuring 4-5 mm located in proximal right ureter  Patient was treated with IV fluids, IV pain medicine, IV Zofran, and PO Flomax in the ED  Patient required multiple rounds of pain meds, but ultimately reports improvement and comfort going home   The patient was given a urinary strainer and an ambulatory referral to Urology was ordered, patient was advised to follow up at the next available appointment time   Prescriptions for pain medicine, nausea medicine, and Flomax were provided   Patient was advised to return to the ER promptly if unimproved or if worse in any way  The patient verbalized understanding and comfort with plan               Clinical Impression:  Final diagnoses:  [N20.0] Kidney stone on right side (Primary)  [N23] Renal colic on right side  [R31.9] Hematuria, unspecified type  [R03.0] Elevated blood pressure reading without diagnosis of hypertension          ED Disposition Condition    Discharge Stable          ED Prescriptions        Medication Sig Dispense Start Date End Date Auth. Provider    ketorolac (TORADOL) 10 mg tablet Take 1 tablet (10 mg total) by mouth 2 (two) times daily as needed for Pain. 14 tablet 5/26/2024 6/2/2024 Lul Flores PA-C    ondansetron (ZOFRAN-ODT) 4 MG TbDL Take 1 tablet (4 mg total) by mouth every 6 (six) hours as needed (nausea). 15 tablet 5/26/2024 -- Lul Flores PA-C    tamsulosin (FLOMAX) 0.4 mg Cap Take 1 capsule (0.4 mg total) by mouth once daily. for 14 days 14 capsule 5/26/2024 6/9/2024 Lul Flores PA-C    HYDROcodone-acetaminophen (NORCO)  mg per tablet Take 1 tablet by mouth every 4 (four) hours as needed for Pain. 15 tablet 5/26/2024 5/29/2024 Lul Flores PA-C          Follow-up Information       Follow up With Specialties Details Why Contact Info Additional Information    Bryce Robert MD Internal Medicine Schedule an appointment as soon as possible for a visit in 2 days  [address] [facility] [facility]  [address]  [facility] [address]  [phone]       Darren Snyder - Urology [address] Urology Schedule an appointment as soon as possible for a visit  Follow up with urology this week for re-evaluation and further management. [address]  [facility] [id number] [phone] [address] Building, [address]th Floor Please park in [facility] and take [facility] elevator    Darren Snyder - Emergency Dept Emergency Medicine  Return to the ER if unimproved or if worse in any way. [address]  [facility] [id number] [phone]              Lul Flores PA-C  05/26/24 1351       Lul Flores PA-C  05/26/24 8991

## 2024-05-27 ENCOUNTER — HOSPITAL ENCOUNTER (OUTPATIENT)
Dept: RADIOLOGY | Facility: HOSPITAL | Age: 49
Discharge: HOME | End: 2024-05-27
Attending: NURSE PRACTITIONER
Payer: COMMERCIAL

## 2024-05-27 DIAGNOSIS — R74.01 TRANSAMINITIS: ICD-10-CM

## 2024-05-27 PROCEDURE — 76700 US EXAM ABDOM COMPLETE: CPT | Mod: TC

## 2024-05-27 PROCEDURE — 76700 US EXAM ABDOM COMPLETE: CPT | Mod: 26,,, | Performed by: RADIOLOGY

## 2024-05-31 ENCOUNTER — PATIENT MESSAGE (OUTPATIENT)
Dept: HEPATOLOGY | Facility: CLINIC | Age: 49
End: 2024-05-31
Payer: COMMERCIAL

## 2024-05-31 ENCOUNTER — OFFICE VISIT (OUTPATIENT)
Dept: OTOLARYNGOLOGY | Facility: CLINIC | Age: 49
End: 2024-05-31
Payer: COMMERCIAL

## 2024-05-31 VITALS
DIASTOLIC BLOOD PRESSURE: 84 MMHG | BODY MASS INDEX: 26.64 KG/M2 | HEIGHT: 64 IN | SYSTOLIC BLOOD PRESSURE: 118 MMHG | WEIGHT: 156.06 LBS

## 2024-05-31 DIAGNOSIS — R49.0 DYSPHONIA: Primary | ICD-10-CM

## 2024-05-31 DIAGNOSIS — Z23 NEED FOR HEPATITIS A AND B VACCINATION: Primary | ICD-10-CM

## 2024-05-31 PROCEDURE — 3044F HG A1C LEVEL LT 7.0%: CPT | Mod: CPTII,S$GLB,, | Performed by: NURSE PRACTITIONER

## 2024-05-31 PROCEDURE — 3079F DIAST BP 80-89 MM HG: CPT | Mod: CPTII,S$GLB,, | Performed by: NURSE PRACTITIONER

## 2024-05-31 PROCEDURE — 99203 OFFICE O/P NEW LOW 30 MIN: CPT | Mod: 25,S$GLB,, | Performed by: NURSE PRACTITIONER

## 2024-05-31 PROCEDURE — 1159F MED LIST DOCD IN RCRD: CPT | Mod: CPTII,S$GLB,, | Performed by: NURSE PRACTITIONER

## 2024-05-31 PROCEDURE — 3008F BODY MASS INDEX DOCD: CPT | Mod: CPTII,S$GLB,, | Performed by: NURSE PRACTITIONER

## 2024-05-31 PROCEDURE — 3074F SYST BP LT 130 MM HG: CPT | Mod: CPTII,S$GLB,, | Performed by: NURSE PRACTITIONER

## 2024-05-31 NOTE — PROGRESS NOTES
OTOLARYNGOLOGY CLINIC NOTE  Date:  05/31/2024     Chief complaint:  Chief Complaint   Patient presents with    Hoarse     Since April 18th,now comes on randomly. 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 male  presenting today for a new evaluation and treatment of dysphonia.  Pt reports his symptoms started on April 18th with hoarseness.  Pt reports he has noticed since then by the end of the day his voice is very raspy.  Pt reports he tried vocal rest but his voice has continued to decline.  Pt reports over the past few weeks it has become worse and he is barely able to speak.  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  Pt does hear his normal voice in the morning but by the end of day his voice is barely heard.  Pt denies any nasal congestion, post nasal drip, rhinitis, acid reflux or facial pain or pressure.  Pt denies any ear pain, pressure or drainage.      Past Medical History  Past Medical History:   Diagnosis Date    Keloid cicatrix     Pituitary macroadenoma       Past Surgical History  Past Surgical History:   Procedure Laterality Date    COLONOSCOPY N/A 12/3/2020    Procedure: COLONOSCOPY;  Surgeon: Avery Paredes MD;  Location: [address]);  Service: Endoscopy;  Laterality: N/A;  covid test 11/30 [facility]    ESOPHAGOGASTRODUODENOSCOPY N/A 12/3/2020    Procedure: EGD (ESOPHAGOGASTRODUODENOSCOPY);  Surgeon: Avery Paredes MD;  Location: [address]);  Service: Endoscopy;  Laterality: N/A;    EXCISION, NEOPLASM, PITUITARY, TRANSSPHENOIDAL APPROACH, USING COMPUTER-ASSISTED NAVIGATION        Medications  Current Outpatient Medications on File Prior to Visit   Medication Sig Dispense Refill    fluticasone propionate (FLONASE) 50 mcg/actuation nasal spray 1 spray (50 mcg total) by Each Nostril route once daily.  "(Patient taking differently: 1 spray by Each Nostril route once daily. PRN) 9.9 mL 0    hydroCHLOROthiazide (HYDRODIURIL) 12.5 MG Tab Take 1 tablet (12.5 mg total) by mouth once daily. 90 tablet 1    ketorolac (TORADOL) 10 mg tablet Take 1 tablet (10 mg total) by mouth 2 (two) times daily as needed for Pain. 14 tablet 0    multivitamin capsule Take 1 capsule by mouth once daily.      ondansetron (ZOFRAN-ODT) 4 MG TbDL Take 1 tablet (4 mg total) by mouth every 6 (six) hours as needed (nausea). 15 tablet 0    tamsulosin (FLOMAX) 0.4 mg Cap Take 1 capsule (0.4 mg total) by mouth once daily. for 14 days 14 capsule 0     No current facility-administered medications on file prior to visit.     Review of Systems  Review of Systems   Constitutional: Negative.    HENT:  Positive for sore throat.    Eyes: Negative.    Respiratory: Negative.     Cardiovascular: Negative.    Gastrointestinal: Negative.    Genitourinary: Negative.    Musculoskeletal: Negative.    Skin: Negative.    Neurological: Negative.    Psychiatric/Behavioral: Negative.        Social History   reports that he has never smoked. He has never used smokeless tobacco. He reports current alcohol use. He reports that he does not currently use drugs.     Family History  Family History   Problem Relation Name Age of Onset    Colon cancer Neg Hx      Esophageal cancer Neg Hx        Physical Exam   Vitals:    05/31/24 1421   BP: 118/84    Body mass index is 26.79 kg/m².  Weight: 70.8 kg (156 lb 1.4 oz)   Height: 5' 4" (162.6 cm)     GENERAL: no acute distress.  HEAD: normocephalic.   EYES: lids and lashes normal. No scleral icterus  EARS: external ear without lesion, normal pinna shape and position.  External auditory canal with normal cerumen, tympanic membrane fully visible, no perforation , no retraction. No middle ear effusion. Ossicles intact.   NOSE: external nose without significant bony abnormality  ORAL CAVITY/OROPHARYNX: tongue midline and mobile. Symmetric " palate rise. Uvula midline.   NECK: trachea midline.   LYMPH NODES:No cervical lymphadenopathy.  RESPIRATORY: no stridor, no stertor. Voice normal. Respirations nonlabored.  NEURO: alert, responds to questions appropriately.   Cranial nerve exam as indicated in above sections and additionally showed facial movement symmetric with good eye closure and symmetric smile.   PSYCH:mood appropriate    PROCEDURE NOTE  NAME OF PROCEDURE: Flexible Laryngoscopy, diagnostic  INDICATIONS: gag reflex precludes mirror exam,  dysphonia   FINDINGS:   Consent: After procedure was explained in detail and all questions answered, verbal consent was obtained for performing flexible laryngoscopy.  Anesthesia: topical 4% lidocaine and neosynephrine  Procedure: With patient in seated position, the scope was inserted into the bilateral nasal passageway and advanced atraumatically into the nasopharynx to examine the following structures:  Nasal cavity: Turbinates with mild hypertrophy. No purulent drainage.   Nasopharynx: no mass or lesion noted in nasopharynx.   Oropharynx: base of tongue without  mass or ulceration. Lingual tonsils normal in appearance  Hypopharynx: posterior pharyngeal wall without mass or lesion. No pooling of secretions. Pyriform sinuses visible without mass or lesion  Larynx: epiglottis normal without lesion. False vocal folds without edema/erythema/lesion. True vocal folds mobile and without lesion. Mild interarytenoid edema no erythema . Postcricoid region with mild edema no lesion   Subglottis: visualized portion of subglottis normal in appearance    After examination performed, the scope was removed atraumatically . The patient tolerated the procedure well. Photodocumentation obtained with representative images below, all images and/or videos uploaded in media section of epic.    Imaging:  The patient does not have any pertinent and/or recent imaging of the head and neck.     Labs:  CBC  Recent Labs   Lab  04/16/24  1120 05/23/24  1042 05/26/24  1051   WBC 6.23 4.41 4.87   Hemoglobin 16.9 14.7 14.0   Hematocrit 52.0 45.6 43.9   MCV 90 88 90   Platelets 279 219 205     BMP  Recent Labs   Lab 04/29/24  1140 05/23/24  1042 05/26/24  1051   Glucose 84 99 96   Sodium 136 140 138   Potassium 4.1 4.3 4.5   Chloride 100 106 105   CO2 25 26 18 L   BUN 12 8 14   Creatinine 1.0 1.0 1.1   Calcium 9.2 9.2 9.0     Assessment  1. Dysphonia     Plan:  Pt is a thompson and part of band.  Nothing seen on Laryngoscopy to account for his voice progressively getting worse over the past month.  Pt referred to Dr. Zamudio for evaluation and treatment.  Discussed plan of care with patient in detail and all questions answered. Patient reported understanding of plan of care.

## 2024-06-03 ENCOUNTER — OFFICE VISIT (OUTPATIENT)
Dept: UROLOGY | Facility: CLINIC | Age: 49
End: 2024-06-03
Payer: COMMERCIAL

## 2024-06-03 ENCOUNTER — HOSPITAL ENCOUNTER (OUTPATIENT)
Dept: RADIOLOGY | Facility: HOSPITAL | Age: 49
Discharge: HOME | End: 2024-06-03
Attending: UROLOGY
Payer: COMMERCIAL

## 2024-06-03 VITALS
HEIGHT: 64 IN | HEART RATE: 65 BPM | SYSTOLIC BLOOD PRESSURE: 125 MMHG | BODY MASS INDEX: 25.82 KG/M2 | WEIGHT: 151.25 LBS | DIASTOLIC BLOOD PRESSURE: 81 MMHG

## 2024-06-03 DIAGNOSIS — N20.0 NEPHROLITHIASIS: ICD-10-CM

## 2024-06-03 PROCEDURE — 1159F MED LIST DOCD IN RCRD: CPT | Mod: CPTII,S$GLB,, | Performed by: UROLOGY

## 2024-06-03 PROCEDURE — 3008F BODY MASS INDEX DOCD: CPT | Mod: CPTII,S$GLB,, | Performed by: UROLOGY

## 2024-06-03 PROCEDURE — 74018 RADEX ABDOMEN 1 VIEW: CPT | Mod: 26,,, | Performed by: RADIOLOGY

## 2024-06-03 PROCEDURE — 3044F HG A1C LEVEL LT 7.0%: CPT | Mod: CPTII,S$GLB,, | Performed by: UROLOGY

## 2024-06-03 PROCEDURE — 99214 OFFICE O/P EST MOD 30 MIN: CPT | Mod: S$GLB,,, | Performed by: UROLOGY

## 2024-06-03 PROCEDURE — 99999 PR PBB SHADOW E&M-EST. PATIENT-LVL IV: CPT | Mod: PBBFAC,,, | Performed by: UROLOGY

## 2024-06-03 PROCEDURE — 74018 RADEX ABDOMEN 1 VIEW: CPT | Mod: TC

## 2024-06-03 PROCEDURE — 3079F DIAST BP 80-89 MM HG: CPT | Mod: CPTII,S$GLB,, | Performed by: UROLOGY

## 2024-06-03 PROCEDURE — 3074F SYST BP LT 130 MM HG: CPT | Mod: CPTII,S$GLB,, | Performed by: UROLOGY

## 2024-06-03 NOTE — H&P (VIEW-ONLY)
CHIEF COMPLAINT:    Mr. Quarles is a 49 y.o. male presenting for a consultation at the request of Dr. MARGOT Flores. Patient presents with nephrolithiasis.    PRESENTING ILLNESS:    Rhett Quarles is a 49 y.o. male who presented to the ER on 5/26/24 c/o R flank pain.  A CT RSS Was done that I interpreted independently.  There is a 7 mm R proximal ureteral stone.    He had a KUB that I interpreted independently, and the stone does appear radiopaque.    He has been straining his urine and has not passed the stone.    REVIEW OF SYSTEMS:    Rhett Quarles denies headache, blurred vision, fever, nausea, vomiting, chills, abdominal pain, chest pain, sore throat, bleeding per rectum, cough, SOB, recent loss of consciousness, recent mental status changes, seizures, dizziness, or upper or lower extremity weakness.    ARNOLDO  1. 3  2. 4  3. 5  4. 5  5. 3      PATIENT HISTORY:    Past Medical History:   Diagnosis Date    Keloid cicatrix     Pituitary macroadenoma        Past Surgical History:   Procedure Laterality Date    COLONOSCOPY N/A 12/3/2020    Procedure: COLONOSCOPY;  Surgeon: Avery Paredes MD;  Location: [address]);  Service: Endoscopy;  Laterality: N/A;  covid test 11/30 [facility]    ESOPHAGOGASTRODUODENOSCOPY N/A 12/3/2020    Procedure: EGD (ESOPHAGOGASTRODUODENOSCOPY);  Surgeon: Avery Paredes MD;  Location: [address]);  Service: Endoscopy;  Laterality: N/A;    EXCISION, NEOPLASM, PITUITARY, TRANSSPHENOIDAL APPROACH, USING COMPUTER-ASSISTED NAVIGATION         Family History   Problem Relation Name Age of Onset    Colon cancer Neg Hx      Esophageal cancer Neg Hx         Social History     Socioeconomic History    Marital status:    Tobacco Use    Smoking status: Never    Smokeless tobacco: Never   Substance and Sexual Activity    Alcohol use: Yes     Comment: 3 servings per day, most days of the week, x 10+ years, stopped 4/2024    Drug use: Not Currently    Sexual  activity: Yes     Partners: Female     Social Determinants of Health     Financial Resource Strain: Low Risk  (4/16/2024)    Overall Financial Resource Strain (CARDIA)     Difficulty of Paying Living Expenses: Not very hard   Food Insecurity: No Food Insecurity (4/16/2024)    Hunger Vital Sign     Worried About Running Out of Food in the Last Year: Never true     Ran Out of Food in the Last Year: Never true   Transportation Needs: No Transportation Needs (4/16/2024)    PRAPARE - Transportation     Lack of Transportation (Medical): No     Lack of Transportation (Non-Medical): No   Physical Activity: Sufficiently Active (4/16/2024)    Exercise Vital Sign     Days of Exercise per Week: 4 days     Minutes of Exercise per Session: 40 min   Stress: Stress Concern Present (4/16/2024)    [employer] [place] - Occupational Stress Questionnaire     Feeling of Stress : To some extent   Housing Stability: Unknown (4/16/2024)    Housing Stability Vital Sign     Unable to Pay for Housing in the Last Year: No       Allergies:  Patient has no known allergies.    Medications:    Current Outpatient Medications:     fluticasone propionate (FLONASE) 50 mcg/actuation nasal spray, 1 spray (50 mcg total) by Each Nostril route once daily. (Patient taking differently: 1 spray by Each Nostril route once daily. PRN), Disp: 9.9 mL, Rfl: 0    hydroCHLOROthiazide (HYDRODIURIL) 12.5 MG Tab, Take 1 tablet (12.5 mg total) by mouth once daily., Disp: 90 tablet, Rfl: 1    multivitamin capsule, Take 1 capsule by mouth once daily., Disp: , Rfl:     ondansetron (ZOFRAN-ODT) 4 MG TbDL, Take 1 tablet (4 mg total) by mouth every 6 (six) hours as needed (nausea)., Disp: 15 tablet, Rfl: 0    tamsulosin (FLOMAX) 0.4 mg Cap, Take 1 capsule (0.4 mg total) by mouth once daily. for 14 days, Disp: 14 capsule, Rfl: 0    PHYSICAL EXAMINATION:    The patient generally appears in good health, is appropriately interactive, and is in no apparent  "distress.     Eyes: anicteric sclerae, moist conjunctivae; no lid-lag; PERRLA     HENT: Atraumatic; oropharynx clear with moist mucous membranes and no mucosal ulcerations;normal hard and soft palate.  No evidence of lymphadenopathy.    Neck: Trachea midline.  No thyromegaly.    Skin: No lesions.    Mental: Cooperative with normal affect.  Is oriented to time, place, and person.    Neuro: Grossly intact.    Chest: Normal inspiratory effort.   No accessory muscles.  No audible wheezes.  Respirations symmetric on inspiration and expiration.    Heart: Regular rhythm.      Abdomen:  Soft, non-tender. No masses or organomegaly. Bladder is not palpable. No evidence of flank discomfort. No evidence of inguinal hernia.    Extremities: No clubbing, cyanosis, or edema      LABS:    UA dipped negative today  No results found for: "PSA", "PSADIAG", "PSATOTAL", "PSAFREE", "PSAFREEPCT"    IMPRESSION:    Encounter Diagnoses   Name Primary?    Nephrolithiasis          PLAN:    1. Will get a KUB.  If the stone is visible and proximal, will plan for ESWL.  Otherwise will plan for ureteroscopy.  2. Strain all urine.  3. The risks and benefits of extracorporeal shock wave lithotripsy were discussed with the patient in detail.    The risks include but are not limited to bleeding in or around the kidney, infection, pain, incomplete fragmentation of the stone requiring a repeat treatment or a different form of treatment, obstruction of the kidney by stone particles possibly requiring a ureteral stent or nephrostomy tube, injury to or loss of the kidney ,injury to the ureter or bladder, need for further procedures, hypertension (transient or permanent), recurrence of stones, and need for open surgery.  Alternative treatments were also discussed with the patient in detail to include ureteroscopy, percutaneous treatment of the stone, open surgery  and observation. Patient understands these risks and has agreed to proceed with surgery. He was " given the chance to ask questions.    4. The risks and benefits of ureteroscopy were discussed with the patient in detail.  The risks include but are not limited to burning with urination, bleeding, infection, pain, incomplete fragmentation of the stone, need for further procedures, injury to the kidney, ureter, bladder and need for open surgery.  The patient was informed that they may require a ureteral stent and that stents can cause irritative voiding symptoms.  They also understand that ureteral stents are temporary and must be removed or exchanged in a timely fashion as they can calcify and make more stones and become difficult to remove. Alternative treatments were also discussed with the patient in detail to include ESWL, percutaneous treatment of the stone, open surgery or observation. Patient understands these risks and has agreed to proceed with surgery.  5.       Copy to:

## 2024-06-03 NOTE — PROGRESS NOTES
CHIEF COMPLAINT:    Mr. Quarles is a 49 y.o. male presenting for a consultation at the request of Dr. MARGOT Flores. Patient presents with nephrolithiasis.    PRESENTING ILLNESS:    Rhett Quarles is a 49 y.o. male who presented to the ER on 5/26/24 c/o R flank pain.  A CT RSS Was done that I interpreted independently.  There is a 7 mm R proximal ureteral stone.    He had a KUB that I interpreted independently, and the stone does appear radiopaque.    He has been straining his urine and has not passed the stone.    REVIEW OF SYSTEMS:    Rhett Quarles denies headache, blurred vision, fever, nausea, vomiting, chills, abdominal pain, chest pain, sore throat, bleeding per rectum, cough, SOB, recent loss of consciousness, recent mental status changes, seizures, dizziness, or upper or lower extremity weakness.    ARNOLDO  1. 3  2. 4  3. 5  4. 5  5. 3      PATIENT HISTORY:    Past Medical History:   Diagnosis Date    Keloid cicatrix     Pituitary macroadenoma        Past Surgical History:   Procedure Laterality Date    COLONOSCOPY N/A 12/3/2020    Procedure: COLONOSCOPY;  Surgeon: Avery Paredes MD;  Location: [address]);  Service: Endoscopy;  Laterality: N/A;  covid test 11/30 [facility]    ESOPHAGOGASTRODUODENOSCOPY N/A 12/3/2020    Procedure: EGD (ESOPHAGOGASTRODUODENOSCOPY);  Surgeon: Avery Paredes MD;  Location: [address]);  Service: Endoscopy;  Laterality: N/A;    EXCISION, NEOPLASM, PITUITARY, TRANSSPHENOIDAL APPROACH, USING COMPUTER-ASSISTED NAVIGATION         Family History   Problem Relation Name Age of Onset    Colon cancer Neg Hx      Esophageal cancer Neg Hx         Social History     Socioeconomic History    Marital status:    Tobacco Use    Smoking status: Never    Smokeless tobacco: Never   Substance and Sexual Activity    Alcohol use: Yes     Comment: 3 servings per day, most days of the week, x 10+ years, stopped 4/2024    Drug use: Not Currently    Sexual  activity: Yes     Partners: Female     Social Determinants of Health     Financial Resource Strain: Low Risk  (4/16/2024)    Overall Financial Resource Strain (CARDIA)     Difficulty of Paying Living Expenses: Not very hard   Food Insecurity: No Food Insecurity (4/16/2024)    Hunger Vital Sign     Worried About Running Out of Food in the Last Year: Never true     Ran Out of Food in the Last Year: Never true   Transportation Needs: No Transportation Needs (4/16/2024)    PRAPARE - Transportation     Lack of Transportation (Medical): No     Lack of Transportation (Non-Medical): No   Physical Activity: Sufficiently Active (4/16/2024)    Exercise Vital Sign     Days of Exercise per Week: 4 days     Minutes of Exercise per Session: 40 min   Stress: Stress Concern Present (4/16/2024)    [employer] [place] [employer] - Occupational Stress Questionnaire     Feeling of Stress : To some extent   Housing Stability: Unknown (4/16/2024)    Housing Stability Vital Sign     Unable to Pay for Housing in the Last Year: No       Allergies:  Patient has no known allergies.    Medications:    Current Outpatient Medications:     fluticasone propionate (FLONASE) 50 mcg/actuation nasal spray, 1 spray (50 mcg total) by Each Nostril route once daily. (Patient taking differently: 1 spray by Each Nostril route once daily. PRN), Disp: 9.9 mL, Rfl: 0    hydroCHLOROthiazide (HYDRODIURIL) 12.5 MG Tab, Take 1 tablet (12.5 mg total) by mouth once daily., Disp: 90 tablet, Rfl: 1    multivitamin capsule, Take 1 capsule by mouth once daily., Disp: , Rfl:     ondansetron (ZOFRAN-ODT) 4 MG TbDL, Take 1 tablet (4 mg total) by mouth every 6 (six) hours as needed (nausea)., Disp: 15 tablet, Rfl: 0    tamsulosin (FLOMAX) 0.4 mg Cap, Take 1 capsule (0.4 mg total) by mouth once daily. for 14 days, Disp: 14 capsule, Rfl: 0    PHYSICAL EXAMINATION:    The patient generally appears in good health, is appropriately interactive, and is in no apparent  "distress.     Eyes: anicteric sclerae, moist conjunctivae; no lid-lag; PERRLA     HENT: Atraumatic; oropharynx clear with moist mucous membranes and no mucosal ulcerations;normal hard and soft palate.  No evidence of lymphadenopathy.    Neck: Trachea midline.  No thyromegaly.    Skin: No lesions.    Mental: Cooperative with normal affect.  Is oriented to time, place, and person.    Neuro: Grossly intact.    Chest: Normal inspiratory effort.   No accessory muscles.  No audible wheezes.  Respirations symmetric on inspiration and expiration.    Heart: Regular rhythm.      Abdomen:  Soft, non-tender. No masses or organomegaly. Bladder is not palpable. No evidence of flank discomfort. No evidence of inguinal hernia.    Extremities: No clubbing, cyanosis, or edema      LABS:    UA dipped negative today  No results found for: "PSA", "PSADIAG", "PSATOTAL", "PSAFREE", "PSAFREEPCT"    IMPRESSION:    Encounter Diagnoses   Name Primary?    Nephrolithiasis          PLAN:    1. Will get a KUB.  If the stone is visible and proximal, will plan for ESWL.  Otherwise will plan for ureteroscopy.  2. Strain all urine.  3. The risks and benefits of extracorporeal shock wave lithotripsy were discussed with the patient in detail.    The risks include but are not limited to bleeding in or around the kidney, infection, pain, incomplete fragmentation of the stone requiring a repeat treatment or a different form of treatment, obstruction of the kidney by stone particles possibly requiring a ureteral stent or nephrostomy tube, injury to or loss of the kidney ,injury to the ureter or bladder, need for further procedures, hypertension (transient or permanent), recurrence of stones, and need for open surgery.  Alternative treatments were also discussed with the patient in detail to include ureteroscopy, percutaneous treatment of the stone, open surgery  and observation. Patient understands these risks and has agreed to proceed with surgery. He was " given the chance to ask questions.    4. The risks and benefits of ureteroscopy were discussed with the patient in detail.  The risks include but are not limited to burning with urination, bleeding, infection, pain, incomplete fragmentation of the stone, need for further procedures, injury to the kidney, ureter, bladder and need for open surgery.  The patient was informed that they may require a ureteral stent and that stents can cause irritative voiding symptoms.  They also understand that ureteral stents are temporary and must be removed or exchanged in a timely fashion as they can calcify and make more stones and become difficult to remove. Alternative treatments were also discussed with the patient in detail to include ESWL, percutaneous treatment of the stone, open surgery or observation. Patient understands these risks and has agreed to proceed with surgery.  5.       Copy to:

## 2024-06-04 ENCOUNTER — PATIENT MESSAGE (OUTPATIENT)
Dept: OTOLARYNGOLOGY | Facility: CLINIC | Age: 49
End: 2024-06-04
Payer: COMMERCIAL

## 2024-06-05 ENCOUNTER — PATIENT MESSAGE (OUTPATIENT)
Dept: UROLOGY | Facility: CLINIC | Age: 49
End: 2024-06-05
Payer: COMMERCIAL

## 2024-06-05 ENCOUNTER — TELEPHONE (OUTPATIENT)
Dept: UROLOGY | Facility: CLINIC | Age: 49
End: 2024-06-05
Payer: COMMERCIAL

## 2024-06-05 DIAGNOSIS — N20.0 NEPHROLITHIASIS: Primary | ICD-10-CM

## 2024-06-05 NOTE — TELEPHONE ENCOUNTER
Per MD: Please notify I can see his stone on KUB.  Can set up for ESWL.      Pt notified via phone call and was also written through the patient portal.

## 2024-06-06 ENCOUNTER — TELEPHONE (OUTPATIENT)
Dept: UROLOGY | Facility: CLINIC | Age: 49
End: 2024-06-06
Payer: COMMERCIAL

## 2024-06-06 DIAGNOSIS — N20.0 NEPHROLITHIASIS: Primary | ICD-10-CM

## 2024-06-10 NOTE — PRE-PROCEDURE INSTRUCTIONS
PreOp Instructions given:    -- Medication information (what to hold and what to take)   -- NPO guidelines as follows: (or as per your Surgeon)  Stop ALL solid food, gum, candy 8 hours before arrival time.  Stop all CLOUDY liquids: coffee with creamer, cloudy juices, 8 hours prior to arrival time.  The patient should be ENCOURAGED to drink carbohydrate-rich clear liquids (sports drinks, clear juices) until 2 hours prior to arrival time.  CLEAR liquids include water, black coffee NO creamer, clear oral rehydration drinks, clear sports drinks and clear fruit juices (no orange juice, no pulpy juices, no apple cider).   IF IN DOUBT, drink water instead.   NOTHING TO DRINK 2 hours before to surgery/procedure  time. If you are told to take medication on the morning of surgery, it may be taken with a sip of water.   -- *Arrival place and directions given*.  Time to be given the day before procedure by the Surgeon's Office   -- Bathe with antibacterial soap (dial or Hibiclens as instructed)  -- Don't wear any jewelry or valuables and not metals on skin or hair AM of surgery   -- No makeup or moisturizer to face   -- No perfume/cologne, powder, lotions, aftershave or deodorant    Pt verbalized understanding.         *If going to , see below:     Directions and Instructions for [facility]   At [facility], we have an outstanding team of physicians, anesthesiologists, CRNAs, Registered Nurses, Surgical Technologists, and other ancillary team members all focused on your surgical and procedural care.   Before Your Procedure:   The physician's office will call you with a specific arrival time and directions a day or two before your scheduled procedure. You may also receive these instructions through your [facility] portal.   Day of Procedure:   Please be sure to arrive at the arrival time given or you may risk your surgery being delayed or canceled. The arrival time is earlier than your  scheduled surgery or procedure time. In the winter months please dress warm and bring blankets for you or your child as the waiting room may be cold. If you have difficulty locating the facility, please give us a call at [phone].   Directions:   The [facility] is located on the 1st floor of the hospital building near the [place] entrance.   Parking:   You will park in the [facility] Parking Garage (note location on map). [facility] opens at 5:00 a.m. and has a drop off area by the entrance.  parking is available starting at 7:00 a.m. Please see below for further  parking instructions.   Directions from the parking garage elevators   Blue [facility] Elevators: From the parking garage, take the blue [place] elevators (located in the center of the parking garage) to the 1st floor of the garage. You will then take a right once off the elevators then another right to the outside of the parking garage. You will be across from the [facility]. You will walk down the sidewalk, pass the  curve at the [place] entrance and continue to follow the sidewalk. You will pass the radiation oncology entrance on your right. Continue to follow the sidewalk to the [facility] glass door entrance.   Hospital Entrance (Inside Route): If a mostly inside route is preferred: Take the inside elevator bank (located at the far north end of the garage) from the parking garage to the 1st floor. On the 1st floor walk past [employer]. Keep walking down the center of the hallway towards the hospital elevators. Once you reach the red brick geraldo, take a left and go past the hospital elevators. Take another left and follow the blue and white [place] signs around the hallway to the end. Go outside of the door. You will see the [facility] entrance to your right.   Drop Off:   There is a drop off area at the doors of the [facility]  for your convenience. If utilized for pediatric patients, an adult must accompany the patient into the surgery center while another adult bruner the vehicle.   Edilberto (at 7:00 a.m.):   Upon check-in, please let the  know that you are utilizing [facility] [employer] parking which is free. The . will then call [employer] for your car to be picked up. Your keys and phone number will be collected and given to [employer] services. You will then be given a ticket. Upon discharge, [employer] will be notified to bring your vehicle back when you are ready.   2/6/2024      If going to 2nd floor surgery center, see below:    Directions to the 2nd floor ([facility]) Surgery Center  The hallway to get to the surgery center is on the 2nd fl between the gold elevators in the atrium.  Follow the hallway into the waiting room (has a fish tank) and check in at desk.

## 2024-06-11 ENCOUNTER — TELEPHONE (OUTPATIENT)
Dept: UROLOGY | Facility: CLINIC | Age: 49
End: 2024-06-11
Payer: COMMERCIAL

## 2024-06-11 ENCOUNTER — PATIENT MESSAGE (OUTPATIENT)
Dept: UROLOGY | Facility: CLINIC | Age: 49
End: 2024-06-11
Payer: COMMERCIAL

## 2024-06-11 ENCOUNTER — ANESTHESIA EVENT (OUTPATIENT)
Dept: SURGERY | Facility: HOSPITAL | Age: 49
End: 2024-06-11
Payer: COMMERCIAL

## 2024-06-11 NOTE — TELEPHONE ENCOUNTER
Called pt to confirm arrival time of 10:30am for procedure on 6/12/24. Gave pt NPO instructions and gave pt opportunity to ask questions. Pt verbalized understanding.

## 2024-06-12 ENCOUNTER — ANESTHESIA (OUTPATIENT)
Dept: SURGERY | Facility: HOSPITAL | Age: 49
End: 2024-06-12
Payer: COMMERCIAL

## 2024-06-17 ENCOUNTER — TELEPHONE (OUTPATIENT)
Dept: UROLOGY | Facility: CLINIC | Age: 49
End: 2024-06-17
Payer: COMMERCIAL

## 2024-06-17 NOTE — TELEPHONE ENCOUNTER
Good Morning. Your surgery will start at 4:20 am, but your arrival time is 2:20 pm.   It will be on the [facility] Passed the gold elevators.  You will need for someone to drive you home because you will be under anaesthesia.  No eating or drinking after Midnight. Take a shower the night before and the morning of with a anti-bacterial soap, ( Dial Soap/ Hibiclens) Do not apply and deodorant, perfume, powder or body lotions the morning of surgery. Wear comfortable clothing, like loose fitting pants and a button down shirt. Leave all jewelry and valuables at home.   If you have any questions don't hesitate to call me. Jennifer [phone]    
Statement Selected

## 2024-06-18 ENCOUNTER — HOSPITAL ENCOUNTER (OUTPATIENT)
Facility: HOSPITAL | Age: 49
Discharge: HOME | End: 2024-06-18
Attending: UROLOGY | Admitting: UROLOGY
Payer: COMMERCIAL

## 2024-06-18 VITALS
HEIGHT: 64 IN | RESPIRATION RATE: 11 BRPM | SYSTOLIC BLOOD PRESSURE: 149 MMHG | WEIGHT: 151 LBS | BODY MASS INDEX: 25.78 KG/M2 | OXYGEN SATURATION: 99 % | DIASTOLIC BLOOD PRESSURE: 88 MMHG | TEMPERATURE: 97 F | HEART RATE: 49 BPM

## 2024-06-18 DIAGNOSIS — N20.9 UROLITHIASIS: Primary | ICD-10-CM

## 2024-06-18 PROCEDURE — 71000015 HC POSTOP RECOV 1ST HR: Performed by: UROLOGY

## 2024-06-18 PROCEDURE — 37000009 HC ANESTHESIA EA ADD 15 MINS: Performed by: UROLOGY

## 2024-06-18 PROCEDURE — 37000008 HC ANESTHESIA 1ST 15 MINUTES: Performed by: UROLOGY

## 2024-06-18 PROCEDURE — 50590 FRAGMENTING OF KIDNEY STONE: CPT | Mod: RT,,, | Performed by: UROLOGY

## 2024-06-18 PROCEDURE — 36000704 HC OR TIME LEV I 1ST 15 MIN: Performed by: UROLOGY

## 2024-06-18 PROCEDURE — 63600175 PHARM REV CODE 636 W HCPCS: Performed by: STUDENT IN AN ORGANIZED HEALTH CARE EDUCATION/TRAINING PROGRAM

## 2024-06-18 PROCEDURE — 71000044 HC DOSC ROUTINE RECOVERY FIRST HOUR: Performed by: UROLOGY

## 2024-06-18 PROCEDURE — 36000705 HC OR TIME LEV I EA ADD 15 MIN: Performed by: UROLOGY

## 2024-06-18 PROCEDURE — 25000003 PHARM REV CODE 250: Performed by: ANESTHESIOLOGY

## 2024-06-18 PROCEDURE — 25000003 PHARM REV CODE 250: Performed by: STUDENT IN AN ORGANIZED HEALTH CARE EDUCATION/TRAINING PROGRAM

## 2024-06-18 PROCEDURE — 71000033 HC RECOVERY, INTIAL HOUR: Performed by: UROLOGY

## 2024-06-18 RX ORDER — HYDROCODONE BITARTRATE AND ACETAMINOPHEN 5; 325 MG/1; MG/1
1 TABLET ORAL EVERY 6 HOURS PRN
COMMUNITY

## 2024-06-18 RX ORDER — SODIUM CHLORIDE 0.9 % (FLUSH) 0.9 %
10 SYRINGE (ML) INJECTION
Status: DISCONTINUED | OUTPATIENT
Start: 2024-06-18 | End: 2024-06-18 | Stop reason: HOSPADM

## 2024-06-18 RX ORDER — TAMSULOSIN HYDROCHLORIDE 0.4 MG/1
0.4 CAPSULE ORAL DAILY
Qty: 20 CAPSULE | Refills: 0 | Status: SHIPPED | OUTPATIENT
Start: 2024-06-18 | End: 2024-07-08

## 2024-06-18 RX ORDER — KETAMINE HCL IN 0.9 % NACL 50 MG/5 ML
SYRINGE (ML) INTRAVENOUS
Status: DISCONTINUED | OUTPATIENT
Start: 2024-06-18 | End: 2024-06-18

## 2024-06-18 RX ORDER — PROPOFOL 10 MG/ML
VIAL (ML) INTRAVENOUS
Status: DISCONTINUED | OUTPATIENT
Start: 2024-06-18 | End: 2024-06-18

## 2024-06-18 RX ORDER — ONDANSETRON HYDROCHLORIDE 2 MG/ML
INJECTION, SOLUTION INTRAVENOUS
Status: DISCONTINUED | OUTPATIENT
Start: 2024-06-18 | End: 2024-06-18

## 2024-06-18 RX ORDER — MIDAZOLAM HYDROCHLORIDE 1 MG/ML
INJECTION INTRAMUSCULAR; INTRAVENOUS
Status: DISCONTINUED | OUTPATIENT
Start: 2024-06-18 | End: 2024-06-18

## 2024-06-18 RX ORDER — OXYCODONE HYDROCHLORIDE 5 MG/1
5 TABLET ORAL ONCE
Status: COMPLETED | OUTPATIENT
Start: 2024-06-18 | End: 2024-06-18

## 2024-06-18 RX ORDER — OXYCODONE HYDROCHLORIDE 5 MG/1
5 TABLET ORAL EVERY 4 HOURS PRN
Qty: 5 TABLET | Refills: 0 | Status: SHIPPED | OUTPATIENT
Start: 2024-06-18

## 2024-06-18 RX ORDER — DEXAMETHASONE SODIUM PHOSPHATE 4 MG/ML
INJECTION, SOLUTION INTRA-ARTICULAR; INTRALESIONAL; INTRAMUSCULAR; INTRAVENOUS; SOFT TISSUE
Status: DISCONTINUED | OUTPATIENT
Start: 2024-06-18 | End: 2024-06-18

## 2024-06-18 RX ORDER — FENTANYL CITRATE 50 UG/ML
INJECTION, SOLUTION INTRAMUSCULAR; INTRAVENOUS
Status: DISCONTINUED | OUTPATIENT
Start: 2024-06-18 | End: 2024-06-18

## 2024-06-18 RX ORDER — PROPOFOL 10 MG/ML
VIAL (ML) INTRAVENOUS CONTINUOUS PRN
Status: DISCONTINUED | OUTPATIENT
Start: 2024-06-18 | End: 2024-06-18

## 2024-06-18 RX ORDER — LIDOCAINE HYDROCHLORIDE 20 MG/ML
INJECTION INTRAVENOUS
Status: DISCONTINUED | OUTPATIENT
Start: 2024-06-18 | End: 2024-06-18

## 2024-06-18 RX ADMIN — PROPOFOL 50 MG: 10 INJECTION, EMULSION INTRAVENOUS at 04:06

## 2024-06-18 RX ADMIN — PROPOFOL 150 MCG/KG/MIN: 10 INJECTION, EMULSION INTRAVENOUS at 04:06

## 2024-06-18 RX ADMIN — ONDANSETRON 4 MG: 2 INJECTION INTRAMUSCULAR; INTRAVENOUS at 04:06

## 2024-06-18 RX ADMIN — MIDAZOLAM HYDROCHLORIDE 2 MG: 1 INJECTION, SOLUTION INTRAMUSCULAR; INTRAVENOUS at 04:06

## 2024-06-18 RX ADMIN — FENTANYL CITRATE 50 MCG: 50 INJECTION, SOLUTION INTRAMUSCULAR; INTRAVENOUS at 04:06

## 2024-06-18 RX ADMIN — SODIUM CHLORIDE: 0.9 INJECTION, SOLUTION INTRAVENOUS at 05:06

## 2024-06-18 RX ADMIN — LIDOCAINE HYDROCHLORIDE 100 MG: 20 INJECTION INTRAVENOUS at 04:06

## 2024-06-18 RX ADMIN — DEXAMETHASONE SODIUM PHOSPHATE 4 MG: 4 INJECTION, SOLUTION INTRAMUSCULAR; INTRAVENOUS at 04:06

## 2024-06-18 RX ADMIN — SODIUM CHLORIDE: 0.9 INJECTION, SOLUTION INTRAVENOUS at 04:06

## 2024-06-18 RX ADMIN — Medication 20 MG: at 04:06

## 2024-06-18 RX ADMIN — OXYCODONE 5 MG: 5 TABLET ORAL at 05:06

## 2024-06-18 NOTE — NURSING TRANSFER
Nursing Transfer Note      6/18/2024   5:57 PM    Nurse giving handoff:Mookie PRICE RN  Nurse receiving handoff:Awais CUEVAS    Reason patient is being transferred: meets criteria    Transfer To: [facility] 26    Transfer via stretcher    Transfer with none    Transported by RN    Transfer Vital Signs:  Blood Pressure:142/94  Heart Rate:52  O2:99  Temperature:96.8  Respirations:18    Telemetry: no  Order for Tele Monitor? No    Additional Lines: no    4eyes on Skin: yes    Medicines sent: oxycodone w/discharge    Any special needs or follow-up needed: discharge instructions    Patient belongings transferred with patient: No    Chart send with patient: Yes    Notified: [facility] to notify family    Patient reassessed at: 6/18

## 2024-06-18 NOTE — DISCHARGE SUMMARY
[facility]  Discharge Note  Short Stay    Admit Date: 6/18/2024    Discharge Date and Time: 06/18/2024 4:45 PM      Attending Physician: Monty Meza MD     Discharge Provider: Shade Dumont MD    Diagnoses:  Active Hospital Problems    Diagnosis  POA    *Urolithiasis [N20.9]  Yes    Transaminitis [R74.01]  Yes    Pituitary macroadenoma [D35.2]  Yes    Hypogonadism in male [E29.1]  Yes    Polycythemia [D75.1]  Yes    Upper extremity weakness [R29.898]  Yes    Muscle twitching [R25.3]  Yes    Brain tumor [D49.6]  Yes    BON (obstructive sleep apnea) [G47.33]  Yes    Convergence insufficiency [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       Discharged Condition: stable    Hospital Course: Patient was admitted for right ESWL and tolerated the procedure well with no complications. He was discharged home in good condition on the same day.       Final Diagnoses: Same as principal problem.    Disposition: Home or Self Care    Follow up/Patient Instructions:    Medications:  Reconciled Home Medications:   Current Discharge Medication List        START taking these medications    Details   oxyCODONE (ROXICODONE) 5 MG immediate release tablet Take 1 tablet (5 mg total) by mouth every 4 (four) hours as needed for Pain.  Qty: 5 tablet, Refills: 0    Comments: Quantity prescribed more than 7 day supply? No      !! tamsulosin (FLOMAX) 0.4 mg Cap Take 1 capsule (0.4 mg total) by mouth once daily. for 20 days  Qty: 20 capsule, Refills: 0       !! - Potential duplicate medications found. Please discuss with provider.        CONTINUE these medications which have NOT CHANGED    Details   fluticasone propionate (FLONASE) 50 mcg/actuation nasal spray 1 spray (50 mcg total) by Each Nostril route once daily.  Qty: 9.9 mL, Refills: 0    Associated Diagnoses: Viral respiratory illness; Cough, unspecified type       hydroCHLOROthiazide (HYDRODIURIL) 12.5 MG Tab Take 1 tablet (12.5 mg total) by mouth once daily.  Qty: 90 tablet, Refills: 1    Comments: .      HYDROcodone-acetaminophen (NORCO) 5-325 mg per tablet Take 1 tablet by mouth every 6 (six) hours as needed for Pain.      multivitamin capsule Take 1 capsule by mouth once daily.      !! tamsulosin (FLOMAX) 0.4 mg Cap Take 1 capsule (0.4 mg total) by mouth once daily. for 14 days  Qty: 14 capsule, Refills: 0      ondansetron (ZOFRAN-ODT) 4 MG TbDL Take 1 tablet (4 mg total) by mouth every 6 (six) hours as needed (nausea).  Qty: 15 tablet, Refills: 0       !! - Potential duplicate medications found. Please discuss with provider.        Discharge Procedure Orders   X-Ray KUB   Standing Status: Future Standing Exp. Date: 06/18/25     Order Specific Question Answer Comments   May the Radiologist modify the order per protocol to meet the clinical needs of the patient? Yes    Release to patient Immediate      Diet Adult Regular     Activity as tolerated      Follow-up Information       Monty Meza MD Follow up.    Specialty: Urology  Contact information:  [address]  [facility] [address] [phone]

## 2024-06-18 NOTE — OP NOTE
[facility]  Operative Note    Date: 06/18/2024    Pre-Op Diagnosis: Right proximal ureteral stone  Active Problem List with Overview Notes    Diagnosis Date Noted    Transaminitis 05/20/2024    Pituitary macroadenoma     Hypogonadism in male 08/25/2020    Polycythemia 08/25/2020    Upper extremity weakness 07/30/2020    Fine motor impairment 07/30/2020    Leg weakness, bilateral 07/23/2020    Muscle twitching 06/16/2020    Brain tumor 02/10/2020    BON (obstructive sleep apnea) 02/03/2020    Convergence insufficiency 01/29/2020    Corticotroph adenoma 01/07/2020    Diplopia 12/18/2019        Post-Op Diagnosis: same    Procedure(s) Performed:   1.  Staged Right ESWL    Specimen(s): none    Staff Surgeon: Monty Meza    Assistant Surgeon: Shade Dumont MD     Anesthesia: Monitored Local Anesthesia with Sedation    Indications: Rhett Quarles is a 49 y.o. male with a  right proximal ureteral stone presenting for staged stone management. The stone is radio-opaque on KUB.      Findings:   - Stone radiopaque on fluoroscopy  - Stone shadow softened at end of case    Estimated Blood Loss: none    Drains: None    Procedure in Detail:  After risk, benefits, and possible complications of ESWL were discussed, the patient elected to undergo the procedure and informed consent was obtained. All questions were answered in the pre-operative area and the patient was transferred to the lithotripsy suite and placed on the lithotriptor table. SCDs were applied and working.  Time out was preformed, jessi-procedural antibiotics were administered.    The patient's Right-sided stone was aligned on the X-Y- and Z axis.  MAC anesthesia was administered.  When the patient was adequately sedated, 3000 thousand shocks were administered at a rate of 60-90 shocks per minute, beginning at 16 KV of power and advanced to 26 KV. Intermittent fluoroscopy was used to monitor fragmentation of the stone.    At the end of the  procedure the stone shadow softened.      The patient tolerated the procedure well and was transferred to the PACU in stable condition.      Plan: The patient will follow up with Dr. Meza in 1 month with a KUB prior to discuss a second stage procedure.  The patient was given prescriptions for flomax, oxycodone.  The patient will strain all urine and bring any fragments to the follow up appointment for stone analysis.

## 2024-06-18 NOTE — ANESTHESIA PREPROCEDURE EVALUATION
06/18/2024  Rhett Quarles is a 49 y.o., male.    Pre-operative evaluation for Procedure(s) (LRB):  LITHOTRIPSY, ESWL (Right)    Patient Active Problem List   Diagnosis    Diplopia    Corticotroph adenoma    Convergence insufficiency    BON (obstructive sleep apnea)    Brain tumor    Muscle twitching    Leg weakness, bilateral    Upper extremity weakness    Fine motor impairment    Hypogonadism in male    Polycythemia    Pituitary macroadenoma    Transaminitis            Peripheral IV - Single Lumen 06/18/24 1218 20 G Left;Posterior Forearm (Active)   Number of days: 0       Medications Prior to Admission   Medication Sig Dispense Refill Last Dose    fluticasone propionate (FLONASE) 50 mcg/actuation nasal spray 1 spray (50 mcg total) by Each Nostril route once daily. (Patient taking differently: 1 spray by Each Nostril route once daily. PRN) 9.9 mL 0 Past Month    hydroCHLOROthiazide (HYDRODIURIL) 12.5 MG Tab Take 1 tablet (12.5 mg total) by mouth once daily. 90 tablet 1 Past Week    HYDROcodone-acetaminophen (NORCO) 5-325 mg per tablet Take 1 tablet by mouth every 6 (six) hours as needed for Pain.   6/17/2024    multivitamin capsule Take 1 capsule by mouth once daily.   Past Week    tamsulosin (FLOMAX) 0.4 mg Cap Take 1 capsule (0.4 mg total) by mouth once daily. for 14 days 14 capsule 0 Past Week    ondansetron (ZOFRAN-ODT) 4 MG TbDL Take 1 tablet (4 mg total) by mouth every 6 (six) hours as needed (nausea). 15 tablet 0 More than a month       Review of patient's allergies indicates:  No Known Allergies    Past Medical History:   Diagnosis Date    Keloid cicatrix     Pituitary macroadenoma      Past Surgical History:   Procedure Laterality Date    COLONOSCOPY N/A 12/3/2020    Procedure: COLONOSCOPY;  Surgeon: Avery Paredes MD;  Location: [facility] ([address] [place] [address]);  Service: Endoscopy;  Laterality:  "N/A;  covid test 11/30 [facility]    ESOPHAGOGASTRODUODENOSCOPY N/A 12/3/2020    Procedure: EGD (ESOPHAGOGASTRODUODENOSCOPY);  Surgeon: Avery Paredes MD;  Location: [facility] ([address] [place] [address]);  Service: Endoscopy;  Laterality: N/A;    EXCISION, NEOPLASM, PITUITARY, TRANSSPHENOIDAL APPROACH, USING COMPUTER-ASSISTED NAVIGATION       Tobacco Use    Smoking status: Never    Smokeless tobacco: Never   Substance and Sexual Activity    Alcohol use: Yes     Comment: 3 servings per day, most days of the week, x 10+ years, stopped 4/2024    Drug use: Not Currently    Sexual activity: Yes     Partners: Female       Objective:     Vital Signs (Most Recent):  Temp: 36.1 °C (97 °F) (06/18/24 1208)  Pulse: 60 (06/18/24 1208)  Resp: 20 (06/18/24 1208)  BP: 137/81 (06/18/24 1208)  SpO2: 98 % (06/18/24 1208) Vital Signs (24h Range):  Temp:  [36.1 °C (97 °F)] 36.1 °C (97 °F)  Pulse:  [60] 60  Resp:  [20] 20  SpO2:  [98 %] 98 %  BP: (137)/(81) 137/81     Weight: 68.5 kg (151 lb 0.2 oz)  Body mass index is 25.92 kg/m².        Significant Labs:  None    CBC: No results for input(s): "WBC", "RBC", "HGB", "HCT", "PLT", "MCV", "MCH", "MCHC" in the last 72 hours.    CMP: No results for input(s): "NA", "K", "CL", "CO2", "BUN", "CREATININE", "GLU", "MG", "PHOS", "CALCIUM", "ALBUMIN", "PROT", "ALKPHOS", "ALT", "AST", "BILITOT" in the last 72 hours.    INR  No results for input(s): "PT", "INR", "PROTIME", "APTT" in the last 72 hours.      Pre-op Assessment    I have reviewed the Patient Summary Reports.     I have reviewed the Nursing Notes. I have reviewed the NPO Status.   I have reviewed the Medications.     Review of Systems  Anesthesia Hx:             Denies Family Hx of Anesthesia complications.    Denies Personal Hx of Anesthesia complications.                    Pulmonary:        Sleep Apnea     Obstructive Sleep Apnea (BON).               Physical Exam  General: Well nourished    Airway:  Mallampati: II   Mouth Opening: Normal  TM " Distance: Normal  Tongue: Normal  Neck ROM: Normal ROM    Dental:  Any loose and/or missing teeth verified with patient   Chest/Lungs:  Clear to auscultation    Heart:  Rate: Normal  Rhythm: Regular Rhythm  Sounds: Normal    Abdomen:  Normal        Anesthesia Plan  Type of Anesthesia, risks & benefits discussed:    Anesthesia Type: Gen ETT, Gen Supraglottic Airway, Gen Natural Airway, MAC  Intra-op Monitoring Plan: Standard ASA Monitors  Post Op Pain Control Plan: multimodal analgesia and IV/PO Opioids PRN  Induction:  IV  Informed Consent: Informed consent signed with the Patient and all parties understand the risks and agree with anesthesia plan.  All questions answered.   ASA Score: 3    Ready For Surgery From Anesthesia Perspective.     .

## 2024-06-18 NOTE — ANESTHESIA POSTPROCEDURE EVALUATION
Anesthesia Post Evaluation    Patient: Rhett Quarles    Procedure(s) Performed: Procedure(s) (LRB):  LITHOTRIPSY, ESWL (Right)    Final Anesthesia Type: general      Patient location during evaluation: PACU  Patient participation: Yes- Able to Participate  Level of consciousness: awake and alert  Post-procedure vital signs: reviewed and stable  Pain management: adequate  Airway patency: patent  BON mitigation strategies: Extubation while patient is awake and Multimodal analgesia  PONV status at discharge: No PONV  Anesthetic complications: no      Cardiovascular status: stable  Respiratory status: unassisted and spontaneous ventilation  Hydration status: euvolemic  Follow-up not needed.              Vitals Value Taken Time   /94 06/18/24 1751   Temp 36.3 °C (97.3 °F) 06/18/24 1720   Pulse 50 06/18/24 1755   Resp 10 06/18/24 1755   SpO2 99 % 06/18/24 1755   Vitals shown include unfiled device data.      Event Time   Out of Recovery 17:50:00         Pain/Abel Score: Pain Rating Prior to Med Admin: 4 (6/18/2024  5:33 PM)  Abel Score: 10 (6/18/2024  5:50 PM)

## 2024-06-18 NOTE — PLAN OF CARE
Rounds completed at this time, pt resting in bed, spouse present at bedside. Pt denies needs, call light in reach, bed low and locked.

## 2024-06-18 NOTE — TRANSFER OF CARE
"Anesthesia Transfer of Care Note    Patient: Rhett Quarles    Procedure(s) Performed: Procedure(s) (LRB):  LITHOTRIPSY, ESWL (Right)    Patient location: PACU    Anesthesia Type: MAC    Transport from OR: Transported from OR on room air with adequate spontaneous ventilation    Post pain: adequate analgesia    Post assessment: no apparent anesthetic complications and tolerated procedure well    Post vital signs: stable    Level of consciousness: awake    Nausea/Vomiting: no nausea/vomiting    Complications: none    Transfer of care protocol was followed      Last vitals: Visit Vitals  BP (!) 114/56   Pulse 63   Temp 36.3 °C (97.3 °F) (Temporal)   Resp 13   Ht 5' 4" (1.626 m)   Wt 68.5 kg (151 lb 0.2 oz)   SpO2 100%   BMI 25.92 kg/m²     "

## 2024-06-18 NOTE — INTERVAL H&P NOTE
The patient has been examined and the H&P has been reviewed:    I concur with the findings and no changes have occurred since H&P was written.    Surgery risks, benefits and alternative options discussed and understood by patient/family.  Right side marked        There are no hospital problems to display for this patient.

## 2024-06-18 NOTE — DISCHARGE INSTRUCTIONS
ESWL post-op instructions:  You may see some blood in your urine while the stone fragments are passing and a few days afterward. Do not be alarmed, even if the urine was clear for a while. Push fluids and refrain from strenuous activity until clearing occurs. If you have difficulty passing clots or don't improve, call us. You can also try sitting in a warm tub of water to help to urinate if needed. Avoid medications such as Aspirin, Advil, Motrin, Plavix, or Coumadin, which thin the blood and cause bleeding.  If you have never had your stones analyzed, strain all urine and bring stone fragments with you to your next appointment.  Diet:  You may return to your normal diet immediately. Alcohol, spicy foods, acidy foods and drinks with caffeine may cause irritation or frequency and should be used in moderation. To keep your urine flowing freely and to avoid constipation, drink plenty of fluids during the day (8-10 glasses).  Activity:  While the kidney is healing do not engage in strenuous activity. If you are active, you may see more blood in the urine. We would suggest cutting down your activity under these circumstances until the bleeding has stopped, perhaps two weeks.  Bowels:  It is important to keep your bowels regular during the postoperative period. Straining with bowel movements can cause bleeding. A bowel movement every other day is reasonable. Use a mild over-the-counter laxative if needed, such as Milk of Magnesia 2-3 tablespoons, or 1-2 Dulcolax tablets. Call if you continue to have problems. Narcotics can worsen constipation; if you had been taking narcotics for pain, before, during or after your surgery, you may be constipated. Ditropan for bladder spasms may also cause constipation.  Problems you should report to us:  Fevers over 101.5 degrees Fahrenheit.   Inability to urinate.   Drug reactions (hives, rash, nausea, vomiting, diarrhea)   Severe burning or pain with urination that is not improving.    You will also have some burning with urination. This is normal after stone therapy and is also expected while the stent is in place. This burning should be mild or moderate and should improve over time. Severe burning, especially when it is not improving, can be a sign of infection.    Follow-up  After the stone has been fragmented you will likely need an x-ray prior to next clinic appointment    Bring stone fragments with you to your next appt.     In some select cases it is important to not leave the string on the stent.  The stent is usually removed 1-4 weeks after treatment.    Call Urology at [phone] with any problems

## 2024-07-01 ENCOUNTER — PATIENT MESSAGE (OUTPATIENT)
Dept: HEPATOLOGY | Facility: CLINIC | Age: 49
End: 2024-07-01
Payer: COMMERCIAL

## 2024-07-03 ENCOUNTER — HOSPITAL ENCOUNTER (OUTPATIENT)
Dept: RADIOLOGY | Facility: HOSPITAL | Age: 49
Discharge: HOME | End: 2024-07-03
Attending: UROLOGY
Payer: COMMERCIAL

## 2024-07-03 DIAGNOSIS — N20.0 NEPHROLITHIASIS: ICD-10-CM

## 2024-07-03 PROCEDURE — 74018 RADEX ABDOMEN 1 VIEW: CPT | Mod: TC

## 2024-07-03 PROCEDURE — 74018 RADEX ABDOMEN 1 VIEW: CPT | Mod: 26,,, | Performed by: RADIOLOGY

## 2024-07-08 ENCOUNTER — OFFICE VISIT (OUTPATIENT)
Dept: UROLOGY | Facility: CLINIC | Age: 49
End: 2024-07-08
Payer: COMMERCIAL

## 2024-07-08 VITALS
BODY MASS INDEX: 25.9 KG/M2 | HEART RATE: 63 BPM | DIASTOLIC BLOOD PRESSURE: 77 MMHG | SYSTOLIC BLOOD PRESSURE: 113 MMHG | WEIGHT: 150.88 LBS

## 2024-07-08 DIAGNOSIS — N20.0 NEPHROLITHIASIS: Primary | ICD-10-CM

## 2024-07-08 PROBLEM — N20.9 UROLITHIASIS: Status: RESOLVED | Noted: 2024-06-18 | Resolved: 2024-07-08

## 2024-07-08 LAB — SPECIMEN SOURCE: NORMAL

## 2024-07-08 PROCEDURE — 99999 PR PBB SHADOW E&M-EST. PATIENT-LVL III: CPT | Mod: PBBFAC,,, | Performed by: UROLOGY

## 2024-07-08 PROCEDURE — 3044F HG A1C LEVEL LT 7.0%: CPT | Mod: CPTII,S$GLB,, | Performed by: UROLOGY

## 2024-07-08 PROCEDURE — 3074F SYST BP LT 130 MM HG: CPT | Mod: CPTII,S$GLB,, | Performed by: UROLOGY

## 2024-07-08 PROCEDURE — 3078F DIAST BP <80 MM HG: CPT | Mod: CPTII,S$GLB,, | Performed by: UROLOGY

## 2024-07-08 PROCEDURE — 1160F RVW MEDS BY RX/DR IN RCRD: CPT | Mod: CPTII,S$GLB,, | Performed by: UROLOGY

## 2024-07-08 PROCEDURE — 82365 CALCULUS SPECTROSCOPY: CPT | Performed by: UROLOGY

## 2024-07-08 PROCEDURE — 1159F MED LIST DOCD IN RCRD: CPT | Mod: CPTII,S$GLB,, | Performed by: UROLOGY

## 2024-07-08 PROCEDURE — 99024 POSTOP FOLLOW-UP VISIT: CPT | Mod: S$GLB,,, | Performed by: UROLOGY

## 2024-07-08 NOTE — PROGRESS NOTES
CHIEF COMPLAINT:    Mr. Quarles is a 49 y.o. male presenting with nephrolithiasis.    PRESENTING ILLNESS:    Rhett Quarles is a 49 y.o. male s/p R ESWL on 6/18/24.  He underwent a post op KUB which I interpreted independently.  The stone is no longer visible.    He brings in fragments.    REVIEW OF SYSTEMS:    Rhett Quarles denies headache, blurred vision, fever, nausea, vomiting, chills, abdominal pain, chest pain, sore throat, bleeding per rectum, cough, SOB, recent loss of consciousness, recent mental status changes, seizures, dizziness, or upper or lower extremity weakness.    ARNOLDO  1. 3  2. 4  3. 5  4. 5  5. 3      PATIENT HISTORY:    Past Medical History:   Diagnosis Date    Keloid cicatrix     Pituitary macroadenoma        Past Surgical History:   Procedure Laterality Date    COLONOSCOPY N/A 12/3/2020    Procedure: COLONOSCOPY;  Surgeon: Avery Paredes MD;  Location: [facility] ([address] [place] [address]);  Service: Endoscopy;  Laterality: N/A;  covid test 11/30 [facility]    ESOPHAGOGASTRODUODENOSCOPY N/A 12/3/2020    Procedure: EGD (ESOPHAGOGASTRODUODENOSCOPY);  Surgeon: Avery Paredes MD;  Location: [facility]  Service: Endoscopy;  Laterality: N/A;    EXCISION, NEOPLASM, PITUITARY, TRANSSPHENOIDAL APPROACH, USING COMPUTER-ASSISTED NAVIGATION      EXTRACORPOREAL SHOCK WAVE LITHOTRIPSY Right 6/18/2024    Procedure: LITHOTRIPSY, ESWL;  Surgeon: Monty Meza MD;  Location: [facility] OR [address] [place] [address];  Service: Urology;  Laterality: Right;  1 hr       Family History   Problem Relation Name Age of Onset    Colon cancer Neg Hx      Esophageal cancer Neg Hx         Social History     Socioeconomic History    Marital status:    Tobacco Use    Smoking status: Never    Smokeless tobacco: Never   Substance and Sexual Activity    Alcohol use: Yes     Comment: 3 servings per day, most days of the week, x 10+ years, stopped 4/2024    Drug use: Not Currently    Sexual activity: Yes     Partners:  Female     Social Determinants of Health     Financial Resource Strain: Low Risk  (4/16/2024)    Overall Financial Resource Strain (CARDIA)     Difficulty of Paying Living Expenses: Not very hard   Food Insecurity: No Food Insecurity (4/16/2024)    Hunger Vital Sign     Worried About Running Out of Food in the Last Year: Never true     Ran Out of Food in the Last Year: Never true   Transportation Needs: No Transportation Needs (4/16/2024)    PRAPARE - Transportation     Lack of Transportation (Medical): No     Lack of Transportation (Non-Medical): No   Physical Activity: Sufficiently Active (4/16/2024)    Exercise Vital Sign     Days of Exercise per Week: 4 days     Minutes of Exercise per Session: 40 min   Stress: Stress Concern Present (4/16/2024)    [employer] - Occupational Stress Questionnaire     Feeling of Stress : To some extent   Housing Stability: Unknown (4/16/2024)    Housing Stability Vital Sign     Unable to Pay for Housing in the Last Year: No       Allergies:  Patient has no known allergies.    Medications:    Current Outpatient Medications:     fluticasone propionate (FLONASE) 50 mcg/actuation nasal spray, 1 spray (50 mcg total) by Each Nostril route once daily. (Patient taking differently: 1 spray by Each Nostril route once daily. PRN), Disp: 9.9 mL, Rfl: 0    hydroCHLOROthiazide (HYDRODIURIL) 12.5 MG Tab, Take 1 tablet (12.5 mg total) by mouth once daily., Disp: 90 tablet, Rfl: 1    multivitamin capsule, Take 1 capsule by mouth once daily., Disp: , Rfl:     tamsulosin (FLOMAX) 0.4 mg Cap, Take 1 capsule (0.4 mg total) by mouth once daily. for 20 days, Disp: 20 capsule, Rfl: 0    HYDROcodone-acetaminophen (NORCO) 5-325 mg per tablet, Take 1 tablet by mouth every 6 (six) hours as needed for Pain. (Patient not taking: Reported on 7/8/2024), Disp: , Rfl:     ondansetron (ZOFRAN-ODT) 4 MG TbDL, Take 1 tablet (4 mg total) by mouth every 6 (six) hours as needed (nausea).,  "Disp: 15 tablet, Rfl: 0    oxyCODONE (ROXICODONE) 5 MG immediate release tablet, Take 1 tablet (5 mg total) by mouth every 4 (four) hours as needed for Pain. (Patient not taking: Reported on 7/8/2024), Disp: 5 tablet, Rfl: 0    PHYSICAL EXAMINATION:    The patient generally appears in good health, is appropriately interactive, and is in no apparent distress.     Eyes: anicteric sclerae, moist conjunctivae; no lid-lag; PERRLA     HENT: Atraumatic; oropharynx clear with moist mucous membranes and no mucosal ulcerations;normal hard and soft palate.  No evidence of lymphadenopathy.    Neck: Trachea midline.  No thyromegaly.    Skin: No lesions.    Mental: Cooperative with normal affect.  Is oriented to time, place, and person.    Neuro: Grossly intact.    Chest: Normal inspiratory effort.   No accessory muscles.  No audible wheezes.  Respirations symmetric on inspiration and expiration.    Heart: Regular rhythm.      Abdomen:  Soft, non-tender. No masses or organomegaly. Bladder is not palpable. No evidence of flank discomfort. No evidence of inguinal hernia.    Extremities: No clubbing, cyanosis, or edema      LABS:    UA dipped negative today  No results found for: "PSA", "PSADIAG", "PSATOTAL", "PSAFREE", "PSAFREEPCT"    IMPRESSION:    Encounter Diagnoses   Name Primary?    Nephrolithiasis Yes         PLAN:    1. Stone for analysis.  2. RTC prn.      Copy to:         "

## 2024-07-09 ENCOUNTER — TELEPHONE (OUTPATIENT)
Dept: HEPATOLOGY | Facility: CLINIC | Age: 49
End: 2024-07-09
Payer: COMMERCIAL

## 2024-07-09 NOTE — TELEPHONE ENCOUNTER
Pt was previously requesting a sooner appt. I had a cancellation next week on 7/15 so I scheduled him for that date. Can you see if he is interested in that date/time? If he does virtual (the way it is scheduled), he can come anytime before then to do his fibroscan. If he wants in person, he can do the fibroscan same day    Thanks !

## 2024-07-09 NOTE — TELEPHONE ENCOUNTER
Reached out to pt in regards to appt changes/ sooner appt. Pt vu and stated the date and time works fine for him. Vu and no further questions

## 2024-07-10 ENCOUNTER — LAB VISIT (OUTPATIENT)
Dept: LAB | Facility: HOSPITAL | Age: 49
End: 2024-07-10
Payer: COMMERCIAL

## 2024-07-10 DIAGNOSIS — R74.01 TRANSAMINITIS: ICD-10-CM

## 2024-07-10 LAB
ALBUMIN SERPL BCP-MCNC: 3.8 G/DL (ref 3.5–5.2)
ALP SERPL-CCNC: 57 U/L (ref 55–135)
ALT SERPL W/O P-5'-P-CCNC: 25 U/L (ref 10–44)
AST SERPL-CCNC: 18 U/L (ref 10–40)
BILIRUB DIRECT SERPL-MCNC: 0.3 MG/DL (ref 0.1–0.3)
BILIRUB SERPL-MCNC: 0.6 MG/DL (ref 0.1–1)
PROT SERPL-MCNC: 6.7 G/DL (ref 6–8.4)

## 2024-07-10 PROCEDURE — 36415 COLL VENOUS BLD VENIPUNCTURE: CPT | Performed by: NURSE PRACTITIONER

## 2024-07-10 PROCEDURE — 80076 HEPATIC FUNCTION PANEL: CPT | Performed by: NURSE PRACTITIONER

## 2024-07-15 ENCOUNTER — OFFICE VISIT (OUTPATIENT)
Dept: OTOLARYNGOLOGY | Facility: CLINIC | Age: 49
End: 2024-07-15
Payer: COMMERCIAL

## 2024-07-15 ENCOUNTER — OFFICE VISIT (OUTPATIENT)
Dept: HEPATOLOGY | Facility: CLINIC | Age: 49
End: 2024-07-15
Payer: COMMERCIAL

## 2024-07-15 ENCOUNTER — PROCEDURE VISIT (OUTPATIENT)
Dept: HEPATOLOGY | Facility: CLINIC | Age: 49
End: 2024-07-15
Payer: COMMERCIAL

## 2024-07-15 VITALS — DIASTOLIC BLOOD PRESSURE: 79 MMHG | HEART RATE: 65 BPM | SYSTOLIC BLOOD PRESSURE: 116 MMHG

## 2024-07-15 VITALS — WEIGHT: 149.06 LBS | BODY MASS INDEX: 25.45 KG/M2 | HEIGHT: 64 IN

## 2024-07-15 DIAGNOSIS — R49.0 DYSPHONIA: Primary | ICD-10-CM

## 2024-07-15 DIAGNOSIS — K76.0 METABOLIC DYSFUNCTION-ASSOCIATED STEATOTIC LIVER DISEASE (MASLD): Primary | ICD-10-CM

## 2024-07-15 DIAGNOSIS — J38.3 VOCAL FOLD SCAR: ICD-10-CM

## 2024-07-15 DIAGNOSIS — R79.89 ELEVATED FERRITIN: ICD-10-CM

## 2024-07-15 DIAGNOSIS — R74.01 TRANSAMINITIS: ICD-10-CM

## 2024-07-15 DIAGNOSIS — J38.5 LARYNGEAL SPASM: ICD-10-CM

## 2024-07-15 PROCEDURE — 1159F MED LIST DOCD IN RCRD: CPT | Mod: CPTII,S$GLB,, | Performed by: NURSE PRACTITIONER

## 2024-07-15 PROCEDURE — 3044F HG A1C LEVEL LT 7.0%: CPT | Mod: CPTII,S$GLB,, | Performed by: NURSE PRACTITIONER

## 2024-07-15 PROCEDURE — 91200 LIVER ELASTOGRAPHY: CPT | Mod: S$GLB,,, | Performed by: NURSE PRACTITIONER

## 2024-07-15 PROCEDURE — 31579 LARYNGOSCOPY TELESCOPIC: CPT | Mod: S$GLB,,, | Performed by: OTOLARYNGOLOGY

## 2024-07-15 PROCEDURE — 1160F RVW MEDS BY RX/DR IN RCRD: CPT | Mod: CPTII,S$GLB,, | Performed by: NURSE PRACTITIONER

## 2024-07-15 PROCEDURE — 1160F RVW MEDS BY RX/DR IN RCRD: CPT | Mod: CPTII,S$GLB,, | Performed by: OTOLARYNGOLOGY

## 2024-07-15 PROCEDURE — 99215 OFFICE O/P EST HI 40 MIN: CPT | Mod: S$GLB,,, | Performed by: NURSE PRACTITIONER

## 2024-07-15 PROCEDURE — 99214 OFFICE O/P EST MOD 30 MIN: CPT | Mod: 25,S$GLB,, | Performed by: OTOLARYNGOLOGY

## 2024-07-15 PROCEDURE — 3008F BODY MASS INDEX DOCD: CPT | Mod: CPTII,S$GLB,, | Performed by: NURSE PRACTITIONER

## 2024-07-15 PROCEDURE — 3078F DIAST BP <80 MM HG: CPT | Mod: CPTII,S$GLB,, | Performed by: OTOLARYNGOLOGY

## 2024-07-15 PROCEDURE — 99999 PR PBB SHADOW E&M-EST. PATIENT-LVL IV: CPT | Mod: PBBFAC,,, | Performed by: OTOLARYNGOLOGY

## 2024-07-15 PROCEDURE — 99999 PR PBB SHADOW E&M-EST. PATIENT-LVL III: CPT | Mod: PBBFAC,,, | Performed by: NURSE PRACTITIONER

## 2024-07-15 PROCEDURE — 3044F HG A1C LEVEL LT 7.0%: CPT | Mod: CPTII,S$GLB,, | Performed by: OTOLARYNGOLOGY

## 2024-07-15 PROCEDURE — 1159F MED LIST DOCD IN RCRD: CPT | Mod: CPTII,S$GLB,, | Performed by: OTOLARYNGOLOGY

## 2024-07-15 PROCEDURE — 3074F SYST BP LT 130 MM HG: CPT | Mod: CPTII,S$GLB,, | Performed by: OTOLARYNGOLOGY

## 2024-07-15 NOTE — PROCEDURES
FibroScan Transplant Hepatology(Vibration Controlled Transient Elastography)    Date/Time: 7/15/2024 9:15 AM    Performed by: Nakita Ellis NP  Authorized by: Nakita Ellis NP    Probe:  M    Universal Protocol: Patient's identity, procedure and site were verified, confirmatory pause was performed.  Discussed procedure including risks and potential complications.  Questions answered.  Patient verbalizes understanding and wishes to proceed with VCTE.     Procedure: After providing explanations of the procedure, patient was placed in the supine position with right arm in maximum abduction to allow optimal exposure of right lateral abdomen.  Patient was briefly assessed, Testing was performed in the mid-axillary location, 50Hz Shear Wave pulses were applied and the resulting Shear Wave and Propagation Speed detected with a 3.5 MHz ultrasonic signal, using the FibroScan probe, Skin to liver capsule distance and liver parenchyma were accessed during the entire examination with the FibroScan probe, Patient was instructed to breathe normally and to abstain from sudden movements during the procedure, allowing for random measurements of liver stiffness. At least 10 Shear Waves were produced, Individual measurements of each Shear Wave were calculated.  Patient tolerated the procedure well with no complications.  Meets discharge criteria as was dismissed.  Rates pain 0 out of 10.  Patient will follow up with ordering provider to review results.    Findings  Median liver stiffness score:  6.4  CAP Reading: dB/m:  263    IQR/med %:  8  Interpretation  Fibrosis interpretation is based on medial liver stiffness - Kilopascal (kPa).    Fibrosis Stage:  F 0-1  Steatosis interpretation is based on controlled attenuation parameter - (dB/m).    Steatosis Grade:  S2

## 2024-07-15 NOTE — PATIENT INSTRUCTIONS
1. Fibroscan to look for fat or scar tissue in the liver showed ~40% fat in the liver, no scar tissue in the liver   2. Follow up in 1 year with labs and US a few days before, fibroscan same day     These things are important to improve fatty liver:  Limit alcohol consumption, no more than 2 serving(s) of alcohol in any day (1 serving is 5 ounces of wine, 12 ounces of beer, or 1.5 ounces of liquor) and do NOT recommend any daily alcohol use    2  Maintain weight loss   3. Avoid processed foods. No fried/fast foods. No sugary drinks or drinks with any calories.   4. Low carb/sugar and high protein diet (~1 g/kg/day of protein).Try to limit your carb intake to LESS than 30-45 grams of carbs with a meal or LESS than 5-10 grams with any snack (total of any snack foods eaten during that time). Use MyFitness Pal or Lose It chandra to add up your carbs through the day. Do NOT drink any beverages with calories or carbs (this can lead to high blood sugar and weight gain). The main thing to focus on is high protein, low carb)  5. Exercise, 5 days per week, 30 minutes per day, as tolerated  6. Recommend good cholesterol, blood pressure, blood sugar levels   7. There is a new medication called Rezdiffra for the treatment of F2-F3 liver scarring due to fatty liver. It is only indicated for patients with significant scar tissue from fatty liver (not you currently)    In some people, fatty liver can progress to steatohepatitis (inflamatory fatty liver) and possibly to cirrhosis, increasing the risk for liver cancer, liver failure, and death. Therefore, the lifestyle changes are very important to decrease this risk.       HEP A/B VACCINE  Your immunity markers show that you do NOT have immunity against Hepatitis A or B, so I recommend that you receive the combination Hepatitis A and B vaccine called TwinRix. This will protect your liver from these viruses, which can make your liver very sick.     Hep A can be transmitted through food  and water and can cause significant liver injury. There was previously a significant Hepatitis A outbreak in [place]. Hep B vaccine is transmitted through blood or bodily fluids (there are no symptoms typically) and can develop longstanding (chronic) Hep B and there is no cure, so many people have it for life. Therefore, the vaccines provide immunity against these viruses that can cause harm to the liver.     The vaccine series is 3 vaccines: one now, one at 4 weeks and one 6 months after the 1st one. I sent the vaccine to the [facility] pharmacy. I recommend calling them in a couple of days to see if the vaccine is covered by your insurance and arrange the vaccines if they are covered. Their number is [phone]    If the vaccine is not covered at the pharmacy level, I sent an order that you can get the vaccine in the infectious disease department at [facility]. If that is the case, please call the injection line at [phone]  to schedule your vaccine administration appointment in the infectious disease department.  If you need to proceed with vaccines with the infectious disease department, you can call to obtain a cost for these vaccines before you proceed, you can call the [facility] office at [phone] or [phone]

## 2024-07-15 NOTE — PROGRESS NOTES
[facility]  Department of Otorhinolaryngology and Communication Sciences    Rhett Quarles is a 49 y.o. male who [facility] to the [facility] self-referred for further management of hoarseness.     He complains of hoarseness, loss of falsetto.  Onset was fairly sudden, without any clear inciting event. Duration is 2-3 months. Time course is constant. Symptoms are stable. Exacerbating factors include voice use. He denies any alleviating factors. He denies any associated symptoms.  He is a .  He is in a few bands, usually sings lower parts but sometimes needs higher notes.  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  From [place], going on a mini tour out there in the coming weeks.        Past Medical History  He has a past medical history of Keloid cicatrix and Pituitary macroadenoma.    Past Surgical History  He has a past surgical history that includes Excision, neoplasm, pituitary, transsphenoidal approach, using computer-assisted navigation; Esophagogastroduodenoscopy (N/A, 12/3/2020); Colonoscopy (N/A, 12/3/2020); and Extracorporeal shock wave lithotripsy (Right, 6/18/2024).    Family History  His family history is not on file.    Social History  He reports that he has never smoked. He has never used smokeless tobacco. He reports current alcohol use. He reports that he does not currently use drugs.    Allergies  He has No Known Allergies.    Medications  He has a current medication list which includes the following prescription(s): fluticasone propionate, hydrochlorothiazide, multivitamin, and tamsulosin.    Review of Systems   Constitutional: Negative.  Negative for fever.   HENT:  Positive for voice change. Negative for sore throat.    Eyes: Negative.  Negative for visual disturbance.   Respiratory: Negative.  Negative for wheezing.    Cardiovascular: Negative.  Negative for chest pain.   Gastrointestinal: Negative.  Negative for nausea.   Endocrine: Negative.     Genitourinary: Negative.    Musculoskeletal: Negative.  Negative for arthralgias.   Skin: Negative.  Negative for rash.   Allergic/Immunologic: Negative.    Neurological: Negative.  Negative for tremors.   Hematological: Negative.  Does not bruise/bleed easily.   Psychiatric/Behavioral: Negative.  The patient is not nervous/anxious.           Objective:     VS reviewed  Physical Exam  Constitutional: comfortable, well dressed  Psychiatric: appropriate affect  Respiratory: comfortably breathing, symmetric chest rise, no stridor  Voice:  Variable mild roughness/strain, breathy aphonic strain in uppermost register; stimulable to improved clarity/flexibility with voice therapy prompting  Cardiovascular: upper extremities non-edematous  Lymphatic: no cervical lymphadenopathy  Neurologic: alert and oriented to time, place, person, and situation; cranial nerves 3-12 grossly intact  Head: normocephalic  Eyes: conjunctivae and sclerae clear  Ears: normal pinnae, normal external auditory canals, tympanic membranes intact  Nose: mucosa pink and noncongested, no masses, no mucopurulence, no polyps  Oral cavity / oropharynx: no mucosal lesions  Neck: soft, full range of motion, laryngotracheal complex palpable with appropriate landmarks, larynx elevates on swallowing  Indirect laryngoscopy: limited due to gag    Procedure  Rigid Laryngeal Videostroboscopy (68036): Laryngeal videostroboscopy is indicated to assess the laryngeal vibratory biomechanics and vocal fold oscillation, which cannot be assessed with a plain light examination. This was carried out with a 70 degree endoscope. After verbal consent was obtained, the patient was positioned and the tongue was gently secured with a gauze sponge. The endoscope was passed transorally and positioned to image the larynx and hypopharynx in detail. The following features were examined: laryngeal and hypopharyngeal masses; vocal fold range and symmetry of motion; laryngeal mucosal  edema, erythema, inflammation, and hydration; salivary pooling; and gross laryngeal sensation. During phonation, the vocal folds were assessed for glottal closure; mucosal wave; vocal fold lesions; vibratory periodicity, amplitude, and phase symmetry; and vertical height match. The equipment was removed. The patient tolerated the procedure well without complication. All findings were normal except:  - mild bilateral vocal fold bowing with sulcus deformity along free edges  - complete closure at vocal processes  - variable slight insufficiency, with slight anterior gap in low/modal pitches, and small spindle shaped gap with in uppermost register  - variable supraglottic hyperfunction, most pronounced in uppermost register      Assessment:     Rhett Quarles is a 49 y.o. 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       Plan:        I had a discussion with the patient regarding his condition and the further workup and management options.      I demonstrated his examination to him on the video monitor.  Reassurance was provided that this does not appear to represent a threatening condition.    SLP voice evaluation and subsequent therapy sessions are medically necessary for restoration of laryngeal function. We will arrange for this to occur with our team in the coming weeks.    He will follow up with me  as needed .    All questions were answered, and the patient is in agreement with the above.     Marco Zamudio M.D.  [facility]  Department of Otorhinolaryngology and Communication Sciences

## 2024-07-15 NOTE — PROGRESS NOTES
[facility] VISIT FOLLOW UP NOTE    PCP: Bryce Robert MD     CHIEF COMPLAINT: elevated liver enzymes, fatty liver     HPI: This is a 49 y.o. patient with PMH noted below, presenting for follow up of above    Serological workup was negative for Bolivar's, alpha-1 antitrypsin deficiency, hemochromatosis (mildly elevated ferritin but normal iron), autoimmune etiology, and viral hepatitis.   CK WNL    Prior serologic workup:   Lab Results   Component Value Date    SMOOTHMUSCAB Negative 1:40 04/29/2024    AMAIFA Negative 1:40 05/27/2024    IGGSERUM 967 05/27/2024    ANASCREEN Negative <1:80 04/29/2024    FERRITIN 306 (H) 04/29/2024    FESATURATED 23 04/29/2024    CERULOPLSM 26.0 05/27/2024    HEPBSAG Non-reactive 04/29/2024    HEPCAB Non-reactive 05/27/2024    HEPAIGM Negative 07/20/2020       Liver fibrosis staging:  -- fibroscan 7/2024 noted F0, S2 (kPA 6.4, )    Risk factors for fatty liver include previously overweight and previous alcohol use     Interval HPI: Presents today alone. Stopped alcohol ~4/2024 (previously ~3 servings most days of the week). Current wt 149 lbs, previously 167 lbs  No SSB, no fried/fast foods  + Herbal medications : milk thistle, tumeric   Walking for exercise    Labs done 7/2024 show normal transaminase levels (ALT > AST, previously elevated  2023/2024)      Lab Results   Component Value Date    ALT 25 07/10/2024    AST 18 07/10/2024    ALKPHOS 57 07/10/2024    BILITOT 0.6 07/10/2024    ALBUMIN 3.8 07/10/2024    INR 1.0 02/03/2020     05/26/2024       Abd U/S 5/2024 noted fatty liver, no splenomegaly    + family history of liver disease: dad but unsure . Previous alcohol consumption currently   Social History     Substance and Sexual Activity   Alcohol Use Yes    Comment: 3 servings per day, most days of the week, x 10+ years, stopped 4/2024         Immunity to Hep A and B - Needs twinix, sent to [facility] pharm and ID          Allergy and medication list  "reviewed and updated     PMHX:  has a past medical history of Keloid cicatrix and Pituitary macroadenoma.    PSHX:  has a past surgical history that includes Excision, neoplasm, pituitary, transsphenoidal approach, using computer-assisted navigation; Esophagogastroduodenoscopy (N/A, 12/3/2020); Colonoscopy (N/A, 12/3/2020); and Extracorporeal shock wave lithotripsy (Right, 6/18/2024).    FAMILY HISTORY: Updated and reviewed in EPIC    ROS:   GENERAL: Denies fatigue  CARDIOVASCULAR: Denies edema  GI: Denies abdominal pain  SKIN: Denies rash, itching   NEURO: Denies confusion, memory loss, or mood changes    PHYSICAL EXAM:   In no acute distress; alert and oriented to person, place and time  VITALS: Ht 5' 4" (1.626 m)   Wt 67.6 kg (149 lb 0.5 oz)   BMI 25.58 kg/m²   EYES: Sclerae anicteric  GI: Soft, non-tender, non-distended. No ascites.  EXTREMITIES:  No edema.  SKIN: Warm and dry. No jaundice. No telangectasias noted. No palmar erythema.  NEURO:  No asterixis.  PSYCH:  Thought and speech pattern appropriate. Behavior normal      EDUCATION:  See instructions discussed with patient in Instructions section of the After Visit Summary     ASSESSMENT & PLAN:  49 y.o. male with:  1. Metabolic associated steatotic liver disease +/- previous alcohol use   Fibroscan 7/2024 noted F0, S2 - will repeat yearly   -- Labs note normal liver enzymes after lifestyle changes  --- synthetic liver function WNL  --- Abd US 5/2024 noted fatty liver   -- do not suspect any medications contributing   --- previous serological work up : see hpi   --- Hep A and B immunity: see HPI    2. Elevated ferritin  Normal iron, low suspicion for HH, can do HH testing if does not normalize with fatty liver improvement     3. Previously Overweight, previous alcohol use  Body mass index is 25.58 kg/m².  Can increase risk of fatty liver  Continue recent lifestyle changes  -- Recommendations discussed with patient:  Limit alcohol consumption, no more than " 2 servings of alcohol in any day (1 serving is 5 ounces of wine, 12 ounces of beer, or 1.5 ounces of liquor) and do NOT recommend any daily alcohol use   2 Maintain weight loss   3. Low carb/sugar, high fiber and protein diet, limit your carb intake to LESS than 30-45 grams of carbs with a meal or LESS than 5-10 grams with any snack   4. Exercise, 5 days per week, 30 minutes per day, as tolerated  5. Recommend good cholesterol, blood pressure, blood sugar levels   6. No indication for Rezdiffra, no fibrosis               Follow up in about 1 year (around 7/15/2025). with labs, US and fibroscan before  Orders Placed This Encounter   Procedures    FibroScan Transplant Hepatology(Vibration Controlled Transient Elastography)    US Abdomen Complete    Ferritin    Iron and TIBC        Thank you for allowing me to participate in the care of MARIO ValenzuelaC    I spent a total of 40 minutes on the day of the visit.This includes face to face time and non-face to face time preparing to see the patient (eg, review of tests), obtaining and/or reviewing separately obtained history, documenting clinical information in the electronic or other health record, independently interpreting results and communicating results to the patient/family/caregiver, and coordinating care.         CC'ed note to:   Bryce Robert MD

## 2024-08-05 ENCOUNTER — CLINICAL SUPPORT (OUTPATIENT)
Dept: SPEECH THERAPY | Facility: HOSPITAL | Age: 49
End: 2024-08-05
Attending: OTOLARYNGOLOGY
Payer: COMMERCIAL

## 2024-08-05 DIAGNOSIS — R49.0 DYSPHONIA: ICD-10-CM

## 2024-08-05 DIAGNOSIS — J38.5 LARYNGEAL SPASM: ICD-10-CM

## 2024-08-05 PROCEDURE — 92524 BEHAVRAL QUALIT ANALYS VOICE: CPT | Mod: GN,95 | Performed by: SPEECH-LANGUAGE PATHOLOGIST

## 2024-11-22 ENCOUNTER — OFFICE VISIT (OUTPATIENT)
Dept: INTERNAL MEDICINE | Facility: CLINIC | Age: 49
End: 2024-11-22
Attending: INTERNAL MEDICINE
Payer: COMMERCIAL

## 2024-11-22 VITALS — DIASTOLIC BLOOD PRESSURE: 79 MMHG | SYSTOLIC BLOOD PRESSURE: 116 MMHG | HEART RATE: 65 BPM

## 2024-11-22 DIAGNOSIS — R51.9 FREQUENT HEADACHES: Primary | ICD-10-CM

## 2024-11-22 DIAGNOSIS — R00.2 PALPITATIONS: ICD-10-CM

## 2024-11-22 PROCEDURE — 3074F SYST BP LT 130 MM HG: CPT | Mod: CPTII,95,, | Performed by: INTERNAL MEDICINE

## 2024-11-22 PROCEDURE — 1160F RVW MEDS BY RX/DR IN RCRD: CPT | Mod: CPTII,95,, | Performed by: INTERNAL MEDICINE

## 2024-11-22 PROCEDURE — 1159F MED LIST DOCD IN RCRD: CPT | Mod: CPTII,95,, | Performed by: INTERNAL MEDICINE

## 2024-11-22 PROCEDURE — 99213 OFFICE O/P EST LOW 20 MIN: CPT | Mod: 95,,, | Performed by: INTERNAL MEDICINE

## 2024-11-22 PROCEDURE — 3044F HG A1C LEVEL LT 7.0%: CPT | Mod: CPTII,95,, | Performed by: INTERNAL MEDICINE

## 2024-11-22 PROCEDURE — 3078F DIAST BP <80 MM HG: CPT | Mod: CPTII,95,, | Performed by: INTERNAL MEDICINE

## 2024-12-27 NOTE — PROGRESS NOTES
Subjective:       Patient ID: Rhett Quarles is a 49 y.o. male.    Chief Complaint: Blood pressure follow up    Concerned about symptoms and BP. Having frequent HA and palpitations.     Hypertension  This is a recurrent problem. The current episode started more than 1 year ago. The problem has been gradually worsening since onset. The problem is resistant. Associated symptoms include headaches, palpitations and peripheral edema. Pertinent negatives include no anxiety, blurred vision, chest pain, malaise/fatigue, neck pain, orthopnea, PND, shortness of breath or sweats. Past treatments include diuretics. The current treatment provides mild improvement.     History of Present Illness              Review of Systems   Constitutional:  Negative for malaise/fatigue.   Eyes:  Negative for blurred vision.   Respiratory:  Negative for shortness of breath.    Cardiovascular:  Positive for palpitations. Negative for chest pain, orthopnea and PND.   Musculoskeletal:  Negative for neck pain.   Neurological:  Positive for headaches.       Objective:      Vitals:    11/22/24 1521   BP: 116/79   Pulse: 65      Physical Exam  Constitutional:       General: He is not in acute distress.     Appearance: Normal appearance. He is well-developed. He is not diaphoretic.   HENT:      Head: Normocephalic and atraumatic.   Eyes:      General: No scleral icterus.        Right eye: No discharge.         Left eye: No discharge.      Conjunctiva/sclera: Conjunctivae normal.   Pulmonary:      Effort: Pulmonary effort is normal. No respiratory distress.   Abdominal:      General: There is no distension.   Skin:     General: Skin is warm and dry.   Neurological:      Mental Status: He is alert and oriented to person, place, and time.   Psychiatric:         Speech: Speech normal.         Assessment:       1. Frequent headaches    2. Palpitations        Plan:       Rhett was seen today for blood pressure follow up.    Diagnoses and all orders  for this visit:    Frequent headaches   Continue workup in progress  Palpitations           Assessment & Plan              This note was generated with the assistance of ambient listening technology. Verbal consent was obtained by the patient and accompanying visitor(s) for the recording of patient appointment to facilitate this note. I attest to having reviewed and edited the generated note for accuracy, though some syntax or spelling errors may persist. Please contact the author of this note for any clarification.      Bryce Merida MD  Internal Medicine-[facility]        Side effects of medication(s) were discussed in detail and patient voiced understanding.  Patient will call back for any issues or complications.

## 2025-02-06 ENCOUNTER — OFFICE VISIT (OUTPATIENT)
Dept: CARDIOLOGY | Facility: CLINIC | Age: 50
End: 2025-02-06
Payer: COMMERCIAL

## 2025-02-06 VITALS
BODY MASS INDEX: 28.87 KG/M2 | DIASTOLIC BLOOD PRESSURE: 89 MMHG | SYSTOLIC BLOOD PRESSURE: 143 MMHG | WEIGHT: 168.19 LBS | HEART RATE: 71 BPM

## 2025-02-06 DIAGNOSIS — I10 PRIMARY HYPERTENSION: Primary | ICD-10-CM

## 2025-02-06 DIAGNOSIS — R00.0 TACHYCARDIA: ICD-10-CM

## 2025-02-06 DIAGNOSIS — E78.2 MIXED HYPERLIPIDEMIA: ICD-10-CM

## 2025-02-06 PROCEDURE — 93000 ELECTROCARDIOGRAM COMPLETE: CPT | Mod: S$GLB,,, | Performed by: INTERNAL MEDICINE

## 2025-02-06 PROCEDURE — 3008F BODY MASS INDEX DOCD: CPT | Mod: CPTII,S$GLB,, | Performed by: INTERNAL MEDICINE

## 2025-02-06 PROCEDURE — 3077F SYST BP >= 140 MM HG: CPT | Mod: CPTII,S$GLB,, | Performed by: INTERNAL MEDICINE

## 2025-02-06 PROCEDURE — 3079F DIAST BP 80-89 MM HG: CPT | Mod: CPTII,S$GLB,, | Performed by: INTERNAL MEDICINE

## 2025-02-06 PROCEDURE — 1159F MED LIST DOCD IN RCRD: CPT | Mod: CPTII,S$GLB,, | Performed by: INTERNAL MEDICINE

## 2025-02-06 PROCEDURE — 1160F RVW MEDS BY RX/DR IN RCRD: CPT | Mod: CPTII,S$GLB,, | Performed by: INTERNAL MEDICINE

## 2025-02-06 PROCEDURE — 99999 PR PBB SHADOW E&M-EST. PATIENT-LVL III: CPT | Mod: PBBFAC,,, | Performed by: INTERNAL MEDICINE

## 2025-02-06 PROCEDURE — 99204 OFFICE O/P NEW MOD 45 MIN: CPT | Mod: 25,S$GLB,, | Performed by: INTERNAL MEDICINE

## 2025-02-06 RX ORDER — AMLODIPINE AND OLMESARTAN MEDOXOMIL 5; 20 MG/1; MG/1
1 TABLET ORAL DAILY
Qty: 90 TABLET | Refills: 3 | Status: SHIPPED | OUTPATIENT
Start: 2025-02-06 | End: 2026-02-06

## 2025-02-06 NOTE — PROGRESS NOTES
HISTORY:    49-year-old male with a h/o hypertension  presenting for initial evaluation by me.      Comes in for general CV eval.    Notes a long h/o hypertension for years. Off bp meds at this time. Has been on low dose HCTZ w minimal effect. Generally runs 150/90s at home. Sometimes feels strong heart beats. Has been evaluated for palpitations over the years with negative chairez.    The patient denies any symptoms of chest pain, shortness of breath, or dyspnea on exertion.    Activity levels moderate. Works as a . Walks 2 miles/day.     The patient denies any previous history of myocardial infarction, coronary artery disease, peripheral arterial disease, stroke, congestive heart failure, or cardiomyopathy. Non-smoker. Dad had had some CV ds at a young age. Not certain.    Pt from [place] and used to live in [place]. Moved here in '09.    PHYSICAL EXAM:    Vitals:    02/06/25 1328   BP: (!) 143/89   Pulse: 71       NAD, A+Ox3.  No jvd, no bruit.  RRR nml s1,s2. No murmurs.  CTA B no wheezes or crackles.  No edema.    LABS/STUDIES (imaging reviewed during clinic visit):    May 2024 CBC and CMP normal.  /HDL 40//.  BNP normal.    EKG February 2025 demonstrates sinus rhythm with no Q-waves or ST changes.    Holter 2024 demonstrates sinus rhythm with no significant ectopy or arrhythmia.  3 reported episodes of palpitations corresponding with sinus rhythm.    TTE 2024 normal LV size and function with EF of 55-60%.  Normal diastology.  CVP 8.    Coronary calcium score 2024 Agatston score of 0.       ASSESSMENT & PLAN:    1. Primary hypertension    2. Tachycardia    3. Mixed hyperlipidemia        Orders Placed This Encounter    Basic Metabolic Panel    Lipid Panel    Lipoprotein A (LPA)    Apolipoprotein B    IN OFFICE EKG 12-LEAD (to Muse)    amlodipine-olmesartan (LISSETTE) 5-20 mg per tablet      No CV symptoms. Coronary calcium score 0.     Bps above goal. Start amlodipine-olmesartan 5-20x1.  Continue to monitor.    LDL above goal. Calcium score 0. Check Lpa/APoB.    We discussed the importance of diet modifications and instituting an exercise regimen.    Follow up in about 6 months (around 8/6/2025).      Gavin Sullivan MD

## 2025-02-07 LAB
OHS QRS DURATION: 102 MS
OHS QTC CALCULATION: 438 MS

## 2025-02-25 ENCOUNTER — OFFICE VISIT (OUTPATIENT)
Dept: DERMATOLOGY | Facility: CLINIC | Age: 50
End: 2025-02-25
Payer: COMMERCIAL

## 2025-02-25 DIAGNOSIS — D22.9 MULTIPLE BENIGN NEVI: ICD-10-CM

## 2025-02-25 DIAGNOSIS — Z12.83 SCREENING EXAM FOR SKIN CANCER: ICD-10-CM

## 2025-02-25 DIAGNOSIS — D18.01 CHERRY ANGIOMA: ICD-10-CM

## 2025-02-25 DIAGNOSIS — L21.9 SEBORRHEIC DERMATITIS: ICD-10-CM

## 2025-02-25 DIAGNOSIS — L82.1 SEBORRHEIC KERATOSES: ICD-10-CM

## 2025-02-25 DIAGNOSIS — D48.5 NEOPLASM OF UNCERTAIN BEHAVIOR OF SKIN: Primary | ICD-10-CM

## 2025-02-25 PROCEDURE — 1159F MED LIST DOCD IN RCRD: CPT | Mod: CPTII,S$GLB,, | Performed by: STUDENT IN AN ORGANIZED HEALTH CARE EDUCATION/TRAINING PROGRAM

## 2025-02-25 PROCEDURE — 99999 PR PBB SHADOW E&M-EST. PATIENT-LVL III: CPT | Mod: PBBFAC,,, | Performed by: STUDENT IN AN ORGANIZED HEALTH CARE EDUCATION/TRAINING PROGRAM

## 2025-02-25 PROCEDURE — 99213 OFFICE O/P EST LOW 20 MIN: CPT | Mod: 25,S$GLB,, | Performed by: STUDENT IN AN ORGANIZED HEALTH CARE EDUCATION/TRAINING PROGRAM

## 2025-02-25 PROCEDURE — 4010F ACE/ARB THERAPY RXD/TAKEN: CPT | Mod: CPTII,S$GLB,, | Performed by: STUDENT IN AN ORGANIZED HEALTH CARE EDUCATION/TRAINING PROGRAM

## 2025-02-25 PROCEDURE — 11102 TANGNTL BX SKIN SINGLE LES: CPT | Mod: S$GLB,,, | Performed by: STUDENT IN AN ORGANIZED HEALTH CARE EDUCATION/TRAINING PROGRAM

## 2025-02-25 PROCEDURE — 1160F RVW MEDS BY RX/DR IN RCRD: CPT | Mod: CPTII,S$GLB,, | Performed by: STUDENT IN AN ORGANIZED HEALTH CARE EDUCATION/TRAINING PROGRAM

## 2025-02-25 NOTE — PROGRESS NOTES
Subjective:      Patient ID:  Rhett Quarles is a 50 y.o. male who presents for   Chief Complaint   Patient presents with    Skin Check     TBSE      Pt is here for TBSE   Pt denies any h/o mm or nmsc       Review of Systems   Skin:  Positive for dry skin (scalp). Negative for itching, rash, daily sunscreen use, activity-related sunscreen use and recent sunburn.   Hematologic/Lymphatic: Does not bruise/bleed easily.       Objective:   Physical Exam   Constitutional: He appears well-developed and well-nourished. No distress.   Neurological: He is alert and oriented to person, place, and time. He is not disoriented.   Psychiatric: He has a normal mood and affect.   Skin:   Areas Examined (abnormalities noted in diagram):   Scalp / Hair Palpated and Inspected  Head / Face Inspection Performed  Neck Inspection Performed  Chest / Axilla Inspection Performed  Abdomen Inspection Performed  Back Inspection Performed  RUE Inspected  LUE Inspection Performed  RLE Inspected  LLE Inspection Performed  Nails and Digits Inspection Performed                 Diagram Legend     Erythematous scaling macule/papule c/w actinic keratosis       Vascular papule c/w angioma      Pigmented verrucoid papule/plaque c/w seborrheic keratosis      Yellow umbilicated papule c/w sebaceous hyperplasia      Irregularly shaped tan macule c/w lentigo     1-2 mm smooth white papules consistent with Milia      Movable subcutaneous cyst with punctum c/w epidermal inclusion cyst      Subcutaneous movable cyst c/w pilar cyst      Firm pink to brown papule c/w dermatofibroma      Pedunculated fleshy papule(s) c/w skin tag(s)      Evenly pigmented macule c/w junctional nevus     Mildly variegated pigmented, slightly irregular-bordered macule c/w mildly atypical nevus      Flesh colored to evenly pigmented papule c/w intradermal nevus       Pink pearly papule/plaque c/w basal cell carcinoma      Erythematous hyperkeratotic cursted plaque c/w SCC       Surgical scar with no sign of skin cancer recurrence      Open and closed comedones      Inflammatory papules and pustules      Verrucoid papule consistent consistent with wart     Erythematous eczematous patches and plaques     Dystrophic onycholytic nail with subungual debris c/w onychomycosis     Umbilicated papule    Erythematous-base heme-crusted tan verrucoid plaque consistent with inflamed seborrheic keratosis     Erythematous Silvery Scaling Plaque c/w Psoriasis     See annotation            Assessment / Plan:      Pathology Orders:       Normal Orders This Visit    Specimen to Pathology, Dermatology     Questions:    Procedure Type: Dermatology and skin neoplasms    Number of Specimens: 1    ------------------------: -------------------------    Spec 1 Procedure: Biopsy    Spec 1 Clinical Impression: IDN vs dysplastic nevus    Spec 1 Source: left clavicle    Release to patient: Immediate    Release to patient:           Neoplasm of uncertain behavior of skin  -     Specimen to Pathology, Dermatology    Shave biopsy procedure note:    Shave biopsy performed after verbal consent including risk of infection, scar, recurrence, need for additional treatment of site. Area prepped with alcohol, anesthetized with approximately 1.0cc of 1% lidocaine with epinephrine. Lesional tissue shaved with razor blade. Hemostasis achieved with application of aluminum chloride followed by hyfrecation. No complications. Dressing applied. Wound care explained.    Multiple benign nevi  Seborrheic keratoses  Cherry angioma  Reassurance given to patient. No treatment is necessary.   Treatment of benign, asymptomatic lesions may be considered cosmetic.    Discussed ABCDE's of nevi.  Monitor for new mole or moles that are becoming bigger, darker, irritated, or developing irregular borders. Instructed patient to observe lesion(s) for changes and follow up in clinic if changes are noted. Patient to monitor skin at home for new or  changing lesions.       Seborrheic dermatitis  - can use selsun blue tiw to wash scalp and face    Screening exam for skin cancer    Total body skin examination performed today including at least 12 points as noted in physical examination. Suspicious lesions noted.    Recommend daily sun protection/avoidance, use of at least SPF 30, broad spectrum sunscreen (OTC drug), skin self examinations, and routine physician surveillance to optimize early detection           Follow up in about 2 years (around 2/25/2027) for TBSE.

## 2025-02-25 NOTE — PATIENT INSTRUCTIONS
Shave Biopsy Wound Care    Your doctor has performed a shave biopsy today.  A band aid and vaseline ointment has been placed over the site.  This should remain in place for NO LONGER THAN 48 hours.  It is fine to remove the bandaid after 24 hours, if the area is no longer bleeding. It is recommended that you keep the area dry (do not wet)) for the first 24 hours.  After 24 hours, wash the area with warm soap and water and apply Vaseline jelly.  Many patients prefer to use Neosporin or Bacitracin ointment.  This is acceptable; however, know that you can develop an allergy to this medication even if you have used it safely for years.  It is important to keep the area moist.  Letting it dry out and get air slows healing time, and will worsen the scar.        If you notice increasing redness, tenderness, pain, or yellow drainage at the biopsy site, please notify your doctor.  These are signs of an infection.    If your biopsy site is bleeding, apply firm pressure for 15 minutes straight.  Repeat for another 15 minutes, if it is still bleeding.   If the surgical site continues to bleed, then please contact your doctor.      For [facility] users:   You will receive your biopsy results in [facility] as soon as they are available. Please be assured that your physician/provider will review your results and will then determine what further treatment, evaluation, or planning is required. You should be contacted by your physician's/provider's office within 5 business days of receiving your results; If not, please reach out to directly. This is one more way [facility] putting you first.     [address] [place] [address]/ [phone] FAX/ [email or web address]         Sunscreen Guidelines  Sunscreen protects your skin by absorbing and reflecting ultraviolet rays. All sunscreens have a sun protection factor (SPF) rating that indicates how long a sunscreen will remain effective on the skin.    Why protect  your skin?  The sun's rays are composed of many different wavelengths, including UVA, UVB, and visible light that each affect the skin differently.    UVB: sunburn, photoaging, skin cancer (melanoma, basal cell, and squamous cell carcinomas) and modulation of the skin's immune system.    UVA: similar to above but thought to contribute more to aging; at the same dose of UVB it is less powerful however the sun has 10-20 times the levels of UVA as compared with UVB.  Visible light: implicated in causing unwanted darkening of skin, such as melasma and post-inflammatory hyperpigmentation in darker skin types     If I have dark skin, do I need to worry about the sun?    More darkly pigmented skin is more protected against UV-induced skin cancer, sunburn, and photoaging, though may still suffer from sun-related conditions, including melasma, hyperpigmentation, and other dark spots.    Sun avoidance  As a general rule, stay out of the sun as much as possible between 10 a.m. - 4 p.m.    Download the EPA UV index chandra to track the UV index by hour in your zip code.      Which sunscreen should I choose?  The best sunscreen to use varies by individual. The one that feels best on your skin and fits your lifestyle will be the one you will likely use most regularly.   Active ingredients of sunscreen vary by , and may be a chemical (such as avobenzone or oxybenzone) or physical agent (such as zinc oxide or titanium dioxide). I recommend a physical agent.  A water-resistant sunscreen is one that maintains the SPF level after 40 minutes of water exposure. A very water-resistant sunscreen maintains the SPF level after 80 minutes of water exposure.    Sunscreen: this is the last layer in sun protection   Be generous: 1 shot glass of sunscreen for your body, ½ teaspoon for your face/neck  Reapply every 2 hours  Broad spectrum (provides UVA/UVB protection), SPF 50 or above  Avoid spray sunscreens: less effective and have been  "found to contain benzene (carcinogen)    Sun protective clothing  Although sunscreen helps minimize sun damage, no sunscreen completely blocks all wavelengths of UV light. Wearing sun protective clothing such as hats, rashguards or swim shirts, and long sleeves and/or pants, as well as avoiding sun exposure from 10 a.m. to 4 p.m. will help protect your skin from overexposure and minimize sun damage. Seek shade.  Long sleeved clothing, hats, and sunglasses: makes sun protection easier, more effective, and can even be more affordable, since sunscreen needs to be reapplied frequently.    Solumbra (www.sunprectautions.com)  Digital Sports (www.[email or web address])  Coolibar (www.Echodio)  Land's end ([employer])  Hats from [employer] ([email or web address])    My Favorite Sunscreens:  Physical blockers: Can have a "white case" but in general are more effective  - Face: CeraVe tinted mineral sunscreen, Bare Minerals complexion rescue (20 shades), Elta MD (UV elements, UV physical, UV restore, etc), Tizo ultra zinc tinted, Cetaphil Sheer Mineral Face Liquid Sunscreen  - Body: Blue Lizard, Neutrogena Sheer Zinc, Eucerin Daily Protection, Aveeno Baby   "

## 2025-03-03 ENCOUNTER — RESULTS FOLLOW-UP (OUTPATIENT)
Dept: DERMATOLOGY | Facility: CLINIC | Age: 50
End: 2025-03-03

## 2025-03-31 ENCOUNTER — PATIENT MESSAGE (OUTPATIENT)
Dept: ADMINISTRATIVE | Facility: HOSPITAL | Age: 50
End: 2025-03-31
Payer: COMMERCIAL

## 2025-04-08 ENCOUNTER — PATIENT MESSAGE (OUTPATIENT)
Dept: OTOLARYNGOLOGY | Facility: CLINIC | Age: 50
End: 2025-04-08
Payer: COMMERCIAL

## 2025-04-09 NOTE — NURSING
1300 Pt transported to and from CT in wheelchair w/ portable tele monitor and ambu bag. Pt tolerated scan and transport well.    #1:

## 2025-04-16 ENCOUNTER — PATIENT MESSAGE (OUTPATIENT)
Dept: INTERNAL MEDICINE | Facility: CLINIC | Age: 50
End: 2025-04-16

## 2025-04-16 ENCOUNTER — LAB VISIT (OUTPATIENT)
Dept: LAB | Facility: OTHER | Age: 50
End: 2025-04-16
Attending: COUNSELOR
Payer: COMMERCIAL

## 2025-04-16 ENCOUNTER — OFFICE VISIT (OUTPATIENT)
Dept: INTERNAL MEDICINE | Facility: CLINIC | Age: 50
End: 2025-04-16
Payer: COMMERCIAL

## 2025-04-16 VITALS
HEIGHT: 64 IN | OXYGEN SATURATION: 97 % | SYSTOLIC BLOOD PRESSURE: 110 MMHG | BODY MASS INDEX: 28.95 KG/M2 | DIASTOLIC BLOOD PRESSURE: 70 MMHG | WEIGHT: 169.56 LBS | HEART RATE: 81 BPM

## 2025-04-16 DIAGNOSIS — D49.6 BRAIN TUMOR: ICD-10-CM

## 2025-04-16 DIAGNOSIS — I10 PRIMARY HYPERTENSION: ICD-10-CM

## 2025-04-16 DIAGNOSIS — R60.0 BILATERAL LOWER EXTREMITY EDEMA: ICD-10-CM

## 2025-04-16 DIAGNOSIS — R60.0 BILATERAL LOWER EXTREMITY EDEMA: Primary | ICD-10-CM

## 2025-04-16 DIAGNOSIS — E78.2 MIXED HYPERLIPIDEMIA: ICD-10-CM

## 2025-04-16 LAB
ALBUMIN SERPL BCP-MCNC: 4.1 G/DL (ref 3.5–5.2)
ALP SERPL-CCNC: 69 UNIT/L (ref 40–150)
ALT SERPL W/O P-5'-P-CCNC: 93 UNIT/L (ref 10–44)
ANION GAP (OHS): 10 MMOL/L (ref 8–16)
AST SERPL-CCNC: 46 UNIT/L (ref 11–45)
BILIRUB SERPL-MCNC: 0.7 MG/DL (ref 0.1–1)
BNP SERPL-MCNC: <10 PG/ML (ref 0–99)
BUN SERPL-MCNC: 9 MG/DL (ref 6–20)
CALCIUM SERPL-MCNC: 8.9 MG/DL (ref 8.7–10.5)
CHLORIDE SERPL-SCNC: 103 MMOL/L (ref 95–110)
CHOLEST SERPL-MCNC: 207 MG/DL (ref 120–199)
CHOLEST/HDLC SERPL: 4.7 {RATIO} (ref 2–5)
CO2 SERPL-SCNC: 24 MMOL/L (ref 23–29)
CREAT SERPL-MCNC: 1 MG/DL (ref 0.5–1.4)
GFR SERPLBLD CREATININE-BSD FMLA CKD-EPI: >60 ML/MIN/1.73/M2
GLUCOSE SERPL-MCNC: 197 MG/DL (ref 70–110)
HDLC SERPL-MCNC: 44 MG/DL (ref 40–75)
HDLC SERPL: 21.3 % (ref 20–50)
LDLC SERPL CALC-MCNC: 119.8 MG/DL (ref 63–159)
NONHDLC SERPL-MCNC: 163 MG/DL
POTASSIUM SERPL-SCNC: 4.4 MMOL/L (ref 3.5–5.1)
PROT SERPL-MCNC: 7.8 GM/DL (ref 6–8.4)
SODIUM SERPL-SCNC: 137 MMOL/L (ref 136–145)
TRIGL SERPL-MCNC: 216 MG/DL (ref 30–150)

## 2025-04-16 PROCEDURE — 83880 ASSAY OF NATRIURETIC PEPTIDE: CPT

## 2025-04-16 PROCEDURE — 82172 ASSAY OF APOLIPOPROTEIN: CPT

## 2025-04-16 PROCEDURE — 99999 PR PBB SHADOW E&M-EST. PATIENT-LVL III: CPT | Mod: PBBFAC,,, | Performed by: COUNSELOR

## 2025-04-16 PROCEDURE — 80061 LIPID PANEL: CPT

## 2025-04-16 PROCEDURE — 83695 ASSAY OF LIPOPROTEIN(A): CPT

## 2025-04-16 PROCEDURE — 36415 COLL VENOUS BLD VENIPUNCTURE: CPT

## 2025-04-16 PROCEDURE — 84443 ASSAY THYROID STIM HORMONE: CPT

## 2025-04-16 PROCEDURE — 84075 ASSAY ALKALINE PHOSPHATASE: CPT

## 2025-04-16 NOTE — PROGRESS NOTES
Subjective:       Patient ID: Rhett Quarles is a 50 y.o. male with history of HTN, HLD, brain tumor, polycythemia, hypogonadism, pituitary macroadenoma, MASLD, BON    Chief Complaint: Bilateral lower extremity edema [R60.0]    Patient is new to me, PCP is Dr. Bryce Robert. Here today for the following:    History of Present Illness    CHIEF COMPLAINT:  Patient presents today for painful swelling in legs and feet    HISTORY OF PRESENT ILLNESS:  He reports bilateral leg and foot swelling that began on March 5th or 6th following a long road trip to [place]. The symptoms have worsened over the past two weeks, with significant exacerbation in the last few days. He notes pronounced indentations on the legs and feet even after wearing socks or shoes for short periods. The swelling is accompanied by pain that improves with elevation. He describes a sensation similar to a charley horse in the affected areas, along with occasional tingling.  No ZHAO, PND, chest pain.    BNP last drawn 1 year ago with a measure of 12.  Echo done 04/16/2024 showed normal ejection fraction with normal diastolic function for peripheral edema.  Has a history of palpitations with a Holter monitor done 04/23/2024 showing normal sinus rhythm without any significant arrhythmias.    BLOOD PRESSURE MANAGEMENT:  Last saw Cardiology 02/06/2025 for hypertension follow up.  Reported that he is off his blood pressure medications at that time.  Home pressures were running around 150/90s.  Previously was on HCTZ low-dose with minimal effect.  They started him on combination amlodipine and olmesartan 5-20.  They were going to collect further lipid analysis including LPA and apolipoprotein B. EKG then was normal.  Recommended follow up in 6 months.    He started amlodipine-olmesartan combination 8-10 days ago, replacing a previous diuretic medication. Since initiating the new medication, he reports experiencing dizziness and lower than usual blood  "pressure, which is typically around 150. He denies increased urination with the new medication.    MEDICAL HISTORY:  He has a history of pituitary macro adenoma resection approximately 5 years ago. He experiences occasional "spells" characterized by generalized tingling sensations and feeling unwell, though he has difficulty explaining these episodes. He denies any recurrence of symptoms related to the pituitary tumor. He had a kidney stone last year.    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 He denies smoking history.    ROS:  General: -fever, -chills, -fatigue, -weight gain, -weight loss  Eyes: -vision changes, -redness, -discharge  ENT: -ear pain, -nasal congestion, -sore throat  Cardiovascular: -chest pain, -palpitations, +lower extremity edema  Respiratory: -cough, -shortness of breath, +exertional dyspnea  Gastrointestinal: -abdominal pain, -nausea, -vomiting, -diarrhea, -constipation, -blood in stool  Genitourinary: -dysuria, -hematuria, -frequency  Musculoskeletal: -joint pain, -muscle pain, +limb pain  Skin: -rash, -lesion  Neurological: -headache, +dizziness, +numbness, +tingling  Psychiatric: -anxiety, -depression, -sleep difficulty          Current Outpatient Medications   Medication Instructions    amlodipine-olmesartan (LISSETTE) 5-20 mg per tablet 1 tablet, Oral, Daily    fluticasone propionate (FLONASE) 50 mcg, Each Nostril, Daily    multivitamin capsule 1 capsule, Daily     Objective:      Vitals:    04/16/25 1135   BP: 110/70   Pulse: 81   SpO2: 97%   Weight: 76.9 kg (169 lb 8.5 oz)   Height: 5' 4" (1.626 m)   PainSc:   6   PainLoc: Generalized     Body mass index is 29.1 kg/m².    Physical Exam  Constitutional:       General: He is not in acute distress.     Appearance: Normal appearance. He is not toxic-appearing or diaphoretic.   HENT:      Head: Normocephalic and atraumatic.   Eyes:      General:         Right eye: No " discharge.         Left eye: No discharge.      Extraocular Movements: Extraocular movements intact.      Conjunctiva/sclera: Conjunctivae normal.   Cardiovascular:      Rate and Rhythm: Normal rate and regular rhythm.      Pulses: Normal pulses.      Heart sounds: Normal heart sounds. No murmur heard.     No friction rub. No gallop.   Pulmonary:      Effort: Pulmonary effort is normal. No respiratory distress.      Breath sounds: Normal breath sounds. No stridor. No wheezing, rhonchi or rales.   Musculoskeletal:      Right lower leg: No edema.      Left lower leg: No edema.   Skin:     General: Skin is warm and dry.      Capillary Refill: Capillary refill takes less than 2 seconds.      Coloration: Skin is not jaundiced.      Findings: No bruising, erythema, lesion or rash.   Neurological:      General: No focal deficit present.      Mental Status: He is alert and oriented to person, place, and time. Mental status is at baseline.      Gait: Gait normal.   Psychiatric:         Mood and Affect: Mood normal.         Behavior: Behavior normal.         Thought Content: Thought content normal.         Judgment: Judgment normal.         Assessment:       1. Bilateral lower extremity edema    2. Brain tumor    3. Primary hypertension        IMPRESSION:  - Assessed bilateral leg swelling and pain, considering multiple potential causes including blood clot, heart failure, medication side effects, thyroid dysregulation, and pituitary gland dysfunction.  - Blood clot unlikely due to bilateral presentation, improvement with elevation, and absence of redness or fever.  - Heart failure unlikely due to normal oxygenation and absence of shortness of breath with previous normal cardiac workup.  - Amlodipine potential contributor to peripheral edema.  - Decided against immediate US of legs due to low suspicion for DVT.  - Continued current BP medication regimen despite dizziness, with plan to reassess after trip.    Plan:     PLAN  SUMMARY:  - Order lab work to check renal function and heart failure markers  - Continue amlodipine-sartan combination at current dose  - Refer patient to endocrinology for evaluation of pituitary function  - Consult with another physician about specific testing related to patient's history of brain tumor  - Increase fluid intake to help manage dizziness  - Follow up after patient's trip to [place] to reassess symptoms and potentially adjust medications    Bilateral lower extremity edema  - Explained the mechanism of peripheral edema related to venous insufficiency and fluid accumulation in tissues.  - Patient reports painful swelling in legs and feet, with indentations from socks and shoes, starting about 1.5-2 weeks ago and worsening in the past week.  - Physical exam showed minimal swelling at the time of visit due to patient elevating legs prior to appointment.  - Confirmed presence of pitting edema based on patient's description.  - Considered various causes for edema, including venous insufficiency, medication side effects, and possible cardiac, hepatic, or renal issues.  - Instructed patient to wear compression stockings when not having legs elevated. Avoid high salt diet.   - Gave strict ER precautions and concerning signs and symptoms to seek care.  -     BNP; Future; Expected date: 04/16/2025  -     Comprehensive Metabolic Panel; Future; Expected date: 04/16/2025  -     Echo; Future; Expected date: 04/16/2025  -     CV US Lower Extremity Veins Bilateral Insufficiency; Future  -     TSH; Future; Expected date: 04/16/2025  -     Ambulatory referral/consult to Endocrinology; Future; Expected date: 04/23/2025    Brain tumor  - Referred the patient to endocrinology for evaluation of pituitary function, considering the patient's history of brain surgery for a macroadenoma (pituitary tumor) a few years ago.  - Noted that the patient had follow-ups for two years post-surgery but has not had one in about five  years.  - Documented occasional 'spells' with tingling sensations reported by the patient.  - Considered the possibility of the pituitary gland issue contributing to the patient's current swelling symptoms.  -     Ambulatory referral/consult to Endocrinology; Future; Expected date: 04/23/2025    Primary hypertension  - Noted the patient's history of high blood pressure, usually around 150/80.  - Patient started taking new antihypertensive medication (amlodipine-sartan combination) 8-10 days ago.  - Observed that the blood pressure is currently lower than usual, with the patient reporting dizziness.  - Acknowledged the effectiveness of the new medication in lowering blood pressure, but noted the side effect of dizziness.  - Continued amlodipine-sartan combination at current dose.  - Try to maintain adequate hydration while managing lower extremity edema.    ALCOHOL USE:  - Documented that the patient reports regular alcohol consumption, typically 2-4 drinks per day.  - Acknowledged that alcohol consumption may be contributing to patient's symptoms.    HISTORY OF NEPHROLITHIASIS:  - Noted the patient's history of nephrolithiasis last year.    FOLLOW-UP AND ADDITIONAL TESTS:  - Ordered lab work to check renal function and heart failure markers.  - Advised the patient to contact the office if experiencing signs of worsening condition, especially dyspnea or other signs of heart failure.  - Follow up after trip to [place] to reassess symptoms and potentially adjust medications.  - Informed patient about limitations of virtual care across state lines due to licensing restrictions.         This note was generated with the assistance of ambient listening technology. Verbal consent was obtained by the patient and accompanying visitor(s) for the recording of patient appointment to facilitate this note. I attest to having reviewed and edited the generated note for accuracy, though some syntax or spelling errors may persist.  Please contact the author of this note for any clarification.    Kyle Mancuso PA-C    4/16/2025

## 2025-04-17 ENCOUNTER — RESULTS FOLLOW-UP (OUTPATIENT)
Dept: INTERNAL MEDICINE | Facility: CLINIC | Age: 50
End: 2025-04-17
Payer: COMMERCIAL

## 2025-04-17 ENCOUNTER — TELEPHONE (OUTPATIENT)
Dept: INTERNAL MEDICINE | Facility: CLINIC | Age: 50
End: 2025-04-17
Payer: COMMERCIAL

## 2025-04-17 DIAGNOSIS — K76.0 METABOLIC DYSFUNCTION-ASSOCIATED STEATOTIC LIVER DISEASE (MASLD): ICD-10-CM

## 2025-04-17 DIAGNOSIS — R73.9 ELEVATED BLOOD SUGAR LEVEL: Primary | ICD-10-CM

## 2025-04-17 LAB — TSH SERPL-ACNC: 1.39 UIU/ML (ref 0.4–4)

## 2025-04-18 LAB — APO B SERPL-MCNC: 115 MG/DL

## 2025-04-22 LAB — W LP(A): 14 MG/DL

## 2025-05-01 ENCOUNTER — HOSPITAL ENCOUNTER (OUTPATIENT)
Dept: CARDIOLOGY | Facility: HOSPITAL | Age: 50
Discharge: HOME | End: 2025-05-01
Attending: COUNSELOR
Payer: COMMERCIAL

## 2025-05-01 DIAGNOSIS — R60.0 BILATERAL LOWER EXTREMITY EDEMA: ICD-10-CM

## 2025-05-01 LAB
LEFT GREAT SAPHENOUS DISTAL THIGH DIA: 0.32 CM
LEFT GREAT SAPHENOUS JUNCTION DIA: 0.58 CM
LEFT GREAT SAPHENOUS KNEE DIA: 0.31 CM
LEFT GREAT SAPHENOUS MIDDLE THIGH DIA: 0.45 CM
LEFT GREAT SAPHENOUS PROXIMAL CALF DIA: 0.39 CM
LEFT SMALL SAPHENOUS KNEE DIA: 0.17 CM
LEFT SMALL SAPHENOUS SPJ DIA: 0.18 CM
RIGHT GREAT SAPHENOUS DISTAL THIGH DIA: 0.25 CM
RIGHT GREAT SAPHENOUS JUNCTION DIA: 0.65 CM
RIGHT GREAT SAPHENOUS KNEE DIA: 0.28 CM
RIGHT GREAT SAPHENOUS MIDDLE THIGH DIA: 0.43 CM
RIGHT GREAT SAPHENOUS PROXIMAL CALF DIA: 0.28 CM
RIGHT SMALL SAPHENOUS KNEE DIA: 0.33 CM
RIGHT SMALL SAPHENOUS SPJ DIA: 0.38 CM

## 2025-05-01 PROCEDURE — 93970 EXTREMITY STUDY: CPT | Mod: 26,,, | Performed by: INTERNAL MEDICINE

## 2025-05-01 PROCEDURE — 93970 EXTREMITY STUDY: CPT | Mod: TC

## 2025-05-08 ENCOUNTER — OFFICE VISIT (OUTPATIENT)
Facility: CLINIC | Age: 50
End: 2025-05-08
Payer: COMMERCIAL

## 2025-05-08 DIAGNOSIS — I10 PRIMARY HYPERTENSION: ICD-10-CM

## 2025-05-08 DIAGNOSIS — D35.2: ICD-10-CM

## 2025-05-08 DIAGNOSIS — D35.2 PITUITARY MACROADENOMA: Primary | ICD-10-CM

## 2025-05-08 PROCEDURE — 98002 SYNCH AUDIO-VIDEO NEW MOD 45: CPT | Mod: 95,,, | Performed by: STUDENT IN AN ORGANIZED HEALTH CARE EDUCATION/TRAINING PROGRAM

## 2025-05-08 PROCEDURE — 4010F ACE/ARB THERAPY RXD/TAKEN: CPT | Mod: CPTII,95,, | Performed by: STUDENT IN AN ORGANIZED HEALTH CARE EDUCATION/TRAINING PROGRAM

## 2025-05-08 PROCEDURE — G2211 COMPLEX E/M VISIT ADD ON: HCPCS | Mod: 95,,, | Performed by: STUDENT IN AN ORGANIZED HEALTH CARE EDUCATION/TRAINING PROGRAM

## 2025-05-08 NOTE — PROGRESS NOTES
ENDOCRINOLOGY NEW PATIENT VISIT: 05/08/2025    The patient location is: Home  The chief complaint leading to consultation is: Pituitary Tumor History    Visit type: audiovisual    Face to Face time with patient: 36 minutes  50 minutes of total time spent on the encounter, which includes face to face time and non-face to face time preparing to see the patient (eg, review of tests), Obtaining and/or reviewing separately obtained history, Documenting clinical information in the electronic or other health record, Independently interpreting results (not separately reported) and communicating results to the patient/family/caregiver, or Care coordination (not separately reported).     Each patient to whom he or she provides medical services by telemedicine is:  (1) informed of the relationship between the physician and patient and the respective role of any other health care provider with respect to management of the patient; and (2) notified that he or she may decline to receive medical services by telemedicine and may withdraw from such care at any time.    Subjective:      Patient ID: Rhett Quarles is a 50 y.o. male.    Chief Complaint:  Consult    History of Present Illness      Rhett Quarles presents today to re-establish with endocrine and follow up on his pituitary adenoma post-resection.     MRI has been stable since his surgery.  Last visit with neurosurgery (Dr. Argueta) was 2020.  Seen by Dr. Franz in the past, last visit Dec 2020.    More recently he reports elevated blood pressure readings in the 140s/90s. He experiences light-headedness with quick positional changes, particularly when standing up rapidly. He has leg swelling since starting blood pressure medication approximately 6 weeks ago (CCB), which improved after temporarily discontinuing the medication. He reports chronic constipation since pituitary surgery, which has been evaluated by gastroenterology with normal findings. He has a  history of kidney stones in the previous year. Liver function abnormalities were noted last year and again recently and are being followed by hepatology with fluctuating levels. A fibroscan is pending. He was previously on hydrochlorothiazide for blood pressure management approximately 7 months ago. Amlodipine and olmesartan combination therapy was initiated 6 weeks ago but is currently on hold. He reports daily alcohol consumption of at least two drinks.          Regarding Pituitary Macroadenoma History:    Underwent transsphenoidal resection (by Dr. Argueta) for 2.7 cm pituitary adenoma found on MRI done for diplopia.  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  Prior post op HC need but off Hydrocortisone 20/10 mg since 3/4/2020.     Underwent TSR 02/10/2020  Pathology - [facility]: CORTICOTROPH ADENOMA, IMMUNOREACTIVE FOR ACTH AND T-Pit    Initial presentation: MRI done for diplopia w/ finding of macroadenoma.     Imaging:                                   MRI 1/3/2020 (preop)  - Pituitary is enlarged measuring 2.5 x 2.7 x 1.8 cm with thickening of the infundibulum, consistent with macroadenoma.  The pituitary gland approaches optic nerves particularly on the RIGHT yet does not grossly displace the optic nerves.  There is remodeling of the sella and extension into the sphenoid sinus     MRI brain 6/15/2020  Postoperative changes consistent with transsphenoidal surgery with resection of the vast majority of the pituitary macroadenoma.  Fat packing material within the anterior aspect of the sella and sphenoid sinus.  Thin rim of residual enhancing tissue along the sella floor, nonspecific in the postoperative setting.  No evidence of compression upon the optic chiasm or extension into the cavernous sinus.     Most Recent MRI:  04/30/2024  Postoperative change of transsphenoidal pituitary macro adenoma resection. Enhancing material along the floor of the sella measures up  to 3 mm, similar to prior study. No discrete nodular component. No mass effect upon the optic chiasm. Anterior fat packing material appears less conspicuous. Additionally, heterogeneous signal material noted along the posterior sphenoid sinus, presumably postoperative.     Interval Hx:   BP issues as noted above, found after a home study with a BP cuff.  He has history of low testosterone in the past but and was trending up in the past after last endocrine visit.  Pituitary labs have not been followed up for many years and given his history his current PCP referred him to us for follow up eval.       Since last visit he had      Current symptoms:  Headache: infrequent headaches now (significantly improved since surgery)     Vision change:  Reports improvement since surgery bot no recent change.  Normal HVF prior to surgery     Formal VF:  Dr Winston 10/2020 (post-op)  Optic chiasm and cranial nerve function were normal. I found no evidence of recurrence of the pituitary adenoma. He will return to me as needed.    Current symptoms:  Hyperprolactinemia:  []  breast tenderness []  nipple discharge []  Menstrual Irregularity [x]  Denies     Lab Results   Component Value Date    PROLACTIN 7.3 02/02/2021    PROLACTIN 9.7 02/17/2020    PROLACTIN 10.8 01/10/2020       Thyroid:  []  fatigue []  weight change []  temp intolerance   [x]  Denies    [x]  BM change [x]  skin/hair change [] palpitations []  tremor []  Denies    Constipation issues he notes.      Lab Results   Component Value Date    TSH 1.385 04/16/2025    FREET4 1.00 02/02/2021          Growth Hormone Excess:  []  increase hand/foot size []  teeth spacing change    []  Denies    [x]  worse glycemic control [x]  joint pain (Wrist and hands - plays guitar)  []  Denies    Last IGF-1:   Lab Results   Component Value Date    SOMATMDN 83 08/17/2020    SOMATMDN 117 03/03/2020    SOMATMDN 151 02/17/2020    SOMATMDN 86 01/10/2020        Cushing's syndrome:   []  Easy  bruising [x]  Weight gain  []  Worse glycemic control   [x]  HTN  []  Acne  []  Hirsutism  []  Striae  []  Menstrual change []  VTE   []  Fractures  []  Denies All    Given path showed corticotroph - he had testing showing prior normal low dose dexamethasone suppression test, 24H UFC normal (11),  fT4, TSH, HbA1c.  LNSC x 2 normal.    Denies symptoms of N/V/abd pain, hypotension.    If standing he will have issues with lightheadedness, so AI needing rule out.      Gonadotrophs:     []  Decreased libido []  ED   [x]  Denies    DI:   []  polyuria  []  Polydipsia  [x]  Nocturia x 1-2 at most   []  Denies      LABORATORY REVIEW:    Chemistry        Component Value Date/Time     04/16/2025 1327     05/26/2024 1051    K 4.4 04/16/2025 1327    K 4.5 05/26/2024 1051     04/16/2025 1327     05/26/2024 1051    CO2 24 04/16/2025 1327    CO2 18 (L) 05/26/2024 1051    BUN 9 04/16/2025 1327    CREATININE 1.0 04/16/2025 1327     (H) 04/16/2025 1327    GLU 96 05/26/2024 1051        Component Value Date/Time    CALCIUM 8.9 04/16/2025 1327    CALCIUM 9.0 05/26/2024 1051    ALKPHOS 69 04/16/2025 1327    ALKPHOS 57 07/10/2024 1028    AST 46 (H) 04/16/2025 1327    AST 18 07/10/2024 1028    ALT 93 (H) 04/16/2025 1327    ALT 25 07/10/2024 1028    BILITOT 0.7 04/16/2025 1327    BILITOT 0.6 07/10/2024 1028    ESTGFRAFRICA >60 07/20/2020 1654    EGFRNONAA >60 07/20/2020 1654          Lab Results   Component Value Date    PROLACTIN 7.3 02/02/2021    PROLACTIN 9.7 02/17/2020    PROLACTIN 10.8 01/10/2020    TSH 1.385 04/16/2025    TSH 1.805 04/16/2024    TSH 1.291 07/10/2023    TSH 1.796 11/03/2022    FREET4 1.00 02/02/2021    FREET4 1.18 09/25/2020    FREET4 1.05 05/20/2020    FREET4 1.06 03/03/2020    ACTH 46 03/03/2020    ACTH 146 (H) 02/17/2020    ACTH 40 01/10/2020    YVAPJIOJ06VF 11 09/24/2020    LABLH 1.2 01/10/2020    FSH 2.00 01/10/2020    SOMATMDN 83 08/17/2020    SOMATMDN 117 03/03/2020    SOMATMDN 151  02/17/2020    SOMATMDN 86 01/10/2020     04/16/2025     05/26/2024     05/23/2024     04/29/2024    K 4.4 04/16/2025    K 4.5 05/26/2024    K 4.3 05/23/2024    K 4.1 04/29/2024    CALCIUM 8.9 04/16/2025    CALCIUM 9.0 05/26/2024    CALCIUM 9.2 05/23/2024    CALCIUM 9.2 04/29/2024    ALBUMIN 4.1 04/16/2025    ALBUMIN 3.8 07/10/2024    ALBUMIN 4.1 05/26/2024    ALBUMIN 4.1 05/23/2024    ESTGFRAFRICA >60 07/20/2020    ESTGFRAFRICA >60.0 05/20/2020    ESTGFRAFRICA >60.0 02/12/2020    ESTGFRAFRICA >60.0 02/11/2020    EGFRNONAA >60 07/20/2020    EGFRNONAA >60.0 05/20/2020    EGFRNONAA >60.0 02/12/2020    EGFRNONAA >60.0 02/11/2020     ROS:   As above    Objective:     There were no vitals taken for this visit.  BP Readings from Last 3 Encounters:   04/16/25 110/70   02/06/25 (!) 143/89   11/22/24 116/79     Wt Readings from Last 1 Encounters:   04/16/25 1135 76.9 kg (169 lb 8.5 oz)     There is no height or weight on file to calculate BMI.    Physical Exam    General: No acute distress. Nontoxic appearing.  Psychiatric: Normal mood. Normal affect. No evidence of SI.           Lab Review:   Lab Results   Component Value Date    HGBA1C 5.5 04/16/2024     Lab Results   Component Value Date    CHOL 207 (H) 04/16/2025    HDL 44 04/16/2025    LDLCALC 119.8 04/16/2025    TRIG 216 (H) 04/16/2025    CHOLHDL 21.3 04/16/2025     Lab Results   Component Value Date     04/16/2025    K 4.4 04/16/2025     04/16/2025    CO2 24 04/16/2025     (H) 04/16/2025    BUN 9 04/16/2025    CREATININE 1.0 04/16/2025    CALCIUM 8.9 04/16/2025    PROT 7.8 04/16/2025    ALBUMIN 4.1 04/16/2025    BILITOT 0.7 04/16/2025    ALKPHOS 69 04/16/2025    AST 46 (H) 04/16/2025    ALT 93 (H) 04/16/2025    ANIONGAP 10 04/16/2025    ESTGFRAFRICA >60 07/20/2020    EGFRNONAA >60 07/20/2020    TSH 1.385 04/16/2025     Vit D, 25-Hydroxy   Date Value Ref Range Status   08/17/2020 43 30 - 96 ng/mL Final     Comment:     Vitamin  D deficiency.........<10 ng/mL                              Vitamin D insufficiency......10-29 ng/mL       Vitamin D sufficiency........> or equal to 30 ng/mL  Vitamin D toxicity............>100 ng/mL         Assessment and Plan     IMPRESSION:  - Reviewed history of pituitary adenoma resection in February 2020.  - Assessed stability of post-op MRI from April 2024, showing no concerning changes.  - Evaluated potential endocrine causes for BP issues, including aldosterone levels.  - Considered possible side effects of calcium channel blocker (amlodipine) causing leg swelling.  - Assessed for symptoms of high growth hormone, finding no significant indicators.    PITUITARY MACROADENOMA:  - Explained function of pituitary gland and its role in hormone production, including relationship between cortisol levels and adrenal gland function.  - Clarified the presence and function of remaining pituitary tissue post-op and described the purpose of fat placement during surgery to prevent complications.    HYPERTENSION:  - Recent discovery of hypertension  - Issues with lower extremity swelling after latest BP med started which includes CCB (likely should avoid calcium channel blockers as symptoms improved with hold)  - May be able to consider thiazide which he recalls worked in the past    HISTORY OF HYPERGONADISM:  - No overt symptoms of hypogonadism  - Did have prior labs showing low T levels which improved by at least 3X on the latest T levels from years prior after TSR    PLAN SUMMARY:  - Explained post-operative pituitary function and fat placement procedure  - Discussed pituitary gland's role in hormone production  - Clarified relationship between cortisol levels and adrenal gland function   - Pituitary panel planned; ACTH, Cortisol, FSH, LH, Total T, TSH, Free T4, Prolactin, IGF-1  - If labs stable then repeat MRI planned around 1 year from now or sooner if new symptoms develop         RTC in 1 year with MRI around that  time.      **Visit today included increased complexity associated with the care of the problems addressed and managing the longitudinal care of the patient due to the serious and/or complex managed problems      Bryce Coon,      This note was generated with the assistance of ambient listening technology. Verbal consent was obtained by the patient and accompanying visitor(s) for the recording of patient appointment to facilitate this note. I attest to having reviewed and edited the generated note for accuracy, though some syntax or spelling errors may persist. Please contact the author of this note for any clarification.

## 2025-05-08 NOTE — Clinical Note
Please set up a fasting 8 am lab draw for this patient in the next week or two.    Follow up in 1 year  Thanks

## 2025-05-09 ENCOUNTER — TELEPHONE (OUTPATIENT)
Dept: ENDOCRINOLOGY | Facility: CLINIC | Age: 50
End: 2025-05-09
Payer: COMMERCIAL

## 2025-05-09 ENCOUNTER — PATIENT MESSAGE (OUTPATIENT)
Dept: INTERNAL MEDICINE | Facility: CLINIC | Age: 50
End: 2025-05-09
Payer: COMMERCIAL

## 2025-05-09 NOTE — PATIENT INSTRUCTIONS
At todays visit, we reviewed your medical history and current symptoms, with a focus on your prior pituitary macroadenoma, blood pressure management, and hormone levels.    Pituitary Adenoma:  You had a pituitary macroadenoma surgically removed in February 2020.  Your latest MRI from April 2024 shows no concerning changes and appears stable.  We discussed the role of the pituitary gland in hormone production  You do not have overt symptoms concerning for hormone excess, such as acromegaly or Cushings syndrome but labs will be needed.    Hormonal Function:  We will assess your current pituitary function with a panel of labs:  ACTH and cortisol - to check adrenal function  FSH, LH, and total testosterone - to evaluate gonadal function  TSH and free T4 - to assess thyroid function  Prolactin and IGF-1 - to screen for hormone-producing tumors or deficiencies  Repeat MRI is planned in about 1 year if hormone labs are stable, or sooner if symptoms develop.    Blood Pressure and Medication Side Effects:  You recently developed high blood pressure, and noticed leg swelling while taking amlodipine, which is a calcium channel blocker.  Since swelling improved when the medication was stopped, you may need to avoid calcium channel blockers in the future.  A thiazide diuretic may be a good alternative or other classes which can control BP but not have side effects.  Your PCP may be required for close adjustments in the future.    Summary of Next Steps:  Complete the ordered pituitary hormone panel.  Await results and follow up on endocrine function.  Repeat brain MRI next year if stable.  Work with your primary care provider to adjust blood pressure medications based on your response and side effects.    Please reach out if any new symptoms arise or if there are questions once labs are completed.

## 2025-05-13 ENCOUNTER — TELEPHONE (OUTPATIENT)
Dept: INTERNAL MEDICINE | Facility: CLINIC | Age: 50
End: 2025-05-13
Payer: COMMERCIAL

## 2025-05-13 NOTE — TELEPHONE ENCOUNTER
Spoke to the pt to try and schedule their 3 wk HTN f/u.  Pt informed staff he couldn't  do a 3 week f/u but could do sometime mid June!    He's been scheduled:   Appointment Information   Name: DION RUBY     Date: 6/17/2025     Appt Time: 1:00 PM     Visit Type: EP - PRIMARY CARE ([facility]) [486]     Provider: Devan Mancuso PA-C Dept: [facility], Internal Medicine       Dept Address: [address] [place] [id number]       Dept Phone Number: [phone]

## 2025-05-19 ENCOUNTER — LAB VISIT (OUTPATIENT)
Dept: LAB | Facility: HOSPITAL | Age: 50
End: 2025-05-19
Payer: COMMERCIAL

## 2025-05-19 DIAGNOSIS — D35.2 PITUITARY MACROADENOMA: ICD-10-CM

## 2025-05-19 LAB
CORTIS SERPL-MCNC: 5.6 UG/DL (ref 4.3–22.4)
FSH SERPL-ACNC: 2.3 MIU/ML (ref 0.95–11.95)
LH SERPL-ACNC: 0.9 MIU/ML (ref 0.6–12.1)
PROLACTIN SERPL IA-MCNC: 9.4 NG/ML (ref 3.5–19.4)
T4 FREE SERPL-MCNC: 0.99 NG/DL (ref 0.71–1.51)
TESTOST SERPL-MCNC: 223 NG/DL (ref 304–1227)
TSH SERPL-ACNC: 2.28 UIU/ML (ref 0.4–4)

## 2025-05-19 PROCEDURE — 82533 TOTAL CORTISOL: CPT

## 2025-05-19 PROCEDURE — 84146 ASSAY OF PROLACTIN: CPT

## 2025-05-19 PROCEDURE — 82024 ASSAY OF ACTH: CPT

## 2025-05-19 PROCEDURE — 84305 ASSAY OF SOMATOMEDIN: CPT

## 2025-05-19 PROCEDURE — 83002 ASSAY OF GONADOTROPIN (LH): CPT

## 2025-05-19 PROCEDURE — 84403 ASSAY OF TOTAL TESTOSTERONE: CPT

## 2025-05-19 PROCEDURE — 36415 COLL VENOUS BLD VENIPUNCTURE: CPT

## 2025-05-19 PROCEDURE — 84443 ASSAY THYROID STIM HORMONE: CPT

## 2025-05-19 PROCEDURE — 84439 ASSAY OF FREE THYROXINE: CPT

## 2025-05-19 PROCEDURE — 83001 ASSAY OF GONADOTROPIN (FSH): CPT

## 2025-05-20 LAB — ACTH (OHS): 23 PG/ML

## 2025-05-21 ENCOUNTER — RESULTS FOLLOW-UP (OUTPATIENT)
Dept: ENDOCRINOLOGY | Facility: CLINIC | Age: 50
End: 2025-05-21

## 2025-06-20 NOTE — PROGRESS NOTES
Subjective:       Patient ID: Rhett Quarles is a 50 y.o. male with history of HTN, HLD, brain tumor, polycythemia, hypogonadism, pituitary macroadenoma, MASLD, BON     Chief Complaint: Primary hypertension [I10]    Patient is known to me, PCP is Dr. Bryce Soliz. Here today for the following:    History of Present Illness    CHIEF COMPLAINT:  Patient presents today for follow up of blood pressure management.    HYPERTENSION:  Last saw me 04/16/2025 for bilateral lower extremity edema.  He has previously been on hydrochlorothiazide and followed by Cardiology.  They switched him to combination amlodipine and olmesartan 5-20 mg.  When initiating the medication he started experiencing dizziness and lower than normal blood pressures.  At that time his blood pressures were around 150s/90s.  Labs at that time showed mildly elevated liver enzymes and glucose.  Ultrasounds of his vein showed reflux of the right external iliac vein.  His apolipoprotein B was borderline high.  Edema improved after discontinuing amlodipine.    He followed up with endocrinology and it appeared that most of his labs were normal.  His testosterone is low in the 200s.  His cortisol was low normal and they instructed him to return if he continues to have dizziness when standing up for an ACTH stimulation test.    Today, BP is typically around 130-140s/80-90s at home. Not taking anything for BP currently due to side effects. He denies CP, SOB, dizziness, HA, blurry vision. He still occasionally has lower extremity edema with long car rides or airplane trips. He feels this has improved since discontinuing the amlodipine-olmesartan combination pill.     MEDICAL HISTORY:  He has a history of kidney stone last year requiring lithotripsy, which he describes as very traumatic. He is currently under follow-up care with endocrinology, with recent testing completed and recommendation for further follow-up. He is compliant with recommended  specialist care.    SOCIAL HISTORY:  He takes daily walks with his wife, acknowledging moderate physical activity level. He reports variable alcohol consumption and is currently attempting to reduce intake. He denies heavy drinking.    ROS:  General: -fever, -chills, -fatigue, -weight gain, -weight loss  Eyes: -vision changes, -redness, -discharge  ENT: -ear pain, -nasal congestion, -sore throat  Cardiovascular: -chest pain, -palpitations, +lower extremity edema  Respiratory: -cough, -shortness of breath  Gastrointestinal: -abdominal pain, -nausea, -vomiting, -diarrhea, -constipation, -blood in stool  Genitourinary: -dysuria, -hematuria, -frequency  Musculoskeletal: -joint pain, -muscle pain  Skin: -rash, -lesion  Neurological: -headache, -dizziness, -numbness, -tingling  Psychiatric: -anxiety, -depression, -sleep difficulty          Current Outpatient Medications   Medication Instructions    fluticasone propionate (FLONASE) 50 mcg, Each Nostril, Daily    multivitamin capsule 1 capsule, Daily    olmesartan (BENICAR) 20 mg, Oral, Daily     Objective:      Vitals:    06/23/25 1301   BP: 124/84   Pulse: 74   SpO2: 98%   Weight: 75.9 kg (167 lb 5.3 oz)   PainSc: 0-No pain     Body mass index is 28.72 kg/m².    Physical Exam  Constitutional:       General: He is not in acute distress.     Appearance: Normal appearance. He is well-developed. He is not ill-appearing, toxic-appearing or diaphoretic.   HENT:      Head: Normocephalic and atraumatic.      Nose: Nose normal.   Eyes:      General: No scleral icterus.        Right eye: No discharge.         Left eye: No discharge.   Neck:      Thyroid: No thyromegaly.      Trachea: No tracheal deviation.   Cardiovascular:      Rate and Rhythm: Normal rate and regular rhythm.      Pulses: Normal pulses.      Heart sounds: Normal heart sounds. No murmur heard.     No friction rub. No gallop.   Pulmonary:      Effort: Pulmonary effort is normal. No respiratory distress.      Breath  sounds: Normal breath sounds. No stridor. No wheezing, rhonchi or rales.   Musculoskeletal:         General: No deformity.      Cervical back: Neck supple.      Right lower leg: No edema.      Left lower leg: No edema.   Skin:     General: Skin is warm and dry.      Capillary Refill: Capillary refill takes less than 2 seconds.      Findings: No erythema, lesion or rash.   Neurological:      General: No focal deficit present.      Mental Status: He is alert and oriented to person, place, and time. Mental status is at baseline.      Gait: Gait normal.   Psychiatric:         Mood and Affect: Mood normal.         Behavior: Behavior normal.         Thought Content: Thought content normal.         Judgment: Judgment normal.         Assessment:       1. Primary hypertension        IMPRESSION:  - Home BP readings typically in 140s/80-90s, indicating need for treatment.  - Previous trials of HCTZ and amlodipine/olmesartan combination unsuccessful due to side effects or lack of efficacy.  - Started olmesartan monotherapy at 20 mg daily, discontinuing amlodipine/olmesartan combination tablet to avoid likely amlodipine-associated swelling.  - Plan to monitor renal function with bloodwork in 3-4 weeks due to potential transient effects on renal function.  - Goal is to achieve BP closer to 130/80 or ideally 120s/80s.  - Recommend lifestyle modifications as first-line approach, with medication as adjunct therapy.  - Considered potential endocrine causes of HTN based on recent endocrinology workup.    Plan:     PLAN SUMMARY:  - Discontinued amlodipine/olmesartan combination tablet  - Started olmesartan monotherapy at 20 mg daily  - Recommend dietary changes and adequate hydration to prevent kidney stone recurrence  - Advised low-salt diet and regular exercise  - Suggested moderation in alcohol consumption  - Ordered labs to check renal function in 3-4 weeks  - Follow-up appointment scheduled to review BP readings and lab  results    Primary hypertension  - Blood pressure today measured at 126/84, which is better than the patient's usual home readings of 140s/80s-90s.  - Target goal is 120s/80s.  - Explained the relationship between renal function, fluid levels, and BP regulation, as well as factors affecting BP readings (crossed legs, full bladder) and proper measurement technique.  - Started olmesartan monotherapy at 20 mg daily, discontinuing the previous amlodipine/olmesartan combination tablet.  - Ordered labs to check renal function in 3-4 weeks with follow-up visit scheduled to review BP readings and lab results.  - Instructed patient to contact office if experiencing concerning symptoms or side effects.  -     olmesartan (BENICAR) 20 MG tablet; Take 1 tablet (20 mg total) by mouth once daily.  Dispense: 90 tablet; Refill: 3  -     Basic Metabolic Panel; Future; Expected date: 07/21/2025    ## ADVERSE EFFECT OF CALCIUM-CHANNEL BLOCKERS:  - Patient experienced significant swelling in legs and hands while on amlodipine.  - Swelling has improved since discontinuation, though some still occurs during long trips or flights.  - Due to these side effects, amlodipine/olmesartan combination tablet has been discontinued, and amlodipine will be avoided in future treatment plans.    ## HISTORY OF URINARY CALCULI:  - Patient has history of kidney stone last year that was treated with lithotripsy, which caused severe pain and hematuria.  - Recommend dietary changes to prevent recurrence, including avoiding high oxalate foods (spinach, beets, rhubarb) and maintaining good hydration.    ## LIFESTYLE-RELATED ISSUES:  - Assessed lifestyle factors affecting blood pressure including diet, exercise, alcohol consumption, and stress.  - Recommend low-salt diet, regular exercise (30 minutes daily or 150 minutes weekly of moderate-intensity activity), and moderation in alcohol consumption (avoiding more than 4 drinks in a sitting).  - Patient will  continue daily walks with wife.  - Educated on alcohol's impact on BP and overall health.    ## DIZZINESS AND GIDDINESS:  - Patient reports no current dizziness, blurry vision, or other related symptoms.         This note was generated with the assistance of ambient listening technology. Verbal consent was obtained by the patient and accompanying visitor(s) for the recording of patient appointment to facilitate this note. I attest to having reviewed and edited the generated note for accuracy, though some syntax or spelling errors may persist. Please contact the author of this note for any clarification.    Kyle Mancuso PA-C    6/23/2025

## 2025-06-23 ENCOUNTER — OFFICE VISIT (OUTPATIENT)
Dept: INTERNAL MEDICINE | Facility: CLINIC | Age: 50
End: 2025-06-23
Payer: COMMERCIAL

## 2025-06-23 VITALS
WEIGHT: 167.31 LBS | BODY MASS INDEX: 28.72 KG/M2 | DIASTOLIC BLOOD PRESSURE: 84 MMHG | OXYGEN SATURATION: 98 % | HEART RATE: 74 BPM | SYSTOLIC BLOOD PRESSURE: 126 MMHG

## 2025-06-23 DIAGNOSIS — I10 PRIMARY HYPERTENSION: Primary | ICD-10-CM

## 2025-06-23 PROCEDURE — 4010F ACE/ARB THERAPY RXD/TAKEN: CPT | Mod: CPTII,S$GLB,, | Performed by: COUNSELOR

## 2025-06-23 PROCEDURE — 3079F DIAST BP 80-89 MM HG: CPT | Mod: CPTII,S$GLB,, | Performed by: COUNSELOR

## 2025-06-23 PROCEDURE — 3008F BODY MASS INDEX DOCD: CPT | Mod: CPTII,S$GLB,, | Performed by: COUNSELOR

## 2025-06-23 PROCEDURE — 1159F MED LIST DOCD IN RCRD: CPT | Mod: CPTII,S$GLB,, | Performed by: COUNSELOR

## 2025-06-23 PROCEDURE — 99214 OFFICE O/P EST MOD 30 MIN: CPT | Mod: S$GLB,,, | Performed by: COUNSELOR

## 2025-06-23 PROCEDURE — 99999 PR PBB SHADOW E&M-EST. PATIENT-LVL III: CPT | Mod: PBBFAC,,, | Performed by: COUNSELOR

## 2025-06-23 PROCEDURE — 3074F SYST BP LT 130 MM HG: CPT | Mod: CPTII,S$GLB,, | Performed by: COUNSELOR

## 2025-06-23 RX ORDER — OLMESARTAN MEDOXOMIL 20 MG/1
20 TABLET ORAL DAILY
Qty: 90 TABLET | Refills: 3 | Status: SHIPPED | OUTPATIENT
Start: 2025-06-23 | End: 2026-06-23

## 2025-06-23 NOTE — Clinical Note
Saw pt for HTN. Refer to note for details: - BP: 124/84, 126/84 on repeat. Home readings consistently 130-140s/80-90s. - PMH: HTN, HLD, brain tumor, polycythemia, hypogonadism, pituitary macroadenoma, MASLD, BON - Prior regimen: HCTZ (D/C due to min effect), amlodipine-olmesartan 5-20 (causes significant peripheral edema) - Updated regimen: olmesartan 20 mg - TLC education provided - F/U 4 weeks with BMP

## 2025-06-24 ENCOUNTER — TELEPHONE (OUTPATIENT)
Dept: INTERNAL MEDICINE | Facility: CLINIC | Age: 50
End: 2025-06-24
Payer: COMMERCIAL

## 2025-06-24 NOTE — TELEPHONE ENCOUNTER
----- Message from Devan Mancuso PA-C sent at 6/23/2025  1:46 PM CDT -----  Please check on patient's BP in 3-4 weeks and have them repeat BMP at that time. Thank you!

## 2025-07-07 ENCOUNTER — HOSPITAL ENCOUNTER (OUTPATIENT)
Dept: RADIOLOGY | Facility: HOSPITAL | Age: 50
Discharge: HOME | End: 2025-07-07
Attending: NURSE PRACTITIONER
Payer: COMMERCIAL

## 2025-07-07 ENCOUNTER — RESULTS FOLLOW-UP (OUTPATIENT)
Dept: INTERNAL MEDICINE | Facility: CLINIC | Age: 50
End: 2025-07-07
Payer: COMMERCIAL

## 2025-07-07 ENCOUNTER — RESULTS FOLLOW-UP (OUTPATIENT)
Dept: HEPATOLOGY | Facility: CLINIC | Age: 50
End: 2025-07-07

## 2025-07-07 DIAGNOSIS — K76.0 METABOLIC DYSFUNCTION-ASSOCIATED STEATOTIC LIVER DISEASE (MASLD): ICD-10-CM

## 2025-07-07 PROCEDURE — 76700 US EXAM ABDOM COMPLETE: CPT | Mod: 26,,, | Performed by: RADIOLOGY

## 2025-07-07 PROCEDURE — 76700 US EXAM ABDOM COMPLETE: CPT | Mod: TC

## 2025-07-14 NOTE — PROGRESS NOTES
Subjective:       Patient ID: Rhett Quarles is a 50 y.o. male history of HTN, HLD, brain tumor, polycythemia, hypogonadism, pituitary macroadenoma, MASLD, BON.     Chief Complaint: Primary hypertension [I10]    Patient is known to me, PCP is Dr. Bryce Robert. Here today for the following:    History of Present Illness    CHIEF COMPLAINT:  Patient presents today for HTN F/U and kidney stone.    HTN:  He has previously been on hydrochlorothiazide and followed by Cardiology.  They switched him to combination amlodipine and olmesartan 5-20 mg.  When initiating the medication he started experiencing dizziness and lower than normal blood pressures.  At that time his blood pressures were around 150s/90s.  Labs at that time showed mildly elevated liver enzymes and glucose.  Ultrasounds of his vein showed reflux of the right external iliac vein.  His apolipoprotein B was borderline high.  Edema improved after discontinuing amlodipine.     He followed up with endocrinology and it appeared that most of his labs were normal.  His testosterone is low in the 200s.  His cortisol was low normal and they instructed him to return if he continues to have dizziness when standing up for an ACTH stimulation test.    Last saw me 6/23/25, started on olmesartan 20 mg, here for follow up.  Home /70's. Denies CP, SOB, peripheral edema, dizziness, blurry vision.  Stopped taking olmesartan 1 week ago after finding incidental kidney stone on abdominal US. BP has remained stable since discontinuation.  Started having right flank pain day starting olmesartan rated as 3/10 severity.   Denies hematuria.    KIDNEY STONE:  He reports a current 2mm kidney stone on the right side identified on recent ultrasound. He experiences localized back pain and intermittent discomfort. The current stone is approximately half the size of his previous stone and is non-obstructing. He denies current hematuria, though notes blood in urine during  "previous episode last year. He underwent kidney stone surgery approximately one year ago.     ROS:  General: -fever, -chills, -fatigue, -weight gain, +weight loss  Eyes: -vision changes, -redness, -discharge  Cardiovascular: -chest pain, -palpitations, -lower extremity edema  Respiratory: -cough, -shortness of breath  Genitourinary: -dysuria, -hematuria, -frequency, +flank pain  Neurological: -headache, -dizziness, -numbness, -tingling         Current Outpatient Medications   Medication Instructions    fluticasone propionate (FLONASE) 50 mcg, Each Nostril, Daily    multivitamin capsule 1 capsule, Daily    olmesartan (BENICAR) 20 mg, Oral, Daily    tamsulosin (FLOMAX) 0.4 mg, Oral, Daily     Objective:      Vitals:    07/15/25 0937   BP: 126/70   Pulse: 62   SpO2: 98%   Weight: 74.6 kg (164 lb 7.4 oz)   Height: 5' 4" (1.626 m)   PainSc:   3   PainLoc: (S) Generalized     Body mass index is 28.23 kg/m².    Physical Exam  Constitutional:       General: He is not in acute distress.     Appearance: Normal appearance. He is well-developed. He is not ill-appearing, toxic-appearing or diaphoretic.   HENT:      Head: Normocephalic and atraumatic.      Nose: Nose normal.   Eyes:      General: No scleral icterus.        Right eye: No discharge.         Left eye: No discharge.   Neck:      Thyroid: No thyromegaly.      Trachea: No tracheal deviation.   Cardiovascular:      Rate and Rhythm: Normal rate and regular rhythm.      Pulses: Normal pulses.      Heart sounds: Normal heart sounds. No murmur heard.     No friction rub. No gallop.   Pulmonary:      Effort: Pulmonary effort is normal. No respiratory distress.      Breath sounds: Normal breath sounds. No stridor. No wheezing, rhonchi or rales.   Musculoskeletal:         General: No deformity.      Right lower leg: No edema.      Left lower leg: No edema.   Skin:     General: Skin is warm and dry.      Findings: No erythema or rash.   Neurological:      Mental Status: He is " alert and oriented to person, place, and time. Mental status is at baseline.      Gait: Gait normal.   Psychiatric:         Mood and Affect: Mood normal.         Behavior: Behavior normal.         Thought Content: Thought content normal.         Judgment: Judgment normal.         Assessment:       1. Primary hypertension    2. Nephrolithiasis        IMPRESSION:  - Patient presents with 2 mm kidney stone in midpole.  - Started Flomax (tamsulosin) 30 days for kidney stone passage due to patient preference, despite not being strictly necessary for stones <5 mm, considering potential side effects including impact on BP and sexual function.  - Will monitor BP for now and reconsider additional medications in future.  - Prediabetes (A1C 5.7%) and fatty liver findings, emphasizing lifestyle modifications over aggressive treatment at this time.    Plan:     PLAN SUMMARY:  - Order repeat imaging in 4 weeks if kidney stone has not passed  - Prescribe Flomax (tamsulosin) for 30 days  - Recommend Ibuprofen for pain management  - Advise increased water intake and urine straining to check for stone passage  - Follow-up to monitor kidney stone passage and reassess blood pressure management    Primary hypertension  - Provided information on optimal blood pressure targets (120/80 mmHg).  - Hold olmesartan now due to patient hesitancy and advised daily home blood pressure monitoring.  - Review BP in a few weeks to see if medication warranted.    Nephrolithiasis  - Monitored kidney stone measuring 2 mm (reduced from previous 5 mm), confirmed non-obstructing via imaging.   - Explained that stones <5 mm typically pass spontaneously and do not require treatment.   - After discussion, prescribed Flomax (tamsulosin) for 30 days to facilitate passage, despite not being strictly necessary for stones this size, considering potential side effects including impact on blood pressure and sexual function.  - Advised increased water intake and urine  straining to check for stone passage.  - Recommend Ibuprofen for pain management.  - Will order repeat imaging in 4 weeks if stone has not passed.  -     tamsulosin (FLOMAX) 0.4 mg Cap; Take 1 capsule (0.4 mg total) by mouth once daily.  Dispense: 30 capsule; Refill: 0    This note was generated with the assistance of ambient listening technology. Verbal consent was obtained by the patient and accompanying visitor(s) for the recording of patient appointment to facilitate this note. I attest to having reviewed and edited the generated note for accuracy, though some syntax or spelling errors may persist. Please contact the author of this note for any clarification.    Kyle Mancuso PA-C    7/15/2025

## 2025-07-15 ENCOUNTER — OFFICE VISIT (OUTPATIENT)
Dept: HEPATOLOGY | Facility: CLINIC | Age: 50
End: 2025-07-15
Payer: COMMERCIAL

## 2025-07-15 ENCOUNTER — CLINICAL SUPPORT (OUTPATIENT)
Dept: INTERNAL MEDICINE | Facility: CLINIC | Age: 50
End: 2025-07-15
Payer: COMMERCIAL

## 2025-07-15 ENCOUNTER — PROCEDURE VISIT (OUTPATIENT)
Dept: HEPATOLOGY | Facility: CLINIC | Age: 50
End: 2025-07-15
Payer: COMMERCIAL

## 2025-07-15 VITALS
WEIGHT: 164.44 LBS | BODY MASS INDEX: 28.07 KG/M2 | HEIGHT: 64 IN | SYSTOLIC BLOOD PRESSURE: 126 MMHG | DIASTOLIC BLOOD PRESSURE: 70 MMHG | HEART RATE: 62 BPM | OXYGEN SATURATION: 98 %

## 2025-07-15 VITALS — HEIGHT: 64 IN | BODY MASS INDEX: 28.12 KG/M2 | WEIGHT: 164.69 LBS

## 2025-07-15 DIAGNOSIS — E78.2 MIXED HYPERLIPIDEMIA: ICD-10-CM

## 2025-07-15 DIAGNOSIS — E66.3 OVERWEIGHT (BMI 25.0-29.9): ICD-10-CM

## 2025-07-15 DIAGNOSIS — R73.03 PRE-DIABETES: ICD-10-CM

## 2025-07-15 DIAGNOSIS — K76.0 METABOLIC DYSFUNCTION-ASSOCIATED STEATOTIC LIVER DISEASE (MASLD): ICD-10-CM

## 2025-07-15 DIAGNOSIS — K76.0 METABOLIC DYSFUNCTION-ASSOCIATED STEATOTIC LIVER DISEASE (MASLD): Primary | ICD-10-CM

## 2025-07-15 DIAGNOSIS — K74.00 LIVER FIBROSIS: ICD-10-CM

## 2025-07-15 DIAGNOSIS — N20.0 NEPHROLITHIASIS: ICD-10-CM

## 2025-07-15 DIAGNOSIS — I10 PRIMARY HYPERTENSION: Primary | ICD-10-CM

## 2025-07-15 DIAGNOSIS — I10 PRIMARY HYPERTENSION: ICD-10-CM

## 2025-07-15 PROBLEM — R79.89 ELEVATED FERRITIN: Status: RESOLVED | Noted: 2024-07-15 | Resolved: 2025-07-15

## 2025-07-15 PROCEDURE — 3008F BODY MASS INDEX DOCD: CPT | Mod: CPTII,S$GLB,, | Performed by: NURSE PRACTITIONER

## 2025-07-15 PROCEDURE — 99214 OFFICE O/P EST MOD 30 MIN: CPT | Mod: S$GLB,,, | Performed by: COUNSELOR

## 2025-07-15 PROCEDURE — 91200 LIVER ELASTOGRAPHY: CPT | Mod: S$GLB,,, | Performed by: NURSE PRACTITIONER

## 2025-07-15 PROCEDURE — 99999 PR PBB SHADOW E&M-EST. PATIENT-LVL III: CPT | Mod: PBBFAC,,, | Performed by: NURSE PRACTITIONER

## 2025-07-15 PROCEDURE — 3044F HG A1C LEVEL LT 7.0%: CPT | Mod: CPTII,S$GLB,, | Performed by: NURSE PRACTITIONER

## 2025-07-15 PROCEDURE — 99214 OFFICE O/P EST MOD 30 MIN: CPT | Mod: S$GLB,,, | Performed by: NURSE PRACTITIONER

## 2025-07-15 PROCEDURE — 99999 PR PBB SHADOW E&M-EST. PATIENT-LVL III: CPT | Mod: PBBFAC,,, | Performed by: COUNSELOR

## 2025-07-15 PROCEDURE — 4010F ACE/ARB THERAPY RXD/TAKEN: CPT | Mod: CPTII,S$GLB,, | Performed by: NURSE PRACTITIONER

## 2025-07-15 RX ORDER — TAMSULOSIN HYDROCHLORIDE 0.4 MG/1
0.4 CAPSULE ORAL DAILY
Qty: 30 CAPSULE | Refills: 0 | Status: SHIPPED | OUTPATIENT
Start: 2025-07-15 | End: 2025-08-14

## 2025-07-15 NOTE — PROGRESS NOTES
[facility] VISIT FOLLOW UP NOTE    PCP: Bryce Robert MD     CHIEF COMPLAINT: elevated liver enzymes, fatty liver     HPI: This is a 50 y.o. patient with PMH noted below, presenting for follow up of above    Serological workup was negative for Bolivar's, alpha-1 antitrypsin deficiency, hemochromatosis (mildly elevated ferritin but normal iron), autoimmune etiology, and viral hepatitis.   CK WNL    Prior serologic workup:   Lab Results   Component Value Date    SMOOTHMUSCAB Negative 1:40 04/29/2024    AMAIFA Negative 1:40 05/27/2024    IGGSERUM 967 05/27/2024    ANASCREEN Negative <1:80 04/29/2024    FERRITIN 245.0 07/07/2025    FESATURATED 23 04/29/2024    CERULOPLSM 26.0 05/27/2024    HEPBSAG Non-reactive 04/29/2024    HEPCAB Non-reactive 05/27/2024    HEPAIGM Negative 07/20/2020       Liver fibrosis staging:  -- fibroscan 7/2024 noted F0, S2 (kPA 6.4, )  -- fibroscan 7/2025 noted F2, S1 (kPA 7.9, )    Risk factors for fatty liver include overweight, alcohol use, HTN, preDM    Interval HPI: Presents today alone. Stopped alcohol ~4/2024 (previously ~3 servings most days of the week) but resumed 8/2024, volume varies by events going on   Some weight gain since last visit: Current wt 164 lbs, previously 140s  No SSB, intermittent fried/fast foods when on the road  Previously Walking for exercise but needs to resume  Recent fibroscan notes fibrosis, needs MRI for eval     Labs done 7/2025 show elevated transaminase levels, increased with weight gain       Lab Results   Component Value Date    ALT 57 (H) 07/07/2025    AST 29 07/07/2025    ALKPHOS 75 07/07/2025    BILITOT 0.7 07/07/2025    ALBUMIN 4.1 07/07/2025    INR 1.0 02/03/2020     05/26/2024       Abd U/S 7/2025 noted fatty liver, focal fatty sparing, no splenomegaly    + family history of liver disease: dad but unsure . + alcohol consumption currently   Social History     Substance and Sexual Activity   Alcohol Use Yes     "Comment: 3 servings per day, most days of the week, x 10+ years, stopped 4/2024, resumed 8/2024, volume/frequency vary         Immunity to Hep A and B - previously given vaccine instructions          Allergy and medication list reviewed and updated     PMHX:  has a past medical history of Keloid cicatrix and Pituitary macroadenoma.    PSHX:  has a past surgical history that includes Excision, neoplasm, pituitary, transsphenoidal approach, using computer-assisted navigation; Esophagogastroduodenoscopy (N/A, 12/3/2020); Colonoscopy (N/A, 12/3/2020); and Extracorporeal shock wave lithotripsy (Right, 6/18/2024).    FAMILY HISTORY: Updated and reviewed in EPIC    ROS:   GENERAL: Denies fatigue  CARDIOVASCULAR: Denies edema  GI: Denies abdominal pain  SKIN: Denies rash, itching   NEURO: Denies confusion, memory loss, or mood changes    PHYSICAL EXAM:   In no acute distress; alert and oriented to person, place and time  VITALS: Ht 5' 4" (1.626 m)   Wt 74.7 kg (164 lb 10.9 oz)   BMI 28.27 kg/m²   EYES: Sclerae anicteric  GI: Soft, non-tender, non-distended. No ascites.  EXTREMITIES:  No edema.  SKIN: Warm and dry. No jaundice. No telangectasias noted. No palmar erythema.  NEURO:  No asterixis.  PSYCH:  Thought and speech pattern appropriate. Behavior normal      EDUCATION:  See instructions discussed with patient in Instructions section of the After Visit Summary     ASSESSMENT & PLAN:  50 y.o. male with:  1. Liver fibrosis  Fibroscan 7/2025 noted F2, S1 - increasing fibrosis. Will compare to MRI elasto to further assess for fibrosis     2. Metabolic +/- alcohol associated steatotic liver disease   -- Labs note elevated liver enzymes   --- synthetic liver function WNL  --- Abd US 7/2025 noted steatosis   -- do not suspect any medications contributing   --- previous serological work up negative for other causes   --- Hep A and B immunity: see HPI    3. Overweight, HTN, pre DM  Body mass index is 28.27 kg/m².  Can increase " risk of fatty liver  Continue recent lifestyle changes  -- Recommendations discussed with patient:  Limit alcohol consumption, no more than 2 servings of alcohol in any day (1 serving is 5 ounces of wine, 12 ounces of beer, or 1.5 ounces of liquor) and do NOT recommend any daily alcohol use   2 Maintain weight loss   3. Low carb/sugar, high fiber and protein diet, limit your carb intake to LESS than 30-45 grams of carbs with a meal or LESS than 5-10 grams with any snack   4. Exercise, 5 days per week, 30 minutes per day, as tolerated  5. Recommend good cholesterol, blood pressure, blood sugar levels   6. Will reassess indication for Rezdiffra after mRI              Follow up for pending results of workup. Based on MRI elasto results   Orders Placed This Encounter   Procedures    MR Elastography        Thank you for allowing me to participate in the care of ERWIN Valenzuela    I spent a total of 30 minutes on the day of the visit.This includes face to face time and non-face to face time preparing to see the patient (eg, review of tests), obtaining and/or reviewing separately obtained history, documenting clinical information in the electronic or other health record, independently interpreting results and communicating results to the patient/family/caregiver, and coordinating care.         CC'ed note to:   Bryce Robert MD

## 2025-07-15 NOTE — PROCEDURES
FibroScan Transplant Hepatology(Vibration Controlled Transient Elastography)    Date/Time: 7/15/2025 1:30 PM    Performed by: Nakita Ellis NP  Authorized by: Nakita Ellis, JOHN    Diagnosis:  NAFLD    Probe:  M    Universal Protocol: Patient's identity, procedure and site were verified, confirmatory pause was performed.  Discussed procedure including risks and potential complications.  Questions answered.  Patient verbalizes understanding and wishes to proceed with VCTE.     Procedure: After providing explanations of the procedure, patient was placed in the supine position with right arm in maximum abduction to allow optimal exposure of right lateral abdomen.  Patient was briefly assessed, Testing was performed in the mid-axillary location, 50Hz Shear Wave pulses were applied and the resulting Shear Wave and Propagation Speed detected with a 3.5 MHz ultrasonic signal, using the FibroScan probe, Skin to liver capsule distance and liver parenchyma were accessed during the entire examination with the FibroScan probe, Patient was instructed to breathe normally and to abstain from sudden movements during the procedure, allowing for random measurements of liver stiffness. At least 10 Shear Waves were produced, Individual measurements of each Shear Wave were calculated.  Patient tolerated the procedure well with no complications.  Meets discharge criteria as was dismissed.  Rates pain 0 out of 10.  Patient will follow up with ordering provider to review results.    Findings  Median liver stiffness score:  7.9  CAP Reading: dB/m:  249    IQR/med %:  9  Interpretation  Fibrosis interpretation is based on medial liver stiffness - Kilopascal (kPa).    Fibrosis Stage:  F2  Steatosis interpretation is based on controlled attenuation parameter - (dB/m).    Steatosis Grade:  S1

## 2025-07-15 NOTE — PATIENT INSTRUCTIONS
1. Your Fibroscan to look for fat or scar tissue in the liver showed 30% fat in the liver, stage 2 scar tissue damage   There are 4 stages of scar tissue in the liver noted below:  Stage 0 - no scar tissue/fibrosis  Stage 1 - mild scar tissue/fibrosis  Stage 2 - moderate scar tissue/fibrosis  Stage 3 - significant scar tissue, not cirrhosis yet  Stage 4 - cirrhosis (significant scar tissue all through the liver)    Your testing suggests you are at stage 2     2. Follow up based on MRI     These things are important to improve Metabolic associated steatotic liver disease :  Limit alcohol consumption, no more than 2 serving(s) of alcohol in any day (1 serving is 5 ounces of wine, 12 ounces of beer, or 1.5 ounces of liquor) and do NOT recommend any daily alcohol use    2 Weight loss goal of 10 lbs. ok to use weight loss medications to help with weight loss from a liver standpoint if you don't have any other contraindications   3. [facility] offers benefits to patients so let me know if you want a referral to the [facility] gym. Also, [facility] has dieticians that can create a weight loss plan. If you are interested let me know and I can place a referral. If so, contact the dietician team at [facility] at email [email or web address] or call [phone].  to get scheduled. They do offer video visits   4. Avoid processed foods. No fried/fast foods. No sugary drinks or drinks with any calories.   5. Low carb/sugar and high protein diet (90 grams per day of protein).Try to limit your carb intake to LESS than  grams per day total. Use MyFitness Pal or Lose It chandra to add up your carbs through the day. Do NOT drink any beverages with calories or carbs (this can lead to high blood sugar and weight gain). The main thing to focus on is high protein, low carb)  6. Exercise, 5 days per week, 30 minutes per day, as tolerated  7. Recommend good cholesterol, blood pressure, blood  sugar levels   8. There is a new medication called Rezdiffra for the treatment of F2-F3 liver scarring due to fatty liver. It is only indicated for patients with significant scar tissue from fatty liver (will reassess after MRI)    In some people, fatty liver can progress to steatohepatitis (inflamatory fatty liver) and possibly to cirrhosis, increasing the risk for liver cancer, liver failure, and death. Therefore, the lifestyle changes are very important to decrease this risk.     Helpful website for ANGELIC/fatty liver: [email or web address].com/patient-resources/

## 2025-07-28 ENCOUNTER — PATIENT MESSAGE (OUTPATIENT)
Dept: INTERNAL MEDICINE | Facility: CLINIC | Age: 50
End: 2025-07-28
Payer: COMMERCIAL

## 2025-07-28 DIAGNOSIS — R73.9 ELEVATED BLOOD SUGAR LEVEL: ICD-10-CM

## 2025-07-28 DIAGNOSIS — K76.0 METABOLIC DYSFUNCTION-ASSOCIATED STEATOTIC LIVER DISEASE (MASLD): ICD-10-CM

## 2025-07-29 VITALS — DIASTOLIC BLOOD PRESSURE: 66 MMHG | SYSTOLIC BLOOD PRESSURE: 99 MMHG

## 2025-07-29 NOTE — TELEPHONE ENCOUNTER
Pt states that he would like to get a repeat CMP and A1c to see if his levels have improved.     Pended repeat CMP and A1c labs from chart review, please advise dx details etc.

## 2025-07-31 ENCOUNTER — HOSPITAL ENCOUNTER (OUTPATIENT)
Dept: RADIOLOGY | Facility: HOSPITAL | Age: 50
Discharge: HOME | End: 2025-07-31
Attending: NURSE PRACTITIONER
Payer: COMMERCIAL

## 2025-07-31 ENCOUNTER — RESULTS FOLLOW-UP (OUTPATIENT)
Dept: INTERNAL MEDICINE | Facility: CLINIC | Age: 50
End: 2025-07-31
Payer: COMMERCIAL

## 2025-07-31 DIAGNOSIS — K76.0 METABOLIC DYSFUNCTION-ASSOCIATED STEATOTIC LIVER DISEASE (MASLD): ICD-10-CM

## 2025-07-31 DIAGNOSIS — K74.00 LIVER FIBROSIS: ICD-10-CM

## 2025-07-31 PROCEDURE — 76391 MR ELASTOGRAPHY: CPT | Mod: 26,,, | Performed by: STUDENT IN AN ORGANIZED HEALTH CARE EDUCATION/TRAINING PROGRAM

## 2025-07-31 PROCEDURE — 76391 MR ELASTOGRAPHY: CPT | Mod: TC

## 2025-08-03 ENCOUNTER — TELEPHONE (OUTPATIENT)
Dept: PHARMACY | Facility: CLINIC | Age: 50
End: 2025-08-03
Payer: COMMERCIAL

## 2025-08-04 ENCOUNTER — TELEPHONE (OUTPATIENT)
Dept: HEPATOLOGY | Facility: CLINIC | Age: 50
End: 2025-08-04
Payer: COMMERCIAL

## 2025-08-04 NOTE — TELEPHONE ENCOUNTER
[facility]/Population Health Chart Review & Patient Outreach Details For Medication Adherence Project    Reason for Outreach Encounter: 3rd Party payor non-compliance report ([employer], [employer], C, etc)  2.  Patient Outreach Method: Reviewed Patient Chart  3.   Medication in question: olmesartan   LAST FILLED: 6/23/25 for 90 day supply  Hypertension Medications              olmesartan (BENICAR) 20 MG tablet Take 1 tablet (20 mg total) by mouth once daily.              4.  Reviewed and or Updates Made To: Patient Chart  5. Outreach Outcomes and/or actions taken: Patient filled medication and is on track to be adherent

## 2025-08-04 NOTE — TELEPHONE ENCOUNTER
I spoke with patient and msg from NP Scroggs relayed.  MRI scheduled 7/27/26 and f/u visit schedueld 7/30/26 per patient's request.

## 2025-08-04 NOTE — TELEPHONE ENCOUNTER
----- Message from Nakita Ellis NP sent at 8/4/2025  9:07 AM CDT -----  Results of MRI sent to pt in [facility]. Please schedule f/u with me in 1 year with MRI elasto a few days before. Thanks !  ----- Message -----  From: Interface, Rad Results In  Sent: 8/1/2025  11:49 AM CDT  To: Nakita Ellis NP

## 2025-08-05 ENCOUNTER — OFFICE VISIT (OUTPATIENT)
Dept: CARDIOLOGY | Facility: CLINIC | Age: 50
End: 2025-08-05
Payer: COMMERCIAL

## 2025-08-05 VITALS
SYSTOLIC BLOOD PRESSURE: 110 MMHG | BODY MASS INDEX: 27.55 KG/M2 | WEIGHT: 160.5 LBS | HEART RATE: 60 BPM | DIASTOLIC BLOOD PRESSURE: 77 MMHG

## 2025-08-05 DIAGNOSIS — I10 PRIMARY HYPERTENSION: Primary | ICD-10-CM

## 2025-08-05 DIAGNOSIS — E66.3 OVERWEIGHT (BMI 25.0-29.9): ICD-10-CM

## 2025-08-05 DIAGNOSIS — E78.2 MIXED HYPERLIPIDEMIA: ICD-10-CM

## 2025-08-05 PROCEDURE — 4010F ACE/ARB THERAPY RXD/TAKEN: CPT | Mod: CPTII,S$GLB,, | Performed by: INTERNAL MEDICINE

## 2025-08-05 PROCEDURE — 1159F MED LIST DOCD IN RCRD: CPT | Mod: CPTII,S$GLB,, | Performed by: INTERNAL MEDICINE

## 2025-08-05 PROCEDURE — 99999 PR PBB SHADOW E&M-EST. PATIENT-LVL III: CPT | Mod: PBBFAC,,, | Performed by: INTERNAL MEDICINE

## 2025-08-05 PROCEDURE — G2211 COMPLEX E/M VISIT ADD ON: HCPCS | Mod: S$GLB,,, | Performed by: INTERNAL MEDICINE

## 2025-08-05 PROCEDURE — 3078F DIAST BP <80 MM HG: CPT | Mod: CPTII,S$GLB,, | Performed by: INTERNAL MEDICINE

## 2025-08-05 PROCEDURE — 99214 OFFICE O/P EST MOD 30 MIN: CPT | Mod: S$GLB,,, | Performed by: INTERNAL MEDICINE

## 2025-08-05 PROCEDURE — 3044F HG A1C LEVEL LT 7.0%: CPT | Mod: CPTII,S$GLB,, | Performed by: INTERNAL MEDICINE

## 2025-08-05 PROCEDURE — 3074F SYST BP LT 130 MM HG: CPT | Mod: CPTII,S$GLB,, | Performed by: INTERNAL MEDICINE

## 2025-08-05 PROCEDURE — 3008F BODY MASS INDEX DOCD: CPT | Mod: CPTII,S$GLB,, | Performed by: INTERNAL MEDICINE

## 2025-08-05 NOTE — PROGRESS NOTES
HISTORY:    50-year-old male with a h/o hypertension  presenting for follow-up.     At initial eval noted a long h/o hypertension. Started on amlodipine-olmesartan and developed le swelling and Bps were low w some light headedness. Switched to olmesartan and had controlled Bps. Developed a kidney stone and stopped olmesartan. He reports improvement in weight through diet modifications and Bps have been controlled on home log over the last month.     The patient denies any symptoms of chest pain, shortness of breath, or dyspnea on exertion.    Activity levels moderate. Works as a . Walks 2 miles/day.     The patient denies any previous history of myocardial infarction, coronary artery disease, peripheral arterial disease, stroke, congestive heart failure, or cardiomyopathy. Non-smoker. Dad had had some CV ds at a young age. Not certain.    Pt from [place] and used to live in [place]. Moved here in '09.    PHYSICAL EXAM:    Vitals:    08/05/25 1359   BP: 110/77   Pulse: 60     NAD, A+Ox3.  No jvd, no bruit.  RRR nml s1,s2. No murmurs.  CTA B no wheezes or crackles.  No edema.    LABS/STUDIES (imaging reviewed during clinic visit):    May 2024 CBC and July 2025 CMP normal.  2025 /HDL 44//.  LPA 14.  APO B 115.  2024 /HDL 40//.  2025 and 2024 BNP normal.    EKG February 2025 demonstrates sinus rhythm with no Q-waves or ST changes.    Holter 2024 demonstrates sinus rhythm with no significant ectopy or arrhythmia.  3 reported episodes of palpitations corresponding with sinus rhythm.    TTE 2024 normal LV size and function with EF of 55-60%.  Normal diastology.  CVP 8.    Coronary calcium score 2024 Agatston score of 0.     Venous ultrasound 2025 no evidence of DVT bilaterally.      ASSESSMENT & PLAN:    1. Primary hypertension    2. Mixed hyperlipidemia    3. Overweight (BMI 25.0-29.9)        Significant chronic condition to be followed longitudinally with following  long term treatment plan.          No CV symptoms. Coronary calcium score 0.     Bps controlled off meds at this time.     LDL improved compared to '24, but elevated ApoB. Calcium score 0. Pt prefers not to be on statin tx.     We discussed the importance of diet modifications and instituting an exercise regimen.    Follow up in about 2 years (around 8/5/2027).      Gavin Sullivan MD

## 2025-08-08 DIAGNOSIS — N20.0 NEPHROLITHIASIS: ICD-10-CM

## 2025-08-11 RX ORDER — TAMSULOSIN HYDROCHLORIDE 0.4 MG/1
0.4 CAPSULE ORAL DAILY
Qty: 90 CAPSULE | Refills: 1 | Status: SHIPPED | OUTPATIENT
Start: 2025-08-11 | End: 2025-09-10

## (undated) DEVICE — SPONGE NEURO 1/4X1/4

## (undated) DEVICE — SEE MEDLINE ITEM 156905

## (undated) DEVICE — DRAPE THYROID WITH ARMBOARD

## (undated) DEVICE — SUCTION COAGULATOR 10FR 6IN

## (undated) DEVICE — DRESSING TELFA STRL 4X3 LF

## (undated) DEVICE — CONTAINER SPECIMEN STRL 4OZ

## (undated) DEVICE — BLADE SURG CARBON STEEL SZ11

## (undated) DEVICE — KIT FIBRIN SEALANT EVICEL 5 ML

## (undated) DEVICE — SUT CTD VICRYL BR CR/SH VIL

## (undated) DEVICE — DRAPE OPMI STERILE

## (undated) DEVICE — DURAPREP SURG SCRUB 26ML

## (undated) DEVICE — KIT SEAL HEMSTAT EVICEL 35CM

## (undated) DEVICE — TUBE FRAZIER 5MM 2FT SOFT TIP

## (undated) DEVICE — HEMOSTAT SURGICEL 4X8IN

## (undated) DEVICE — RUBBERBAND STERILE 3X1/8IN

## (undated) DEVICE — CORD BIPOLAR 12 FOOT

## (undated) DEVICE — ELECTRODE REM PLYHSV RETURN 9

## (undated) DEVICE — DRESSING SURGICAL 1/2X1/2

## (undated) DEVICE — SPONGE PATTY SURGICAL .5X3IN

## (undated) DEVICE — APPLICATOR Q-TIPS BULK 3 INCH

## (undated) DEVICE — DRAPE STERI-DRAPE 1000 17X11IN

## (undated) DEVICE — DRESSING SURGICAL 3/4X3/4

## (undated) DEVICE — PINS SKULL ADULT MAYFIELD
Type: IMPLANTABLE DEVICE | Site: CRANIAL | Status: NON-FUNCTIONAL
Removed: 2020-02-10

## (undated) DEVICE — DRESSING TRANS 4X4 TEGADERM